# Patient Record
Sex: FEMALE | Race: WHITE | NOT HISPANIC OR LATINO
[De-identification: names, ages, dates, MRNs, and addresses within clinical notes are randomized per-mention and may not be internally consistent; named-entity substitution may affect disease eponyms.]

---

## 2015-05-30 RX ORDER — METOPROLOL TARTRATE 50 MG
1 TABLET ORAL
Qty: 0 | Refills: 0 | DISCHARGE
Start: 2015-05-30

## 2015-05-30 RX ORDER — LEVOTHYROXINE SODIUM 125 MCG
1 TABLET ORAL
Qty: 0 | Refills: 0 | DISCHARGE
Start: 2015-05-30

## 2015-05-30 RX ORDER — DILTIAZEM HCL 120 MG
1 CAPSULE, EXT RELEASE 24 HR ORAL
Qty: 0 | Refills: 0 | DISCHARGE
Start: 2015-05-30

## 2015-05-30 RX ORDER — PANTOPRAZOLE SODIUM 20 MG/1
1 TABLET, DELAYED RELEASE ORAL
Qty: 0 | Refills: 0 | DISCHARGE
Start: 2015-05-30

## 2017-02-07 ENCOUNTER — APPOINTMENT (OUTPATIENT)
Dept: CARDIOLOGY | Facility: CLINIC | Age: 82
End: 2017-02-07

## 2017-02-07 ENCOUNTER — NON-APPOINTMENT (OUTPATIENT)
Age: 82
End: 2017-02-07

## 2017-02-07 VITALS
DIASTOLIC BLOOD PRESSURE: 88 MMHG | HEIGHT: 55 IN | BODY MASS INDEX: 31.47 KG/M2 | OXYGEN SATURATION: 94 % | SYSTOLIC BLOOD PRESSURE: 165 MMHG | WEIGHT: 136 LBS | HEART RATE: 66 BPM

## 2017-03-06 ENCOUNTER — MEDICATION RENEWAL (OUTPATIENT)
Age: 82
End: 2017-03-06

## 2017-03-13 ENCOUNTER — RX RENEWAL (OUTPATIENT)
Age: 82
End: 2017-03-13

## 2017-04-20 ENCOUNTER — APPOINTMENT (OUTPATIENT)
Dept: CARDIOLOGY | Facility: CLINIC | Age: 82
End: 2017-04-20

## 2017-04-20 ENCOUNTER — NON-APPOINTMENT (OUTPATIENT)
Age: 82
End: 2017-04-20

## 2017-04-20 VITALS
BODY MASS INDEX: 32.4 KG/M2 | HEIGHT: 55 IN | HEART RATE: 62 BPM | WEIGHT: 140 LBS | SYSTOLIC BLOOD PRESSURE: 160 MMHG | DIASTOLIC BLOOD PRESSURE: 82 MMHG | OXYGEN SATURATION: 91 %

## 2017-05-04 ENCOUNTER — APPOINTMENT (OUTPATIENT)
Dept: CARDIOLOGY | Facility: CLINIC | Age: 82
End: 2017-05-04

## 2017-07-24 ENCOUNTER — APPOINTMENT (OUTPATIENT)
Dept: CARDIOLOGY | Facility: CLINIC | Age: 82
End: 2017-07-24

## 2017-07-24 VITALS
WEIGHT: 143 LBS | DIASTOLIC BLOOD PRESSURE: 90 MMHG | HEIGHT: 55 IN | SYSTOLIC BLOOD PRESSURE: 157 MMHG | HEART RATE: 67 BPM | BODY MASS INDEX: 33.09 KG/M2 | OXYGEN SATURATION: 91 %

## 2017-10-03 ENCOUNTER — APPOINTMENT (OUTPATIENT)
Dept: CARDIOLOGY | Facility: CLINIC | Age: 82
End: 2017-10-03
Payer: MEDICARE

## 2017-10-03 VITALS
WEIGHT: 142 LBS | SYSTOLIC BLOOD PRESSURE: 167 MMHG | HEIGHT: 55 IN | DIASTOLIC BLOOD PRESSURE: 76 MMHG | HEART RATE: 67 BPM | BODY MASS INDEX: 32.86 KG/M2 | OXYGEN SATURATION: 94 %

## 2017-10-03 PROCEDURE — 99214 OFFICE O/P EST MOD 30 MIN: CPT

## 2017-10-13 ENCOUNTER — MEDICATION RENEWAL (OUTPATIENT)
Age: 82
End: 2017-10-13

## 2017-11-15 ENCOUNTER — APPOINTMENT (OUTPATIENT)
Dept: CARDIOLOGY | Facility: CLINIC | Age: 82
End: 2017-11-15
Payer: MEDICARE

## 2017-11-15 PROCEDURE — 93306 TTE W/DOPPLER COMPLETE: CPT

## 2017-12-12 ENCOUNTER — APPOINTMENT (OUTPATIENT)
Dept: CARDIOLOGY | Facility: CLINIC | Age: 82
End: 2017-12-12
Payer: MEDICARE

## 2017-12-12 VITALS
WEIGHT: 140 LBS | HEIGHT: 55 IN | BODY MASS INDEX: 32.4 KG/M2 | SYSTOLIC BLOOD PRESSURE: 185 MMHG | OXYGEN SATURATION: 92 % | DIASTOLIC BLOOD PRESSURE: 79 MMHG | HEART RATE: 68 BPM

## 2017-12-12 DIAGNOSIS — I35.9 NONRHEUMATIC AORTIC VALVE DISORDER, UNSPECIFIED: ICD-10-CM

## 2017-12-12 PROCEDURE — 99215 OFFICE O/P EST HI 40 MIN: CPT

## 2018-01-03 ENCOUNTER — EMERGENCY (EMERGENCY)
Facility: HOSPITAL | Age: 83
LOS: 1 days | Discharge: HOME | End: 2018-01-03
Attending: EMERGENCY MEDICINE | Admitting: EMERGENCY MEDICINE
Payer: MEDICARE

## 2018-01-03 VITALS
RESPIRATION RATE: 20 BRPM | HEART RATE: 76 BPM | DIASTOLIC BLOOD PRESSURE: 55 MMHG | OXYGEN SATURATION: 94 % | TEMPERATURE: 98 F | SYSTOLIC BLOOD PRESSURE: 154 MMHG

## 2018-01-03 VITALS
HEIGHT: 55 IN | OXYGEN SATURATION: 97 % | DIASTOLIC BLOOD PRESSURE: 75 MMHG | TEMPERATURE: 98 F | RESPIRATION RATE: 16 BRPM | SYSTOLIC BLOOD PRESSURE: 189 MMHG | HEART RATE: 94 BPM | WEIGHT: 134.04 LBS

## 2018-01-03 DIAGNOSIS — Z95.1 PRESENCE OF AORTOCORONARY BYPASS GRAFT: Chronic | ICD-10-CM

## 2018-01-03 LAB
ALBUMIN SERPL ELPH-MCNC: 3.9 G/DL — SIGNIFICANT CHANGE UP (ref 3.3–5)
ALP SERPL-CCNC: 111 U/L — SIGNIFICANT CHANGE UP (ref 30–120)
ALT FLD-CCNC: 22 U/L DA — SIGNIFICANT CHANGE UP (ref 10–60)
ANION GAP SERPL CALC-SCNC: 6 MMOL/L — SIGNIFICANT CHANGE UP (ref 5–17)
APTT BLD: 36.4 SEC — SIGNIFICANT CHANGE UP (ref 27.5–37.4)
AST SERPL-CCNC: 22 U/L — SIGNIFICANT CHANGE UP (ref 10–40)
BASOPHILS # BLD AUTO: 0 K/UL — SIGNIFICANT CHANGE UP (ref 0–0.2)
BASOPHILS NFR BLD AUTO: 0.5 % — SIGNIFICANT CHANGE UP (ref 0–2)
BILIRUB SERPL-MCNC: 0.6 MG/DL — SIGNIFICANT CHANGE UP (ref 0.2–1.2)
BUN SERPL-MCNC: 19 MG/DL — SIGNIFICANT CHANGE UP (ref 7–23)
CALCIUM SERPL-MCNC: 9.3 MG/DL — SIGNIFICANT CHANGE UP (ref 8.4–10.5)
CHLORIDE SERPL-SCNC: 106 MMOL/L — SIGNIFICANT CHANGE UP (ref 96–108)
CK MB CFR SERPL CALC: 1.6 NG/ML — SIGNIFICANT CHANGE UP (ref 0–3.6)
CK SERPL-CCNC: 64 U/L — SIGNIFICANT CHANGE UP (ref 26–192)
CK SERPL-CCNC: 67 U/L — SIGNIFICANT CHANGE UP (ref 26–192)
CO2 SERPL-SCNC: 29 MMOL/L — SIGNIFICANT CHANGE UP (ref 22–31)
CREAT SERPL-MCNC: 1.15 MG/DL — SIGNIFICANT CHANGE UP (ref 0.5–1.3)
D DIMER BLD IA.RAPID-MCNC: <150 NG/ML DDU — SIGNIFICANT CHANGE UP
EOSINOPHIL # BLD AUTO: 0.1 K/UL — SIGNIFICANT CHANGE UP (ref 0–0.5)
EOSINOPHIL NFR BLD AUTO: 1.7 % — SIGNIFICANT CHANGE UP (ref 0–6)
GLUCOSE SERPL-MCNC: 134 MG/DL — HIGH (ref 70–99)
HCT VFR BLD CALC: 40.1 % — SIGNIFICANT CHANGE UP (ref 34.5–45)
HGB BLD-MCNC: 12.3 G/DL — SIGNIFICANT CHANGE UP (ref 11.5–15.5)
INR BLD: 1.64 RATIO — HIGH (ref 0.88–1.16)
LIDOCAIN IGE QN: 119 U/L — SIGNIFICANT CHANGE UP (ref 73–393)
LYMPHOCYTES # BLD AUTO: 1.7 K/UL — SIGNIFICANT CHANGE UP (ref 1–3.3)
LYMPHOCYTES # BLD AUTO: 20.9 % — SIGNIFICANT CHANGE UP (ref 13–44)
MCHC RBC-ENTMCNC: 26.1 PG — LOW (ref 27–34)
MCHC RBC-ENTMCNC: 30.6 GM/DL — LOW (ref 32–36)
MCV RBC AUTO: 85.4 FL — SIGNIFICANT CHANGE UP (ref 80–100)
MONOCYTES # BLD AUTO: 0.7 K/UL — SIGNIFICANT CHANGE UP (ref 0–0.9)
MONOCYTES NFR BLD AUTO: 8.7 % — SIGNIFICANT CHANGE UP (ref 2–14)
NEUTROPHILS # BLD AUTO: 5.4 K/UL — SIGNIFICANT CHANGE UP (ref 1.8–7.4)
NEUTROPHILS NFR BLD AUTO: 68.2 % — SIGNIFICANT CHANGE UP (ref 43–77)
NT-PROBNP SERPL-SCNC: 1798 PG/ML — HIGH (ref 0–450)
PLATELET # BLD AUTO: 226 K/UL — SIGNIFICANT CHANGE UP (ref 150–400)
POTASSIUM SERPL-MCNC: 3.7 MMOL/L — SIGNIFICANT CHANGE UP (ref 3.5–5.3)
POTASSIUM SERPL-SCNC: 3.7 MMOL/L — SIGNIFICANT CHANGE UP (ref 3.5–5.3)
PROT SERPL-MCNC: 8 G/DL — SIGNIFICANT CHANGE UP (ref 6–8.3)
PROTHROM AB SERPL-ACNC: 18.1 SEC — HIGH (ref 9.8–12.7)
RBC # BLD: 4.7 M/UL — SIGNIFICANT CHANGE UP (ref 3.8–5.2)
RBC # FLD: 14 % — SIGNIFICANT CHANGE UP (ref 10.3–14.5)
SODIUM SERPL-SCNC: 141 MMOL/L — SIGNIFICANT CHANGE UP (ref 135–145)
TROPONIN I SERPL-MCNC: 0 NG/ML — LOW (ref 0.02–0.06)
TROPONIN I SERPL-MCNC: 0.01 NG/ML — LOW (ref 0.02–0.06)
WBC # BLD: 7.9 K/UL — SIGNIFICANT CHANGE UP (ref 3.8–10.5)
WBC # FLD AUTO: 7.9 K/UL — SIGNIFICANT CHANGE UP (ref 3.8–10.5)

## 2018-01-03 PROCEDURE — 80053 COMPREHEN METABOLIC PANEL: CPT

## 2018-01-03 PROCEDURE — 99285 EMERGENCY DEPT VISIT HI MDM: CPT

## 2018-01-03 PROCEDURE — 85379 FIBRIN DEGRADATION QUANT: CPT

## 2018-01-03 PROCEDURE — 84484 ASSAY OF TROPONIN QUANT: CPT

## 2018-01-03 PROCEDURE — 71046 X-RAY EXAM CHEST 2 VIEWS: CPT

## 2018-01-03 PROCEDURE — 71046 X-RAY EXAM CHEST 2 VIEWS: CPT | Mod: 26

## 2018-01-03 PROCEDURE — 82550 ASSAY OF CK (CPK): CPT

## 2018-01-03 PROCEDURE — 83690 ASSAY OF LIPASE: CPT

## 2018-01-03 PROCEDURE — 85610 PROTHROMBIN TIME: CPT

## 2018-01-03 PROCEDURE — 93005 ELECTROCARDIOGRAM TRACING: CPT

## 2018-01-03 PROCEDURE — 82553 CREATINE MB FRACTION: CPT

## 2018-01-03 PROCEDURE — 85730 THROMBOPLASTIN TIME PARTIAL: CPT

## 2018-01-03 PROCEDURE — 93010 ELECTROCARDIOGRAM REPORT: CPT

## 2018-01-03 PROCEDURE — 36415 COLL VENOUS BLD VENIPUNCTURE: CPT

## 2018-01-03 PROCEDURE — 83880 ASSAY OF NATRIURETIC PEPTIDE: CPT

## 2018-01-03 PROCEDURE — 99284 EMERGENCY DEPT VISIT MOD MDM: CPT | Mod: 25

## 2018-01-03 PROCEDURE — 85027 COMPLETE CBC AUTOMATED: CPT

## 2018-01-03 RX ORDER — SODIUM CHLORIDE 9 MG/ML
3 INJECTION INTRAMUSCULAR; INTRAVENOUS; SUBCUTANEOUS ONCE
Qty: 0 | Refills: 0 | Status: COMPLETED | OUTPATIENT
Start: 2018-01-03 | End: 2018-01-03

## 2018-01-03 RX ORDER — ASPIRIN/CALCIUM CARB/MAGNESIUM 324 MG
325 TABLET ORAL ONCE
Qty: 0 | Refills: 0 | Status: COMPLETED | OUTPATIENT
Start: 2018-01-03 | End: 2018-01-03

## 2018-01-03 RX ADMIN — SODIUM CHLORIDE 3 MILLILITER(S): 9 INJECTION INTRAMUSCULAR; INTRAVENOUS; SUBCUTANEOUS at 19:45

## 2018-01-03 NOTE — ED PROVIDER NOTE - PROGRESS NOTE DETAILS
had discussed Dr. Barnes's recommendation with pt and son, do not want to stay for second set of blood work, pt has been asymptomatic since presentation, pain on and off for days, came on again around 5:30 this evening, after discussion, pt agrees to 2nd set now, will not stay for set later

## 2018-01-03 NOTE — ED ADULT NURSE NOTE - CHPI ED SYMPTOMS NEG
no syncope/no fever/no vomiting/no nausea/no cough/no dizziness/no chills/no shortness of breath/no back pain/no chest pain

## 2018-01-03 NOTE — ED PROVIDER NOTE - MEDICAL DECISION MAKING DETAILS
89 y.o. F awaiting valve replacement surgery next week presenting with episode CP - has been having pressure on and off for days, today was very stressed, does have h/o anxiety and panic attacks as well - on arrival to ED, asymptomatic - pt had 2 sets enzymes negative - pt refused 6hr set, allowed for 2hr - will call her cardiologist in the morning

## 2018-01-03 NOTE — ED PROVIDER NOTE - PMH
Atrial fibrillation, unspecified    CAD (coronary artery disease)  S/P CABG 1999  Dyslipidemia    Gastritis    Gastroesophageal reflux disease without esophagitis    Gastrointestinal hemorrhage, unspecified gastritis, unspecified gastrointestinal hemorrhage type    Hyperlipidemia    Hypertension    Hypothyroidism    Obesity

## 2018-01-03 NOTE — ED PROVIDER NOTE - OBJECTIVE STATEMENT
89 y/o F presents to the ED fo intermittent chest discomfort and tightness x several days. Denies any discomfort right now. Thinks that it may be related to nervousness regarding valve surgery she will be having next week-cardiologist Dr. Craig at [facility] advised her 3 weeks ago that she needed a new valve. Notes exertional dyspnea but denies any SOB right now. Notes nausea after eating dinner at times. Notes that swelling in legs is chronic. Denies fevers, cough, rhinorrhea, dizziness, vomiting, abdominal pain. PSHx; bypass (20 years ago), 2 stents (2 years ago) Takes Xarelto. Nonsmoker. Denies ETOH usage.

## 2018-01-03 NOTE — ED ADULT NURSE REASSESSMENT NOTE - ANCILLARY STATUS
awaiting lab draw/awaiting radiology
lab results pending/cardiovascular tests pending
lab results pending/radiology results pending
physician at bedside/DR ROLDAN AT BEDSIDE DISCUSSES POC

## 2018-01-11 ENCOUNTER — OUTPATIENT (OUTPATIENT)
Dept: OUTPATIENT SERVICES | Facility: HOSPITAL | Age: 83
LOS: 1 days | End: 2018-01-11
Payer: MEDICARE

## 2018-01-11 VITALS
RESPIRATION RATE: 18 BRPM | SYSTOLIC BLOOD PRESSURE: 164 MMHG | OXYGEN SATURATION: 97 % | WEIGHT: 134.04 LBS | HEART RATE: 63 BPM | DIASTOLIC BLOOD PRESSURE: 71 MMHG | HEIGHT: 55 IN | TEMPERATURE: 98 F

## 2018-01-11 DIAGNOSIS — Z86.010 PERSONAL HISTORY OF COLONIC POLYPS: Chronic | ICD-10-CM

## 2018-01-11 DIAGNOSIS — Z95.1 PRESENCE OF AORTOCORONARY BYPASS GRAFT: Chronic | ICD-10-CM

## 2018-01-11 DIAGNOSIS — K62.9 DISEASE OF ANUS AND RECTUM, UNSPECIFIED: Chronic | ICD-10-CM

## 2018-01-11 DIAGNOSIS — Z90.710 ACQUIRED ABSENCE OF BOTH CERVIX AND UTERUS: Chronic | ICD-10-CM

## 2018-01-11 DIAGNOSIS — I35.0 NONRHEUMATIC AORTIC (VALVE) STENOSIS: ICD-10-CM

## 2018-01-11 LAB
ALBUMIN SERPL ELPH-MCNC: 4.7 G/DL — SIGNIFICANT CHANGE UP (ref 3.3–5)
ALP SERPL-CCNC: 101 U/L — SIGNIFICANT CHANGE UP (ref 40–120)
ALT FLD-CCNC: 13 U/L RC — SIGNIFICANT CHANGE UP (ref 10–45)
ANION GAP SERPL CALC-SCNC: 12 MMOL/L — SIGNIFICANT CHANGE UP (ref 5–17)
AST SERPL-CCNC: 19 U/L — SIGNIFICANT CHANGE UP (ref 10–40)
BILIRUB SERPL-MCNC: 0.6 MG/DL — SIGNIFICANT CHANGE UP (ref 0.2–1.2)
BUN SERPL-MCNC: 19 MG/DL — SIGNIFICANT CHANGE UP (ref 7–23)
CALCIUM SERPL-MCNC: 9.7 MG/DL — SIGNIFICANT CHANGE UP (ref 8.4–10.5)
CHLORIDE SERPL-SCNC: 99 MMOL/L — SIGNIFICANT CHANGE UP (ref 96–108)
CO2 SERPL-SCNC: 30 MMOL/L — SIGNIFICANT CHANGE UP (ref 22–31)
CREAT SERPL-MCNC: 0.95 MG/DL — SIGNIFICANT CHANGE UP (ref 0.5–1.3)
GLUCOSE SERPL-MCNC: 119 MG/DL — HIGH (ref 70–99)
HCT VFR BLD CALC: 40.6 % — SIGNIFICANT CHANGE UP (ref 34.5–45)
HGB BLD-MCNC: 12.6 G/DL — SIGNIFICANT CHANGE UP (ref 11.5–15.5)
MCHC RBC-ENTMCNC: 26.5 PG — LOW (ref 27–34)
MCHC RBC-ENTMCNC: 31 GM/DL — LOW (ref 32–36)
MCV RBC AUTO: 85.4 FL — SIGNIFICANT CHANGE UP (ref 80–100)
PLATELET # BLD AUTO: 252 K/UL — SIGNIFICANT CHANGE UP (ref 150–400)
POTASSIUM SERPL-MCNC: 4 MMOL/L — SIGNIFICANT CHANGE UP (ref 3.5–5.3)
POTASSIUM SERPL-SCNC: 4 MMOL/L — SIGNIFICANT CHANGE UP (ref 3.5–5.3)
PROT SERPL-MCNC: 8.4 G/DL — HIGH (ref 6–8.3)
RBC # BLD: 4.76 M/UL — SIGNIFICANT CHANGE UP (ref 3.8–5.2)
RBC # FLD: 13.7 % — SIGNIFICANT CHANGE UP (ref 10.3–14.5)
SODIUM SERPL-SCNC: 141 MMOL/L — SIGNIFICANT CHANGE UP (ref 135–145)
WBC # BLD: 9 K/UL — SIGNIFICANT CHANGE UP (ref 3.8–10.5)
WBC # FLD AUTO: 9 K/UL — SIGNIFICANT CHANGE UP (ref 3.8–10.5)

## 2018-01-11 PROCEDURE — C1817: CPT

## 2018-01-11 PROCEDURE — 80053 COMPREHEN METABOLIC PANEL: CPT

## 2018-01-11 PROCEDURE — 93460 R&L HRT ART/VENTRICLE ANGIO: CPT

## 2018-01-11 PROCEDURE — 85027 COMPLETE CBC AUTOMATED: CPT

## 2018-01-11 PROCEDURE — 93005 ELECTROCARDIOGRAM TRACING: CPT

## 2018-01-11 PROCEDURE — 93460 R&L HRT ART/VENTRICLE ANGIO: CPT | Mod: 26

## 2018-01-11 PROCEDURE — C1894: CPT

## 2018-01-11 PROCEDURE — 93010 ELECTROCARDIOGRAM REPORT: CPT

## 2018-01-11 PROCEDURE — C1769: CPT

## 2018-01-11 PROCEDURE — C1887: CPT

## 2018-01-11 RX ORDER — LABETALOL HCL 100 MG
10 TABLET ORAL ONCE
Qty: 0 | Refills: 0 | Status: COMPLETED | OUTPATIENT
Start: 2018-01-11 | End: 2018-01-11

## 2018-01-11 RX ORDER — SIMVASTATIN 20 MG/1
1 TABLET, FILM COATED ORAL
Qty: 0 | Refills: 0 | COMMUNITY

## 2018-01-11 RX ADMIN — Medication 10 MILLIGRAM(S): at 14:15

## 2018-01-11 NOTE — H&P CARDIOLOGY - PSH
Rectal mass  removal in 6/2015  S/P CABG (coronary artery bypass graft)  in 1999 H/O abdominal hysterectomy    History of colon polyps  removal 2014  Rectal mass  removal in 6/2015  S/P CABG (coronary artery bypass graft)  in 1999

## 2018-01-11 NOTE — H&P CARDIOLOGY - HISTORY OF PRESENT ILLNESS
89-year-old white female who is status post CABG in 1989 with a LIMA graft to LAD and vein grafts to the other vesselS, HTN, HLD, Hypothyroidism, Chronic Atrial fibrillation and moderate aortic stenosis on echocardiogram from a year and half ago, Removal of Rectal mass in 6/15. She had a stress test in June of 2011 that was normal.     Cardiac catheterization in 2015 showed normal ejection fraction. She had a 90% distal left main, 100% ostial LAD, 90% circumflex 85% RCA. The LIMA graft to the LAD was patent and the vein grafts to the OM and RCA were closed. She underwent drug-eluting stents to the RCA and circumflex arteries. By echocardiogram 7/14 she has moderate aortic stenosis. A repeat echocardiogram performed 3/16 demonstrated ejection fraction of 66%. There was moderate aortic stenosis with a peak gradient of 32 and valve area 1.1 cmÂ². Showed mild MR and moderate TR with a PA pressure of 40. Most recent echo performed 5/17 demonstrates an ejection fraction of 57%. Distal moderate aortic stenosis. It is now moderate to severe TR with estimated PA pressure 62.    The patient had a repeat echocardiogram performed 11/17. This now shows severe aortic stenosis with stage III diastolic dysfunction and acentric LVH. She has mild pulmonary hypertension.     The patient has noted a slight increase in shortness of breath. She has no chest pain or episodes of dizziness lightheadedness.Pt was referred for Cardiac Cath as a pre-op for AVR.

## 2018-01-12 ENCOUNTER — CLINICAL ADVICE (OUTPATIENT)
Age: 83
End: 2018-01-12

## 2018-01-16 ENCOUNTER — APPOINTMENT (OUTPATIENT)
Dept: CARDIOTHORACIC SURGERY | Facility: CLINIC | Age: 83
End: 2018-01-16
Payer: MEDICARE

## 2018-01-16 ENCOUNTER — NON-APPOINTMENT (OUTPATIENT)
Age: 83
End: 2018-01-16

## 2018-01-16 VITALS — HEIGHT: 55 IN

## 2018-01-16 DIAGNOSIS — Z82.49 FAMILY HISTORY OF ISCHEMIC HEART DISEASE AND OTHER DISEASES OF THE CIRCULATORY SYSTEM: ICD-10-CM

## 2018-01-16 DIAGNOSIS — I35.0 NONRHEUMATIC AORTIC (VALVE) STENOSIS: ICD-10-CM

## 2018-01-16 DIAGNOSIS — Z80.9 FAMILY HISTORY OF MALIGNANT NEOPLASM, UNSPECIFIED: ICD-10-CM

## 2018-01-16 PROCEDURE — 99205 OFFICE O/P NEW HI 60 MIN: CPT

## 2018-01-16 PROCEDURE — 94010 BREATHING CAPACITY TEST: CPT

## 2018-01-16 PROCEDURE — 93000 ELECTROCARDIOGRAM COMPLETE: CPT

## 2018-01-16 RX ORDER — MULTIVIT-MIN/FOLIC/VIT K/LYCOP 400-300MCG
25 MCG TABLET ORAL DAILY
Refills: 0 | Status: ACTIVE | COMMUNITY

## 2018-01-25 ENCOUNTER — APPOINTMENT (OUTPATIENT)
Dept: CARDIOLOGY | Facility: CLINIC | Age: 83
End: 2018-01-25

## 2018-01-25 ENCOUNTER — OUTPATIENT (OUTPATIENT)
Dept: OUTPATIENT SERVICES | Facility: HOSPITAL | Age: 83
LOS: 1 days | End: 2018-01-25
Payer: MEDICARE

## 2018-01-25 DIAGNOSIS — I35.9 NONRHEUMATIC AORTIC VALVE DISORDER, UNSPECIFIED: ICD-10-CM

## 2018-01-25 DIAGNOSIS — Z90.710 ACQUIRED ABSENCE OF BOTH CERVIX AND UTERUS: Chronic | ICD-10-CM

## 2018-01-25 DIAGNOSIS — R07.9 CHEST PAIN, UNSPECIFIED: ICD-10-CM

## 2018-01-25 DIAGNOSIS — K62.9 DISEASE OF ANUS AND RECTUM, UNSPECIFIED: Chronic | ICD-10-CM

## 2018-01-25 DIAGNOSIS — Z95.1 PRESENCE OF AORTOCORONARY BYPASS GRAFT: Chronic | ICD-10-CM

## 2018-01-25 DIAGNOSIS — Z86.010 PERSONAL HISTORY OF COLONIC POLYPS: Chronic | ICD-10-CM

## 2018-01-25 PROCEDURE — 75572 CT HRT W/3D IMAGE: CPT | Mod: 26

## 2018-01-25 PROCEDURE — 75572 CT HRT W/3D IMAGE: CPT

## 2018-02-09 ENCOUNTER — APPOINTMENT (OUTPATIENT)
Dept: ULTRASOUND IMAGING | Facility: HOSPITAL | Age: 83
End: 2018-02-09

## 2018-02-09 ENCOUNTER — OUTPATIENT (OUTPATIENT)
Dept: OUTPATIENT SERVICES | Facility: HOSPITAL | Age: 83
LOS: 1 days | End: 2018-02-09
Payer: MEDICARE

## 2018-02-09 VITALS
SYSTOLIC BLOOD PRESSURE: 143 MMHG | TEMPERATURE: 99 F | WEIGHT: 134.92 LBS | HEART RATE: 60 BPM | RESPIRATION RATE: 20 BRPM | DIASTOLIC BLOOD PRESSURE: 82 MMHG | HEIGHT: 55 IN | OXYGEN SATURATION: 98 %

## 2018-02-09 DIAGNOSIS — Z90.710 ACQUIRED ABSENCE OF BOTH CERVIX AND UTERUS: Chronic | ICD-10-CM

## 2018-02-09 DIAGNOSIS — I48.91 UNSPECIFIED ATRIAL FIBRILLATION: ICD-10-CM

## 2018-02-09 DIAGNOSIS — Z01.818 ENCOUNTER FOR OTHER PREPROCEDURAL EXAMINATION: ICD-10-CM

## 2018-02-09 DIAGNOSIS — I35.0 NONRHEUMATIC AORTIC (VALVE) STENOSIS: ICD-10-CM

## 2018-02-09 DIAGNOSIS — Z95.1 PRESENCE OF AORTOCORONARY BYPASS GRAFT: Chronic | ICD-10-CM

## 2018-02-09 DIAGNOSIS — Z86.010 PERSONAL HISTORY OF COLONIC POLYPS: Chronic | ICD-10-CM

## 2018-02-09 DIAGNOSIS — K62.9 DISEASE OF ANUS AND RECTUM, UNSPECIFIED: Chronic | ICD-10-CM

## 2018-02-09 LAB
ANION GAP SERPL CALC-SCNC: 15 MMOL/L — SIGNIFICANT CHANGE UP (ref 5–17)
BLD GP AB SCN SERPL QL: NEGATIVE — SIGNIFICANT CHANGE UP
BUN SERPL-MCNC: 19 MG/DL — SIGNIFICANT CHANGE UP (ref 7–23)
CALCIUM SERPL-MCNC: 9.3 MG/DL — SIGNIFICANT CHANGE UP (ref 8.4–10.5)
CHLORIDE SERPL-SCNC: 101 MMOL/L — SIGNIFICANT CHANGE UP (ref 96–108)
CO2 SERPL-SCNC: 24 MMOL/L — SIGNIFICANT CHANGE UP (ref 22–31)
CREAT SERPL-MCNC: 0.96 MG/DL — SIGNIFICANT CHANGE UP (ref 0.5–1.3)
GLUCOSE SERPL-MCNC: 97 MG/DL — SIGNIFICANT CHANGE UP (ref 70–99)
HBA1C BLD-MCNC: 5.9 % — HIGH (ref 4–5.6)
HCT VFR BLD CALC: 34.3 % — LOW (ref 34.5–45)
HGB BLD-MCNC: 10.3 G/DL — LOW (ref 11.5–15.5)
MCHC RBC-ENTMCNC: 24.7 PG — LOW (ref 27–34)
MCHC RBC-ENTMCNC: 30 GM/DL — LOW (ref 32–36)
MCV RBC AUTO: 82.3 FL — SIGNIFICANT CHANGE UP (ref 80–100)
MRSA PCR RESULT.: SIGNIFICANT CHANGE UP
PLATELET # BLD AUTO: 241 K/UL — SIGNIFICANT CHANGE UP (ref 150–400)
POTASSIUM SERPL-MCNC: 4.5 MMOL/L — SIGNIFICANT CHANGE UP (ref 3.5–5.3)
POTASSIUM SERPL-SCNC: 4.5 MMOL/L — SIGNIFICANT CHANGE UP (ref 3.5–5.3)
RBC # BLD: 4.17 M/UL — SIGNIFICANT CHANGE UP (ref 3.8–5.2)
RBC # FLD: 15.1 % — HIGH (ref 10.3–14.5)
RH IG SCN BLD-IMP: POSITIVE — SIGNIFICANT CHANGE UP
S AUREUS DNA NOSE QL NAA+PROBE: SIGNIFICANT CHANGE UP
SODIUM SERPL-SCNC: 140 MMOL/L — SIGNIFICANT CHANGE UP (ref 135–145)
WBC # BLD: 8.36 K/UL — SIGNIFICANT CHANGE UP (ref 3.8–10.5)
WBC # FLD AUTO: 8.36 K/UL — SIGNIFICANT CHANGE UP (ref 3.8–10.5)

## 2018-02-09 PROCEDURE — 71045 X-RAY EXAM CHEST 1 VIEW: CPT

## 2018-02-09 PROCEDURE — 71045 X-RAY EXAM CHEST 1 VIEW: CPT | Mod: 26

## 2018-02-09 PROCEDURE — 87640 STAPH A DNA AMP PROBE: CPT

## 2018-02-09 PROCEDURE — G0463: CPT

## 2018-02-09 PROCEDURE — 93880 EXTRACRANIAL BILAT STUDY: CPT

## 2018-02-09 PROCEDURE — 83036 HEMOGLOBIN GLYCOSYLATED A1C: CPT

## 2018-02-09 PROCEDURE — 86901 BLOOD TYPING SEROLOGIC RH(D): CPT

## 2018-02-09 PROCEDURE — 86850 RBC ANTIBODY SCREEN: CPT

## 2018-02-09 PROCEDURE — 85027 COMPLETE CBC AUTOMATED: CPT

## 2018-02-09 PROCEDURE — 86900 BLOOD TYPING SEROLOGIC ABO: CPT

## 2018-02-09 PROCEDURE — 93880 EXTRACRANIAL BILAT STUDY: CPT | Mod: 26

## 2018-02-09 PROCEDURE — 87641 MR-STAPH DNA AMP PROBE: CPT

## 2018-02-09 PROCEDURE — 80048 BASIC METABOLIC PNL TOTAL CA: CPT

## 2018-02-09 RX ORDER — VANCOMYCIN HCL 1 G
1000 VIAL (EA) INTRAVENOUS ONCE
Qty: 0 | Refills: 0 | Status: DISCONTINUED | OUTPATIENT
Start: 2018-02-14 | End: 2018-02-14

## 2018-02-09 RX ORDER — SODIUM CHLORIDE 9 MG/ML
3 INJECTION INTRAMUSCULAR; INTRAVENOUS; SUBCUTANEOUS EVERY 8 HOURS
Qty: 0 | Refills: 0 | Status: DISCONTINUED | OUTPATIENT
Start: 2018-02-14 | End: 2018-02-14

## 2018-02-09 NOTE — H&P PST ADULT - ASSESSMENT
89-year-old white female with PMHx of CAD prior cardiac stents ( PCI -RCA & Cx in 2015), CABG in 1989 with a LIMA graft to LAD and vein grafts to the other vessels, HTN, HLD, Hypothyroidism, Chronic Atrial fibrillation on xarelto  and moderate aortic stenosis on echocardiogram from a year and half ago, Removal of Rectal mass in 6/15. She had a stress test in June of 2011 that was normal. The patient has noted a slight increase in shortness of breath x 2 months She has no chest pain or episodes of dizziness lightheadedness .Pt had Cardiac Cath  on jan 11 2018 as a pre-op for AVR., The patient had a repeat echocardiogram performed 11/17. This now shows severe aortic stenosis with stage III diastolic dysfunction and acentric LVH. She has mild pulmonary hypertension. Seen & evaluated by Dr Byrnes & Dr Anton & primary cards Dr Alonso Craig & is now referred  for Replace Aortic Valve Perq femoral Artery Approach om 2/14/18.

## 2018-02-09 NOTE — H&P PST ADULT - PROBLEM SELECTOR PLAN 1
Replace Aortic Valve Perq femoral Artery Approach om 2/14/18.  Afib on xarelto will take last dose on 2/11/18  cbc, BMP, T & S, HgbA1c, MRSA swab, CXR, carotid dopplers.

## 2018-02-09 NOTE — H&P PST ADULT - PROBLEM SELECTOR PLAN 2
As per [facility] project suggests Recommendation is 3 days prior procedure pt is taking her last dose on 2/11 at 10 am  Record ID is # [id number]

## 2018-02-09 NOTE — H&P PST ADULT - HISTORY OF PRESENT ILLNESS
89-year-old white female who is status post CABG in 1989 with a LIMA graft to LAD and vein grafts to the other vesselS, HTN, HLD, Hypothyroidism, Chronic Atrial fibrillation and moderate aortic stenosis on echocardiogram from a year and half ago, Removal of Rectal mass in 6/15. She had a stress test in June of 2011 that was normal.     Cardiac catheterization in 2015 showed normal ejection fraction. She had a 90% distal left main, 100% ostial LAD, 90% circumflex 85% RCA. The LIMA graft to the LAD was patent and the vein grafts to the OM and RCA were closed. She underwent drug-eluting stents to the RCA and circumflex arteries. By echocardiogram 7/14 she has moderate aortic stenosis. A repeat echocardiogram performed 3/16 demonstrated ejection fraction of 66%. There was moderate aortic stenosis with a peak gradient of 32 and valve area 1.1 cmÂ². Showed mild MR and moderate TR with a PA pressure of 40. Most recent echo performed 5/17 demonstrates an ejection fraction of 57%. Distal moderate aortic stenosis. It is now moderate to severe TR with estimated PA pressure 62.    The patient had a repeat echocardiogram performed 11/17. This now shows severe aortic stenosis with stage III diastolic dysfunction and acentric LVH. She has mild pulmonary hypertension.     The patient has noted a slight increase in shortness of breath. She has no chest pain or episodes of dizziness lightheadedness.Pt was referred for Cardiac Cath as a pre-op for AVR. 89-year-old white female who is status post CABG in 1989 with a LIMA graft to LAD and vein grafts to the other vesselS, HTN, HLD, Hypothyroidism, Chronic Atrial fibrillation and moderate aortic stenosis on echocardiogram from a year and half ago, Removal of Rectal mass in 6/15. She had a stress test in June of 2011 that was normal.     The patient had a repeat echocardiogram performed 11/17. This now shows severe aortic stenosis with stage III diastolic dysfunction and acentric LVH. She has mild pulmonary hypertension.     The patient has noted a slight increase in shortness of breath. She has no chest pain or episodes of dizziness lightheadedness.Pt was referred for Cardiac Cath as a pre-op for AVR. 89-year-old white female with PMHx of CAD prior cardiac stents ( PCI -RCA & Cx in 2015), CABG in 1989 with a LIMA graft to LAD and vein grafts to the other vessels, HTN, HLD, Hypothyroidism, Chronic Atrial fibrillation on xarelto  and moderate aortic stenosis on echocardiogram from a year and half ago, Removal of Rectal mass in 6/15. She had a stress test in June of 2011 that was normal. The patient has noted a slight increase in shortness of breath x 2 months She has no chest pain or episodes of dizziness lightheadedness .Pt had Cardiac Cath  on jan 11 2018 as a pre-op for AVR., The patient had a repeat echocardiogram performed 11/17. This now shows severe aortic stenosis with stage III diastolic dysfunction and acentric LVH. She has mild pulmonary hypertension. Seen & evaluated by Dr Byrnes & Dr Anton & primary cards Dr Alonso Craig & is now referred  for Replace Aortic Valve Perq femoral Artery Approach om 2/14/18.

## 2018-02-09 NOTE — H&P PST ADULT - PSH
H/O abdominal hysterectomy    History of colon polyps  removal 2014  Rectal mass  removal in 6/2015  S/P CABG (coronary artery bypass graft)  in 1999

## 2018-02-09 NOTE — H&P PST ADULT - NEGATIVE OPHTHALMOLOGIC SYMPTOMS
no diplopia/no blurred vision L/no lacrimation R/no photophobia/no lacrimation L/no blurred vision R

## 2018-02-09 NOTE — H&P PST ADULT - NSANTHOSAYNRD_GEN_A_CORE
No. BOBBI screening performed.  STOP BANG Legend: 0-2 = LOW Risk; 3-4 = INTERMEDIATE Risk; 5-8 = HIGH Risk

## 2018-02-13 ENCOUNTER — OUTPATIENT (OUTPATIENT)
Dept: OUTPATIENT SERVICES | Facility: HOSPITAL | Age: 83
LOS: 1 days | End: 2018-02-13
Payer: MEDICARE

## 2018-02-13 DIAGNOSIS — Z95.1 PRESENCE OF AORTOCORONARY BYPASS GRAFT: Chronic | ICD-10-CM

## 2018-02-13 DIAGNOSIS — Z90.710 ACQUIRED ABSENCE OF BOTH CERVIX AND UTERUS: Chronic | ICD-10-CM

## 2018-02-13 DIAGNOSIS — K62.9 DISEASE OF ANUS AND RECTUM, UNSPECIFIED: Chronic | ICD-10-CM

## 2018-02-13 DIAGNOSIS — Z86.010 PERSONAL HISTORY OF COLONIC POLYPS: Chronic | ICD-10-CM

## 2018-02-13 PROCEDURE — 86923 COMPATIBILITY TEST ELECTRIC: CPT

## 2018-02-14 ENCOUNTER — INPATIENT (INPATIENT)
Facility: HOSPITAL | Age: 83
LOS: 4 days | Discharge: HOME | DRG: 267 | End: 2018-02-19
Attending: THORACIC SURGERY (CARDIOTHORACIC VASCULAR SURGERY) | Admitting: THORACIC SURGERY (CARDIOTHORACIC VASCULAR SURGERY)
Payer: MEDICARE

## 2018-02-14 ENCOUNTER — APPOINTMENT (OUTPATIENT)
Dept: CARDIOTHORACIC SURGERY | Facility: HOSPITAL | Age: 83
End: 2018-02-14

## 2018-02-14 VITALS
WEIGHT: 136.69 LBS | SYSTOLIC BLOOD PRESSURE: 156 MMHG | OXYGEN SATURATION: 96 % | HEIGHT: 55 IN | DIASTOLIC BLOOD PRESSURE: 82 MMHG | HEART RATE: 73 BPM | RESPIRATION RATE: 18 BRPM | TEMPERATURE: 98 F

## 2018-02-14 DIAGNOSIS — Z95.1 PRESENCE OF AORTOCORONARY BYPASS GRAFT: Chronic | ICD-10-CM

## 2018-02-14 DIAGNOSIS — Z90.710 ACQUIRED ABSENCE OF BOTH CERVIX AND UTERUS: Chronic | ICD-10-CM

## 2018-02-14 DIAGNOSIS — Z86.010 PERSONAL HISTORY OF COLONIC POLYPS: Chronic | ICD-10-CM

## 2018-02-14 DIAGNOSIS — I35.0 NONRHEUMATIC AORTIC (VALVE) STENOSIS: ICD-10-CM

## 2018-02-14 DIAGNOSIS — K62.9 DISEASE OF ANUS AND RECTUM, UNSPECIFIED: Chronic | ICD-10-CM

## 2018-02-14 LAB
ALBUMIN SERPL ELPH-MCNC: 3.5 G/DL — SIGNIFICANT CHANGE UP (ref 3.3–5)
ALP SERPL-CCNC: 80 U/L — SIGNIFICANT CHANGE UP (ref 40–120)
ALT FLD-CCNC: 10 U/L RC — SIGNIFICANT CHANGE UP (ref 10–45)
ANION GAP SERPL CALC-SCNC: 14 MMOL/L — SIGNIFICANT CHANGE UP (ref 5–17)
APTT BLD: 30.5 SEC — SIGNIFICANT CHANGE UP (ref 27.5–37.4)
AST SERPL-CCNC: 20 U/L — SIGNIFICANT CHANGE UP (ref 10–40)
BASOPHILS # BLD AUTO: 0 K/UL — SIGNIFICANT CHANGE UP (ref 0–0.2)
BASOPHILS NFR BLD AUTO: 0.1 % — SIGNIFICANT CHANGE UP (ref 0–2)
BILIRUB SERPL-MCNC: 1 MG/DL — SIGNIFICANT CHANGE UP (ref 0.2–1.2)
BUN SERPL-MCNC: 21 MG/DL — SIGNIFICANT CHANGE UP (ref 7–23)
CALCIUM SERPL-MCNC: 8 MG/DL — LOW (ref 8.4–10.5)
CHLORIDE SERPL-SCNC: 105 MMOL/L — SIGNIFICANT CHANGE UP (ref 96–108)
CK MB BLD-MCNC: 10.2 % — HIGH (ref 0–3.5)
CK MB CFR SERPL CALC: 5.9 NG/ML — HIGH (ref 0–3.8)
CK SERPL-CCNC: 58 U/L — SIGNIFICANT CHANGE UP (ref 25–170)
CO2 SERPL-SCNC: 24 MMOL/L — SIGNIFICANT CHANGE UP (ref 22–31)
CREAT SERPL-MCNC: 0.85 MG/DL — SIGNIFICANT CHANGE UP (ref 0.5–1.3)
EOSINOPHIL # BLD AUTO: 0 K/UL — SIGNIFICANT CHANGE UP (ref 0–0.5)
EOSINOPHIL NFR BLD AUTO: 0 % — SIGNIFICANT CHANGE UP (ref 0–6)
GAS PNL BLDA: SIGNIFICANT CHANGE UP
GLUCOSE BLDC GLUCOMTR-MCNC: 139 MG/DL — HIGH (ref 70–99)
GLUCOSE SERPL-MCNC: 193 MG/DL — HIGH (ref 70–99)
HCT VFR BLD CALC: 29.7 % — LOW (ref 34.5–45)
HGB BLD-MCNC: 9.2 G/DL — LOW (ref 11.5–15.5)
INR BLD: 1.16 RATIO — SIGNIFICANT CHANGE UP (ref 0.88–1.16)
LYMPHOCYTES # BLD AUTO: 1.4 K/UL — SIGNIFICANT CHANGE UP (ref 1–3.3)
LYMPHOCYTES # BLD AUTO: 12.8 % — LOW (ref 13–44)
MCHC RBC-ENTMCNC: 25.5 PG — LOW (ref 27–34)
MCHC RBC-ENTMCNC: 31.1 GM/DL — LOW (ref 32–36)
MCV RBC AUTO: 81.9 FL — SIGNIFICANT CHANGE UP (ref 80–100)
MONOCYTES # BLD AUTO: 0.5 K/UL — SIGNIFICANT CHANGE UP (ref 0–0.9)
MONOCYTES NFR BLD AUTO: 4.3 % — SIGNIFICANT CHANGE UP (ref 2–14)
NEUTROPHILS # BLD AUTO: 9.2 K/UL — HIGH (ref 1.8–7.4)
NEUTROPHILS NFR BLD AUTO: 82.8 % — HIGH (ref 43–77)
PLATELET # BLD AUTO: 194 K/UL — SIGNIFICANT CHANGE UP (ref 150–400)
POTASSIUM SERPL-MCNC: 3.9 MMOL/L — SIGNIFICANT CHANGE UP (ref 3.5–5.3)
POTASSIUM SERPL-SCNC: 3.9 MMOL/L — SIGNIFICANT CHANGE UP (ref 3.5–5.3)
PROT SERPL-MCNC: 6.2 G/DL — SIGNIFICANT CHANGE UP (ref 6–8.3)
PROTHROM AB SERPL-ACNC: 12.7 SEC — SIGNIFICANT CHANGE UP (ref 9.8–12.7)
RBC # BLD: 3.62 M/UL — LOW (ref 3.8–5.2)
RBC # FLD: 14.6 % — HIGH (ref 10.3–14.5)
SODIUM SERPL-SCNC: 143 MMOL/L — SIGNIFICANT CHANGE UP (ref 135–145)
TROPONIN T SERPL-MCNC: 0.06 NG/ML — SIGNIFICANT CHANGE UP (ref 0–0.06)
WBC # BLD: 11.1 K/UL — HIGH (ref 3.8–10.5)
WBC # FLD AUTO: 11.1 K/UL — HIGH (ref 3.8–10.5)

## 2018-02-14 PROCEDURE — 99223 1ST HOSP IP/OBS HIGH 75: CPT

## 2018-02-14 PROCEDURE — 93355 ECHO TRANSESOPHAGEAL (TEE): CPT

## 2018-02-14 PROCEDURE — 33361 REPLACE AORTIC VALVE PERQ: CPT | Mod: 62,Q0

## 2018-02-14 PROCEDURE — 93010 ELECTROCARDIOGRAM REPORT: CPT

## 2018-02-14 PROCEDURE — 33361 REPLACE AORTIC VALVE PERQ: CPT

## 2018-02-14 RX ORDER — LEVOTHYROXINE SODIUM 125 MCG
100 TABLET ORAL DAILY
Qty: 0 | Refills: 0 | Status: DISCONTINUED | OUTPATIENT
Start: 2018-02-14 | End: 2018-02-19

## 2018-02-14 RX ORDER — ATORVASTATIN CALCIUM 80 MG/1
40 TABLET, FILM COATED ORAL AT BEDTIME
Qty: 0 | Refills: 0 | Status: DISCONTINUED | OUTPATIENT
Start: 2018-02-14 | End: 2018-02-19

## 2018-02-14 RX ORDER — INSULIN LISPRO 100/ML
VIAL (ML) SUBCUTANEOUS
Qty: 0 | Refills: 0 | Status: DISCONTINUED | OUTPATIENT
Start: 2018-02-14 | End: 2018-02-15

## 2018-02-14 RX ORDER — METOPROLOL TARTRATE 50 MG
25 TABLET ORAL EVERY 12 HOURS
Qty: 0 | Refills: 0 | Status: DISCONTINUED | OUTPATIENT
Start: 2018-02-14 | End: 2018-02-15

## 2018-02-14 RX ORDER — DOCUSATE SODIUM 100 MG
100 CAPSULE ORAL THREE TIMES A DAY
Qty: 0 | Refills: 0 | Status: DISCONTINUED | OUTPATIENT
Start: 2018-02-14 | End: 2018-02-15

## 2018-02-14 RX ORDER — SODIUM CHLORIDE 9 MG/ML
1000 INJECTION INTRAMUSCULAR; INTRAVENOUS; SUBCUTANEOUS
Qty: 0 | Refills: 0 | Status: DISCONTINUED | OUTPATIENT
Start: 2018-02-14 | End: 2018-02-15

## 2018-02-14 RX ORDER — CEFUROXIME AXETIL 250 MG
1500 TABLET ORAL EVERY 8 HOURS
Qty: 0 | Refills: 0 | Status: COMPLETED | OUTPATIENT
Start: 2018-02-14 | End: 2018-02-15

## 2018-02-14 RX ORDER — POTASSIUM CHLORIDE 20 MEQ
20 PACKET (EA) ORAL
Qty: 0 | Refills: 0 | Status: COMPLETED | OUTPATIENT
Start: 2018-02-14 | End: 2018-02-14

## 2018-02-14 RX ORDER — ASPIRIN/CALCIUM CARB/MAGNESIUM 324 MG
325 TABLET ORAL DAILY
Qty: 0 | Refills: 0 | Status: DISCONTINUED | OUTPATIENT
Start: 2018-02-14 | End: 2018-02-15

## 2018-02-14 RX ORDER — HEPARIN SODIUM 5000 [USP'U]/ML
5000 INJECTION INTRAVENOUS; SUBCUTANEOUS EVERY 8 HOURS
Qty: 0 | Refills: 0 | Status: DISCONTINUED | OUTPATIENT
Start: 2018-02-14 | End: 2018-02-15

## 2018-02-14 RX ORDER — INSULIN LISPRO 100/ML
VIAL (ML) SUBCUTANEOUS AT BEDTIME
Qty: 0 | Refills: 0 | Status: DISCONTINUED | OUTPATIENT
Start: 2018-02-14 | End: 2018-02-15

## 2018-02-14 RX ORDER — NICARDIPINE HYDROCHLORIDE 30 MG/1
3 CAPSULE, EXTENDED RELEASE ORAL
Qty: 40 | Refills: 0 | Status: DISCONTINUED | OUTPATIENT
Start: 2018-02-14 | End: 2018-02-15

## 2018-02-14 RX ORDER — PANTOPRAZOLE SODIUM 20 MG/1
40 TABLET, DELAYED RELEASE ORAL DAILY
Qty: 0 | Refills: 0 | Status: DISCONTINUED | OUTPATIENT
Start: 2018-02-14 | End: 2018-02-15

## 2018-02-14 RX ADMIN — ATORVASTATIN CALCIUM 40 MILLIGRAM(S): 80 TABLET, FILM COATED ORAL at 22:06

## 2018-02-14 RX ADMIN — Medication 100 MILLIGRAM(S): at 14:20

## 2018-02-14 RX ADMIN — Medication 20 MILLIEQUIVALENT(S): at 12:26

## 2018-02-14 RX ADMIN — Medication 2: at 17:14

## 2018-02-14 RX ADMIN — Medication 100 MILLIGRAM(S): at 22:06

## 2018-02-14 RX ADMIN — NICARDIPINE HYDROCHLORIDE 15 MG/HR: 30 CAPSULE, EXTENDED RELEASE ORAL at 12:30

## 2018-02-14 RX ADMIN — Medication 100 MILLIGRAM(S): at 14:46

## 2018-02-14 RX ADMIN — Medication 20 MILLIEQUIVALENT(S): at 12:27

## 2018-02-14 RX ADMIN — HEPARIN SODIUM 5000 UNIT(S): 5000 INJECTION INTRAVENOUS; SUBCUTANEOUS at 14:46

## 2018-02-14 RX ADMIN — Medication 25 MILLIGRAM(S): at 15:40

## 2018-02-14 RX ADMIN — PANTOPRAZOLE SODIUM 40 MILLIGRAM(S): 20 TABLET, DELAYED RELEASE ORAL at 12:27

## 2018-02-14 RX ADMIN — HEPARIN SODIUM 5000 UNIT(S): 5000 INJECTION INTRAVENOUS; SUBCUTANEOUS at 22:06

## 2018-02-14 RX ADMIN — Medication 20 MILLIEQUIVALENT(S): at 14:47

## 2018-02-14 RX ADMIN — SODIUM CHLORIDE 3 MILLILITER(S): 9 INJECTION INTRAMUSCULAR; INTRAVENOUS; SUBCUTANEOUS at 10:54

## 2018-02-14 RX ADMIN — Medication 325 MILLIGRAM(S): at 12:26

## 2018-02-14 NOTE — PROGRESS NOTE ADULT - SUBJECTIVE AND OBJECTIVE BOX
This patient has been implanted with a Transcatheter Aortic Valve Implant/Mitraclip under the following NCT/ALEX:    TAVR:    Commercial Implant NCT [id number], ALEX N/A and will be placed into the TVT Registry.

## 2018-02-14 NOTE — CONSULT NOTE ADULT - SUBJECTIVE AND OBJECTIVE BOX
CHIEF COMPLAINT: Patient is a 89y old  Female who presents with a chief complaint of " I am have valve surgery " (2018 06:00)      HPI:  89-year-old white female with PMHx of CAD prior cardiac stents ( PCI -RCA & Cx in ), CABG in  with a LIMA graft to LAD and vein grafts to the other vessels, HTN, HLD, Hypothyroidism, Chronic Atrial fibrillation on xarelto, severe AS, Removal of Rectal mass in 6/15 now with progressive dyspnea on exertion. She now is planning on having a TAVR this morning. Denies any chest pain, PND, orthopnea, near syncope, syncope, lower extremity edema, stroke like symptoms. Her dyspnea is at baseline. She is nervous about the procedure.       EKG: AF    REVIEW OF SYSTEMS:   All other review of systems are negative unless indicated above    PAST MEDICAL & SURGICAL HISTORY:  Gastrointestinal hemorrhage, unspecified gastritis, unspecified gastrointestinal hemorrhage type  Gastritis  Atrial fibrillation, unspecified  Hyperlipidemia  Gastroesophageal reflux disease without esophagitis  Hypothyroidism  Obesity  Dyslipidemia  Hypertension  CAD (coronary artery disease): S/P CABG   History of colon polyps: removal   H/O abdominal hysterectomy  Rectal mass: removal in 2015  S/P CABG (coronary artery bypass graft): in       SOCIAL HISTORY:  No tobacco, ethanol, or drug abuse.    FAMILY HISTORY:  No pertinent family history in first degree relatives    No family history of acute MI or sudden cardiac death.    MEDICATIONS  (STANDING):  sodium chloride 0.9% lock flush 3 milliLiter(s) IV Push every 8 hours  vancomycin  IVPB 1000 milliGRAM(s) IV Intermittent once    MEDICATIONS  (PRN):      Allergies    amoxicillin (Unknown)    Intolerances        Home meds:  Home Medications:  Calcium 500+D oral tablet, chewable: 1 tab(s) orally once a day (2018 05:56)  diltiazem 240 mg/24 hours oral capsule, extended release: 1 cap(s) orally once a day (2018 05:56)  levothyroxine 100 mcg (0.1 mg) oral tablet: 1 tab(s) orally once a day (2018 05:56)  Metoprolol Tartrate 25 mg oral tablet: 1 tab(s) orally 2 times a day (2018 05:56)  Ocuvite oral tablet: 1 tab(s) orally once a day (2018 05:56)  pantoprazole 20 mg oral delayed release tablet: 1 tab(s) orally once a day (2018 05:56)  simvastatin 20 mg oral tablet: 1 tab(s) orally once a day (at bedtime)    home (2018 05:56)  Vitamin B-12 100 mcg oral tablet: 1 tab(s) orally once a day (2018 05:56)  Vitamin D3 1000 intl units oral tablet: 1 tab(s) orally once a day (2018 05:56)  Xarelto 20 mg oral tablet: 1 tab(s) orally once a day (in the evening) last dose will be  at 10 am  (2018 05:56)        VITAL SIGNS:   Vital Signs Last 24 Hrs  T(C): 36.9 (2018 05:56), Max: 36.9 (2018 05:56)  T(F): 98.4 (2018 05:56), Max: 98.4 (2018 05:56)  HR: 73 (2018 05:56) (73 - 73)  BP: 156/82 (2018 05:56) (156/82 - 156/82)  BP(mean): --  RR: 18 (2018 05:56) (18 - 18)  SpO2: 96% (2018 05:56) (96% - 96%)    I&O's Summary      On Exam:     Constitutional: NAD, awake and alert, well-developed  HEENT: Moist Mucous Membranes, Anicteric  Pulmonary: Decreased breath sounds b/l. No rales, crackles or wheeze appreciated.   Cardiovascular: IRRR, S1 and no S2, 3/6 late peaking SM  Gastrointestinal: Bowel Sounds present, soft, nontender.   Lymph: No peripheral edema. No lymphadenopathy.  Skin: No visible rashes or ulcers.  Psych:  Mood & affect appropriate for situation.     LABS: All Labs Reviewed:                Blood Culture:         RADIOLOGY:  < from: Xray Chest 1 View AP/PA (18 @ 13:49) >    EXAM:  XR CHEST AP OR PA 1V                            PROCEDURE DATE:  2018            INTERPRETATION:  History: TAVR      Heart and mediastinum: Poststernotomy with mild cardiomegaly    Lungs, pleura, and airways: Trace left effusion and/orpleural   thickening. No pneumothorax or consolidation    Bones and soft tissues: The bones and soft tissues are unremarkable.    Impression:    Trace left effusion and/or pleural thickening. No pneumothorax or   consolidation    No significant change from January 3, 2018                    TREVIN KNUTSON M.D., ATTENDING RADIOLOGIST  This document has been electronically signed. 2018  2:25PM                < end of copied text >    < from: Cardiac Cath Lab - Adult (18 @ 12:33) >    [facility]  Department of Cardiology  [address] [place] [address]  [phone]  Cath Lab Report -- Comprehensive Report  Patient: RON ABREU  Study date: 2018  Account number: [id number]  MR number: [id number]  : 10/06/1926  Gender: Female  Race: W  Case Physician(s):  Nelson Flores M.D.  Fellow:  Lorenzo Joya M.D.  Referring Physician:  Alonso Craig M.D.  INDICATIONS: Aortic valve stenosis.  HISTORY: Aortic valve: history of severe stenosis. The patient has a  history of coronary artery disease. The patient has hypertension. PRIOR  CARDIOVASCULAR PROCEDURES: Coronary bypass.  PROCEDURE:  --  Right heart catheterization.  --  Left heart catheterization.  --  Left coronary angiography.  --  Right coronary angiography.  --  Sonosite - Diagnostic.  TECHNIQUE: The risks and alternatives of the procedures and conscious  sedation were explained to the patient and informed consent was obtained.  Cardiac catheterization performed electively.  Local anesthetic given. Right heart catheterization. The procedure was  performed utilizing a 7 FR [place] catheter under fluoroscopic guidance.  Measurements of pressures were obtained. Left heart catheterization. A 5FR  AL1 EXPO catheter was utilized. After recording ascending aortic pressure,  the catheter was advanced across the aortic valve and left ventricular  pressure was recorded. Left coronary artery angiography. The vessel was  injected utilizing a 5FR EBU4.0 catheter. Right coronary artery  angiography. The vessel was injected utilizing a 4FR SRC catheter.  Sonosite - Diagnostic. RADIATION EXPOSURE: 13 min.  CONTRAST GIVEN: Omnipaque 50 ml.  MEDICATIONS GIVEN: Fentanyl, 25 mcg, IV. Midazolam, 0.5 mg, IV.  VENTRICLES: No left ventriculogram was performed.  VALVES: AORTIC VALVE: The aortic valve was evaluated by hemodynamic  measurement and fluoroscopy. The aortic valve leaflets exhibited  moderately reduced excursion and mild calcification.  CORONARY VESSELS: The coronary circulation is right dominant.  LM:   --  LM: Angiography showed minor luminal irregularities with no flow  limiting lesions.  LAD:   --  Mid LAD: There was a 100 % stenosis in the proximal third of the  vessel segment. In a second lesion, there was a discrete 90 % stenosis in  the middle third of the vessel segment, proximal to the graft anastomosis.  --  D1: There was a 60 % stenosis.  CX:   --  Ostial circumflex: There was a tubular 20 % stenosis at the site  of a prior stent, in-stent.  --  Distal circumflex: There was a 30 % stenosis.  RCA:   --  Proximal RCA: There was a tubular 40 % stenosis at the site of a  prior stent, in-stent.  GRAFTS:   --  Graft to the mid LAD: The graft was a LIMA. It was normal.  COMPLICATIONS: There were no complications.  DIAGNOSTIC IMPRESSIONS: Patent RCA and Lcx stents. Patent LIMA to LAD.  Diseased mid LAD. Severe AS  DIAGNOSTIC RECOMMENDATIONS: F/u with TAVR clinic  Prepared and signed by  Nelson Flores M.D.  Signed 2018 17:44:18  HEMODYNAMIC TABLES  Pressures:  Baseline  Pressures:  - HR: 69  Pressures:  - Rhythm:  Pressures:  -- Aortic Pressure (S/D/M): 184/81/122  Pressures:  -- Aortic Valve Splice - AO: 189/77/--  Pressures:  -- Aortic Valve Splice - LV: 200/0/--  Pressures:  -- Left Ventricle (s/edp): 207/19/--  Pressures:  -- Pulmonary Artery (S/D/M): 54/18/35  Pressures:  -- Pulmonary Capillary Wedge: 24/32/22  Pressures:  -- Right [place] (a/v/M): 15/14/11  Pressures:  -- Right Ventricle (s/edp): 56/11/--  O2 Sats:  Baseline  O2 Sats:  - HR: 69  O2 Sats:  - Rhythm:  O2 Sats:  -- AO: 11.1/92.4/13.95  O2 Sats:  -- PA: 11.1/61.9/9.34  Valves:  Baseline  Valves:  -- AORTIC: Aortic systolic ejection period: 23.40  Valves:  -- AORTIC: Aortic valve area: 0.86  Valves:  -- AORTIC: Aortic valve flow: 183.78  Valves:  -- AORTIC: Aortic valve index: 0.58  Valves:  -- AORTIC: Aortic Valve Splice - Gradient: 23.48  Outputs:  Baseline  Outputs:  -- CALCULATIONS[age]  Outputs:  -- CALCULATIONS: Body Surface Area: 1.48  Outputs:  -- CALCULATIONS: Height in cm: 140.00  Outputs:  -- CALCULATIONS: Sex: Female  Outputs:  -- CALCULATIONS: Weight in k.80  Outputs:  -- OUTPUTS: CO by Quiana: 4.30  Outputs:  -- OUTPUTS: Quiana cardiac index: 2.90  Outputs:  -- OUTPUTS: O2 consumption: 198.00  Outputs:  -- OUTPUTS: Vo2 Indexed: 133.72  Outputs:  -- RESISTANCES: Left ventricular stroke work: 75.95  Outputs:  -- RESISTANCES: Left Ventricular Stroke Work index: 51.30  Outputs:  -- RESISTANCES: Pulmonary vascular index (dsc): 358.01  Outputs:  -- RESISTANCES: Pulmonary vascular index (Wood Units): 4.48  Outputs:  -- RESISTANCES: Pulmonary vascular resistance (dsc): 241.78  Outputs:  -- RESISTANCES: Pulmonary vascular resistance (Wood Units): 3.02  Outputs:  -- RESISTANCES: PVR_SVR Ratio: 0.12  Outputs:  -- RESISTANCES: Right ventricular stroke work: 20.64  Outputs:  -- RESISTANCES: Right ventricular stroke work index: 13.94  Outputs:  -- RESISTANCES: Systemic vascular index (dsc): 3056.81  Outputs:  -- RESISTANCES: Systemic vascular index (Wood Units): 38.22  Outputs:  -- RESISTANCES: Systemic vascular resistance (dsc): 2064.43  Outputs:  -- RESISTANCES: Systemic vascular resistance (Wood Units): 25.81  Outputs:  -- RESISTANCES: Total pulmonary index (dsc): 963.86  Outputs:  -- RESISTANCES: Total pulmonary index (Wood Units): 12.05  Outputs:  -- RESISTANCES: Total pulmonary resistance (dsc): 650.95  Outputs:  -- RESISTANCES: Total pulmonary resistance (Wood Units): 8.14  Outputs:  -- RESISTANCES: Total vascular index (Wood Units): 42.01  Outputs:  -- RESISTANCES: Total vascular resistance (dsc): 2269.02  Outputs:  -- RESISTANCES: Total vascular resistance (Wood Units): 28.37  Outputs:  -- RESISTANCES: Total vascular resistance index (dsc): 3359.74  Outputs:-- RESISTANCES: TPR_TVR Ratio: 0.29  Outputs:  -- SHUNTS: Pulmonary flow: 4.30  Outputs:  -- SHUNTS: Qp Indexed: 2.90  Outputs:  -- SHUNTS: Qs Indexed: 2.90  Outputs:  -- SHUNTS: Systemic flow: 4.30    < end of copied text >

## 2018-02-14 NOTE — CONSULT NOTE ADULT - ASSESSMENT
89-year-old white female with PMHx of CAD prior cardiac stents ( PCI -RCA & Cx in 2015), CABG in 1989 with a LIMA graft to LAD and vein grafts to the other vessels, HTN, HLD, Hypothyroidism, Chronic Atrial fibrillation on xarelto, severe AS, Removal of Rectal mass in 6/15 now with progressive dyspnea on exertion now pending TAVR.   - Plan for TAVR today. Optimized as best as possible from cardiac standpoint.   - Hold AC until ok with TAVR team to resume  - Cont  statin  - Likely would benefit from ASA  - Cont Metorpolol and Cardizem at home doses  - Monitor and replete electrolytes. Keep K>4.0 and Mg>2.0.   - Further cardiac workup will depend on clinical course.   Will followup post operatively.

## 2018-02-14 NOTE — BRIEF OPERATIVE NOTE - PROCEDURE
<<-----Click on this checkbox to enter Procedure TAVR (transcatheter aortic valve replacement)  02/14/2018  29core valve via left transfemoral approach  TVP wire in place via right venous sheath  Active  JOYCE

## 2018-02-14 NOTE — BRIEF OPERATIVE NOTE - POST-OP DX
Aortic valve stenosis, etiology of cardiac valve disease unspecified  02/14/2018    Active  Aleks Jang

## 2018-02-15 DIAGNOSIS — Z95.2 PRESENCE OF PROSTHETIC HEART VALVE: ICD-10-CM

## 2018-02-15 LAB
ALBUMIN SERPL ELPH-MCNC: 3.9 G/DL — SIGNIFICANT CHANGE UP (ref 3.3–5)
ALP SERPL-CCNC: 81 U/L — SIGNIFICANT CHANGE UP (ref 40–120)
ALT FLD-CCNC: 14 U/L RC — SIGNIFICANT CHANGE UP (ref 10–45)
ANION GAP SERPL CALC-SCNC: 11 MMOL/L — SIGNIFICANT CHANGE UP (ref 5–17)
APTT BLD: 23.1 SEC — LOW (ref 27.5–37.4)
AST SERPL-CCNC: 22 U/L — SIGNIFICANT CHANGE UP (ref 10–40)
BILIRUB SERPL-MCNC: 0.6 MG/DL — SIGNIFICANT CHANGE UP (ref 0.2–1.2)
BUN SERPL-MCNC: 19 MG/DL — SIGNIFICANT CHANGE UP (ref 7–23)
CALCIUM SERPL-MCNC: 8.7 MG/DL — SIGNIFICANT CHANGE UP (ref 8.4–10.5)
CHLORIDE SERPL-SCNC: 106 MMOL/L — SIGNIFICANT CHANGE UP (ref 96–108)
CO2 SERPL-SCNC: 24 MMOL/L — SIGNIFICANT CHANGE UP (ref 22–31)
CREAT SERPL-MCNC: 0.82 MG/DL — SIGNIFICANT CHANGE UP (ref 0.5–1.3)
GLUCOSE SERPL-MCNC: 137 MG/DL — HIGH (ref 70–99)
HCT VFR BLD CALC: 29.1 % — LOW (ref 34.5–45)
HGB BLD-MCNC: 9.3 G/DL — LOW (ref 11.5–15.5)
INR BLD: 1.12 RATIO — SIGNIFICANT CHANGE UP (ref 0.88–1.16)
MCHC RBC-ENTMCNC: 26.1 PG — LOW (ref 27–34)
MCHC RBC-ENTMCNC: 31.9 GM/DL — LOW (ref 32–36)
MCV RBC AUTO: 81.9 FL — SIGNIFICANT CHANGE UP (ref 80–100)
PLATELET # BLD AUTO: 172 K/UL — SIGNIFICANT CHANGE UP (ref 150–400)
POTASSIUM SERPL-MCNC: 4.6 MMOL/L — SIGNIFICANT CHANGE UP (ref 3.5–5.3)
POTASSIUM SERPL-SCNC: 4.6 MMOL/L — SIGNIFICANT CHANGE UP (ref 3.5–5.3)
PROT SERPL-MCNC: 7 G/DL — SIGNIFICANT CHANGE UP (ref 6–8.3)
PROTHROM AB SERPL-ACNC: 12.2 SEC — SIGNIFICANT CHANGE UP (ref 9.8–12.7)
RBC # BLD: 3.55 M/UL — LOW (ref 3.8–5.2)
RBC # FLD: 14.5 % — SIGNIFICANT CHANGE UP (ref 10.3–14.5)
SODIUM SERPL-SCNC: 141 MMOL/L — SIGNIFICANT CHANGE UP (ref 135–145)
WBC # BLD: 11.4 K/UL — HIGH (ref 3.8–10.5)
WBC # FLD AUTO: 11.4 K/UL — HIGH (ref 3.8–10.5)

## 2018-02-15 PROCEDURE — 93010 ELECTROCARDIOGRAM REPORT: CPT

## 2018-02-15 PROCEDURE — 33207 INSERT HEART PM VENTRICULAR: CPT | Mod: KX

## 2018-02-15 PROCEDURE — 71045 X-RAY EXAM CHEST 1 VIEW: CPT | Mod: 26,77

## 2018-02-15 PROCEDURE — 99232 SBSQ HOSP IP/OBS MODERATE 35: CPT

## 2018-02-15 PROCEDURE — 71045 X-RAY EXAM CHEST 1 VIEW: CPT | Mod: 26

## 2018-02-15 PROCEDURE — 93306 TTE W/DOPPLER COMPLETE: CPT | Mod: 26

## 2018-02-15 RX ORDER — VANCOMYCIN HCL 1 G
1000 VIAL (EA) INTRAVENOUS ONCE
Qty: 0 | Refills: 0 | Status: COMPLETED | OUTPATIENT
Start: 2018-02-16 | End: 2018-02-16

## 2018-02-15 RX ORDER — DOCUSATE SODIUM 100 MG
100 CAPSULE ORAL THREE TIMES A DAY
Qty: 0 | Refills: 0 | Status: DISCONTINUED | OUTPATIENT
Start: 2018-02-15 | End: 2018-02-19

## 2018-02-15 RX ORDER — RIVAROXABAN 15 MG-20MG
15 KIT ORAL EVERY 24 HOURS
Qty: 0 | Refills: 0 | Status: DISCONTINUED | OUTPATIENT
Start: 2018-02-15 | End: 2018-02-15

## 2018-02-15 RX ORDER — ASPIRIN/CALCIUM CARB/MAGNESIUM 324 MG
81 TABLET ORAL DAILY
Qty: 0 | Refills: 0 | Status: DISCONTINUED | OUTPATIENT
Start: 2018-02-15 | End: 2018-02-19

## 2018-02-15 RX ORDER — ATORVASTATIN CALCIUM 80 MG/1
40 TABLET, FILM COATED ORAL AT BEDTIME
Qty: 0 | Refills: 0 | Status: DISCONTINUED | OUTPATIENT
Start: 2018-02-15 | End: 2018-02-15

## 2018-02-15 RX ORDER — ASPIRIN/CALCIUM CARB/MAGNESIUM 324 MG
81 TABLET ORAL DAILY
Qty: 0 | Refills: 0 | Status: DISCONTINUED | OUTPATIENT
Start: 2018-02-15 | End: 2018-02-15

## 2018-02-15 RX ORDER — SODIUM CHLORIDE 9 MG/ML
3 INJECTION INTRAMUSCULAR; INTRAVENOUS; SUBCUTANEOUS EVERY 8 HOURS
Qty: 0 | Refills: 0 | Status: DISCONTINUED | OUTPATIENT
Start: 2018-02-15 | End: 2018-02-19

## 2018-02-15 RX ORDER — FENTANYL CITRATE 50 UG/ML
25 INJECTION INTRAVENOUS ONCE
Qty: 0 | Refills: 0 | Status: DISCONTINUED | OUTPATIENT
Start: 2018-02-15 | End: 2018-02-15

## 2018-02-15 RX ORDER — RIVAROXABAN 15 MG-20MG
20 KIT ORAL EVERY 24 HOURS
Qty: 0 | Refills: 0 | Status: DISCONTINUED | OUTPATIENT
Start: 2018-02-15 | End: 2018-02-15

## 2018-02-15 RX ADMIN — Medication 25 MILLIGRAM(S): at 06:12

## 2018-02-15 RX ADMIN — SODIUM CHLORIDE 3 MILLILITER(S): 9 INJECTION INTRAMUSCULAR; INTRAVENOUS; SUBCUTANEOUS at 22:00

## 2018-02-15 RX ADMIN — Medication 100 MICROGRAM(S): at 06:12

## 2018-02-15 RX ADMIN — Medication 81 MILLIGRAM(S): at 13:45

## 2018-02-15 RX ADMIN — ATORVASTATIN CALCIUM 40 MILLIGRAM(S): 80 TABLET, FILM COATED ORAL at 22:49

## 2018-02-15 RX ADMIN — HEPARIN SODIUM 5000 UNIT(S): 5000 INJECTION INTRAVENOUS; SUBCUTANEOUS at 06:12

## 2018-02-15 RX ADMIN — Medication 100 MILLIGRAM(S): at 06:00

## 2018-02-15 RX ADMIN — FENTANYL CITRATE 25 MICROGRAM(S): 50 INJECTION INTRAVENOUS at 08:00

## 2018-02-15 RX ADMIN — RIVAROXABAN 20 MILLIGRAM(S): KIT at 13:45

## 2018-02-15 RX ADMIN — FENTANYL CITRATE 25 MICROGRAM(S): 50 INJECTION INTRAVENOUS at 07:45

## 2018-02-15 RX ADMIN — SODIUM CHLORIDE 3 MILLILITER(S): 9 INJECTION INTRAMUSCULAR; INTRAVENOUS; SUBCUTANEOUS at 13:46

## 2018-02-15 NOTE — PROGRESS NOTE ADULT - ASSESSMENT
89-year-old white female with PMHx of CAD prior cardiac stents ( PCI -RCA & Cx in 2015), CABG in 1989 with a LIMA graft to LAD and vein grafts to the other vessels, HTN, HLD, Hypothyroidism, Chronic Atrial fibrillation on xarelto, severe AS, Removal of Rectal mass in 6/15 now with progressive dyspnea on exertion with severe AS, now s/p TAVR.    - Tolerated procedure well. Is aware and alert, and off pressors  - Can restart systemic anticoagulation with Xarelto as per CTS  - Cont statin  - Would benefit from ASA; She seems to have excessive bruising when on aspirin in past. We will monitor this as outpatient.  - Restart Metoprolol 25 mg po bid. We can hold off on restarting Cardizem at current time. Her heart rates are controlled in AF.  - Monitor and replete electrolytes. Keep K>4.0 and Mg>2.0.   - Further cardiac workup will depend on clinical course.   - Will follow in step down unit. 89-year-old white female with PMHx of CAD prior cardiac stents ( PCI -RCA & Cx in 2015), CABG in 1989 with a LIMA graft to LAD and vein grafts to the other vessels, HTN, HLD, Hypothyroidism, Chronic Atrial fibrillation on xarelto, severe AS, Removal of Rectal mass in 6/15 now with progressive dyspnea on exertion with severe AS, now s/p TAVR.    - Tolerated procedure well. Is aware and alert, and off pressors  - Can restart systemic anticoagulation with Xarelto as per CTS  - No events on telemetry to date. Continue to monitor. Pacing wires remain in.  - Cont statin  - Would benefit from ASA; She seems to have excessive bruising when on aspirin in past. We will monitor this as outpatient.  - Restart Metoprolol 25 mg po bid. We can hold off on restarting Cardizem at current time. Her heart rates are controlled in AF.  - Monitor and replete electrolytes. Keep K>4.0 and Mg>2.0.   - Further cardiac workup will depend on clinical course.   - Will follow in step down unit.

## 2018-02-15 NOTE — PROGRESS NOTE ADULT - SUBJECTIVE AND OBJECTIVE BOX
[facility] - Kam Craig, Anaid, Magali, Roselyn, Jaylene Gomez  Office Number:  [phone]    Patient resting comfortably in bed in NAD.  Laying flat with no respiratory distress.  No complaints of chest pain, dyspnea, palpitations, PND, or orthopnea.  Tolerated procedure well.    ROS: negative unless otherwise mentioned.    Telemetry:  AF, rates controlled    MEDICATIONS  (STANDING):  aspirin Disintegrating Tablet 81 milliGRAM(s) Oral daily  atorvastatin 40 milliGRAM(s) Oral at bedtime  fentaNYL    Injectable 25 MICROGram(s) IV Push once  heparin  Injectable 5000 Unit(s) SubCutaneous every 8 hours  levothyroxine 100 MICROGram(s) Oral daily  metoprolol     tartrate 25 milliGRAM(s) Oral every 12 hours  rivaroxaban 20 milliGRAM(s) Oral every 24 hours  sodium chloride 0.9%. 1000 milliLiter(s) (20 mL/Hr) IV Continuous <Continuous>    MEDICATIONS  (PRN):      Allergies    amoxicillin (Unknown)    Intolerances        Vital Signs Last 24 Hrs  T(C): 36.6 (15 Feb 2018 08:00), Max: 37 (14 Feb 2018 19:00)  T(F): 97.9 (15 Feb 2018 08:00), Max: 98.6 (14 Feb 2018 19:00)  HR: 72 (15 Feb 2018 10:00) (44 - 95)  BP: 145/65 (15 Feb 2018 10:00) (125/58 - 151/66)  BP(mean): 93 (15 Feb 2018 10:00) (84 - 95)  RR: 23 (15 Feb 2018 10:00) (22 - 59)  SpO2: 99% (15 Feb 2018 10:00) (92% - 100%)    I&O's Summary    14 Feb 2018 07:01  -  15 Feb 2018 07:00  --------------------------------------------------------  IN: 970 mL / OUT: 600 mL / NET: 370 mL    15 Feb 2018 07:01  -  15 Feb 2018 10:55  --------------------------------------------------------  IN: 170 mL / OUT: 200 mL / NET: -30 mL        ON EXAM:    Constitutional: NAD, awake and alert, well-developed  HEENT: Moist Mucous Membranes, Anicteric  Pulmonary: Decreased breath sounds b/l. No rales, crackles or wheeze appreciated.   Cardiovascular: IRRR, S1 and no S2, 3/6 late peaking SM  Gastrointestinal: Bowel Sounds present, soft, nontender.   Lymph: No peripheral edema. No lymphadenopathy.  Skin: No visible rashes or ulcers.  Psych:  Mood & affect appropriate for situation.   LABS: All Labs Reviewed:                        9.3    11.4  )-----------( 172      ( 15 Feb 2018 00:35 )             29.1                         9.2    11.1  )-----------( 194      ( 14 Feb 2018 10:32 )             29.7     15 Feb 2018 00:35    141    |  106    |  19     ----------------------------<  137    4.6     |  24     |  0.82   14 Feb 2018 10:32    143    |  105    |  21     ----------------------------<  193    3.9     |  24     |  0.85     Ca    8.7        15 Feb 2018 00:35  Ca    8.0        14 Feb 2018 10:32    TPro  7.0    /  Alb  3.9    /  TBili  0.6    /  DBili  x      /  AST  22     /  ALT  14     /  AlkPhos  81     15 Feb 2018 00:35  TPro  6.2    /  Alb  3.5    /  TBili  1.0    /  DBili  x      /  AST  20     /  ALT  10     /  AlkPhos  80     14 Feb 2018 10:32    PT/INR - ( 15 Feb 2018 00:35 )   PT: 12.2 sec;   INR: 1.12 ratio         PTT - ( 15 Feb 2018 00:35 )  PTT:23.1 sec  CARDIAC MARKERS ( 14 Feb 2018 10:32 )  x     / 0.06 ng/mL / 58 U/L / x     / 5.9 ng/mL      Blood Culture:

## 2018-02-15 NOTE — PROGRESS NOTE ADULT - ASSESSMENT
89-year-old white female with PMHx of CAD prior cardiac stents ( PCI -RCA & Cx in 2015), CABG in 1989 with a LIMA graft to LAD and vein grafts to the other vessels, HTN, HLD, Hypothyroidism, Chronic Atrial fibrillation on xarelto  and moderate aortic stenosis on echocardiogram from a year and half ago, Removal of Rectal mass in 6/15.  reports increasin SOB/HERNADEZ ECHO reveals severe aortic stenosis with stage III diastolic dysfunction and acentric LVH, mild PAH .  Dr. Craig Cardiology Seen  2/14/18 Underwent TAVR # 29mm CORE Evolut Pro bioprosthesis via left  femoral approach .Right femoral TVP. Post operative course includes   howard cardia down to 40s resolved. Restarted on home dose  metoprolol 25 BID   2/15/18  ECHO  WDL no AR no paravalvular leak transferred to [address] [place] [address]- seen by cardiology Dr Mariee  no need to resume home cardizem at this time in rate controlled   chronic afib-  Xarelto 20 BID resumed 89-year-old white female with PMHx of CAD prior cardiac stents ( PCI -RCA & Cx in 2015), CABG in 1989 with a LIMA graft to LAD and vein grafts to the other vessels, HTN, HLD, Hypothyroidism, Chronic Atrial fibrillation on xarelto  and moderate aortic stenosis on echocardiogram from a year and half ago, Removal of Rectal mass in 6/15.  reports increasin SOB/HERNADEZ ECHO reveals severe aortic stenosis with stage III diastolic dysfunction and acentric LVH, mild PAH .  Dr. Craig Cardiology Seen  2/14/18 Underwent TAVR # 29mm CORE Evolut Pro bioprosthesis via left  femoral approach .Right femoral TVP.  Post operative course includes   howard cardia down to 40s resolved.  LBBB Restarted on home dose  metoprolol 25 BID   2/15/18  ECHO  WDL no AR no paravalvular leak transferred to [address] [place] [address]- seen by cardiology Dr Mariee  no need to resume home cardizem at this time in rate controlled   chronic afib-  Xarelto 20 BID resumed 89-year-old white female with PMHx of CAD prior cardiac stents ( PCI -RCA & Cx in 2015), CABG in 1989 with a LIMA graft to LAD and vein grafts to the other vessels, HTN, HLD, Hypothyroidism, Chronic Atrial fibrillation on xarelto  and moderate aortic stenosis on echocardiogram from a year and half ago, Removal of Rectal mass in 6/15.  reports increasin SOB/HERNADEZ ECHO reveals severe aortic stenosis with stage III diastolic dysfunction and acentric LVH, mild PAH .  Dr. Craig Cardiology Seen  2/14/18 Underwent TAVR # 29mm CORE Evolut Pro bioprosthesis via left  femoral approach .Right femoral TVP.  Post operative course includes   howard cardia down to 40s resolved.  LBBB Restarted on home dose  metoprolol 25 BID   2/15/18  ECHO  WDL no AR no paravalvular leak transferred to [address] [place] [address]- seen by cardiology Dr Mariee  no need to resume home cardizem at this time in rate controlled   chronic afib-  Xarelto 20 BID resumed   addendum: patient experienced  multiple 3- 5  sec symptomatic pauses EP called Dr Anton,Dr Byrnes and Dr Mccray at bedside.   patient underwent PPM placement LACW tolerated procedure well Xarelto on hold

## 2018-02-15 NOTE — PROGRESS NOTE ADULT - SUBJECTIVE AND OBJECTIVE BOX
Subjective  Hello OOB in chair no distress "I feel good"  VITAL SIGNS  Telemetry:  Afib 80s    Vital Signs Last 24 Hrs  T(C): 36.8 (02-15-18 @ 11:59), Max: 37 (18 @ 19:00)  T(F): 98.2 (02-15-18 @ 11:59), Max: 98.6 (18 @ 19:00)  HR: 83 (02-15-18 @ 11:59) (44 - 95)  BP: 157/81 (02-15-18 @ 11:59) (125/58 - 157/81)  RR: 20 (02-15-18 @ 11:59) (20 - 59)  SpO2: 99% (02-15-18 @ 11:59) (92% - 100%)            @ 07:01  -  02-15 @ 07:00  --------------------------------------------------------  IN: 970 mL / OUT: 600 mL / NET: 370 mL    02-15 @ 07:01  -  02-15 @ 12:32  --------------------------------------------------------  IN: 170 mL / OUT: 200 mL / NET: -30 mL    Daily Weight in k (15 Feb 2018 00:00)  CAPILLARY BLOOD GLUCOSE  POCT Blood Glucose.: 139 mg/dL (2018 12:33)        PHYSICAL EXAM        Neurology: alert and oriented x 3, nonfocal, no gross deficits    CV :    Sternal Wound :  CDI , Stable    Lungs:    Abdomen: soft, nontender, nondistended, positive bowel sounds, last bowel movement     :               Extremities:                                           Physical Therapy Rec:   Home  [  ]   Home w/ PT  [  ]  Rehab  [  ]    Discussed with Cardiothoracic Team at AM rounds. Subjective  Hello OOB in chair no distress "I feel good"  VITAL SIGNS  Telemetry:  Afib 80s    Vital Signs Last 24 Hrs  T(C): 36.8 (02-15-18 @ 11:59), Max: 37 (18 @ 19:00)  T(F): 98.2 (02-15-18 @ 11:59), Max: 98.6 (18 @ 19:00)  HR: 83 (02-15-18 @ 11:59) (44 - 95)  BP: 157/81 (02-15-18 @ 11:59) (125/58 - 157/81)  RR: 20 (02-15-18 @ 11:59) (20 - 59)  SpO2: 99% (02-15-18 @ 11:59) (92% - 100%)            @ 07:01  -  02-15 @ 07:00  --------------------------------------------------------  IN: 970 mL / OUT: 600 mL / NET: 370 mL    02-15 @ 07:01  -  02-15 @ 12:32  --------------------------------------------------------  IN: 170 mL / OUT: 200 mL / NET: -30 mL    Daily Weight in k (15 Feb 2018 00:00)  CAPILLARY BLOOD GLUCOSE  POCT Blood Glucose.: 139 mg/dL (2018 12:33)  MEDICATIONS  (STANDING):  aspirin enteric coated 81 milliGRAM(s) Oral daily  atorvastatin 40 milliGRAM(s) Oral at bedtime  docusate sodium 100 milliGRAM(s) Oral three times a day  levothyroxine 100 MICROGram(s) Oral daily  metoprolol     tartrate 25 milliGRAM(s) Oral every 12 hours  rivaroxaban 20 milliGRAM(s) Oral every 24 hours  sodium chloride 0.9% lock flush 3 milliLiter(s) IV Push every 8 hours           PHYSICAL EXAM  Neurology: alert and oriented x 3, nonfocal, no gross deficits  CV :S1 S2 IRR  Lungs:CTA  Abdomen: soft, nontender, nondistended, positive bowel sounds,   :  Voids           Extremities: B/L LE warm + Dp   Right groin CDI no hematoma ecchymosis        left Groin CDI no hematoma ecchymosis                                       Disposition to   Home  [x  ]    Discussed with Cardiothoracic Team at AM rounds. Subjective  Hello OOB in chair no distress "I feel good"  VITAL SIGNS  Telemetry:  Afib 80s    Vital Signs Last 24 Hrs  T(C): 36.8 (02-15-18 @ 11:59), Max: 37 (18 @ 19:00)  T(F): 98.2 (02-15-18 @ 11:59), Max: 98.6 (18 @ 19:00)  HR: 83 (02-15-18 @ 11:59) (44 - 95)  BP: 157/81 (02-15-18 @ 11:59) (125/58 - 157/81)  RR: 20 (02-15-18 @ 11:59) (20 - 59)  SpO2: 99% (02-15-18 @ 11:59) (92% - 100%)            @ 07:01  -  02-15 @ 07:00  --------------------------------------------------------  IN: 970 mL / OUT: 600 mL / NET: 370 mL    02-15 @ 07:01  -  02-15 @ 12:32  --------------------------------------------------------  IN: 170 mL / OUT: 200 mL / NET: -30 mL    Daily Weight in k (15 Feb 2018 00:00)  CAPILLARY BLOOD GLUCOSE  POCT Blood Glucose.: 139 mg/dL (2018 12:33)  MEDICATIONS  (STANDING):  aspirin enteric coated 81 milliGRAM(s) Oral daily  atorvastatin 40 milliGRAM(s) Oral at bedtime  docusate sodium 100 milliGRAM(s) Oral three times a day  levothyroxine 100 MICROGram(s) Oral daily  metoprolol     tartrate 25 milliGRAM(s) Oral every 12 hours  rivaroxaban 20 milliGRAM(s) Oral every 24 hours  sodium chloride 0.9% lock flush 3 milliLiter(s) IV Push every 8 hours           PHYSICAL EXAM  Neurology: alert and oriented x 3, nonfocal, no gross deficits  CV :S1 S2 IRR  Lungs:CTA  Abdomen: soft, nontender, nondistended, positive bowel sounds,   :  Voids           Extremities: B/L LE warm + Dp   Right groin CDI no hematoma ecchymosis        left Groin CDI no hematoma ecchymosis     Disposition to   Home  [x  ]    Discussed with Cardiothoracic Team at AM rounds.

## 2018-02-15 NOTE — PROGRESS NOTE ADULT - SUBJECTIVE AND OBJECTIVE BOX
*****Structural Heart Team*****    Subjective:    Patient resting comfortably in chair, offering no complaints. She had no difficulty in ambulating earlier with the assistance of a walker.    T(C): 36.9 (02-15-18 @ 13:53), Max: 37 (02-14-18 @ 19:00)  HR: 94 (02-15-18 @ 13:53) (68 - 95)  BP: 143/75 (02-15-18 @ 13:53) (125/58 - 157/81)  RR: 18 (02-15-18 @ 13:53) (18 - 59)  SpO2: 94% (02-15-18 @ 13:53) (92% - 100%)  Wt(kg): --  02-14 @ 07:01  -  02-15 @ 07:00  --------------------------------------------------------  IN: 970 mL / OUT: 600 mL / NET: 370 mL    02-15 @ 07:01  -  02-15 @ 15:13  --------------------------------------------------------  IN: 406 mL / OUT: 200 mL / NET: 206 mL      MEDICATIONS  (STANDING):  aspirin enteric coated 81 milliGRAM(s) Oral daily  atorvastatin 40 milliGRAM(s) Oral at bedtime  docusate sodium 100 milliGRAM(s) Oral three times a day  levothyroxine 100 MICROGram(s) Oral daily  metoprolol     tartrate 25 milliGRAM(s) Oral every 12 hours  rivaroxaban 20 milliGRAM(s) Oral every 24 hours  sodium chloride 0.9% lock flush 3 milliLiter(s) IV Push every 8 hours    MEDICATIONS  (PRN):      Exam:  General: A/O x3  Pulmonary: CTAB  Cor: S1S2, Irregular, No murmur noted.  ECG: AFib  Groin/Wound: Soft, NT, No hematoma noted  Abdomen: soft, NT/ND, + Bowel Sounds  Neuro: Non focal  Extremeties: 1+ Pulses B/L, no edema noted                          9.3    11.4  )-----------( 172      ( 15 Feb 2018 00:35 )             29.1   02-15    141  |  106  |  19  ----------------------------<  137<H>  4.6   |  24  |  0.82    Ca    8.7      15 Feb 2018 00:35    TPro  7.0  /  Alb  3.9  /  TBili  0.6  /  DBili  x   /  AST  22  /  ALT  14  /  AlkPhos  81  02-15  PT/INR - ( 15 Feb 2018 00:35 )   PT: 12.2 sec;   INR: 1.12 ratio         PTT - ( 15 Feb 2018 00:35 )  PTT:23.1 sec    Assesment/Plan:  89F s/p Percutaneous femoral TAVR POD #1, AFib/Sick Sinus Syndrome, Hypothyroidism, Chronic Diastolic Heart failure  1.) ASA 81 mg po daily, Rivaroxaban 20 mg po daily  2.) AFib: Metoprolol 25 mg po 2 times daily  3.) Ambulate as tolerated  4.) Hypothyroid: continue Synthroid  5.) Discharge Plan: Patient is to follow up with Dr. Anton in 1 week post discharge. HE/She should then follow up with Dr. Byrnes in 30 days, with a repeat Transthoracic Echo to be done at that visit.

## 2018-02-16 DIAGNOSIS — I49.5 SICK SINUS SYNDROME: ICD-10-CM

## 2018-02-16 LAB
ANION GAP SERPL CALC-SCNC: 12 MMOL/L — SIGNIFICANT CHANGE UP (ref 5–17)
APTT BLD: 32.1 SEC — SIGNIFICANT CHANGE UP (ref 27.5–37.4)
BUN SERPL-MCNC: 17 MG/DL — SIGNIFICANT CHANGE UP (ref 7–23)
CALCIUM SERPL-MCNC: 8.9 MG/DL — SIGNIFICANT CHANGE UP (ref 8.4–10.5)
CHLORIDE SERPL-SCNC: 100 MMOL/L — SIGNIFICANT CHANGE UP (ref 96–108)
CO2 SERPL-SCNC: 28 MMOL/L — SIGNIFICANT CHANGE UP (ref 22–31)
CREAT SERPL-MCNC: 0.81 MG/DL — SIGNIFICANT CHANGE UP (ref 0.5–1.3)
GLUCOSE SERPL-MCNC: 115 MG/DL — HIGH (ref 70–99)
HCT VFR BLD CALC: 27 % — LOW (ref 34.5–45)
HCT VFR BLD CALC: 30 % — LOW (ref 34.5–45)
HGB BLD-MCNC: 8.6 G/DL — LOW (ref 11.5–15.5)
HGB BLD-MCNC: 9.5 G/DL — LOW (ref 11.5–15.5)
INR BLD: 1.83 RATIO — HIGH (ref 0.88–1.16)
MCHC RBC-ENTMCNC: 25.5 PG — LOW (ref 27–34)
MCHC RBC-ENTMCNC: 25.6 PG — LOW (ref 27–34)
MCHC RBC-ENTMCNC: 31.6 GM/DL — LOW (ref 32–36)
MCHC RBC-ENTMCNC: 31.8 GM/DL — LOW (ref 32–36)
MCV RBC AUTO: 80.5 FL — SIGNIFICANT CHANGE UP (ref 80–100)
MCV RBC AUTO: 80.9 FL — SIGNIFICANT CHANGE UP (ref 80–100)
PLATELET # BLD AUTO: 153 K/UL — SIGNIFICANT CHANGE UP (ref 150–400)
PLATELET # BLD AUTO: 166 K/UL — SIGNIFICANT CHANGE UP (ref 150–400)
POTASSIUM SERPL-MCNC: 3.9 MMOL/L — SIGNIFICANT CHANGE UP (ref 3.5–5.3)
POTASSIUM SERPL-SCNC: 3.9 MMOL/L — SIGNIFICANT CHANGE UP (ref 3.5–5.3)
PROTHROM AB SERPL-ACNC: 20.2 SEC — HIGH (ref 9.8–12.7)
RBC # BLD: 3.35 M/UL — LOW (ref 3.8–5.2)
RBC # BLD: 3.71 M/UL — LOW (ref 3.8–5.2)
RBC # FLD: 14.7 % — HIGH (ref 10.3–14.5)
RBC # FLD: 14.7 % — HIGH (ref 10.3–14.5)
SODIUM SERPL-SCNC: 140 MMOL/L — SIGNIFICANT CHANGE UP (ref 135–145)
WBC # BLD: 10.9 K/UL — HIGH (ref 3.8–10.5)
WBC # BLD: 8.5 K/UL — SIGNIFICANT CHANGE UP (ref 3.8–10.5)
WBC # FLD AUTO: 10.9 K/UL — HIGH (ref 3.8–10.5)
WBC # FLD AUTO: 8.5 K/UL — SIGNIFICANT CHANGE UP (ref 3.8–10.5)

## 2018-02-16 PROCEDURE — 99232 SBSQ HOSP IP/OBS MODERATE 35: CPT

## 2018-02-16 PROCEDURE — 71045 X-RAY EXAM CHEST 1 VIEW: CPT | Mod: 26

## 2018-02-16 RX ORDER — ACETAMINOPHEN 500 MG
650 TABLET ORAL EVERY 6 HOURS
Qty: 0 | Refills: 0 | Status: DISCONTINUED | OUTPATIENT
Start: 2018-02-16 | End: 2018-02-19

## 2018-02-16 RX ORDER — METOPROLOL TARTRATE 50 MG
25 TABLET ORAL
Qty: 0 | Refills: 0 | Status: DISCONTINUED | OUTPATIENT
Start: 2018-02-16 | End: 2018-02-19

## 2018-02-16 RX ADMIN — Medication 650 MILLIGRAM(S): at 08:45

## 2018-02-16 RX ADMIN — Medication 250 MILLIGRAM(S): at 06:48

## 2018-02-16 RX ADMIN — SODIUM CHLORIDE 3 MILLILITER(S): 9 INJECTION INTRAMUSCULAR; INTRAVENOUS; SUBCUTANEOUS at 06:49

## 2018-02-16 RX ADMIN — Medication 25 MILLIGRAM(S): at 18:27

## 2018-02-16 RX ADMIN — Medication 100 MICROGRAM(S): at 06:08

## 2018-02-16 RX ADMIN — SODIUM CHLORIDE 3 MILLILITER(S): 9 INJECTION INTRAMUSCULAR; INTRAVENOUS; SUBCUTANEOUS at 13:08

## 2018-02-16 RX ADMIN — ATORVASTATIN CALCIUM 40 MILLIGRAM(S): 80 TABLET, FILM COATED ORAL at 21:46

## 2018-02-16 RX ADMIN — Medication 650 MILLIGRAM(S): at 08:15

## 2018-02-16 RX ADMIN — Medication 650 MILLIGRAM(S): at 18:57

## 2018-02-16 RX ADMIN — Medication 650 MILLIGRAM(S): at 18:27

## 2018-02-16 RX ADMIN — SODIUM CHLORIDE 3 MILLILITER(S): 9 INJECTION INTRAMUSCULAR; INTRAVENOUS; SUBCUTANEOUS at 21:47

## 2018-02-16 NOTE — PROGRESS NOTE ADULT - SUBJECTIVE AND OBJECTIVE BOX
VITAL SIGNS    Telemetry:  AFib 70-90  Vital Signs Last 24 Hrs  T(C): 36.8 (02-16-18 @ 10:00), Max: 37.3 (02-16-18 @ 08:30)  T(F): 98.2 (02-16-18 @ 10:00), Max: 99.1 (02-16-18 @ 08:30)  HR: 87 (02-16-18 @ 10:00) (77 - 107)  BP: 150/66 (02-16-18 @ 10:00) (128/87 - 169/84)  RR: 18 (02-16-18 @ 10:00) (18 - 18)  SpO2: 94% (02-16-18 @ 10:00) (91% - 96%)            02-15 @ 07:01  -  02-16 @ 07:00  --------------------------------------------------------  IN: 566 mL / OUT: 1175 mL / NET: -609 mL    02-16 @ 07:01  -  02-16 @ 13:37  --------------------------------------------------------  IN: 0 mL / OUT: 200 mL / NET: -200 mL       Daily   Admit Wt: Drug Dosing Weight  Height (cm): 139.7 (14 Feb 2018 05:56)  Weight (kg): 62 (14 Feb 2018 05:56)  BMI (kg/m2): 31.8 (14 Feb 2018 05:56)  BSA (m2): 1.49 (14 Feb 2018 05:56)    MEDICATIONS  acetaminophen   Tablet. 650 milliGRAM(s) Oral every 6 hours PRN  aspirin enteric coated 81 milliGRAM(s) Oral daily  atorvastatin 40 milliGRAM(s) Oral at bedtime  docusate sodium 100 milliGRAM(s) Oral three times a day  levothyroxine 100 MICROGram(s) Oral daily  sodium chloride 0.9% lock flush 3 milliLiter(s) IV Push every 8 hours      PHYSICAL EXAM  Subjective: NAD  Neurology: alert and oriented x 3, nonfocal, no gross deficits  CV : S1S2 PPM site ecchymotic superior incision slow ooze  Lungs: CTA  Abdomen: soft, NT,ND, (+ )BM  :  voiding  Extremities: -c/c/e     LABS  02-16    140  |  100  |  17  ----------------------------<  115<H>  3.9   |  28  |  0.81    Ca    8.9      16 Feb 2018 00:13    TPro  7.0  /  Alb  3.9  /  TBili  0.6  /  DBili  x   /  AST  22  /  ALT  14  /  AlkPhos  81  02-15                                 9.5    10.9  )-----------( 166      ( 16 Feb 2018 00:14 )             30.0          PT/INR - ( 16 Feb 2018 00:14 )   PT: 20.2 sec;   INR: 1.83 ratio         PTT - ( 16 Feb 2018 00:14 )  PTT:32.1 sec       PAST MEDICAL & SURGICAL HISTORY:  Gastrointestinal hemorrhage, unspecified gastritis, unspecified gastrointestinal hemorrhage type  Gastritis  Atrial fibrillation, unspecified  Hyperlipidemia  Gastroesophageal reflux disease without esophagitis  Hypothyroidism  Obesity  Dyslipidemia  Hypertension  CAD (coronary artery disease): S/P CABG 1999  History of colon polyps: removal 2014  H/O abdominal hysterectomy  Rectal mass: removal in 6/2015  S/P CABG (coronary artery bypass graft): in 1999

## 2018-02-16 NOTE — PROGRESS NOTE ADULT - ASSESSMENT
89-year-old white female with PMHx of CAD prior cardiac stents ( PCI -RCA & Cx in 2015), CABG in 1989 with a LIMA graft to LAD and vein grafts to the other vessels, HTN, HLD, Hypothyroidism, Chronic Atrial fibrillation( on xarelto)  and moderate aortic stenosis on echocardiogram from a year and half ago with recent echo revealing  severe aortic stenosis with stage III diastolic dysfunction and acentric LVH, mild PAH. She is s/p TAVR 2/14  after which she experienced  multiple 3- 5  sec symptomatic pauses.  Patient now status post PPM ( [employer] ) 2/15/18

## 2018-02-16 NOTE — PROGRESS NOTE ADULT - PROBLEM SELECTOR PLAN 1
- Status post PPM 2/15/18  - Device check and pairing by "[employer]" rep  - surgical incision site with + clot formation and positive ooze at right lateral aspect of incision; pressure applied for approximately 30 minutes with significant tapering of ooze. Pressure dressing and sandbag applied.   -Continue to hold Xarelto  - Continue to monitor site  - may resume Betablocker for rate control    -Post PPM teaching reviewed with patient and son at bedside   - Booklet with further instructions and temporary ID card provided to patient  - Patient to follow up in  in EP clinic on 2/27/18 at 9am  -Patient and son informed that if when cleared for discharge, if fever or swelling/redness/discharge from wound, they should call EP clinic at [phone]. [phone] / Afib with up to 5 second pause;  - Status post PPM 2/15/18  - Device check and pairing by [employer] rep  - surgical incision site with + clot formation and positive ooze at right lateral aspect of incision; pressure applied for approximately 30 minutes with significant tapering of ooze. Pressure dressing and sandbag applied.   -Continue to hold Xarelto  - Continue to monitor site  - may resume Betablocker for rate control    -Post PPM teaching reviewed with patient and son at bedside   - Booklet with further instructions and temporary ID card provided to patient  - Patient to follow up in  in EP clinic on 2/27/18 at 9am  -Patient and son informed that if when cleared for discharge, if fever or swelling/redness/discharge from wound, they should call EP clinic at [phone]. [phone]

## 2018-02-16 NOTE — PROGRESS NOTE ADULT - ATTENDING COMMENTS
seen and examined with NP. I agree with H & P, A & P.  oozing from PM site.  Responded well to pressure.  Hold Xarelto.

## 2018-02-16 NOTE — PROGRESS NOTE ADULT - PROBLEM SELECTOR PLAN 1
Xarelto held for bleeding to PPM site  Pressure dressing to PPM site  labs in am   asa 81 metoprolol 25 bid atorvastatin  multiple 3-5 sec pause  PPM with Dr Mccray 2/15  ambulate tid  incentive spirometrty   hold Cardizem per Cardiology  disposition  to home

## 2018-02-16 NOTE — PROGRESS NOTE ADULT - SUBJECTIVE AND OBJECTIVE BOX
[facility] - Kam Craig, Anaid, Magali, Roselyn, Jaylene Gomez  Office Number:  [phone]    Patient resting comfortably in bed in NAD.  Laying flat with no respiratory distress.  No complaints of chest pain, dyspnea, palpitations, PND, or orthopnea.  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  She has a hematoma at the site.  Her H/H is stable.  Her full dose AC is on hold.    Telemetry:  Atrial fibrillation.  pauses up to 5 seconds yesterday.  Nothing overnight.    MEDICATIONS  (STANDING):  aspirin enteric coated 81 milliGRAM(s) Oral daily  atorvastatin 40 milliGRAM(s) Oral at bedtime  docusate sodium 100 milliGRAM(s) Oral three times a day  levothyroxine 100 MICROGram(s) Oral daily  sodium chloride 0.9% lock flush 3 milliLiter(s) IV Push every 8 hours    MEDICATIONS  (PRN):  acetaminophen   Tablet. 650 milliGRAM(s) Oral every 6 hours PRN Mild Pain (1 - 3)      Allergies    amoxicillin (Unknown)        Vital Signs Last 24 Hrs  T(C): 37.3 (16 Feb 2018 08:30), Max: 37.3 (16 Feb 2018 08:30)  T(F): 99.1 (16 Feb 2018 08:30), Max: 99.1 (16 Feb 2018 08:30)  HR: 87 (16 Feb 2018 08:30) (72 - 107)  BP: 139/59 (16 Feb 2018 08:30) (125/58 - 169/84)  BP(mean): 86 (16 Feb 2018 08:30) (84 - 93)  RR: 18 (16 Feb 2018 08:30) (18 - 26)  SpO2: 94% (16 Feb 2018 08:30) (91% - 99%)    I&O's Summary    15 Feb 2018 07:01  -  16 Feb 2018 07:00  --------------------------------------------------------  IN: 566 mL / OUT: 1175 mL / NET: -609 mL        ON EXAM:    Constitutional: NAD, awake and alert, well-developed  HEENT: Moist Mucous Membranes, Anicteric  Pulmonary: Decreased breath sounds b/l. No rales, crackles or wheeze appreciated.   Cardiovascular: IRRR, S1 and no S2, 3/6 systolic murmur  Gastrointestinal: Bowel Sounds present, soft, nontender.   Lymph: No peripheral edema. No lymphadenopathy.  Skin: No visible rashes or ulcers.  Psych:  Mood & affect appropriate for situation.     LABS: All Labs Reviewed:                        9.5    10.9  )-----------( 166      ( 16 Feb 2018 00:14 )             30.0                         9.3    11.4  )-----------( 172      ( 15 Feb 2018 00:35 )             29.1                         9.2    11.1  )-----------( 194      ( 14 Feb 2018 10:32 )             29.7     16 Feb 2018 00:13    140    |  100    |  17     ----------------------------<  115    3.9     |  28     |  0.81   15 Feb 2018 00:35    141    |  106    |  19     ----------------------------<  137    4.6     |  24     |  0.82   14 Feb 2018 10:32    143    |  105    |  21     ----------------------------<  193    3.9     |  24     |  0.85     Ca    8.9        16 Feb 2018 00:13  Ca    8.7        15 Feb 2018 00:35  Ca    8.0        14 Feb 2018 10:32    TPro  7.0    /  Alb  3.9    /  TBili  0.6    /  DBili  x      /  AST  22     /  ALT  14     /  AlkPhos  81     15 Feb 2018 00:35  TPro  6.2    /  Alb  3.5    /  TBili  1.0    /  DBili  x      /  AST  20     /  ALT  10     /  AlkPhos  80     14 Feb 2018 10:32    PT/INR - ( 16 Feb 2018 00:14 )   PT: 20.2 sec;   INR: 1.83 ratio         PTT - ( 16 Feb 2018 00:14 )  PTT:32.1 sec  CARDIAC MARKERS ( 14 Feb 2018 10:32 )  x     / 0.06 ng/mL / 58 U/L / x     / 5.9 ng/mL

## 2018-02-16 NOTE — PROGRESS NOTE ADULT - ASSESSMENT
89-year-old white female with PMHx of CAD prior cardiac stents ( PCI -RCA & Cx in 2015), CABG in 1989 with a LIMA graft to LAD and vein grafts to the other vessels, HTN, HLD, Hypothyroidism, Chronic Atrial fibrillation on xarelto  and moderate aortic stenosis on echocardiogram from a year and half ago, Removal of Rectal mass in 6/15.  reports increasin SOB/HERNADEZ ECHO reveals severe aortic stenosis with stage III diastolic dysfunction and acentric LVH, mild PAH .  Dr. Craig Cardiology Seen  2/14/18 Underwent TAVR # 29mm CORE Evolut Pro bioprosthesis via left  femoral approach .Right femoral TVP.  Post operative course includes   howard cardia down to 40s resolved.  LBBB Restarted on home dose  metoprolol 25 BID   2/15/18  ECHO  WDL no AR no paravalvular leak transferred to [address] [place] [address]- seen by cardiology Dr Mariee  no need to resume home cardizem at this time in rate controlled   chronic afib-  Xarelto 20 BID resumed   addendum: patient experienced  multiple 3- 5  sec symptomatic pauses EP called Dr Anton,Dr Byrnes and Dr Mccray at bedside.  patient underwent PPM placement LACW  Xarelto on hold   2/16 FFP transfused for PPM site bleeding

## 2018-02-16 NOTE — PROGRESS NOTE ADULT - SUBJECTIVE AND OBJECTIVE BOX
INTERVAL HPI/OVERNIGHT EVENTS: + oozing at pacemaker incision site. Patient denies chest pain/ sob/ dizziness/ palpitations.     MEDICATIONS  (STANDING):  aspirin enteric coated 81 milliGRAM(s) Oral daily  atorvastatin 40 milliGRAM(s) Oral at bedtime  docusate sodium 100 milliGRAM(s) Oral three times a day  levothyroxine 100 MICROGram(s) Oral daily  sodium chloride 0.9% lock flush 3 milliLiter(s) IV Push every 8 hours    MEDICATIONS  (PRN):  acetaminophen   Tablet. 650 milliGRAM(s) Oral every 6 hours PRN Mild Pain (1 - 3)      Allergies  amoxicillin (Unknown)    Intolerances      ROS:  General: Pt denies fever/chills  Cardiovascular: denies chest pain/palpitations.  Respiratory and Thorax: denies difficulty breathing  Gastrointestinal: denies abdominal pain/diarrhea/constipation/bloody stool  Skin: denies rashes/sores  Hematologic: denies abnormal bleeding    Vital Signs Last 24 Hrs  T(C): 37 (16 Feb 2018 13:47), Max: 37.3 (16 Feb 2018 08:30)  T(F): 98.6 (16 Feb 2018 13:47), Max: 99.1 (16 Feb 2018 08:30)  HR: 65 (16 Feb 2018 13:47) (65 - 107)  BP: 144/74 (16 Feb 2018 13:47) (128/87 - 169/84)  BP(mean): 94 (16 Feb 2018 10:00) (86 - 94)  RR: 18 (16 Feb 2018 13:47) (18 - 18)  SpO2: 98% (16 Feb 2018 13:47) (91% - 98%)    Physical Exam:  Constitutional: well developed, well nourished,  and in  no acute distress  Neurological: Alert & Oriented x 3,  no focal deficits  Respiratory: Breathing nonlabored; CTA bilaterally  Cardiovascular: (+) S1 & S2  Gastrointestinal: soft, NT, nondistended, (+) BS  Genitourinary: non distended bladder  Extremities: +2 bilateral radial pulses. Trace bilateral  pedal edema  Skin: Left anterior chest wall, infraclavicular, surgical incision site with + clot formation and positive ooze at right lateral  aspect of incision; pressure applied for approximately 30 minutes with significant tapering of ooze. Pressure dressing applied.     LABS:                        9.5    10.9  )-----------( 166      ( 16 Feb 2018 00:14 )             30.0     02-16    140  |  100  |  17  ----------------------------<  115<H>  3.9   |  28  |  0.81    Ca    8.9      16 Feb 2018 00:13    TPro  7.0  /  Alb  3.9  /  TBili  0.6  /  DBili  x   /  AST  22  /  ALT  14  /  AlkPhos  81  02-15    PT/INR - ( 16 Feb 2018 00:14 )   PT: 20.2 sec;   INR: 1.83 ratio         PTT - ( 16 Feb 2018 00:14 )  PTT:32.1 sec      RADIOLOGY & ADDITIONAL TESTS: < from: Xray Chest 1 View- PORTABLE-Urgent (02.15.18 @ 19:35) >  No pneumothorax status post left chest wall pacemaker   placement.  Trace left pleural effusion.    < end of copied text >      TELE: Afib 60's to low 100's, briefly up to 130's this am. Intermittently V paced.

## 2018-02-16 NOTE — PROGRESS NOTE ADULT - ASSESSMENT
89-year-old white female with PMHx of CAD prior cardiac stents ( PCI -RCA & Cx in 2015), CABG in 1989 with a LIMA graft to LAD and vein grafts to the other vessels, HTN, HLD, Hypothyroidism, Chronic Atrial fibrillation on xarelto, severe AS, Removal of Rectal mass in 6/15 now with progressive dyspnea on exertion with severe AS, now s/p TAVR, tachy howard syndrome s/p emergent PPM:    - Patient s/p emergent PPM and now with pocket hematoma.   Monitor for signs of expansion.  EPS follow up.  Follow CBC.  stable thus far with no evidence of meaningful acute blood loss anemia  - Continue to hold full dose AC until cleared by EPS  - Continue aspirin for now  - Would resume metoprolol and increase as tolerated given AF with RVR and hyperdynamic LV  - Cont statin  - Monitor and replete electrolytes. Keep K>4.0 and Mg>2.0.   - Further cardiac workup will depend on clinical course.

## 2018-02-17 LAB
ANION GAP SERPL CALC-SCNC: 11 MMOL/L — SIGNIFICANT CHANGE UP (ref 5–17)
BUN SERPL-MCNC: 18 MG/DL — SIGNIFICANT CHANGE UP (ref 7–23)
CALCIUM SERPL-MCNC: 8.5 MG/DL — SIGNIFICANT CHANGE UP (ref 8.4–10.5)
CHLORIDE SERPL-SCNC: 102 MMOL/L — SIGNIFICANT CHANGE UP (ref 96–108)
CO2 SERPL-SCNC: 26 MMOL/L — SIGNIFICANT CHANGE UP (ref 22–31)
CREAT SERPL-MCNC: 0.91 MG/DL — SIGNIFICANT CHANGE UP (ref 0.5–1.3)
GLUCOSE SERPL-MCNC: 109 MG/DL — HIGH (ref 70–99)
HCT VFR BLD CALC: 26.3 % — LOW (ref 34.5–45)
HGB BLD-MCNC: 8.2 G/DL — LOW (ref 11.5–15.5)
MCHC RBC-ENTMCNC: 25.5 PG — LOW (ref 27–34)
MCHC RBC-ENTMCNC: 31.3 GM/DL — LOW (ref 32–36)
MCV RBC AUTO: 81.3 FL — SIGNIFICANT CHANGE UP (ref 80–100)
PLATELET # BLD AUTO: 145 K/UL — LOW (ref 150–400)
POTASSIUM SERPL-MCNC: 4 MMOL/L — SIGNIFICANT CHANGE UP (ref 3.5–5.3)
POTASSIUM SERPL-SCNC: 4 MMOL/L — SIGNIFICANT CHANGE UP (ref 3.5–5.3)
RBC # BLD: 3.24 M/UL — LOW (ref 3.8–5.2)
RBC # FLD: 14.6 % — HIGH (ref 10.3–14.5)
SODIUM SERPL-SCNC: 139 MMOL/L — SIGNIFICANT CHANGE UP (ref 135–145)
WBC # BLD: 7.5 K/UL — SIGNIFICANT CHANGE UP (ref 3.8–10.5)
WBC # FLD AUTO: 7.5 K/UL — SIGNIFICANT CHANGE UP (ref 3.8–10.5)

## 2018-02-17 PROCEDURE — 71045 X-RAY EXAM CHEST 1 VIEW: CPT | Mod: 26

## 2018-02-17 RX ADMIN — Medication 650 MILLIGRAM(S): at 12:29

## 2018-02-17 RX ADMIN — SODIUM CHLORIDE 3 MILLILITER(S): 9 INJECTION INTRAMUSCULAR; INTRAVENOUS; SUBCUTANEOUS at 06:19

## 2018-02-17 RX ADMIN — Medication 650 MILLIGRAM(S): at 12:59

## 2018-02-17 RX ADMIN — SODIUM CHLORIDE 3 MILLILITER(S): 9 INJECTION INTRAMUSCULAR; INTRAVENOUS; SUBCUTANEOUS at 21:15

## 2018-02-17 RX ADMIN — ATORVASTATIN CALCIUM 40 MILLIGRAM(S): 80 TABLET, FILM COATED ORAL at 21:16

## 2018-02-17 RX ADMIN — Medication 100 MICROGRAM(S): at 06:18

## 2018-02-17 RX ADMIN — Medication 25 MILLIGRAM(S): at 17:44

## 2018-02-17 RX ADMIN — Medication 25 MILLIGRAM(S): at 06:18

## 2018-02-17 RX ADMIN — Medication 100 MILLIGRAM(S): at 06:19

## 2018-02-17 RX ADMIN — SODIUM CHLORIDE 3 MILLILITER(S): 9 INJECTION INTRAMUSCULAR; INTRAVENOUS; SUBCUTANEOUS at 14:00

## 2018-02-17 RX ADMIN — Medication 81 MILLIGRAM(S): at 12:28

## 2018-02-17 NOTE — PROGRESS NOTE ADULT - PROBLEM SELECTOR PLAN 1
/ Afib with up to 5 second pause;  - Status post PPM 2/15/18 with oozing at site;  resolving, now with some spotting   -Continue to hold Xarelto  - Continue to monitor site  - Please increase betablocker for better rate control , BP permitting    - Patient to follow up in EP clinic on 2/27/18 at 9am  - Continue to monitor on telemetry

## 2018-02-17 NOTE — PROGRESS NOTE ADULT - SUBJECTIVE AND OBJECTIVE BOX
INTERVAL HPI/OVERNIGHT EVENTS: Patient evaluated sitting in chair, in no acute distress. She denies chest pain/sob/dizziness/palpitations. Left anterior chest wall, infraclavicular surgical incision site pressure dressing changed; no active oozing appreciated but remains with spotting at site. No hematoma/ ecchymosis/erythema. New pressure dressing applied to site.    MEDICATIONS  (STANDING):  aspirin enteric coated 81 milliGRAM(s) Oral daily  atorvastatin 40 milliGRAM(s) Oral at bedtime  docusate sodium 100 milliGRAM(s) Oral three times a day  levothyroxine 100 MICROGram(s) Oral daily  metoprolol     tartrate 25 milliGRAM(s) Oral two times a day  sodium chloride 0.9% lock flush 3 milliLiter(s) IV Push every 8 hours    MEDICATIONS  (PRN):  acetaminophen   Tablet. 650 milliGRAM(s) Oral every 6 hours PRN Mild Pain (1 - 3)      Allergies    amoxicillin (Unknown)    Intolerances      ROS:  General: Pt denies fevers/chills  Cardiovascular: denies chest pain/palpitations.  Respiratory and Thorax: + mild sob with exertion   Gastrointestinal: denies abdominal pain/diarrhea/constipation/bloody stool    Vital Signs Last 24 Hrs  T(C): 36.7 (17 Feb 2018 06:14), Max: 36.8 (16 Feb 2018 20:05)  T(F): 98.1 (17 Feb 2018 06:14), Max: 98.2 (16 Feb 2018 20:05)  HR: 97 (17 Feb 2018 06:14) (67 - 97)  BP: 130/69 (17 Feb 2018 06:14) (115/66 - 181/70)  BP(mean): 107 (16 Feb 2018 17:00) (107 - 107)  RR: 18 (17 Feb 2018 06:14) (18 - 18)  SpO2: 95% (17 Feb 2018 06:14) (94% - 96%)    Physical Exam:  Constitutional: well developed, well nourished,  and in  no acute distress.  Neurological: Alert & Oriented x 3,  no focal deficits, WALSH  Respiratory: Breathing nonlabored;  CTA  bilaterally  Cardiovascular: (+) S1 & S2  Gastrointestinal: softly distended, NT/no rebound, (+) BS  Genitourinary: non distended bladder  Extremities: + 2 bilateral radial pulses.  No pedal edema  Skin:  Left anterior chest wall, infraclavicular surgical incision site pressure dressing changed; no active oozing appreciated but remains with spotting at site. No hematoma/ ecchymosis/erythema. New pressure dressing applied to site.      LABS:                        8.2    7.5   )-----------( 145      ( 17 Feb 2018 05:56 )             26.3     02-17    139  |  102  |  18  ----------------------------<  109<H>  4.0   |  26  |  0.91    Ca    8.5      17 Feb 2018 05:56      PT/INR - ( 16 Feb 2018 00:14 )   PT: 20.2 sec;   INR: 1.83 ratio         PTT - ( 16 Feb 2018 00:14 )  PTT:32.1 sec      RADIOLOGY & ADDITIONAL TESTS: < from: Xray Chest 1 View- PORTABLE-Urgent (02.15.18 @ 19:35) >  No pneumothorax status post left chest wall pacemaker   placement.  Trace left pleural effusion.    TELE: Afib 80's to 120's

## 2018-02-17 NOTE — PROGRESS NOTE ADULT - PROBLEM SELECTOR PLAN 1
Xarelto held for bleeding to PPM site  Maintain pressure dressing to PPM site  labs in am   asa 81 metoprolol 25 bid atorvastatin  multiple 3-5 sec pause  PPM with Dr Mccray 2/15  ambulate tid  incentive spirometrty   hold Cardizem per Cardiology  disposition  to home

## 2018-02-17 NOTE — PROGRESS NOTE ADULT - SUBJECTIVE AND OBJECTIVE BOX
VITAL SIGNS    Telemetry:  Afib   Vital Signs Last 24 Hrs  T(C): 36.7 (18 @ 06:14), Max: 36.8 (18 @ 20:05)  T(F): 98.1 (18 @ 06:14), Max: 98.2 (18 @ 20:05)  HR: 97 (18 @ 06:14) (67 - 97)  BP: 130/69 (18 @ 06:14) (115/66 - 181/70)  RR: 18 (18 @ 06:14) (18 - 18)  SpO2: 95% (18 @ 06:14) (94% - 96%)             @ 07:01  -   @ 07:00  --------------------------------------------------------  IN: 460 mL / OUT: 850 mL / NET: -390 mL  Daily Weight in k.2 (2018 11:16)  Admit Wt: Drug Dosing Weight  Height (cm): 139.7 (2018 05:56)  Weight (kg): 62 (2018 05:56)  BMI (kg/m2): 31.8 (2018 05:56)  BSA (m2): 1.49 (2018 05:56)    MEDICATIONS  acetaminophen   Tablet. 650 milliGRAM(s) Oral every 6 hours PRN  aspirin enteric coated 81 milliGRAM(s) Oral daily  atorvastatin 40 milliGRAM(s) Oral at bedtime  docusate sodium 100 milliGRAM(s) Oral three times a day  levothyroxine 100 MICROGram(s) Oral daily  metoprolol     tartrate 25 milliGRAM(s) Oral two times a day    PHYSICAL EXAM  Subjective: NAD  Neurology: alert and oriented x 3, nonfocal, no gross deficits  CV : S1S2  Left PPM site w/ compression dressing intact  Lungs: CTA b/l  Abdomen: soft, NT,ND, (+ )BM  :  voiding  Extremities: -c/c/e     LABS      139  |  102  |  18  ----------------------------<  109<H>  4.0   |  26  |  0.91    Ca    8.5      2018 05:56                                   8.2    7.5   )-----------( 145      ( 2018 05:56 )             26.3          PT/INR - ( 2018 00:14 )   PT: 20.2 sec;   INR: 1.83 ratio         PTT - ( 2018 00:14 )  PTT:32.1 sec       PAST MEDICAL & SURGICAL HISTORY:  Gastrointestinal hemorrhage, unspecified gastritis, unspecified gastrointestinal hemorrhage type  Gastritis  Atrial fibrillation, unspecified  Hyperlipidemia  Gastroesophageal reflux disease without esophagitis  Hypothyroidism  Obesity  Dyslipidemia  Hypertension  CAD (coronary artery disease): S/P CABG   History of colon polyps: removal   H/O abdominal hysterectomy  Rectal mass: removal in 2015  S/P CABG (coronary artery bypass graft): in

## 2018-02-17 NOTE — PROGRESS NOTE ADULT - ASSESSMENT
89-year-old white female with PMHx of CAD prior cardiac stents ( PCI -RCA & Cx in 2015), CABG in 1989 with a LIMA graft to LAD and vein grafts to the other vessels, HTN, HLD, Hypothyroidism, Chronic Atrial fibrillation on xarelto  and moderate aortic stenosis on echocardiogram from a year and half ago, Removal of Rectal mass in 6/15.  reports increasin SOB/HERNADEZ ECHO reveals severe aortic stenosis with stage III diastolic dysfunction and acentric LVH, mild PAH .  Dr. Craig Cardiology Seen  2/14/18 Underwent TAVR # 29mm CORE Evolut Pro bioprosthesis via left  femoral approach .Right femoral TVP.  Post operative course includes   howard cardia down to 40s resolved.  LBBB Restarted on home dose  metoprolol 25 BID   2/15/18  ECHO  WDL no AR no paravalvular leak transferred to [address] [place] [address]- seen by cardiology Dr Mariee  no need to resume home cardizem at this time in rate controlled   chronic afib-  Xarelto 20 BID resumed   addendum: patient experienced  multiple 3- 5  sec symptomatic pauses EP called Dr Anton,Dr Byrnes and Dr Mccray at bedside.  patient underwent PPM placement LACW  Xarelto on hold   2/16 FFP transfused for bleeding to PPM site  2/17 H/H stable, will consider reinstating xeralto in am if no further bleeding noted from PPM

## 2018-02-17 NOTE — PROGRESS NOTE ADULT - ASSESSMENT
89-year-old white female with PMHx of CAD prior cardiac stents ( PCI -RCA & Cx in 2015), CABG in 1989 with a LIMA graft to LAD and vein grafts to the other vessels, HTN, HLD, Hypothyroidism, Chronic Atrial fibrillation( on xarelto)  and moderate aortic stenosis on echocardiogram from a year and half ago with recent echo revealing  severe aortic stenosis with stage III diastolic dysfunction and acentric LVH, mild PAH. She is s/p TAVR 2/14  after which she experienced  multiple 3- 5  sec symptomatic pauses.  Patient now status post PPM ( [employer] ) 2/15/18. Hospital course complicated by pacemaker site ooze. Xarelto on hold.

## 2018-02-17 NOTE — PROGRESS NOTE ADULT - SUBJECTIVE AND OBJECTIVE BOX
· Subjective and Objective:   [facility] CARDIOLOGY CONSULTANTS:    Kam Craig, Magali Worrell Pannella, Patel, Goodger      [phone]    CHIEF COMPLAINT: Patient is a 89y old  Female who presents with a chief complaint of " I am have valve surgery " (14 Feb 2018 06:00)    Patient resting comfortably in bed in NAD.  Laying flat with no respiratory distress.  No complaints of chest pain, dyspnea, palpitations, PND, or orthopnea.  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  She has a hematoma at the site.  Her H/H is stable.  Her full dose AC is on hold.      REVIEW OF SYSTEMS:  CONSTITUTIONAL: No fever, weight loss, or fatigue  EYES: No eye pain, visual disturbances, or discharge  ENMT:  No difficulty hearing, tinnitus, vertigo; No sinus or throat pain  NECK: No pain or stiffness  RESPIRATORY: denies cough,No wheezing, chills or hemoptysis; No shortness of breath  CARDIOVASCULAR: No chest pain,No palpitations, dizziness, or leg swelling  GASTROINTESTINAL: No abdominal or epigastric pain. No nausea, vomiting, or hematemesis; No diarrhea , no constipation. No melena or hematochezia.  GENITOURINARY: No dysuria, frequency, hematuria, or incontinence  NEUROLOGICAL: No headaches, memory loss, loss of strength, numbness, or tremors  SKIN: No itching, burning, rashes, or lesions   LYMPH NODES: No enlarged glands  ENDOCRINE: No heat or cold intolerance; No hair loss  MUSCULOSKELETAL: No joint pain or swelling; No muscle, back, or extremity pain  PSYCHIATRIC: No depression, anxiety, mood swings, or difficulty sleeping  HEME/LYMPH: No easy bruising, or bleeding gums  ALLERGY AND IMMUNOLOGIC: No hives or eczema          PAST MEDICAL & SURGICAL HISTORY:  Gastrointestinal hemorrhage, unspecified gastritis, unspecified gastrointestinal hemorrhage type  Gastritis  Atrial fibrillation, unspecified  Hyperlipidemia  Gastroesophageal reflux disease without esophagitis  Hypothyroidism  Obesity  Dyslipidemia  Hypertension  CAD (coronary artery disease): S/P CABG 1999  History of colon polyps: removal 2014  H/O abdominal hysterectomy  Rectal mass: removal in 6/2015  S/P CABG (coronary artery bypass graft): in 1999      MEDICATIONS  (STANDING):  aspirin enteric coated 81 milliGRAM(s) Oral daily  atorvastatin 40 milliGRAM(s) Oral at bedtime  docusate sodium 100 milliGRAM(s) Oral three times a day  levothyroxine 100 MICROGram(s) Oral daily  metoprolol     tartrate 25 milliGRAM(s) Oral two times a day  sodium chloride 0.9% lock flush 3 milliLiter(s) IV Push every 8 hours      Allergies    amoxicillin (Unknown)    Intolerances                              8.2    7.5   )-----------( 145      ( 17 Feb 2018 05:56 )             26.3       02-17    139  |  102  |  18  ----------------------------<  109<H>  4.0   |  26  |  0.91    Ca    8.5      17 Feb 2018 05:56            PT/INR - ( 16 Feb 2018 00:14 )   PT: 20.2 sec;   INR: 1.83 ratio         PTT - ( 16 Feb 2018 00:14 )  PTT:32.1 sec                      Daily     Daily     I&O's Summary    16 Feb 2018 07:01  -  17 Feb 2018 07:00  --------------------------------------------------------  IN: 460 mL / OUT: 850 mL / NET: -390 mL        Vital Signs Last 24 Hrs  T(C): 36.7 (17 Feb 2018 06:14), Max: 37 (16 Feb 2018 13:47)  T(F): 98.1 (17 Feb 2018 06:14), Max: 98.6 (16 Feb 2018 13:47)  HR: 97 (17 Feb 2018 06:14) (65 - 97)  BP: 130/69 (17 Feb 2018 06:14) (115/66 - 181/70)  BP(mean): 107 (16 Feb 2018 17:00) (94 - 107)  RR: 18 (17 Feb 2018 06:14) (18 - 18)  SpO2: 95% (17 Feb 2018 06:14) (94% - 98%)    PHYSICAL EXAM:   · Constitutional	Well-developed, well nourished  · Eyes	EOMI; PERRL; no drainage or redness  · ENMT	No oral lesions; no gross abnormalities  · Neck	No bruits; no thyromegaly or nodules  · Respiratory	Normal breath sounds b/l, No RRW  · Cardiovascular	Regular rate & rhythm, normal S1, S2; no murmurs, gallops or rubs; no S3, S4  · Gastrointestinal	Soft, non-tender, no hepatosplenomegaly, normal bowel sounds  · Extremities	No cyanosis, clubbing or edema  · Vascular	Equal and normal pulses (carotid, femoral, dorsalis pedis)  · Neurological	Alert & oriented; no sensory, motor or coordination deficits, normal reflexes

## 2018-02-18 LAB
ANION GAP SERPL CALC-SCNC: 12 MMOL/L — SIGNIFICANT CHANGE UP (ref 5–17)
BUN SERPL-MCNC: 22 MG/DL — SIGNIFICANT CHANGE UP (ref 7–23)
CALCIUM SERPL-MCNC: 8.6 MG/DL — SIGNIFICANT CHANGE UP (ref 8.4–10.5)
CHLORIDE SERPL-SCNC: 103 MMOL/L — SIGNIFICANT CHANGE UP (ref 96–108)
CO2 SERPL-SCNC: 25 MMOL/L — SIGNIFICANT CHANGE UP (ref 22–31)
CREAT SERPL-MCNC: 0.85 MG/DL — SIGNIFICANT CHANGE UP (ref 0.5–1.3)
GLUCOSE SERPL-MCNC: 105 MG/DL — HIGH (ref 70–99)
HCT VFR BLD CALC: 25.5 % — LOW (ref 34.5–45)
HGB BLD-MCNC: 8.3 G/DL — LOW (ref 11.5–15.5)
MCHC RBC-ENTMCNC: 26.4 PG — LOW (ref 27–34)
MCHC RBC-ENTMCNC: 32.5 GM/DL — SIGNIFICANT CHANGE UP (ref 32–36)
MCV RBC AUTO: 81.4 FL — SIGNIFICANT CHANGE UP (ref 80–100)
PLATELET # BLD AUTO: 149 K/UL — LOW (ref 150–400)
POTASSIUM SERPL-MCNC: 3.9 MMOL/L — SIGNIFICANT CHANGE UP (ref 3.5–5.3)
POTASSIUM SERPL-SCNC: 3.9 MMOL/L — SIGNIFICANT CHANGE UP (ref 3.5–5.3)
RBC # BLD: 3.13 M/UL — LOW (ref 3.8–5.2)
RBC # FLD: 14.5 % — SIGNIFICANT CHANGE UP (ref 10.3–14.5)
SODIUM SERPL-SCNC: 140 MMOL/L — SIGNIFICANT CHANGE UP (ref 135–145)
WBC # BLD: 8.1 K/UL — SIGNIFICANT CHANGE UP (ref 3.8–10.5)
WBC # FLD AUTO: 8.1 K/UL — SIGNIFICANT CHANGE UP (ref 3.8–10.5)

## 2018-02-18 PROCEDURE — 93010 ELECTROCARDIOGRAM REPORT: CPT

## 2018-02-18 RX ORDER — RIVAROXABAN 15 MG-20MG
20 KIT ORAL EVERY 24 HOURS
Qty: 0 | Refills: 0 | Status: DISCONTINUED | OUTPATIENT
Start: 2018-02-18 | End: 2018-02-19

## 2018-02-18 RX ORDER — DILTIAZEM HCL 120 MG
240 CAPSULE, EXT RELEASE 24 HR ORAL DAILY
Qty: 0 | Refills: 0 | Status: DISCONTINUED | OUTPATIENT
Start: 2018-02-18 | End: 2018-02-19

## 2018-02-18 RX ORDER — FERROUS SULFATE 325(65) MG
325 TABLET ORAL DAILY
Qty: 0 | Refills: 0 | Status: DISCONTINUED | OUTPATIENT
Start: 2018-02-18 | End: 2018-02-19

## 2018-02-18 RX ORDER — FOLIC ACID 0.8 MG
1 TABLET ORAL DAILY
Qty: 0 | Refills: 0 | Status: DISCONTINUED | OUTPATIENT
Start: 2018-02-18 | End: 2018-02-19

## 2018-02-18 RX ADMIN — SODIUM CHLORIDE 3 MILLILITER(S): 9 INJECTION INTRAMUSCULAR; INTRAVENOUS; SUBCUTANEOUS at 05:36

## 2018-02-18 RX ADMIN — Medication 1 MILLIGRAM(S): at 12:57

## 2018-02-18 RX ADMIN — Medication 100 MICROGRAM(S): at 05:39

## 2018-02-18 RX ADMIN — RIVAROXABAN 20 MILLIGRAM(S): KIT at 16:20

## 2018-02-18 RX ADMIN — SODIUM CHLORIDE 3 MILLILITER(S): 9 INJECTION INTRAMUSCULAR; INTRAVENOUS; SUBCUTANEOUS at 14:17

## 2018-02-18 RX ADMIN — ATORVASTATIN CALCIUM 40 MILLIGRAM(S): 80 TABLET, FILM COATED ORAL at 21:30

## 2018-02-18 RX ADMIN — Medication 100 MILLIGRAM(S): at 21:30

## 2018-02-18 RX ADMIN — Medication 25 MILLIGRAM(S): at 16:18

## 2018-02-18 RX ADMIN — Medication 650 MILLIGRAM(S): at 06:20

## 2018-02-18 RX ADMIN — Medication 81 MILLIGRAM(S): at 12:57

## 2018-02-18 RX ADMIN — Medication 325 MILLIGRAM(S): at 12:57

## 2018-02-18 RX ADMIN — Medication 100 MILLIGRAM(S): at 12:57

## 2018-02-18 RX ADMIN — Medication 25 MILLIGRAM(S): at 05:39

## 2018-02-18 RX ADMIN — Medication 240 MILLIGRAM(S): at 16:20

## 2018-02-18 RX ADMIN — Medication 650 MILLIGRAM(S): at 05:39

## 2018-02-18 RX ADMIN — SODIUM CHLORIDE 3 MILLILITER(S): 9 INJECTION INTRAMUSCULAR; INTRAVENOUS; SUBCUTANEOUS at 21:32

## 2018-02-18 NOTE — PROGRESS NOTE ADULT - PROBLEM SELECTOR PLAN 1
Xarelto held for bleeding to PPM site, Will reinstate tonight  dry sterile gauze to PPM site  Cardizem 240 for HR control  labs in am   asa 81 metoprolol 25 bid atorvastatin  multiple 3-5 sec pause  PPM with Dr Mccray 2/15  ambulate tid  incentive spirometrty   hold Cardizem per Cardiology  disposition  to home

## 2018-02-18 NOTE — PROGRESS NOTE ADULT - PROBLEM SELECTOR PLAN 1
Afib with up to 5 second pause;  - Status post PPM 2/15/18 with oozing at site;  resolving, now with some spotting   -back on xarelto - observe site  - Please increase betablocker for better rate control   - Patient to follow up in EP clinic on 2/27/18 at 9am  - Continue to monitor on telemetry Afib with up to 5 second pause;  - Status post PPM 2/15/18 with oozing at site;  resolving, now with some spotting   -xarelto scheduled for tonight - observe site  - Please increase betablocker for better rate control   - Patient to follow up in EP clinic on 2/27/18 at 9am  - Continue to monitor on telemetry

## 2018-02-18 NOTE — PROGRESS NOTE ADULT - PROBLEM SELECTOR PROBLEM 1
S/P TAVR (transcatheter aortic valve replacement)
Tachycardia-bradycardia syndrome
S/P TAVR (transcatheter aortic valve replacement)

## 2018-02-18 NOTE — PROGRESS NOTE ADULT - SUBJECTIVE AND OBJECTIVE BOX
24H hour events: No acute events.    MEDICATIONS:  aspirin enteric coated 81 milliGRAM(s) Oral daily  diltiazem    milliGRAM(s) Oral daily  metoprolol     tartrate 25 milliGRAM(s) Oral two times a day  rivaroxaban 20 milliGRAM(s) Oral every 24 hours  acetaminophen   Tablet. 650 milliGRAM(s) Oral every 6 hours PRN  docusate sodium 100 milliGRAM(s) Oral three times a day  atorvastatin 40 milliGRAM(s) Oral at bedtime  levothyroxine 100 MICROGram(s) Oral daily  ferrous    sulfate 325 milliGRAM(s) Oral daily  folic acid 1 milliGRAM(s) Oral daily      REVIEW OF SYSTEMS:  Complete 10point ROS negative. Feels well. Denies cardiac sx.    PHYSICAL EXAM:  T(C): 36.6 (02-18-18 @ 05:00), Max: 36.8 (02-17-18 @ 20:10)  HR: 84 (02-18-18 @ 05:00) (84 - 99)  BP: 113/70 (02-18-18 @ 05:00) (113/70 - 120/75)  RR: 18 (02-18-18 @ 05:00) (18 - 18)  SpO2: 97% (02-18-18 @ 05:00) (94% - 97%)  Wt(kg): --  I&O's Summary    17 Feb 2018 07:01  -  18 Feb 2018 07:00  --------------------------------------------------------  IN: 420 mL / OUT: 750 mL / NET: -330 mL      Appearance: Normal	  HEENT:   Normal oral mucosa, PERRL, EOMI	  Lymphatic: No lymphadenopathy  Cardiovascular: Normal S1 S2, No JVD, No murmurs, No edema  left upper chest incision c/d/i, nontender to palp, no swelling/erythema  Respiratory: Lungs clear to auscultation	  Psychiatry: A & O x 3, Mood & affect appropriate  Gastrointestinal:  Soft, Non-tender, + BS	  Skin: No rashes, No ecchymoses, No cyanosis	  Neurologic: Non-focal  Extremities: Normal range of motion, No clubbing, cyanosis or edema  Vascular: Peripheral pulses palpable 2+ bilaterally      LABS:	 	    CBC Full  -  ( 18 Feb 2018 06:14 )  WBC Count : 8.1 K/uL  Hemoglobin : 8.3 g/dL  Hematocrit : 25.5 %  Platelet Count - Automated : 149 K/uL  Mean Cell Volume : 81.4 fl  Mean Cell Hemoglobin : 26.4 pg  Mean Cell Hemoglobin Concentration : 32.5 gm/dL  Auto Neutrophil # : x  Auto Lymphocyte # : x  Auto Monocyte # : x  Auto Eosinophil # : x  Auto Basophil # : x  Auto Neutrophil % : x  Auto Lymphocyte % : x  Auto Monocyte % : x  Auto Eosinophil % : x  Auto Basophil % : x    02-18    140  |  103  |  22  ----------------------------<  105<H>  3.9   |  25  |  0.85  02-17    139  |  102  |  18  ----------------------------<  109<H>  4.0   |  26  |  0.91    Ca    8.6      18 Feb 2018 06:14  Ca    8.5      17 Feb 2018 05:56    TELEMETRY: AF 70, rarely as high as 140s     	  ASSESSMENT/PLAN:

## 2018-02-18 NOTE — PROGRESS NOTE ADULT - ASSESSMENT
89-year-old white female with PMHx of CAD prior cardiac stents ( PCI -RCA & Cx in 2015), CABG in 1989 with a LIMA graft to LAD and vein grafts to the other vessels, HTN, HLD, Hypothyroidism, Chronic Atrial fibrillation on xarelto, severe AS, Removal of Rectal mass in 6/15 now with progressive dyspnea on exertion with severe AS, now s/p TAVR, tachy howard syndrome s/p emergent PPM:    - Patient s/p emergent PPM and now with pocket hematoma.   Monitor for signs of expansion.  CTS and EPS follow up.  Follow CBC.  stable thus far with no evidence of meaningful acute blood loss anemia  - Pt has been restarted on rivaroxaban  - Continue aspirin for now  - Would resume metoprolol and increase as tolerated given AF with RVR and hyperdynamic LV  - Cont statin  - Monitor and replete electrolytes. Keep K>4.0 and Mg>2.0.   - Further cardiac workup will depend on clinical course.

## 2018-02-18 NOTE — PROGRESS NOTE ADULT - SUBJECTIVE AND OBJECTIVE BOX
VITAL SIGNS    Telemetry:  Afib   Vital Signs Last 24 Hrs  T(C): 36.6 (18 @ 05:00), Max: 36.8 (18 @ 20:10)  T(F): 97.8 (18 @ 05:00), Max: 98.2 (18 @ 20:10)  HR: 84 (18 @ 05:00) (84 - 99)  BP: 113/70 (18 @ 05:00) (113/70 - 120/75)  RR: 18 (18 @ 05:00) (18 - 18)  SpO2: 97% (18 @ 05:00) (94% - 97%)             @ 07:01  -   @ 07:00  --------------------------------------------------------  IN: 420 mL / OUT: 750 mL / NET: -330 mL    Daily Weight in k.2 (2018 11:16)  Admit Wt: Drug Dosing Weight  Height (cm): 139.7 (2018 05:56)  Weight (kg): 62 (2018 05:56)  BMI (kg/m2): 31.8 (2018 05:56)  BSA (m2): 1.49 (2018 05:56)    MEDICATIONS  acetaminophen   Tablet. 650 milliGRAM(s) Oral every 6 hours PRN  aspirin enteric coated 81 milliGRAM(s) Oral daily  atorvastatin 40 milliGRAM(s) Oral at bedtime  docusate sodium 100 milliGRAM(s) Oral three times a day  levothyroxine 100 MICROGram(s) Oral daily  metoprolol     tartrate 25 milliGRAM(s) Oral two times a day    PHYSICAL EXAM  Subjective: NAD  Neurology: alert and oriented x 3, nonfocal, no gross deficits  CV : S1S2  left ppm site oozing to right border,    Lungs: CTA  Abdomen: soft, NT,ND, (+)BM  :  voiding  Extremities:  -c/c/e     LABS      140  |  103  |  22  ----------------------------<  105<H>  3.9   |  25  |  0.85    Ca    8.6      2018 06:14                                   8.3    8.1   )-----------( 149      ( 2018 06:14 )             25.5                 PAST MEDICAL & SURGICAL HISTORY:  Gastrointestinal hemorrhage, unspecified gastritis, unspecified gastrointestinal hemorrhage type  Gastritis  Atrial fibrillation, unspecified  Hyperlipidemia  Gastroesophageal reflux disease without esophagitis  Hypothyroidism  Obesity  Dyslipidemia  Hypertension  CAD (coronary artery disease): S/P CABG   History of colon polyps: removal   H/O abdominal hysterectomy  Rectal mass: removal in 2015  S/P CABG (coronary artery bypass graft): in

## 2018-02-18 NOTE — PROGRESS NOTE ADULT - ASSESSMENT
89-year-old white female with PMHx of CAD prior cardiac stents ( PCI -RCA & Cx in 2015), CABG in 1989 with a LIMA graft to LAD and vein grafts to the other vessels, HTN, HLD, Hypothyroidism, Chronic Atrial fibrillation on xarelto  and moderate aortic stenosis on echocardiogram from a year and half ago, Removal of Rectal mass in 6/15.  reports increasin SOB/HERNADEZ ECHO reveals severe aortic stenosis with stage III diastolic dysfunction and acentric LVH, mild PAH .  Dr. Craig Cardiology Seen  2/14/18 Underwent TAVR # 29mm CORE Evolut Pro bioprosthesis via left  femoral approach .Right femoral TVP.  Post operative course includes   howard cardia down to 40s resolved.  LBBB Restarted on home dose  metoprolol 25 BID   2/15/18  ECHO  WDL no AR no paravalvular leak transferred to [address] [place] [address]- seen by cardiology Dr Mariee  no need to resume home cardizem at this time in rate controlled   chronic afib-  Xarelto 20 BID resumed   addendum: patient experienced  multiple 3- 5  sec symptomatic pauses EP called Dr Anton,Dr Byrnes and Dr Mccray at bedside.  patient underwent PPM placement LACW  Xarelto on hold   2/16 FFP transfused for bleeding to PPM site  2/17 H/H stable, will consider reinstating xeralto in am if no further bleeding noted from PPM  2/18 small clot expressed from ppm pocket, +oozing to right wound border          interrupted silk sutures x 3 placed with prolene mesh by Dr Anton

## 2018-02-18 NOTE — PROGRESS NOTE ADULT - ASSESSMENT
89-year-old white female with PMHx of CAD prior cardiac stents ( PCI -RCA & Cx in 2015), CABG in 1989 with a LIMA graft to LAD and vein grafts to the other vessels, HTN, HLD, Hypothyroidism, Chronic Atrial fibrillation( on xarelto)  and moderate aortic stenosis on echocardiogram from a year and half ago with recent echo revealing  severe aortic stenosis with stage III diastolic dysfunction and acentric LVH, mild PAH. She is s/p TAVR 2/14  after which she experienced  multiple 3- 5  sec symptomatic pauses.  Patient now status post PPM ( [employer] ) 2/15/18. Hospital course complicated by pacemaker site ooze.

## 2018-02-18 NOTE — PROGRESS NOTE ADULT - SUBJECTIVE AND OBJECTIVE BOX
· Subjective and Objective:   [facility] CARDIOLOGY CONSULTANTS:    Kam Craig, Magali Worrell, Jason Dempsey Savella, Goodger      [phone]    CHIEF COMPLAINT: Patient is a 89y old  Female who presents with a chief complaint of " I am have valve surgery " (2018 06:00)    Patient resting comfortably in bed in NAD.  Laying flat with no respiratory distress.  No complaints of chest pain, dyspnea, palpitations, PND, or orthopnea.  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  She has a hematoma at the site.  Her H/H is stable. Pocket sutured this morning - small clot expressed    REVIEW OF SYSTEMS:  CONSTITUTIONAL: No fever, weight loss, or fatigue  EYES: No eye pain, visual disturbances, or discharge  ENMT:  No difficulty hearing, tinnitus, vertigo; No sinus or throat pain  NECK: No pain or stiffness  RESPIRATORY: denies cough,No wheezing, chills or hemoptysis; No shortness of breath  CARDIOVASCULAR: No chest pain,No palpitations, dizziness, or leg swelling  GASTROINTESTINAL: No abdominal or epigastric pain. No nausea, vomiting, or hematemesis; No diarrhea , no constipation. No melena or hematochezia.  GENITOURINARY: No dysuria, frequency, hematuria, or incontinence  NEUROLOGICAL: No headaches, memory loss, loss of strength, numbness, or tremors  SKIN: No itching, burning, rashes, or lesions   LYMPH NODES: No enlarged glands  ENDOCRINE: No heat or cold intolerance; No hair loss  MUSCULOSKELETAL: No joint pain or swelling; No muscle, back, or extremity pain  PSYCHIATRIC: No depression, anxiety, mood swings, or difficulty sleeping  HEME/LYMPH: No easy bruising, or bleeding gums  ALLERGY AND IMMUNOLOGIC: No hives or eczema          PAST MEDICAL & SURGICAL HISTORY:  Gastrointestinal hemorrhage, unspecified gastritis, unspecified gastrointestinal hemorrhage type  Gastritis  Atrial fibrillation, unspecified  Hyperlipidemia  Gastroesophageal reflux disease without esophagitis  Hypothyroidism  Obesity  Dyslipidemia  Hypertension  CAD (coronary artery disease): S/P CABG   History of colon polyps: removal   H/O abdominal hysterectomy  Rectal mass: removal in 2015  S/P CABG (coronary artery bypass graft): in       MEDICATIONS  (STANDING):  aspirin enteric coated 81 milliGRAM(s) Oral daily  atorvastatin 40 milliGRAM(s) Oral at bedtime  diltiazem    milliGRAM(s) Oral daily  docusate sodium 100 milliGRAM(s) Oral three times a day  ferrous    sulfate 325 milliGRAM(s) Oral daily  folic acid 1 milliGRAM(s) Oral daily  levothyroxine 100 MICROGram(s) Oral daily  metoprolol     tartrate 25 milliGRAM(s) Oral two times a day  rivaroxaban 20 milliGRAM(s) Oral every 24 hours  sodium chloride 0.9% lock flush 3 milliLiter(s) IV Push every 8 hours      Allergies    amoxicillin (Unknown)    Intolerances                              8.3    8.1   )-----------( 149      ( 2018 06:14 )             25.5       02-18    140  |  103  |  22  ----------------------------<  105<H>  3.9   |  25  |  0.85    Ca    8.6      2018 06:14                                  Daily     Daily Weight in k.2 (2018 11:16)    I&O's Summary    2018 07:01  -  2018 07:00  --------------------------------------------------------  IN: 420 mL / OUT: 750 mL / NET: -330 mL        Vital Signs Last 24 Hrs  T(C): 36.6 (2018 05:00), Max: 36.8 (2018 20:10)  T(F): 97.8 (2018 05:00), Max: 98.2 (2018 20:10)  HR: 84 (2018 05:00) (84 - 99)  BP: 113/70 (2018 05:00) (113/70 - 120/75)  BP(mean): --  RR: 18 (2018 05:00) (18 - 18)  SpO2: 97% (2018 05:00) (94% - 97%)    PHYSICAL EXAM:   · Constitutional	Well-developed, well nourished  · Eyes	EOMI; PERRL; no drainage or redness  · ENMT	No oral lesions; no gross abnormalities  · Neck	No bruits; no thyromegaly or nodules  · Respiratory	Normal breath sounds b/l, No RRW  · Cardiovascular	Regular rate & rhythm, normal S1, S2; no murmurs, gallops or rubs; no S3, S4  · Gastrointestinal	Soft, non-tender, no hepatosplenomegaly, normal bowel sounds  · Extremities	No cyanosis, clubbing or edema  · Vascular	Equal and normal pulses (carotid, femoral, dorsalis pedis)  · Neurological	Alert & oriented; no sensory, motor or coordination deficits, normal reflexes

## 2018-02-19 ENCOUNTER — TRANSCRIPTION ENCOUNTER (OUTPATIENT)
Age: 83
End: 2018-02-19

## 2018-02-19 VITALS
OXYGEN SATURATION: 97 % | SYSTOLIC BLOOD PRESSURE: 100 MMHG | DIASTOLIC BLOOD PRESSURE: 60 MMHG | RESPIRATION RATE: 18 BRPM | HEART RATE: 58 BPM | TEMPERATURE: 98 F

## 2018-02-19 LAB
ANION GAP SERPL CALC-SCNC: 10 MMOL/L — SIGNIFICANT CHANGE UP (ref 5–17)
BUN SERPL-MCNC: 21 MG/DL — SIGNIFICANT CHANGE UP (ref 7–23)
CALCIUM SERPL-MCNC: 8.6 MG/DL — SIGNIFICANT CHANGE UP (ref 8.4–10.5)
CHLORIDE SERPL-SCNC: 101 MMOL/L — SIGNIFICANT CHANGE UP (ref 96–108)
CO2 SERPL-SCNC: 28 MMOL/L — SIGNIFICANT CHANGE UP (ref 22–31)
CREAT SERPL-MCNC: 0.86 MG/DL — SIGNIFICANT CHANGE UP (ref 0.5–1.3)
GLUCOSE SERPL-MCNC: 119 MG/DL — HIGH (ref 70–99)
HCT VFR BLD CALC: 24.2 % — LOW (ref 34.5–45)
HGB BLD-MCNC: 7.9 G/DL — LOW (ref 11.5–15.5)
MCHC RBC-ENTMCNC: 26.4 PG — LOW (ref 27–34)
MCHC RBC-ENTMCNC: 32.8 GM/DL — SIGNIFICANT CHANGE UP (ref 32–36)
MCV RBC AUTO: 80.6 FL — SIGNIFICANT CHANGE UP (ref 80–100)
PLATELET # BLD AUTO: 170 K/UL — SIGNIFICANT CHANGE UP (ref 150–400)
POTASSIUM SERPL-MCNC: 4.1 MMOL/L — SIGNIFICANT CHANGE UP (ref 3.5–5.3)
POTASSIUM SERPL-SCNC: 4.1 MMOL/L — SIGNIFICANT CHANGE UP (ref 3.5–5.3)
RBC # BLD: 3 M/UL — LOW (ref 3.8–5.2)
RBC # FLD: 14.7 % — HIGH (ref 10.3–14.5)
SODIUM SERPL-SCNC: 139 MMOL/L — SIGNIFICANT CHANGE UP (ref 135–145)
WBC # BLD: 8.5 K/UL — SIGNIFICANT CHANGE UP (ref 3.8–10.5)
WBC # FLD AUTO: 8.5 K/UL — SIGNIFICANT CHANGE UP (ref 3.8–10.5)

## 2018-02-19 PROCEDURE — C1769: CPT

## 2018-02-19 PROCEDURE — 93355 ECHO TRANSESOPHAGEAL (TEE): CPT

## 2018-02-19 PROCEDURE — 83605 ASSAY OF LACTIC ACID: CPT

## 2018-02-19 PROCEDURE — 80053 COMPREHEN METABOLIC PANEL: CPT

## 2018-02-19 PROCEDURE — C1887: CPT

## 2018-02-19 PROCEDURE — 82330 ASSAY OF CALCIUM: CPT

## 2018-02-19 PROCEDURE — 85730 THROMBOPLASTIN TIME PARTIAL: CPT

## 2018-02-19 PROCEDURE — P9059: CPT

## 2018-02-19 PROCEDURE — C1894: CPT

## 2018-02-19 PROCEDURE — 86923 COMPATIBILITY TEST ELECTRIC: CPT

## 2018-02-19 PROCEDURE — 84132 ASSAY OF SERUM POTASSIUM: CPT

## 2018-02-19 PROCEDURE — 82962 GLUCOSE BLOOD TEST: CPT

## 2018-02-19 PROCEDURE — 93005 ELECTROCARDIOGRAM TRACING: CPT

## 2018-02-19 PROCEDURE — C1895: CPT

## 2018-02-19 PROCEDURE — 82947 ASSAY GLUCOSE BLOOD QUANT: CPT

## 2018-02-19 PROCEDURE — L8699: CPT

## 2018-02-19 PROCEDURE — 99239 HOSP IP/OBS DSCHRG MGMT >30: CPT

## 2018-02-19 PROCEDURE — 84295 ASSAY OF SERUM SODIUM: CPT

## 2018-02-19 PROCEDURE — 82803 BLOOD GASES ANY COMBINATION: CPT

## 2018-02-19 PROCEDURE — C1760: CPT

## 2018-02-19 PROCEDURE — 80048 BASIC METABOLIC PNL TOTAL CA: CPT

## 2018-02-19 PROCEDURE — 82565 ASSAY OF CREATININE: CPT

## 2018-02-19 PROCEDURE — C1898: CPT

## 2018-02-19 PROCEDURE — 36430 TRANSFUSION BLD/BLD COMPNT: CPT

## 2018-02-19 PROCEDURE — 85027 COMPLETE CBC AUTOMATED: CPT

## 2018-02-19 PROCEDURE — 33207 INSERT HEART PM VENTRICULAR: CPT | Mod: KX

## 2018-02-19 PROCEDURE — 85014 HEMATOCRIT: CPT

## 2018-02-19 PROCEDURE — 82435 ASSAY OF BLOOD CHLORIDE: CPT

## 2018-02-19 PROCEDURE — 93306 TTE W/DOPPLER COMPLETE: CPT

## 2018-02-19 PROCEDURE — 85610 PROTHROMBIN TIME: CPT

## 2018-02-19 PROCEDURE — 71045 X-RAY EXAM CHEST 1 VIEW: CPT

## 2018-02-19 PROCEDURE — C1889: CPT

## 2018-02-19 PROCEDURE — 84484 ASSAY OF TROPONIN QUANT: CPT

## 2018-02-19 PROCEDURE — 82550 ASSAY OF CK (CPK): CPT

## 2018-02-19 PROCEDURE — 99232 SBSQ HOSP IP/OBS MODERATE 35: CPT

## 2018-02-19 PROCEDURE — 76000 FLUOROSCOPY <1 HR PHYS/QHP: CPT

## 2018-02-19 PROCEDURE — 82553 CREATINE MB FRACTION: CPT

## 2018-02-19 RX ORDER — ACETAMINOPHEN 500 MG
2 TABLET ORAL
Qty: 0 | Refills: 0 | COMMUNITY
Start: 2018-02-19

## 2018-02-19 RX ORDER — ASPIRIN/CALCIUM CARB/MAGNESIUM 324 MG
1 TABLET ORAL
Qty: 0 | Refills: 0 | DISCHARGE
Start: 2018-02-19

## 2018-02-19 RX ORDER — FOLIC ACID 0.8 MG
0 TABLET ORAL
Qty: 0 | Refills: 0 | COMMUNITY
Start: 2018-02-19

## 2018-02-19 RX ORDER — DOCUSATE SODIUM 100 MG
1 CAPSULE ORAL
Qty: 0 | Refills: 0 | COMMUNITY
Start: 2018-02-19

## 2018-02-19 RX ORDER — FERROUS SULFATE 325(65) MG
1 TABLET ORAL
Qty: 0 | Refills: 0 | COMMUNITY
Start: 2018-02-19

## 2018-02-19 RX ADMIN — SODIUM CHLORIDE 3 MILLILITER(S): 9 INJECTION INTRAMUSCULAR; INTRAVENOUS; SUBCUTANEOUS at 13:19

## 2018-02-19 RX ADMIN — Medication 100 MICROGRAM(S): at 06:36

## 2018-02-19 RX ADMIN — Medication 325 MILLIGRAM(S): at 11:00

## 2018-02-19 RX ADMIN — Medication 240 MILLIGRAM(S): at 06:36

## 2018-02-19 RX ADMIN — Medication 100 MILLIGRAM(S): at 06:36

## 2018-02-19 RX ADMIN — Medication 1 MILLIGRAM(S): at 11:00

## 2018-02-19 RX ADMIN — SODIUM CHLORIDE 3 MILLILITER(S): 9 INJECTION INTRAMUSCULAR; INTRAVENOUS; SUBCUTANEOUS at 06:40

## 2018-02-19 RX ADMIN — Medication 25 MILLIGRAM(S): at 06:36

## 2018-02-19 RX ADMIN — Medication 81 MILLIGRAM(S): at 11:01

## 2018-02-19 NOTE — DISCHARGE NOTE ADULT - NS AS ACTIVITY OBS
Walking-Outdoors allowed/Walking-Indoors allowed/Do not make important decisions/Do not drive or operate machinery/Showering allowed/Stairs allowed/No Heavy lifting/straining

## 2018-02-19 NOTE — DISCHARGE NOTE ADULT - CARE PLAN
Principal Discharge DX:	S/P TAVR (transcatheter aortic valve replacement)  Goal:	recover from surgery  Assessment and plan of treatment:	Follow up Dr Anton 2 weeks  Call [phone]> ask for Barbie to arrange post op visit  Follow up Dr Mccray 2 weeks for pacemaker check  TAVR discharge instructions>Keep femoral or groin site clean,please shower daily and pat site dry.Watch site for signs of reddness or drainage, these should be reported to your doctor.You will receive a card about your valve in the mail,please carry in your wallet.  [facility] Nurse Practitioner  [phone]  They will call you to arrange complimentary home visits post operatively

## 2018-02-19 NOTE — DISCHARGE NOTE ADULT - PLAN OF CARE
recover from surgery Follow up Dr Anton 2 weeks  Call [phone]> ask for Barbie to arrange post op visit  Follow up Dr Mccray 2 weeks for pacemaker check  TAVR discharge instructions>Keep femoral or groin site clean,please shower daily and pat site dry.Watch site for signs of reddness or drainage, these should be reported to your doctor.You will receive a card about your valve in the mail,please carry in your wallet.  [facility] Nurse Practitioner  [phone]  They will call you to arrange complimentary home visits post operatively

## 2018-02-19 NOTE — PROGRESS NOTE ADULT - SUBJECTIVE AND OBJECTIVE BOX
[facility] Cardiology Consultants - Kam Craig, Anaid, Magali, Roselyn, Jaylene Gomez  Office Number:  [phone]    Patient resting comfortably in bed in NAD.  Laying flat with no respiratory distress.  No complaints of chest pain, dyspnea, palpitations, PND, or orthopnea.  Feeling well    ROS: negative unless otherwise mentioned.    Telemetry:  AF , rate controlled today, was up to 170s yesterday    MEDICATIONS  (STANDING):  aspirin enteric coated 81 milliGRAM(s) Oral daily  atorvastatin 40 milliGRAM(s) Oral at bedtime  diltiazem    milliGRAM(s) Oral daily  docusate sodium 100 milliGRAM(s) Oral three times a day  ferrous    sulfate 325 milliGRAM(s) Oral daily  folic acid 1 milliGRAM(s) Oral daily  levothyroxine 100 MICROGram(s) Oral daily  metoprolol     tartrate 25 milliGRAM(s) Oral two times a day  rivaroxaban 20 milliGRAM(s) Oral every 24 hours  sodium chloride 0.9% lock flush 3 milliLiter(s) IV Push every 8 hours    MEDICATIONS  (PRN):  acetaminophen   Tablet. 650 milliGRAM(s) Oral every 6 hours PRN Mild Pain (1 - 3)      Allergies    amoxicillin (Unknown)    Intolerances        Vital Signs Last 24 Hrs  T(C): 36.9 (19 Feb 2018 10:37), Max: 36.9 (19 Feb 2018 10:37)  T(F): 98.4 (19 Feb 2018 10:37), Max: 98.4 (19 Feb 2018 10:37)  HR: 58 (19 Feb 2018 10:37) (58 - 100)  BP: 100/60 (19 Feb 2018 10:37) (100/60 - 126/68)  BP(mean): --  RR: 18 (19 Feb 2018 10:37) (18 - 18)  SpO2: 97% (19 Feb 2018 10:37) (95% - 98%)    I&O's Summary    18 Feb 2018 07:01  -  19 Feb 2018 07:00  --------------------------------------------------------  IN: 880 mL / OUT: 1300 mL / NET: -420 mL    19 Feb 2018 07:01  -  19 Feb 2018 12:09  --------------------------------------------------------  IN: 360 mL / OUT: 0 mL / NET: 360 mL        ON EXAM:    Constitutional: NAD, awake and alert, well-developed, PPM site with bruising but no significant hematoma noted.  HEENT: Moist Mucous Membranes, Anicteric  Pulmonary: Decreased breath sounds b/l. No rales, crackles or wheeze appreciated.   Cardiovascular: IRRR, S1 and no S2, 3/6 systolic murmur  Gastrointestinal: Bowel Sounds present, soft, nontender.   Lymph: No peripheral edema. No lymphadenopathy.  Skin: No visible rashes or ulcers.  Psych:  Mood & affect appropriate for situation.     LABS: All Labs Reviewed:                        7.9    8.5   )-----------( 170      ( 19 Feb 2018 05:36 )             24.2                         8.3    8.1   )-----------( 149      ( 18 Feb 2018 06:14 )             25.5                         8.2    7.5   )-----------( 145      ( 17 Feb 2018 05:56 )             26.3     19 Feb 2018 05:36    139    |  101    |  21     ----------------------------<  119    4.1     |  28     |  0.86   18 Feb 2018 06:14    140    |  103    |  22     ----------------------------<  105    3.9     |  25     |  0.85   17 Feb 2018 05:56    139    |  102    |  18     ----------------------------<  109    4.0     |  26     |  0.91     Ca    8.6        19 Feb 2018 05:36  Ca    8.6        18 Feb 2018 06:14  Ca    8.5        17 Feb 2018 05:56            Blood Culture:

## 2018-02-19 NOTE — DISCHARGE NOTE ADULT - COMMUNITY RESOURCES
[facility] Nurse Practitioner  [phone]  They will call you to arrange complimentary home visits post operatively

## 2018-02-19 NOTE — DISCHARGE NOTE ADULT - CARE PROVIDERS DIRECT ADDRESSES
[email or web address]

## 2018-02-19 NOTE — DISCHARGE NOTE ADULT - MEDICATION SUMMARY - MEDICATIONS TO TAKE
I will START or STAY ON the medications listed below when I get home from the hospital:    aspirin 81 mg oral delayed release tablet  -- 1 tab(s) by mouth once a day  -- Indication: For aspirin    Tylenol 325 mg oral tablet  -- 2 tab(s) by mouth every 6 hours, As needed, Mild Pain (1 - 3)  -- Indication: For pain    diltiazem 240 mg/24 hours oral capsule, extended release  -- 1 cap(s) by mouth once a day  -- Indication: For calcium channel blocker    Xarelto 20 mg oral tablet  -- 1 tab(s) by mouth once a day (in the evening) last dose will be feb 11 at 10 am   -- Indication: For anticoagulation    simvastatin 20 mg oral tablet  -- 1 tab(s) by mouth once a day (at bedtime)    home  -- Indication: For Statin    Metoprolol Tartrate 25 mg oral tablet  -- 1 tab(s) by mouth 2 times a day  -- Indication: For betablocker    ferrous sulfate 325 mg (65 mg elemental iron) oral tablet  -- 1 tab(s) by mouth 3 times a day  -- Indication: For iron supplement    docusate sodium 100 mg oral capsule  -- 1 cap(s) by mouth 3 times a day  -- Indication: For Stool softener    pantoprazole 20 mg oral delayed release tablet  -- 1 tab(s) by mouth once a day  -- Indication: For GERD    levothyroxine 100 mcg (0.1 mg) oral tablet  -- 1 tab(s) by mouth once a day  -- Indication: For hypothyoidism    Calcium 500+D oral tablet, chewable  -- 1 tab(s) by mouth once a day  -- Indication: For Supplement    Ocuvite oral tablet  -- 1 tab(s) by mouth once a day  -- Indication: For Supplement    Vitamin D3 1000 intl units oral tablet  -- 1 tab(s) by mouth once a day  -- Indication: For Supplement    Vitamin B-12 100 mcg oral tablet  -- 1 tab(s) by mouth once a day  -- Indication: For Supplement    folic acid 0.8 mg oral tablet  --  by mouth   -- Indication: For Supplement

## 2018-02-19 NOTE — DISCHARGE NOTE ADULT - PATIENT PORTAL LINK FT
You can access the [facility] Patient Portal, offered by [facility], by registering with the following website: [email or web address]

## 2018-02-19 NOTE — DISCHARGE NOTE ADULT - HOSPITAL COURSE
89-year-old white female with PMHx of CAD prior cardiac stents ( PCI -RCA & Cx in 2015), CABG in 1989 with a LIMA graft to LAD and vein grafts to the other vessels, HTN, HLD, Hypothyroidism, Chronic Atrial fibrillation on xarelto  and moderate aortic stenosis on echocardiogram from a year and half ago, Removal of Rectal mass in 6/15.  reports increasin SOB/HERNADEZ ECHO reveals severe aortic stenosis with stage III diastolic dysfunction and acentric LVH, mild PAH .  Dr. Craig Cardiology Seen  2/14/18 Underwent TAVR # 29mm CORE Evolut Pro bioprosthesis via left  femoral approach .Right femoral TVP.  Post operative course includes   howard cardia down to 40s resolved.  LBBB Restarted on home dose  metoprolol 25 BID   2/15/18  ECHO  WDL no AR no paravalvular leak transferred to [address] [place] [address]- seen by cardiology Dr Mariee  no need to resume home cardizem at this time in rate controlled   chronic afib-  Xarelto 20 BID resumed   addendum: patient experienced  multiple 3- 5  sec symptomatic pauses EP called Dr Anton,Dr Byrnes and Dr Mccray at bedside.  patient underwent PPM placement LACW  Xarelto on hold   2/16 FFP transfused for bleeding to PPM site  2/17 H/H stable, will consider reinstating xeralto in am if no further bleeding noted from PPM  2/18 small clot expressed from ppm pocket, +oozing to right wound border          interrupted silk sutures x 3 placed with prolene mesh by Dr Anton

## 2018-02-19 NOTE — DISCHARGE NOTE ADULT - OTHER SIGNIFICANT FINDINGS
PHYSICAL EXAM  Subjective: NAD  Neurology: alert and oriented x 3, nonfocal, no gross deficits  CV : S1S2  irreg  irreg  left ppm site > no drainage, dsg changed   Lungs: CTA  Abdomen: soft, NT,ND, (+)BM this am  :  voiding  Extremities:  no edema, B/L groin incision , + ecchymosis

## 2018-02-19 NOTE — PROGRESS NOTE ADULT - ASSESSMENT
89-year-old white female with CAD prior cardiac stents ( PCI -RCA & Cx in 2015), CABG in 1989 with a LIMA graft to LAD and vein grafts to the other vessels, HTN, HLD, Hypothyroidism, Chronic Atrial fibrillation on xarelto, Removal of Rectal mass in 6/15 now with progressive dyspnea on exertion with severe AS, now s/p TAVR, tachy howard syndrome s/p emergent PPM:    - Patient s/p emergent PPM and with pocket hematoma.  Seems to have resolved and improved.  - Drop in Hb noted this morning, though no re-expansion of the hematoma.  - Restarted back on Xarelto  - Continue aspirin for now  - Agree with continuing metoprolol 25 mg po bid and cardizem. She has been rate controlled today.  - Cont statin  - Monitor and replete electrolytes. Keep K>4.0 and Mg>2.0.   - Further cardiac workup will depend on clinical course.   - d/c planning by CTS if bleeding issues resolved. She will follow up with Dr. Craig in our office in 1-2 weeks.

## 2018-02-20 LAB
ANION GAP SERPL CALC-SCNC: 12 MMOL/L
BASOPHILS # BLD AUTO: 0 K/UL
BASOPHILS NFR BLD AUTO: 0.1 %
BUN SERPL-MCNC: 20 MG/DL
CALCIUM SERPL-MCNC: 9.5 MG/DL
CHLORIDE SERPL-SCNC: 102 MMOL/L
CO2 SERPL-SCNC: 28 MMOL/L
CREAT SERPL-MCNC: 0.93 MG/DL
EOSINOPHIL # BLD AUTO: 0.1 K/UL
EOSINOPHIL NFR BLD AUTO: 1.7 %
GLUCOSE SERPL-MCNC: 96 MG/DL
HCT VFR BLD CALC: 35.5 %
HGB BLD-MCNC: 11.8 G/DL
LYMPHOCYTES # BLD AUTO: 1.6 K/UL
LYMPHOCYTES NFR BLD AUTO: 19.3 %
MAN DIFF?: NO
MCHC RBC-ENTMCNC: 28.3 PG
MCHC RBC-ENTMCNC: 33.3 GM/DL
MCV RBC AUTO: 85 FL
MONOCYTES # BLD AUTO: 0.7 K/UL
MONOCYTES NFR BLD AUTO: 8.6 %
NEUTROPHILS # BLD AUTO: 5.6 K/UL
NEUTROPHILS NFR BLD AUTO: 70.3 %
NT-PROBNP SERPL-MCNC: 1949 PG/ML
PLATELET # BLD AUTO: 233 K/UL
POTASSIUM SERPL-SCNC: 4.6 MMOL/L
RBC # BLD: 4.18 M/UL
RBC # FLD: 13.6 %
SODIUM SERPL-SCNC: 142 MMOL/L
WBC # FLD AUTO: 8 K/UL

## 2018-02-20 RX ORDER — CALCIUM CITRATE/VITAMIN D3 315MG-6.25
TABLET ORAL DAILY
Refills: 0 | Status: ACTIVE | COMMUNITY

## 2018-02-20 RX ORDER — ACETAMINOPHEN 325 MG/1
325 TABLET, FILM COATED ORAL
Refills: 0 | Status: ACTIVE | COMMUNITY

## 2018-02-20 RX ORDER — PANTOPRAZOLE 20 MG/1
20 TABLET, DELAYED RELEASE ORAL DAILY
Qty: 90 | Refills: 3 | Status: DISCONTINUED | COMMUNITY
End: 2018-02-20

## 2018-02-26 ENCOUNTER — APPOINTMENT (OUTPATIENT)
Dept: ELECTROPHYSIOLOGY | Facility: CLINIC | Age: 83
End: 2018-02-26
Payer: MEDICARE

## 2018-02-26 VITALS
OXYGEN SATURATION: 96 % | BODY MASS INDEX: 31.01 KG/M2 | HEIGHT: 55 IN | DIASTOLIC BLOOD PRESSURE: 80 MMHG | WEIGHT: 134 LBS | HEART RATE: 64 BPM | SYSTOLIC BLOOD PRESSURE: 155 MMHG

## 2018-02-26 PROCEDURE — 99024 POSTOP FOLLOW-UP VISIT: CPT

## 2018-02-26 RX ORDER — PANTOPRAZOLE SODIUM 40 MG/1
40 TABLET, DELAYED RELEASE ORAL
Qty: 30 | Refills: 0 | Status: DISCONTINUED | COMMUNITY
End: 2018-02-26

## 2018-02-27 ENCOUNTER — NON-APPOINTMENT (OUTPATIENT)
Age: 83
End: 2018-02-27

## 2018-02-27 ENCOUNTER — APPOINTMENT (OUTPATIENT)
Dept: CARDIOTHORACIC SURGERY | Facility: CLINIC | Age: 83
End: 2018-02-27
Payer: MEDICARE

## 2018-02-27 VITALS
OXYGEN SATURATION: 99 % | BODY MASS INDEX: 30.09 KG/M2 | HEART RATE: 58 BPM | SYSTOLIC BLOOD PRESSURE: 142 MMHG | DIASTOLIC BLOOD PRESSURE: 73 MMHG | TEMPERATURE: 97.7 F | RESPIRATION RATE: 15 BRPM | HEIGHT: 55 IN | WEIGHT: 130 LBS

## 2018-02-27 PROCEDURE — 99214 OFFICE O/P EST MOD 30 MIN: CPT

## 2018-02-27 RX ORDER — DOCUSATE SODIUM 100 MG/1
100 CAPSULE ORAL 3 TIMES DAILY
Refills: 0 | Status: DISCONTINUED | COMMUNITY
End: 2018-02-27

## 2018-03-06 DIAGNOSIS — Z01.818 ENCOUNTER FOR OTHER PREPROCEDURAL EXAMINATION: ICD-10-CM

## 2018-03-06 DIAGNOSIS — I35.0 NONRHEUMATIC AORTIC (VALVE) STENOSIS: ICD-10-CM

## 2018-03-08 ENCOUNTER — APPOINTMENT (OUTPATIENT)
Dept: CARDIOLOGY | Facility: CLINIC | Age: 83
End: 2018-03-08
Payer: MEDICARE

## 2018-03-08 VITALS
HEIGHT: 55 IN | BODY MASS INDEX: 30.55 KG/M2 | WEIGHT: 132 LBS | HEART RATE: 70 BPM | SYSTOLIC BLOOD PRESSURE: 150 MMHG | DIASTOLIC BLOOD PRESSURE: 74 MMHG | OXYGEN SATURATION: 90 %

## 2018-03-08 PROCEDURE — 99214 OFFICE O/P EST MOD 30 MIN: CPT

## 2018-03-12 DIAGNOSIS — R05 COUGH: ICD-10-CM

## 2018-03-26 ENCOUNTER — APPOINTMENT (OUTPATIENT)
Dept: CARDIOLOGY | Facility: CLINIC | Age: 83
End: 2018-03-26
Payer: MEDICARE

## 2018-03-26 VITALS
HEIGHT: 55 IN | SYSTOLIC BLOOD PRESSURE: 149 MMHG | HEART RATE: 58 BPM | DIASTOLIC BLOOD PRESSURE: 71 MMHG | BODY MASS INDEX: 30.09 KG/M2 | OXYGEN SATURATION: 97 % | WEIGHT: 130 LBS

## 2018-03-26 DIAGNOSIS — R06.02 SHORTNESS OF BREATH: ICD-10-CM

## 2018-03-26 PROCEDURE — 99215 OFFICE O/P EST HI 40 MIN: CPT

## 2018-03-27 ENCOUNTER — APPOINTMENT (OUTPATIENT)
Dept: CARDIOLOGY | Facility: CLINIC | Age: 83
End: 2018-03-27

## 2018-04-03 ENCOUNTER — APPOINTMENT (OUTPATIENT)
Dept: CV DIAGNOSITCS | Facility: HOSPITAL | Age: 83
End: 2018-04-03

## 2018-04-03 ENCOUNTER — APPOINTMENT (OUTPATIENT)
Dept: CARDIOTHORACIC SURGERY | Facility: CLINIC | Age: 83
End: 2018-04-03
Payer: MEDICARE

## 2018-04-03 ENCOUNTER — OUTPATIENT (OUTPATIENT)
Dept: OUTPATIENT SERVICES | Facility: HOSPITAL | Age: 83
LOS: 1 days | End: 2018-04-03
Payer: MEDICARE

## 2018-04-03 VITALS — OXYGEN SATURATION: 98 % | HEART RATE: 67 BPM | SYSTOLIC BLOOD PRESSURE: 154 MMHG | DIASTOLIC BLOOD PRESSURE: 75 MMHG

## 2018-04-03 DIAGNOSIS — Z90.710 ACQUIRED ABSENCE OF BOTH CERVIX AND UTERUS: Chronic | ICD-10-CM

## 2018-04-03 DIAGNOSIS — Z86.010 PERSONAL HISTORY OF COLONIC POLYPS: Chronic | ICD-10-CM

## 2018-04-03 DIAGNOSIS — Z95.1 PRESENCE OF AORTOCORONARY BYPASS GRAFT: Chronic | ICD-10-CM

## 2018-04-03 DIAGNOSIS — K62.9 DISEASE OF ANUS AND RECTUM, UNSPECIFIED: Chronic | ICD-10-CM

## 2018-04-03 DIAGNOSIS — Z95.2 PRESENCE OF PROSTHETIC HEART VALVE: ICD-10-CM

## 2018-04-03 PROCEDURE — 93000 ELECTROCARDIOGRAM COMPLETE: CPT

## 2018-04-03 PROCEDURE — 93306 TTE W/DOPPLER COMPLETE: CPT | Mod: 26

## 2018-04-03 PROCEDURE — 93306 TTE W/DOPPLER COMPLETE: CPT

## 2018-04-03 PROCEDURE — 99215 OFFICE O/P EST HI 40 MIN: CPT

## 2018-04-24 ENCOUNTER — APPOINTMENT (OUTPATIENT)
Dept: CARDIOLOGY | Facility: CLINIC | Age: 83
End: 2018-04-24
Payer: MEDICARE

## 2018-04-24 VITALS
HEIGHT: 55 IN | HEART RATE: 71 BPM | OXYGEN SATURATION: 95 % | SYSTOLIC BLOOD PRESSURE: 123 MMHG | BODY MASS INDEX: 30.55 KG/M2 | WEIGHT: 132 LBS | DIASTOLIC BLOOD PRESSURE: 74 MMHG

## 2018-04-24 PROCEDURE — 99214 OFFICE O/P EST MOD 30 MIN: CPT

## 2018-05-03 ENCOUNTER — APPOINTMENT (OUTPATIENT)
Dept: CARDIOLOGY | Facility: CLINIC | Age: 83
End: 2018-05-03
Payer: MEDICARE

## 2018-05-03 ENCOUNTER — NON-APPOINTMENT (OUTPATIENT)
Age: 83
End: 2018-05-03

## 2018-05-03 VITALS
WEIGHT: 133 LBS | DIASTOLIC BLOOD PRESSURE: 80 MMHG | BODY MASS INDEX: 30.78 KG/M2 | HEART RATE: 92 BPM | SYSTOLIC BLOOD PRESSURE: 170 MMHG | OXYGEN SATURATION: 86 % | HEIGHT: 55 IN

## 2018-05-03 PROCEDURE — 99214 OFFICE O/P EST MOD 30 MIN: CPT

## 2018-05-15 ENCOUNTER — APPOINTMENT (OUTPATIENT)
Dept: CARDIOLOGY | Facility: CLINIC | Age: 83
End: 2018-05-15
Payer: MEDICARE

## 2018-05-15 ENCOUNTER — NON-APPOINTMENT (OUTPATIENT)
Age: 83
End: 2018-05-15

## 2018-05-15 VITALS
OXYGEN SATURATION: 93 % | BODY MASS INDEX: 30.09 KG/M2 | WEIGHT: 130 LBS | DIASTOLIC BLOOD PRESSURE: 70 MMHG | HEIGHT: 55 IN | HEART RATE: 73 BPM | SYSTOLIC BLOOD PRESSURE: 130 MMHG

## 2018-05-15 PROCEDURE — 99214 OFFICE O/P EST MOD 30 MIN: CPT

## 2018-05-15 PROCEDURE — 93000 ELECTROCARDIOGRAM COMPLETE: CPT

## 2018-05-21 ENCOUNTER — APPOINTMENT (OUTPATIENT)
Dept: ELECTROPHYSIOLOGY | Facility: CLINIC | Age: 83
End: 2018-05-21
Payer: MEDICARE

## 2018-05-21 VITALS
WEIGHT: 131 LBS | SYSTOLIC BLOOD PRESSURE: 146 MMHG | BODY MASS INDEX: 30.32 KG/M2 | HEART RATE: 83 BPM | HEIGHT: 55 IN | OXYGEN SATURATION: 93 % | DIASTOLIC BLOOD PRESSURE: 71 MMHG

## 2018-05-21 PROCEDURE — 93279 PRGRMG DEV EVAL PM/LDLS PM: CPT

## 2018-05-31 ENCOUNTER — APPOINTMENT (OUTPATIENT)
Dept: CARDIOLOGY | Facility: CLINIC | Age: 83
End: 2018-05-31
Payer: MEDICARE

## 2018-05-31 ENCOUNTER — NON-APPOINTMENT (OUTPATIENT)
Age: 83
End: 2018-05-31

## 2018-05-31 VITALS
DIASTOLIC BLOOD PRESSURE: 76 MMHG | BODY MASS INDEX: 30.55 KG/M2 | HEIGHT: 55 IN | HEART RATE: 92 BPM | WEIGHT: 132 LBS | SYSTOLIC BLOOD PRESSURE: 160 MMHG | OXYGEN SATURATION: 91 %

## 2018-05-31 PROCEDURE — 99215 OFFICE O/P EST HI 40 MIN: CPT

## 2018-06-04 ENCOUNTER — APPOINTMENT (OUTPATIENT)
Dept: CARDIOLOGY | Facility: CLINIC | Age: 83
End: 2018-06-04
Payer: MEDICARE

## 2018-06-04 VITALS
HEART RATE: 81 BPM | DIASTOLIC BLOOD PRESSURE: 67 MMHG | WEIGHT: 131 LBS | HEIGHT: 55 IN | OXYGEN SATURATION: 92 % | BODY MASS INDEX: 30.32 KG/M2 | SYSTOLIC BLOOD PRESSURE: 126 MMHG

## 2018-06-04 PROCEDURE — 99214 OFFICE O/P EST MOD 30 MIN: CPT

## 2018-06-05 ENCOUNTER — INPATIENT (INPATIENT)
Facility: HOSPITAL | Age: 83
LOS: 12 days | Discharge: HOME | DRG: 330 | End: 2018-06-18
Attending: FAMILY MEDICINE | Admitting: FAMILY MEDICINE
Payer: MEDICARE

## 2018-06-05 VITALS
OXYGEN SATURATION: 96 % | DIASTOLIC BLOOD PRESSURE: 53 MMHG | HEART RATE: 66 BPM | RESPIRATION RATE: 18 BRPM | SYSTOLIC BLOOD PRESSURE: 152 MMHG | WEIGHT: 130.95 LBS | TEMPERATURE: 98 F | HEIGHT: 55 IN

## 2018-06-05 DIAGNOSIS — Z29.9 ENCOUNTER FOR PROPHYLACTIC MEASURES, UNSPECIFIED: ICD-10-CM

## 2018-06-05 DIAGNOSIS — D64.9 ANEMIA, UNSPECIFIED: ICD-10-CM

## 2018-06-05 DIAGNOSIS — D72.829 ELEVATED WHITE BLOOD CELL COUNT, UNSPECIFIED: ICD-10-CM

## 2018-06-05 DIAGNOSIS — K62.9 DISEASE OF ANUS AND RECTUM, UNSPECIFIED: Chronic | ICD-10-CM

## 2018-06-05 DIAGNOSIS — Z86.010 PERSONAL HISTORY OF COLONIC POLYPS: Chronic | ICD-10-CM

## 2018-06-05 DIAGNOSIS — Z95.1 PRESENCE OF AORTOCORONARY BYPASS GRAFT: Chronic | ICD-10-CM

## 2018-06-05 DIAGNOSIS — Z90.710 ACQUIRED ABSENCE OF BOTH CERVIX AND UTERUS: Chronic | ICD-10-CM

## 2018-06-05 LAB
ALBUMIN SERPL ELPH-MCNC: 3.7 G/DL — SIGNIFICANT CHANGE UP (ref 3.3–5)
ALP SERPL-CCNC: 84 U/L — SIGNIFICANT CHANGE UP (ref 40–120)
ALT FLD-CCNC: 13 U/L — SIGNIFICANT CHANGE UP (ref 12–78)
ANION GAP SERPL CALC-SCNC: 12 MMOL/L — SIGNIFICANT CHANGE UP (ref 5–17)
AST SERPL-CCNC: 15 U/L — SIGNIFICANT CHANGE UP (ref 15–37)
BASOPHILS # BLD AUTO: 0.02 K/UL — SIGNIFICANT CHANGE UP (ref 0–0.2)
BASOPHILS NFR BLD AUTO: 0.2 % — SIGNIFICANT CHANGE UP (ref 0–2)
BILIRUB SERPL-MCNC: 1.2 MG/DL — SIGNIFICANT CHANGE UP (ref 0.2–1.2)
BLD GP AB SCN SERPL QL: SIGNIFICANT CHANGE UP
BUN SERPL-MCNC: 15 MG/DL — SIGNIFICANT CHANGE UP (ref 7–23)
CALCIUM SERPL-MCNC: 8.8 MG/DL — SIGNIFICANT CHANGE UP (ref 8.5–10.1)
CHLORIDE SERPL-SCNC: 102 MMOL/L — SIGNIFICANT CHANGE UP (ref 96–108)
CK MB BLD-MCNC: 2.9 % — SIGNIFICANT CHANGE UP (ref 0–3.5)
CK MB CFR SERPL CALC: 0.9 NG/ML — SIGNIFICANT CHANGE UP (ref 0–3.6)
CK SERPL-CCNC: 31 U/L — SIGNIFICANT CHANGE UP (ref 26–192)
CO2 SERPL-SCNC: 25 MMOL/L — SIGNIFICANT CHANGE UP (ref 22–31)
CREAT SERPL-MCNC: 0.99 MG/DL — SIGNIFICANT CHANGE UP (ref 0.5–1.3)
EOSINOPHIL # BLD AUTO: 0.05 K/UL — SIGNIFICANT CHANGE UP (ref 0–0.5)
EOSINOPHIL NFR BLD AUTO: 0.5 % — SIGNIFICANT CHANGE UP (ref 0–6)
GLUCOSE SERPL-MCNC: 133 MG/DL — HIGH (ref 70–99)
HCT VFR BLD CALC: 23.5 % — LOW (ref 34.5–45)
HGB BLD-MCNC: 6.9 G/DL — CRITICAL LOW (ref 11.5–15.5)
IMM GRANULOCYTES NFR BLD AUTO: 1 % — SIGNIFICANT CHANGE UP (ref 0–1.5)
LYMPHOCYTES # BLD AUTO: 1.3 K/UL — SIGNIFICANT CHANGE UP (ref 1–3.3)
LYMPHOCYTES # BLD AUTO: 13.8 % — SIGNIFICANT CHANGE UP (ref 13–44)
MCHC RBC-ENTMCNC: 22.8 PG — LOW (ref 27–34)
MCHC RBC-ENTMCNC: 29.4 GM/DL — LOW (ref 32–36)
MCV RBC AUTO: 77.6 FL — LOW (ref 80–100)
MONOCYTES # BLD AUTO: 0.66 K/UL — SIGNIFICANT CHANGE UP (ref 0–0.9)
MONOCYTES NFR BLD AUTO: 7 % — SIGNIFICANT CHANGE UP (ref 2–14)
NEUTROPHILS # BLD AUTO: 7.3 K/UL — SIGNIFICANT CHANGE UP (ref 1.8–7.4)
NEUTROPHILS NFR BLD AUTO: 77.5 % — HIGH (ref 43–77)
NRBC # BLD: 0 /100 WBCS — SIGNIFICANT CHANGE UP (ref 0–0)
NT-PROBNP SERPL-SCNC: 5095 PG/ML — HIGH (ref 0–450)
OB PNL STL: NEGATIVE — SIGNIFICANT CHANGE UP
PLATELET # BLD AUTO: 310 K/UL — SIGNIFICANT CHANGE UP (ref 150–400)
POTASSIUM SERPL-MCNC: 4.3 MMOL/L — SIGNIFICANT CHANGE UP (ref 3.5–5.3)
POTASSIUM SERPL-SCNC: 4.3 MMOL/L — SIGNIFICANT CHANGE UP (ref 3.5–5.3)
PROT SERPL-MCNC: 7.6 G/DL — SIGNIFICANT CHANGE UP (ref 6–8.3)
RBC # BLD: 3.03 M/UL — LOW (ref 3.8–5.2)
RBC # FLD: 19.6 % — HIGH (ref 10.3–14.5)
SODIUM SERPL-SCNC: 139 MMOL/L — SIGNIFICANT CHANGE UP (ref 135–145)
TROPONIN I SERPL-MCNC: <.015 NG/ML — SIGNIFICANT CHANGE UP (ref 0.01–0.04)
WBC # BLD: 9.42 K/UL — SIGNIFICANT CHANGE UP (ref 3.8–10.5)
WBC # FLD AUTO: 9.42 K/UL — SIGNIFICANT CHANGE UP (ref 3.8–10.5)

## 2018-06-05 PROCEDURE — 71045 X-RAY EXAM CHEST 1 VIEW: CPT | Mod: 26

## 2018-06-05 PROCEDURE — 99223 1ST HOSP IP/OBS HIGH 75: CPT

## 2018-06-05 PROCEDURE — 99285 EMERGENCY DEPT VISIT HI MDM: CPT

## 2018-06-05 PROCEDURE — 93010 ELECTROCARDIOGRAM REPORT: CPT

## 2018-06-05 RX ORDER — FUROSEMIDE 40 MG
40 TABLET ORAL ONCE
Qty: 0 | Refills: 0 | Status: COMPLETED | OUTPATIENT
Start: 2018-06-05 | End: 2018-06-05

## 2018-06-05 RX ORDER — ASPIRIN/CALCIUM CARB/MAGNESIUM 324 MG
81 TABLET ORAL DAILY
Qty: 0 | Refills: 0 | Status: DISCONTINUED | OUTPATIENT
Start: 2018-06-05 | End: 2018-06-13

## 2018-06-05 RX ORDER — PANTOPRAZOLE SODIUM 20 MG/1
40 TABLET, DELAYED RELEASE ORAL
Qty: 0 | Refills: 0 | Status: DISCONTINUED | OUTPATIENT
Start: 2018-06-05 | End: 2018-06-07

## 2018-06-05 RX ORDER — METOPROLOL TARTRATE 50 MG
25 TABLET ORAL
Qty: 0 | Refills: 0 | Status: DISCONTINUED | OUTPATIENT
Start: 2018-06-05 | End: 2018-06-06

## 2018-06-05 RX ORDER — CHOLECALCIFEROL (VITAMIN D3) 125 MCG
1000 CAPSULE ORAL DAILY
Qty: 0 | Refills: 0 | Status: DISCONTINUED | OUTPATIENT
Start: 2018-06-05 | End: 2018-06-13

## 2018-06-05 RX ORDER — PREGABALIN 225 MG/1
100 CAPSULE ORAL DAILY
Qty: 0 | Refills: 0 | Status: DISCONTINUED | OUTPATIENT
Start: 2018-06-05 | End: 2018-06-13

## 2018-06-05 RX ORDER — MULTIVIT-MIN/FERROUS GLUCONATE 9 MG/15 ML
1 LIQUID (ML) ORAL DAILY
Qty: 0 | Refills: 0 | Status: DISCONTINUED | OUTPATIENT
Start: 2018-06-05 | End: 2018-06-13

## 2018-06-05 RX ORDER — DILTIAZEM HCL 120 MG
240 CAPSULE, EXT RELEASE 24 HR ORAL DAILY
Qty: 0 | Refills: 0 | Status: DISCONTINUED | OUTPATIENT
Start: 2018-06-05 | End: 2018-06-06

## 2018-06-05 RX ORDER — DOCUSATE SODIUM 100 MG
100 CAPSULE ORAL THREE TIMES A DAY
Qty: 0 | Refills: 0 | Status: DISCONTINUED | OUTPATIENT
Start: 2018-06-05 | End: 2018-06-13

## 2018-06-05 RX ORDER — DIGOXIN 250 MCG
0.12 TABLET ORAL DAILY
Qty: 0 | Refills: 0 | Status: DISCONTINUED | OUTPATIENT
Start: 2018-06-05 | End: 2018-06-06

## 2018-06-05 RX ORDER — FOLIC ACID 0.8 MG
1 TABLET ORAL DAILY
Qty: 0 | Refills: 0 | Status: DISCONTINUED | OUTPATIENT
Start: 2018-06-05 | End: 2018-06-13

## 2018-06-05 RX ORDER — LEVOTHYROXINE SODIUM 125 MCG
100 TABLET ORAL DAILY
Qty: 0 | Refills: 0 | Status: DISCONTINUED | OUTPATIENT
Start: 2018-06-05 | End: 2018-06-13

## 2018-06-05 RX ORDER — SIMVASTATIN 20 MG/1
20 TABLET, FILM COATED ORAL AT BEDTIME
Qty: 0 | Refills: 0 | Status: DISCONTINUED | OUTPATIENT
Start: 2018-06-05 | End: 2018-06-13

## 2018-06-05 RX ADMIN — Medication 25 MILLIGRAM(S): at 21:52

## 2018-06-05 RX ADMIN — Medication 40 MILLIGRAM(S): at 17:45

## 2018-06-05 RX ADMIN — SIMVASTATIN 20 MILLIGRAM(S): 20 TABLET, FILM COATED ORAL at 21:52

## 2018-06-05 RX ADMIN — Medication 100 MILLIGRAM(S): at 21:53

## 2018-06-05 NOTE — H&P ADULT - PMH
Atrial fibrillation, unspecified    CAD (coronary artery disease)  S/P CABG 1999  Dyslipidemia    Gastritis    Gastroesophageal reflux disease without esophagitis    Gastrointestinal hemorrhage, unspecified gastritis, unspecified gastrointestinal hemorrhage type    Hyperlipidemia    Hypertension    Hypothyroidism    Obesity Anemia    Atrial fibrillation, unspecified    CAD (coronary artery disease)  S/P CABG 1999  Constipation    Dyslipidemia    Gastritis    Gastroesophageal reflux disease without esophagitis    Gastrointestinal hemorrhage, unspecified gastritis, unspecified gastrointestinal hemorrhage type    Hyperlipidemia    Hypertension    Hypothyroidism    Obesity    Xerophthalmia

## 2018-06-05 NOTE — H&P ADULT - NSHPREVIEWOFSYSTEMS_GEN_ALL_CORE
· RESPIRATORY: - - -  · Respiratory [+]: EXERTIONAL DYSPNEA, SHORTNESS OF BREATH  · ROS STATEMENT: all other ROS negative except as per HPI

## 2018-06-05 NOTE — ED ADULT NURSE NOTE - OBJECTIVE STATEMENT
patient came in ED from home with c/o weakness, poor appetite and shortness of breath on exertion. sent by PMD for low Hgb. patient states last Feb 2018 she had valve replacement and pacemaker placed in. alert and oriented X 4. non-labored respiration noted. lungs clear and equal on auscultation. denies abdominal pain. denies chest pain or discomfort. denies episode of nausea, vomiting or diarrhea. denies black or dark stool. on Xarelto.

## 2018-06-05 NOTE — CONSULT NOTE ADULT - ASSESSMENT
This is a 89 year old woman with a history of CAD s/p remote CABG, more recent CHARY to the RCA and LCx, severe AS s/p TAVR, chronic AF with TBS s/p PPM, on Xarelto for stroke risk reduction, normal LV function, hypertension, hyperlipidemia who has been feeling weak and fatigued lately, along with having HERNADEZ, admitted with symptomatic anemia:    - Admit to remote telemetry  - Transfuse 2 units PRBC  - Trend H/H  -  Send stool for occult blood  - GI evaluation  - Continue aspirin  - Can hold xarelto for now  - Continue digoxin 125 QD.  Check level  - Continue diltiazem 240 QD and metoprolol 25 BID as long as blood pressure can tolerate  - COntinue simvastatin 20  - Monitor and replete lytes  - To follow closely with you.

## 2018-06-05 NOTE — H&P ADULT - NSHPLABSRESULTS_GEN_ALL_CORE
06-05    139  |  102  |  15  ----------------------------<  133<H>  4.3   |  25  |  0.99    Ca    8.8      05 Jun 2018 12:33    TPro  7.6  /  Alb  3.7  /  TBili  1.2  /  DBili  x   /  AST  15  /  ALT  13  /  AlkPhos  84  06-05                            6.9    9.42  )-----------( 310      ( 05 Jun 2018 12:33 )             23.5       CARDIAC MARKERS ( 05 Jun 2018 12:33 )  <.015 ng/mL / x     / 31 U/L / x     / 0.9 ng/mL        LIVER FUNCTIONS - ( 05 Jun 2018 12:33 )  Alb: 3.7 g/dL / Pro: 7.6 g/dL / ALK PHOS: 84 U/L / ALT: 13 U/L / AST: 15 U/L / GGT: x

## 2018-06-05 NOTE — H&P ADULT - HISTORY OF PRESENT ILLNESS
· HPI Objective Statement: 89 female sent to ER by her cardiologist for a low hg level 7.1. Patient states for the past 2 weeks she has not been feeling well, generalized fatigue, shortness of breath with exertion, sleeping more.	  In ER patient was found to be anemic.  Patient is being admitted for further work up and treatment

## 2018-06-05 NOTE — PROVIDER CONTACT NOTE (CHANGE IN STATUS NOTIFICATION) - SITUATION
1st unit PRBC started, time out completed with JOHNNY Barajas. vital sign taken and recorded. consent verified. patient made aware to watch for signs for blood transfusion reaction. will monitor.

## 2018-06-05 NOTE — ED PROVIDER NOTE - OBJECTIVE STATEMENT
89 female sent to ER by her cardiologist for a low hg level 7.1. Patient states for the past 2 weeks she has not been feeling well, generalized fatigue, shortness of breath with exertion, sleeping more.

## 2018-06-05 NOTE — H&P ADULT - NSHPPHYSICALEXAM_GEN_ALL_CORE
· Physical Examination: rectal- small hemorrhoid, brown stool  · CONSTITUTIONAL: - - -  · Appearance: well appearing  · Development: well developed  · Distress: no apparent  · Manner: appropriate for situation  · Mentation: awake, alert  · Mood: appropriate  · Nourishment: well  · CARDIAC: - - -  · CARDIAC RHYTHM: IRREGULAR  · CARDIAC RATE: normal  · CARDIAC SOUNDS: MURMUR  · CARDIAC PEDAL EDEMA: PITTING 2 MM  · CARDIAC CAPI REFILL: less than or equal to 2 seconds  · GASTROINTESTINAL: Abdomen soft, non-tender, no guarding.  · MUSCULOSKELETAL: - - -  · MUSC EXAM: atraumatic, normal range of motion  · NEUROLOGICAL: - - -  · NEURO LOC: alert  follows commands  · NEURO NECK: normal  · SKIN: Skin normal color for race, warm, dry and intact. No evidence of rash.

## 2018-06-06 DIAGNOSIS — I48.91 UNSPECIFIED ATRIAL FIBRILLATION: ICD-10-CM

## 2018-06-06 LAB
ANION GAP SERPL CALC-SCNC: 7 MMOL/L — SIGNIFICANT CHANGE UP (ref 5–17)
BUN SERPL-MCNC: 16 MG/DL — SIGNIFICANT CHANGE UP (ref 7–23)
CALCIUM SERPL-MCNC: 8.4 MG/DL — LOW (ref 8.5–10.1)
CHLORIDE SERPL-SCNC: 102 MMOL/L — SIGNIFICANT CHANGE UP (ref 96–108)
CO2 SERPL-SCNC: 31 MMOL/L — SIGNIFICANT CHANGE UP (ref 22–31)
CREAT SERPL-MCNC: 0.95 MG/DL — SIGNIFICANT CHANGE UP (ref 0.5–1.3)
FERRITIN SERPL-MCNC: 46 NG/ML — SIGNIFICANT CHANGE UP (ref 15–150)
GLUCOSE SERPL-MCNC: 88 MG/DL — SIGNIFICANT CHANGE UP (ref 70–99)
HCT VFR BLD CALC: 30.2 % — LOW (ref 34.5–45)
HGB BLD-MCNC: 9.5 G/DL — LOW (ref 11.5–15.5)
IRON SATN MFR SERPL: 14 UG/DL — LOW (ref 30–160)
IRON SATN MFR SERPL: 4 % — LOW (ref 14–50)
OB PNL STL: POSITIVE
POTASSIUM SERPL-MCNC: 3.7 MMOL/L — SIGNIFICANT CHANGE UP (ref 3.5–5.3)
POTASSIUM SERPL-SCNC: 3.7 MMOL/L — SIGNIFICANT CHANGE UP (ref 3.5–5.3)
SODIUM SERPL-SCNC: 140 MMOL/L — SIGNIFICANT CHANGE UP (ref 135–145)
TIBC SERPL-MCNC: 393 UG/DL — SIGNIFICANT CHANGE UP (ref 220–430)
UIBC SERPL-MCNC: 379 UG/DL — HIGH (ref 110–370)

## 2018-06-06 PROCEDURE — 99233 SBSQ HOSP IP/OBS HIGH 50: CPT

## 2018-06-06 RX ORDER — IRON SUCROSE 20 MG/ML
100 INJECTION, SOLUTION INTRAVENOUS EVERY 24 HOURS
Qty: 0 | Refills: 0 | Status: COMPLETED | OUTPATIENT
Start: 2018-06-06 | End: 2018-06-08

## 2018-06-06 RX ORDER — METOPROLOL TARTRATE 50 MG
25 TABLET ORAL
Qty: 0 | Refills: 0 | Status: DISCONTINUED | OUTPATIENT
Start: 2018-06-06 | End: 2018-06-13

## 2018-06-06 RX ORDER — DILTIAZEM HCL 120 MG
240 CAPSULE, EXT RELEASE 24 HR ORAL DAILY
Qty: 0 | Refills: 0 | Status: DISCONTINUED | OUTPATIENT
Start: 2018-06-06 | End: 2018-06-13

## 2018-06-06 RX ORDER — FERROUS SULFATE 325(65) MG
325 TABLET ORAL DAILY
Qty: 0 | Refills: 0 | Status: DISCONTINUED | OUTPATIENT
Start: 2018-06-06 | End: 2018-06-07

## 2018-06-06 RX ORDER — DIGOXIN 250 MCG
0.12 TABLET ORAL DAILY
Qty: 0 | Refills: 0 | Status: DISCONTINUED | OUTPATIENT
Start: 2018-06-06 | End: 2018-06-13

## 2018-06-06 RX ADMIN — Medication 25 MILLIGRAM(S): at 17:14

## 2018-06-06 RX ADMIN — Medication 100 MILLIGRAM(S): at 05:15

## 2018-06-06 RX ADMIN — PANTOPRAZOLE SODIUM 40 MILLIGRAM(S): 20 TABLET, DELAYED RELEASE ORAL at 05:15

## 2018-06-06 RX ADMIN — Medication 1000 UNIT(S): at 11:36

## 2018-06-06 RX ADMIN — Medication 1 TABLET(S): at 11:33

## 2018-06-06 RX ADMIN — Medication 25 MILLIGRAM(S): at 05:15

## 2018-06-06 RX ADMIN — Medication 1 MILLIGRAM(S): at 11:36

## 2018-06-06 RX ADMIN — Medication 1 TABLET(S): at 11:34

## 2018-06-06 RX ADMIN — Medication 0.12 MILLIGRAM(S): at 05:15

## 2018-06-06 RX ADMIN — Medication 240 MILLIGRAM(S): at 05:15

## 2018-06-06 RX ADMIN — Medication 100 MICROGRAM(S): at 05:15

## 2018-06-06 RX ADMIN — SIMVASTATIN 20 MILLIGRAM(S): 20 TABLET, FILM COATED ORAL at 21:20

## 2018-06-06 RX ADMIN — IRON SUCROSE 100 MILLIGRAM(S): 20 INJECTION, SOLUTION INTRAVENOUS at 17:13

## 2018-06-06 RX ADMIN — PREGABALIN 100 MICROGRAM(S): 225 CAPSULE ORAL at 11:35

## 2018-06-06 RX ADMIN — Medication 81 MILLIGRAM(S): at 11:33

## 2018-06-06 NOTE — PROGRESS NOTE ADULT - ASSESSMENT
89 year old woman with a history of CAD s/p remote CABG, more recent CHARY to the RCA and LCx, severe AS s/p TAVR, chronic AF with TBS s/p PPM, on Xarelto for stroke risk reduction, normal LV function, hypertension, hyperlipidemia who has been feeling weak and fatigued lately, along with having HERNADEZ, admitted with symptomatic anemia. No clear GI bleed. AF is rate controlled; s/p RBCs    - cont tele  - Trend H/H  -  Send stool for occult blood  - GI evaluation  - Continue aspirin  - Can hold xarelto for now  - Continue digoxin 125 QD.  Check level  - Continue diltiazem 240 QD and metoprolol 25 BID as long as blood pressure can tolerate  - COntinue simvastatin 20  - Monitor and replete lytes  - To follow closely with you.

## 2018-06-06 NOTE — CONSULT NOTE ADULT - SUBJECTIVE AND OBJECTIVE BOX
Chief Complaint:  Patient is a 89y old  Female who presents with a chief complaint of weakness and lethargy, blood level low (2018 21:06)      HPI:  · HPI Objective Statement: 89 female sent to ER by her cardiologist for a low hg level 7.1. Patient states for the past 2 weeks she has not been feeling well, generalized fatigue, shortness of breath with exertion, sleeping more.	  In ER patient was found to be anemic.  Patient is being admitted for further work up and treatment (2018 15:51)  GI called to R/O GI bleed or GI lesion. Pt offers no GI complaints today.      Allergies:  amoxicillin (Unknown)      Medications:  aspirin enteric coated 81 milliGRAM(s) Oral daily  calcium carbonate 1250 mG + Vitamin D (OsCal 500 + D) 1 Tablet(s) Oral daily  cholecalciferol 1000 Unit(s) Oral daily  cyanocobalamin 100 MICROGram(s) Oral daily  digoxin     Tablet 0.125 milliGRAM(s) Oral daily  diltiazem    milliGRAM(s) Oral daily  docusate sodium 100 milliGRAM(s) Oral three times a day  ferrous    sulfate 325 milliGRAM(s) Oral daily  folic acid 1 milliGRAM(s) Oral daily  iron sucrose Injectable 100 milliGRAM(s) IV Push every 24 hours  levothyroxine 100 MICROGram(s) Oral daily  metoprolol tartrate 25 milliGRAM(s) Oral two times a day  multivitamin/minerals 1 Tablet(s) Oral daily  pantoprazole    Tablet 40 milliGRAM(s) Oral before breakfast  simvastatin 20 milliGRAM(s) Oral at bedtime      PMHX/PSHX:  Xerophthalmia  Anemia  Constipation  Gastrointestinal hemorrhage, unspecified gastritis, unspecified gastrointestinal hemorrhage type  Gastritis  Atrial fibrillation, unspecified  Hyperlipidemia  Gastroesophageal reflux disease without esophagitis  Hypothyroidism  Obesity  Dyslipidemia  Hypertension  CAD (coronary artery disease)  History of colon polyps  H/O abdominal hysterectomy  Rectal mass  S/P CABG (coronary artery bypass graft)      Family history:  No pertinent family history in first degree relatives      Social History:     ROS:     General:  No wt loss, fevers, chills, night sweats, fatigue,   Eyes:  Good vision, no reported pain  ENT:  No sore throat, pain, runny nose, dysphagia  CV:  No pain, palpitations, hypo/hypertension  Resp:  No dyspnea, cough, tachypnea, wheezing  GI:  No pain, No nausea, No vomiting, No diarrhea, No constipation, No weight loss, No fever, No pruritis, No rectal bleeding, No tarry stools, No dysphagia,  :  No pain, bleeding, incontinence, nocturia  Muscle:  No pain, weakness  Neuro:  No weakness, tingling, memory problems        PHYSICAL EXAM:   Vital Signs:  Vital Signs Last 24 Hrs  T(C): 36.8 (2018 16:01), Max: 37.4 (2018 05:16)  T(F): 98.2 (2018 16:01), Max: 99.3 (2018 05:16)  HR: 66 (2018 16:01) (60 - 76)  BP: 116/65 (2018 16:01) (116/64 - 153/79)  BP(mean): --  RR: 16 (2018 16:01) (14 - 16)  SpO2: 95% (2018 16:01) (93% - 96%)  Daily     Daily Weight in k (2018 05:16)    GENERAL:  Appears stated age, well-groomed, well-nourished, no distress  HEENT:  NC/AT,  conjunctivae clear and pink, no thyromegaly, nodules, adenopathy, no JVD, sclera -anicteric  NECK: Supple, No JVD  CHEST:  Full & symmetric excursion, no increased effort, breath sounds clear  HEART:  Regular rhythm, S1, S2, no murmur/rub/S3/S4, no abdominal bruit, no edema  ABDOMEN:  Soft, non-tender, non-distended, normoactive bowel sounds,  no masses ,no hepato-splenomegaly, no signs of chronic liver disease  EXTEREMITIES:  no cyanosis,clubbing or edema  NEURO:  Alert, oriented, no asterixis, no tremor, no encephalopathy    LABS:                        9.5    x     )-----------( x        ( 2018 01:11 )             30.2     06-06    140  |  102  |  16  ----------------------------<  88  3.7   |  31  |  0.95    Ca    8.4<L>      2018 16:07    TPro  7.6  /  Alb  3.7  /  TBili  1.2  /  DBili  x   /  AST  15  /  ALT  13  /  AlkPhos  84  06-05    LIVER FUNCTIONS - ( 2018 12:33 )  Alb: 3.7 g/dL / Pro: 7.6 g/dL / ALK PHOS: 84 U/L / ALT: 13 U/L / AST: 15 U/L / GGT: x                   Imaging:

## 2018-06-06 NOTE — CONSULT NOTE ADULT - ASSESSMENT
Anemia, R/O GI bleed or GI source of anemia    1 Discussed EGD/Colonoscopy to evaluate for ulcer/cancer or GI source of anemia  2 No GI complaints or bleeding  3 Agree with transfusion  4 Patient refused endoscopic work up. Understands risk of ulcer/cancer etc  5 Follow CBC  6 Transfuse PRN  7 Care coordinated with PMD  8 Spent 55-65 minutes on patient care  9 Please call me back PRN. Thank you.

## 2018-06-06 NOTE — PROGRESS NOTE ADULT - SUBJECTIVE AND OBJECTIVE BOX
Follow up: CAD, TAVR, AF    HPI:   89 female sent to ER by her cardiologist for a low hg level 7.1. Patient states for the past 2 weeks she has not been feeling well, generalized fatigue, shortness of breath with exertion, sleeping more.	  In ER patient was found to be anemic.  Patient is being admitted for further work up and treatment (05 Jun 2018 15:51)    This morning, she is sleeping but arousable. She has no specific complaints and reports no chest pain or dyspnea       PAST MEDICAL & SURGICAL HISTORY:  Xerophthalmia  Anemia  Constipation  Gastrointestinal hemorrhage, unspecified gastritis, unspecified gastrointestinal hemorrhage type  Gastritis  Atrial fibrillation, unspecified  Hyperlipidemia  Gastroesophageal reflux disease without esophagitis  Hypothyroidism  Obesity  Dyslipidemia  Hypertension  CAD (coronary artery disease): S/P CABG 1999  History of colon polyps: removal 2014  H/O abdominal hysterectomy  Rectal mass: removal in 6/2015  S/P CABG (coronary artery bypass graft): in 1999      MEDICATIONS  (STANDING):  aspirin enteric coated 81 milliGRAM(s) Oral daily  calcium carbonate 1250 mG + Vitamin D (OsCal 500 + D) 1 Tablet(s) Oral daily  cholecalciferol 1000 Unit(s) Oral daily  cyanocobalamin 100 MICROGram(s) Oral daily  digoxin     Tablet 0.125 milliGRAM(s) Oral daily  diltiazem    milliGRAM(s) Oral daily  docusate sodium 100 milliGRAM(s) Oral three times a day  folic acid 1 milliGRAM(s) Oral daily  levothyroxine 100 MICROGram(s) Oral daily  metoprolol tartrate 25 milliGRAM(s) Oral two times a day  multivitamin/minerals 1 Tablet(s) Oral daily  pantoprazole    Tablet 40 milliGRAM(s) Oral before breakfast  simvastatin 20 milliGRAM(s) Oral at bedtime    Vital Signs Last 24 Hrs  T(C): 37.4 (06 Jun 2018 05:16), Max: 37.4 (06 Jun 2018 05:16)  T(F): 99.3 (06 Jun 2018 05:16), Max: 99.3 (06 Jun 2018 05:16)  HR: 76 (06 Jun 2018 05:16) (65 - 93)  BP: 120/73 (06 Jun 2018 05:16) (120/73 - 157/63)  BP(mean): --  RR: 16 (06 Jun 2018 05:16) (16 - 19)  SpO2: 93% (06 Jun 2018 05:16) (93% - 96%)    I&O's Summary      PHYSICAL EXAM:    Constitutional: NAD, frail  Eyes:   Pupils round, no lesions  ENMT: no exudate or erythema  Pulmonary: Non-labored, breath sounds are clear bilaterally, No wheezing, rales or rhonchi  Cardiovascular: PMI not palpable irreg normal S1 and S2, no murmurs, rubs, gallops or clicks  Gastrointestinal: Bowel Sounds present, soft, nontender.   Lymph: No cervical lymphadenopathy.  Neurological: Alert, no focal deficits  Skin: No rashes.  No cyanosis.  Psych:  Mood & affect appropriate   Ext: No lower ext edema      CARDIAC MARKERS ( 05 Jun 2018 12:33 )  <.015 ng/mL / x     / 31 U/L / x     / 0.9 ng/mL                            9.5    x     )-----------( x        ( 06 Jun 2018 01:11 )             30.2     06-05    139  |  102  |  15  ----------------------------<  133<H>  4.3   |  25  |  0.99    Ca    8.8      05 Jun 2018 12:33    TPro  7.6  /  Alb  3.7  /  TBili  1.2  /  DBili  x   /  AST  15  /  ALT  13  /  AlkPhos  84  06-05    Tele: AF 60bpm    < from: Xray Chest 1 View- PORTABLE-Urgent (06.05.18 @ 14:24) >    EXAM:  XR CHEST PORTABLE URGENT 1V                            PROCEDURE DATE:  06/05/2018          INTERPRETATION:  Chest one view    HISTORY: Anemia and weakness    COMPARISON STUDY: 2/17/2018    Frontal expiratory view of the chest shows the heart to be similarly   enlarged in size. The lungs show small left pleural effusion with   blunting of the right costophrenic angle and there is no evidence of   pneumothorax.  Left pacemaker and sternal wires are again noted.    IMPRESSION:  Basilar fluid as described.    Thank you for the courtesy of this referral.                BOBBY SILVA M.D., ATTENDING RADIOLOGIST  This document has been electronically signed. Jun 5 2018  2:38PM                < end of copied text >    < from: Transthoracic Echocardiogram (04.03.18 @ 12:57) >    Patient name: RON ABREU  YOB: 1926   Age: 89 (F)   MR#: [id number]  Study Date: 4/3/2018  Location: O/PSonographer: Sangita Ram[facility]rez [facility], [place]  Study quality: Technically difficult  Referring Physician: DARIA MINAYA MD  Blood Pressure: 183/77 mmHg  Height: 140 cm  Weight: 58 kg  BSA: 1.5 m2  ------------------------------------------------------------------------  PROCEDURE: Transthoracic echocardiogram with 2-D, M-Mode  and complete spectral and color flow Doppler.  INDICATION:Presence of prosthetic heart valve (Z95.2)  ------------------------------------------------------------------------  Dimensions:    Normal Values:  LA:     5.0    2.0 - 4.0 cm  Ao:     2.5    2.0 - 3.8 cm  SEPTUM: 0.6    0.6 - 1.2 cm  PWT:    0.80.6 - 1.1 cm  LVIDd:  4.4    3.0 - 5.6 cm  LVIDs:  2.7    1.8 - 4.0 cm  Derived variables:  LVMI: 63 g/m2  RWT: 0.36  Fractional short: 39 %  EF (Visual Estimate): 70 %  Doppler Peak Velocity (m/sec): AoV=1.5  ------------------------------------------------------------------------  Observations:  Mitral Valve: Mitral annular calcification. Thickened  mitral leaflets. Mild mitral regurgitation.  Aortic Valve/Aorta: Transcatheter aortic valve replacement.  Peak transaortic valve gradient equals 9 mm Hg, mean  transaortic valve gradient equals 4 mm Hg, which is  probably normal in the presence of a transcatheter aortic  valve replacement. No aortic valve regurgitation seen. Peak  left ventricular outflow tract gradient equals 1 mm Hg,  mean gradient is equal to 1 mm Hg, LVOT velocity time  integral equals 14 cm.  Aortic Root: 2.5 cm.  Left Atrium: Severely dilated left atrium.  LA volume index  = 69 cc/m2.  Left Ventricle: Normal left ventricular systolic function.  Septal motion consistentwith paced rhythm/conduction  defect. Normal left ventricular internal dimensions and  wall thicknesses.  Right Heart: Right atrium not well visualized, appears  mildly dilated. Mild right ventricular enlargement with  mildly decreased right ventricular systolic function. A  device wire is noted in the right heart. Normal tricuspid  valve. Mild-moderate tricuspid regurgitation. Normal  pulmonic valve. Mild pulmonic regurgitation.  Pericardium/Pleura: Normal pericardium with no pericardial  effusion.  Hemodynamic: Estimated right atrial pressure is 8 mm Hg.  Estimated right ventricular systolic pressure equals 57 mm  Hg, assuming right atrial pressure equals 8 mm Hg,  consistent with moderate pulmonary hypertension.  ------------------------------------------------------------------------  Conclusions:  1. Mitral annular calcification. Thickened mitral leaflets.  Mild mitral regurgitation.  2. Transcatheter aortic valve replacement. Peak transaortic  valve gradient equals 9 mm Hg, mean transaortic valve  gradient equals 4 mm Hg, which is probably normal in the  presence of a transcatheter aortic valve replacement. No  aortic valve regurgitation seen.  3. Normal left ventricular systolic function. Septal motion  consistent with paced rhythm/conduction defect.  4. Mild right ventricular enlargement with mildly decreased  right ventricular systolic function. A device wire is noted  in the right heart.  5. Estimated pulmonary artery systolic pressure equals 57  mm Hg, assuming right atrial pressure equals 8 mm Hg,  consistent with moderate pulmonary pressures.  *** Compared with echocardiogram of 2/15/2018, results are  similar on today's study.  ------------------------------------------------------------------------  Confirmed on 4/3/2018 - 15:19:53 by Meagan Carter M.D.  ------------------------------------------------------------------------    < end of copied text >

## 2018-06-06 NOTE — PROGRESS NOTE ADULT - SUBJECTIVE AND OBJECTIVE BOX
Patient is a 89y old  Female who presents with a chief complaint of weakness and lethargy, blood level low (05 Jun 2018 21:06)      INTERVAL /OVERNIGHT EVENTS: denies bleeding, no abd pain    MEDICATIONS  (STANDING):  aspirin enteric coated 81 milliGRAM(s) Oral daily  calcium carbonate 1250 mG + Vitamin D (OsCal 500 + D) 1 Tablet(s) Oral daily  cholecalciferol 1000 Unit(s) Oral daily  cyanocobalamin 100 MICROGram(s) Oral daily  digoxin     Tablet 0.125 milliGRAM(s) Oral daily  diltiazem    milliGRAM(s) Oral daily  docusate sodium 100 milliGRAM(s) Oral three times a day  ferrous    sulfate 325 milliGRAM(s) Oral daily  folic acid 1 milliGRAM(s) Oral daily  levothyroxine 100 MICROGram(s) Oral daily  metoprolol tartrate 25 milliGRAM(s) Oral two times a day  multivitamin/minerals 1 Tablet(s) Oral daily  pantoprazole    Tablet 40 milliGRAM(s) Oral before breakfast  simvastatin 20 milliGRAM(s) Oral at bedtime    MEDICATIONS  (PRN):      Allergies    amoxicillin (Unknown)    Intolerances        REVIEW OF SYSTEMS:  CONSTITUTIONAL: No fever, weight loss, or fatigue  EYES: No eye pain, visual disturbances, or discharge  ENMT:  No difficulty hearing, tinnitus, vertigo; No sinus or throat pain  NECK: No pain or stiffness  RESPIRATORY: No cough, wheezing, chills or hemoptysis; No shortness of breath  CARDIOVASCULAR: No chest pain, palpitations, dizziness, or leg swelling  GASTROINTESTINAL: No abdominal or epigastric pain. No nausea, vomiting, or hematemesis; No diarrhea or constipation. No melena or hematochezia.  GENITOURINARY: No dysuria, frequency, hematuria, or incontinence  NEUROLOGICAL: No headaches, memory loss, loss of strength, numbness, or tremors  SKIN: No itching, burning, rashes, or lesions   LYMPH NODES: No enlarged glands  ENDOCRINE: No heat or cold intolerance; No hair loss; No polydipsia or polyuria  MUSCULOSKELETAL: No joint pain or swelling; No muscle, back, or extremity pain  PSYCHIATRIC: No depression, anxiety, mood swings, or difficulty sleeping  HEME/LYMPH: No easy bruising, or bleeding gums  ALLERGY AND IMMUNOLOGIC: No hives or eczema    Vital Signs Last 24 Hrs  T(C): 36.8 (06 Jun 2018 12:14), Max: 37.4 (06 Jun 2018 05:16)  T(F): 98.2 (06 Jun 2018 12:14), Max: 99.3 (06 Jun 2018 05:16)  HR: 60 (06 Jun 2018 12:14) (60 - 93)  BP: 116/64 (06 Jun 2018 12:14) (116/64 - 157/63)  BP(mean): --  RR: 15 (06 Jun 2018 12:14) (14 - 18)  SpO2: 96% (06 Jun 2018 12:14) (93% - 96%)    PHYSICAL EXAM:  GENERAL: NAD, well-groomed, well-developed  HEAD:  Atraumatic, Normocephalic  EYES: EOMI, PERRLA, conjunctiva and sclera clear  ENMT: No tonsillar erythema, exudates, or enlargement; Moist mucous membranes, Good dentition, No lesions  NECK: Supple, No JVD, Normal thyroid  NERVOUS SYSTEM:  Alert & Oriented X3, Good concentration; Motor Strength 5/5 B/L upper and lower extremities; DTRs 2+ intact and symmetric  CHEST/LUNG: Clear to auscultation bilaterally; No rales, rhonchi, wheezing, or rubs  HEART: Regular rate and rhythm; No murmurs, rubs, or gallops  ABDOMEN: Soft, Nontender, Nondistended; Bowel sounds present  EXTREMITIES:  2+ Peripheral Pulses, No clubbing, cyanosis, or edema  LYMPH: No lymphadenopathy noted  SKIN: No rashes or lesions    LABS:                        9.5    x     )-----------( x        ( 06 Jun 2018 01:11 )             30.2       Ca    8.8        05 Jun 2018 12:33          CAPILLARY BLOOD GLUCOSE          RADIOLOGY & ADDITIONAL TESTS:    Notes Reviewed:  [x ] YES  [ ] NO    Care Discussed with Consultants/Other Providers [ x] YES  [ ] NO

## 2018-06-06 NOTE — CONSULT NOTE ADULT - SUBJECTIVE AND OBJECTIVE BOX
Hematology/Oncology consult     Patient is seen and examined  notes/labs reviewed  pt family is at bedside and d/w family.  consult dictated,  Job #    contact #  son/daughter----  phone #    IMPRESSION:      RECOMMENDATION:              ,thanks for courtsey of this consult,will follow this pt with you for hem/onc issue while pt is in hospital. Hematology/Oncology consult     Patient is seen and examined  notes/labs reviewed  pt family is at bedside and d/w family.  consult dictated,  Job # [id number]    pcp---dr cuenca    contact #  son----luly strauss  phone # [phone]    IMPRESSION:  anemia  s/p prbc on admisssion 6/5  iron deficincy on iron profile    RECOMMENDATION:  1/anemia w/u--b12/level/retic count  2/ iron def--looking for a rapid response, will give iv iron, consider GI evaluation to r/o gi pathology/malignancy in view of iron def, according to son pt follows with gi dr langley  iv venofer 100 mg a day for 3 days  family/son refuses bm bx  monitor h/h--transfuse prbc as needed for dsymptomatic anemia or if hb is under 7  d/w pt and son , are in agreement    Dr Jefferson  ,thanks for courtesy of this consult, will follow this pt with you for hem/onc issue while pt is in hospital.

## 2018-06-07 ENCOUNTER — TRANSCRIPTION ENCOUNTER (OUTPATIENT)
Age: 83
End: 2018-06-07

## 2018-06-07 DIAGNOSIS — R19.5 OTHER FECAL ABNORMALITIES: ICD-10-CM

## 2018-06-07 LAB
APTT BLD: 26.9 SEC — LOW (ref 27.5–37.4)
DIGOXIN SERPL-MCNC: 1.1 NG/ML — SIGNIFICANT CHANGE UP (ref 0.8–2)
FOLATE SERPL-MCNC: >20 NG/ML — SIGNIFICANT CHANGE UP
HCT VFR BLD CALC: 30 % — LOW (ref 34.5–45)
HCT VFR BLD CALC: 30.8 % — LOW (ref 34.5–45)
HGB BLD-MCNC: 9 G/DL — LOW (ref 11.5–15.5)
HGB BLD-MCNC: 9.6 G/DL — LOW (ref 11.5–15.5)
INR BLD: 1.25 RATIO — HIGH (ref 0.88–1.16)
MCHC RBC-ENTMCNC: 24.7 PG — LOW (ref 27–34)
MCHC RBC-ENTMCNC: 31.2 GM/DL — LOW (ref 32–36)
MCV RBC AUTO: 79.2 FL — LOW (ref 80–100)
NRBC # BLD: 0 /100 WBCS — SIGNIFICANT CHANGE UP (ref 0–0)
PLATELET # BLD AUTO: 216 K/UL — SIGNIFICANT CHANGE UP (ref 150–400)
PROTHROM AB SERPL-ACNC: 13.7 SEC — HIGH (ref 9.8–12.7)
RBC # BLD: 3.89 M/UL — SIGNIFICANT CHANGE UP (ref 3.8–5.2)
RBC # BLD: 3.89 M/UL — SIGNIFICANT CHANGE UP (ref 3.8–5.2)
RBC # FLD: 19.2 % — HIGH (ref 10.3–14.5)
RETICS #: 121.8 K/UL — SIGNIFICANT CHANGE UP (ref 25–125)
RETICS/RBC NFR: SIGNIFICANT CHANGE UP % (ref 0.5–2.5)
VIT B12 SERPL-MCNC: >2000 PG/ML — HIGH (ref 232–1245)
WBC # BLD: 9.86 K/UL — SIGNIFICANT CHANGE UP (ref 3.8–10.5)
WBC # FLD AUTO: 9.86 K/UL — SIGNIFICANT CHANGE UP (ref 3.8–10.5)

## 2018-06-07 PROCEDURE — 99232 SBSQ HOSP IP/OBS MODERATE 35: CPT

## 2018-06-07 RX ORDER — FERROUS SULFATE 325(65) MG
325 TABLET ORAL
Qty: 0 | Refills: 0 | Status: DISCONTINUED | OUTPATIENT
Start: 2018-06-07 | End: 2018-06-08

## 2018-06-07 RX ORDER — SUCRALFATE 1 G
1 TABLET ORAL
Qty: 0 | Refills: 0 | Status: DISCONTINUED | OUTPATIENT
Start: 2018-06-07 | End: 2018-06-13

## 2018-06-07 RX ORDER — MULTIVIT WITH MIN/MFOLATE/K2 340-15/3 G
240 POWDER (GRAM) ORAL ONCE
Qty: 0 | Refills: 0 | Status: COMPLETED | OUTPATIENT
Start: 2018-06-07 | End: 2018-06-07

## 2018-06-07 RX ORDER — PANTOPRAZOLE SODIUM 20 MG/1
40 TABLET, DELAYED RELEASE ORAL
Qty: 0 | Refills: 0 | Status: DISCONTINUED | OUTPATIENT
Start: 2018-06-07 | End: 2018-06-13

## 2018-06-07 RX ORDER — POLYETHYLENE GLYCOL 3350 17 G/17G
17 POWDER, FOR SOLUTION ORAL ONCE
Qty: 0 | Refills: 0 | Status: COMPLETED | OUTPATIENT
Start: 2018-06-07 | End: 2018-06-07

## 2018-06-07 RX ADMIN — Medication 325 MILLIGRAM(S): at 17:11

## 2018-06-07 RX ADMIN — Medication 100 MILLIGRAM(S): at 21:29

## 2018-06-07 RX ADMIN — Medication 240 MILLILITER(S): at 18:33

## 2018-06-07 RX ADMIN — Medication 1000 UNIT(S): at 11:15

## 2018-06-07 RX ADMIN — Medication 240 MILLIGRAM(S): at 05:36

## 2018-06-07 RX ADMIN — Medication 1 GRAM(S): at 17:10

## 2018-06-07 RX ADMIN — Medication 5 MILLIGRAM(S): at 21:29

## 2018-06-07 RX ADMIN — Medication 0.12 MILLIGRAM(S): at 05:36

## 2018-06-07 RX ADMIN — Medication 1 TABLET(S): at 11:15

## 2018-06-07 RX ADMIN — Medication 100 MICROGRAM(S): at 05:36

## 2018-06-07 RX ADMIN — PANTOPRAZOLE SODIUM 40 MILLIGRAM(S): 20 TABLET, DELAYED RELEASE ORAL at 05:36

## 2018-06-07 RX ADMIN — Medication 1 MILLIGRAM(S): at 11:15

## 2018-06-07 RX ADMIN — Medication 81 MILLIGRAM(S): at 11:15

## 2018-06-07 RX ADMIN — Medication 25 MILLIGRAM(S): at 17:11

## 2018-06-07 RX ADMIN — IRON SUCROSE 100 MILLIGRAM(S): 20 INJECTION, SOLUTION INTRAVENOUS at 17:10

## 2018-06-07 RX ADMIN — Medication 25 MILLIGRAM(S): at 05:36

## 2018-06-07 RX ADMIN — PANTOPRAZOLE SODIUM 40 MILLIGRAM(S): 20 TABLET, DELAYED RELEASE ORAL at 17:10

## 2018-06-07 RX ADMIN — PREGABALIN 100 MICROGRAM(S): 225 CAPSULE ORAL at 11:15

## 2018-06-07 RX ADMIN — SIMVASTATIN 20 MILLIGRAM(S): 20 TABLET, FILM COATED ORAL at 21:29

## 2018-06-07 NOTE — CHART NOTE - NSCHARTNOTEFT_GEN_A_CORE
Called by RN pt with BRBPR    89 yo f pmh afib (previously on xarelto), GERD, Gastritis, HLD, hemorrhoids, HTN< HLD, CAD ?stent on ASA, CABG, Rectal mass, presents with lethargy. admitted with anemia. Evaluated by heme and GI, anemia likely multifactorial, guaiac pos stool, iron def, hx of gastritis and gastric ulcerated submucosal lesion on prior egds as per GI, s/p prbc on this admisssion 6/5/18. Now with bright red blood per rectum after prep for scope in am. Pt states she was in the bathroom and felt like she had a watery bowel movement, looked down and saw bright red blood. Pt was helped back to bed by nurse. Denies abdominal pain, nausea, vomiting, dizziness, lightheadedness, chest pain or shortness of breath    Vital Signs Last 24 Hrs  T(C): 36.7 (07 Jun 2018 19:40), Max: 36.8 (07 Jun 2018 11:50)  T(F): 98 (07 Jun 2018 19:40), Max: 98.2 (07 Jun 2018 11:50)  HR: 69 (07 Jun 2018 19:40) (55 - 74)  BP: 144/83 (07 Jun 2018 19:40) (123/55 - 151/78)  BP(mean): --  RR: 16 (07 Jun 2018 19:40) (14 - 18)  SpO2: 97% (07 Jun 2018 19:40) (95% - 98%)    General: Well developed, well nourished, NAD  HEENT: NCAT, PERRLA, EOMI bl, moist mucous membranes   Neurology: A&Ox3  Respiratory: CTA B/L, No W/R/R  CV: RRR, +S1/S2, no murmurs, rubs or gallops  Abdominal: Soft, NT, ND +BSx4  Skin: warm, dry, normal color, no rash or abnormal lesions  Rectal: + trace bright red blood, no obvious active bleeding +rectal mass/ hemorrhoid                          9.6    9.86  )-----------( 216      ( 07 Jun 2018 06:54 )             30.8     06 Jun 2018 16:07    140    |  102    |  16     ----------------------------<  88     3.7     |  31     |  0.95     Ca    8.4        06 Jun 2018 16:07        PT/INR - ( 07 Jun 2018 06:54 )   PT: 13.7 sec;   INR: 1.25 ratio         PTT - ( 07 Jun 2018 06:54 )  PTT:26.9 sec  CAPILLARY BLOOD GLUCOSE    A/p: 89 yo f pmh as above , admitted with anemia, now with episode of BRBPR    - hemodynamically stable. Asymptomatic  - pt currently on ASA, h/o stent , will continue aspirin , bleeding has stopped.   - will check STAT H&H  - GI eval tomorrow, scheduled for scope  - d/w Dr. Jefferson, will transfuse <8   - will follow

## 2018-06-07 NOTE — PROGRESS NOTE ADULT - ASSESSMENT
89 female sent to ER by her cardiologist for a low hg level 7.1.GI called for fobt anemia and scopes.

## 2018-06-07 NOTE — PROGRESS NOTE ADULT - SUBJECTIVE AND OBJECTIVE BOX
Patient is a 89y old  Female who presents with a chief complaint of weakness and lethargy, blood level low (05 Jun 2018 21:06)      INTERVAL /OVERNIGHT EVENTS: now guaic +, agreeable to GI work up    MEDICATIONS  (STANDING):  aspirin enteric coated 81 milliGRAM(s) Oral daily  calcium carbonate 1250 mG + Vitamin D (OsCal 500 + D) 1 Tablet(s) Oral daily  cholecalciferol 1000 Unit(s) Oral daily  cyanocobalamin 100 MICROGram(s) Oral daily  digoxin     Tablet 0.125 milliGRAM(s) Oral daily  diltiazem    milliGRAM(s) Oral daily  docusate sodium 100 milliGRAM(s) Oral three times a day  ferrous    sulfate 325 milliGRAM(s) Oral two times a day  folic acid 1 milliGRAM(s) Oral daily  iron sucrose Injectable 100 milliGRAM(s) IV Push every 24 hours  levothyroxine 100 MICROGram(s) Oral daily  metoprolol tartrate 25 milliGRAM(s) Oral two times a day  multivitamin/minerals 1 Tablet(s) Oral daily  pantoprazole    Tablet 40 milliGRAM(s) Oral before breakfast  simvastatin 20 milliGRAM(s) Oral at bedtime    MEDICATIONS  (PRN):      Allergies    amoxicillin (Unknown)    Intolerances        REVIEW OF SYSTEMS:  CONSTITUTIONAL: No fever, weight loss, or fatigue  EYES: No eye pain, visual disturbances, or discharge  ENMT:  No difficulty hearing, tinnitus, vertigo; No sinus or throat pain  NECK: No pain or stiffness  RESPIRATORY: No cough, wheezing, chills or hemoptysis; No shortness of breath  CARDIOVASCULAR: No chest pain, palpitations, dizziness, or leg swelling  GASTROINTESTINAL: No abdominal or epigastric pain. No nausea, vomiting, or hematemesis; No diarrhea or constipation. No melena or hematochezia.  GENITOURINARY: No dysuria, frequency, hematuria, or incontinence  NEUROLOGICAL: No headaches, memory loss, loss of strength, numbness, or tremors  SKIN: No itching, burning, rashes, or lesions   LYMPH NODES: No enlarged glands  ENDOCRINE: No heat or cold intolerance; No hair loss; No polydipsia or polyuria  MUSCULOSKELETAL: No joint pain or swelling; No muscle, back, or extremity pain  PSYCHIATRIC: No depression, anxiety, mood swings, or difficulty sleeping  HEME/LYMPH: No easy bruising, or bleeding gums  ALLERGY AND IMMUNOLOGIC: No hives or eczema    Vital Signs Last 24 Hrs  T(C): 36.8 (07 Jun 2018 11:50), Max: 36.8 (06 Jun 2018 16:01)  T(F): 98.2 (07 Jun 2018 11:50), Max: 98.2 (06 Jun 2018 16:01)  HR: 55 (07 Jun 2018 11:50) (55 - 74)  BP: 123/67 (07 Jun 2018 11:50) (106/59 - 151/78)  BP(mean): --  RR: 14 (07 Jun 2018 11:50) (14 - 18)  SpO2: 98% (07 Jun 2018 11:50) (95% - 98%)    PHYSICAL EXAM:  GENERAL: NAD, well-groomed, well-developed  HEAD:  Atraumatic, Normocephalic  EYES: EOMI, PERRLA, conjunctiva and sclera clear  ENMT: No tonsillar erythema, exudates, or enlargement; Moist mucous membranes, Good dentition, No lesions  NECK: Supple, No JVD, Normal thyroid  NERVOUS SYSTEM:  Alert & Oriented X3, Good concentration; Motor Strength 5/5 B/L upper and lower extremities; DTRs 2+ intact and symmetric  CHEST/LUNG: Clear to auscultation bilaterally; No rales, rhonchi, wheezing, or rubs  HEART: Regular rate and rhythm; No murmurs, rubs, or gallops  ABDOMEN: Soft, Nontender, Nondistended; Bowel sounds present  EXTREMITIES:  2+ Peripheral Pulses, No clubbing, cyanosis, or edema  LYMPH: No lymphadenopathy noted  SKIN: No rashes or lesions    LABS:                        9.6    9.86  )-----------( 216      ( 07 Jun 2018 06:54 )             30.8     06 Jun 2018 16:07    140    |  102    |  16     ----------------------------<  88     3.7     |  31     |  0.95     Ca    8.4        06 Jun 2018 16:07      PT/INR - ( 07 Jun 2018 06:54 )   PT: 13.7 sec;   INR: 1.25 ratio         PTT - ( 07 Jun 2018 06:54 )  PTT:26.9 sec    CAPILLARY BLOOD GLUCOSE          RADIOLOGY & ADDITIONAL TESTS:    Notes Reviewed:  [x ] YES  [ ] NO    Care Discussed with Consultants/Other Providers [x ] YES  [ ] NO

## 2018-06-07 NOTE — PROGRESS NOTE ADULT - ASSESSMENT
IMPRESSION:  anemia--multifactorial, guaiac pos stool, iron def, hx of gastritis and gastric ulcerated submucosal lesion on prior egds as per gi  s/p prbc on this admisssion 6/5/18  iron deficincy on iron profile    RECOMMENDATION:  1/ iron def--looking for a rapid response, pt is on  iv iron 100 mg a day --day 2(2/3) today, s/p prbc, monitor serial h/h--transfuse prbc as needed for symptomatic anemia  2/ patient is seen by  GI , for possible egd per gi, final decision gi w/u per pt/gi to r/o gi pathology  monitor h/h--transfuse prbc as needed for symptomatic anemia or if hb is under 7  d/w pt and son earlier

## 2018-06-07 NOTE — PROGRESS NOTE ADULT - SUBJECTIVE AND OBJECTIVE BOX
Patient is a 89y old  Female who presents with a chief complaint of weakness and lethargy, blood level low (05 Jun 2018 21:06)       Pt is seen and examined  pt is awake and is out of bed to chair.  pt seems comfortable and denies any complaints at this time    HPI:  · HPI Objective Statement: 89 female sent to ER by her cardiologist for a low hg level 7.1. Patient states for the past 2 weeks she has not been feeling well, generalized fatigue, shortness of breath with exertion, sleeping more.	  In ER patient was found to be anemic.  Patient is being admitted for further work up and treatment (05 Jun 2018 15:51)       MEDICATIONS  (STANDING):  aspirin enteric coated 81 milliGRAM(s) Oral daily  bisacodyl 5 milliGRAM(s) Oral at bedtime  calcium carbonate 1250 mG + Vitamin D (OsCal 500 + D) 1 Tablet(s) Oral daily  cholecalciferol 1000 Unit(s) Oral daily  cyanocobalamin 100 MICROGram(s) Oral daily  digoxin     Tablet 0.125 milliGRAM(s) Oral daily  diltiazem    milliGRAM(s) Oral daily  docusate sodium 100 milliGRAM(s) Oral three times a day  ferrous    sulfate 325 milliGRAM(s) Oral two times a day  folic acid 1 milliGRAM(s) Oral daily  iron sucrose Injectable 100 milliGRAM(s) IV Push every 24 hours  levothyroxine 100 MICROGram(s) Oral daily  metoprolol tartrate 25 milliGRAM(s) Oral two times a day  multivitamin/minerals 1 Tablet(s) Oral daily  pantoprazole    Tablet 40 milliGRAM(s) Oral two times a day  polyethylene glycol 3350 17 Gram(s) Oral once  simvastatin 20 milliGRAM(s) Oral at bedtime  sucralfate 1 Gram(s) Oral two times a day    MEDICATIONS  (PRN):      Allergies    amoxicillin (Unknown)    Intolerances        Vital Signs Last 24 Hrs  T(C): 36.6 (07 Jun 2018 16:22), Max: 36.8 (07 Jun 2018 11:50)  T(F): 97.9 (07 Jun 2018 16:22), Max: 98.2 (07 Jun 2018 11:50)  HR: 65 (07 Jun 2018 16:22) (55 - 74)  BP: 123/55 (07 Jun 2018 16:22) (106/59 - 151/78)  BP(mean): --  RR: 15 (07 Jun 2018 16:22) (14 - 18)  SpO2: 97% (07 Jun 2018 16:22) (95% - 98%)    PHYSICAL EXAM  General: adult in NAD  HEENT: clear oropharynx, anicteric sclera, pink conjunctiva  Neck: supple  CV: normal S1/S2 with no murmur rubs or gallops  Lungs: positive air movement b/l ant lungs,clear to auscultation, no wheezes, no rales  Abdomen: soft non-tender non-distended, no hepatosplenomegaly  Ext: no clubbing cyanosis or edema  Skin: no rashes and no petechiae  Neuro: alert and oriented X 4, no focal deficits  LABS:                          9.6    9.86  )-----------( 216      ( 07 Jun 2018 06:54 )             30.8         Mean Cell Volume : 79.2 fl  Mean Cell Hemoglobin : 24.7 pg  Mean Cell Hemoglobin Concentration : 31.2 gm/dL  Auto Neutrophil # : x  Auto Lymphocyte # : x  Auto Monocyte # : x  Auto Eosinophil # : x  Auto Basophil # : x  Auto Neutrophil % : x  Auto Lymphocyte % : x  Auto Monocyte % : x  Auto Eosinophil % : x  Auto Basophil % : x    Serial CBC's  06-07 @ 06:54  Hct-30.8 / Hgb-9.6 / Plat-216 / RBC-3.89 / WBC-9.86          Serial CBC's  06-06 @ 01:11  Hct-30.2 / Hgb-9.5 / Plat--- / RBC--- / WBC---          Serial CBC's  06-05 @ 12:33  Hct-23.5 / Hgb-6.9 / Plat-310 / RBC-3.03 / WBC-9.42            06-06    140  |  102  |  16  ----------------------------<  88  3.7   |  31  |  0.95    Ca    8.4<L>      06 Jun 2018 16:07        PT/INR - ( 07 Jun 2018 06:54 )   PT: 13.7 sec;   INR: 1.25 ratio         PTT - ( 07 Jun 2018 06:54 )  PTT:26.9 sec    Vitamin B12, Serum: >2000 pg/mL (06-07-18 @ 08:03)  Folate, Serum: >20.0 ng/mL (06-07-18 @ 08:03)  Reticulocyte Percent: see note % (06-07-18 @ 06:54)  Iron - Total Binding Capacity.: 393 ug/dL (06-05-18 @ 22:52)  Ferritin, Serum: 46 ng/mL (06-05-18 @ 22:52)                BLOOD SMEAR INTERPRETATION: Normal WBC series and morphology, no apparent blast cells or immature wbc, no schistocytes or fragmented rbc, platelets are adequate per field, no clumping of platelets or giant platelets.        RADIOLOGY & ADDITIONAL STUDIES:

## 2018-06-07 NOTE — PROGRESS NOTE ADULT - PROBLEM SELECTOR PLAN 1
fobt +  hgb trend noted, s/p 2u prbc  hx of gastritis and gastric ulcerated submucosal lesion on egd done 3/2015  serial cbcs, active T&S, transfuse prn   ppi/carafate  hold ac, asa, nsaids in setting of active gib  refusing colon, agreeable for egd, plan for egd tomorrow, npo after midnight, please obtain medical and cardiac clearance, pt with ppm needs interrogation prior to procedure hgb trend noted, s/p 2u prbc  hx of gastritis and gastric ulcerated submucosal lesion on prior egds   serial cbcs, active T&S, transfuse prn   ppi/carafate  hold ac, asa, nsaids in setting of active gib  pt declining colon at present, agreeable for egd, procedure discussed/questions answered, plan for egd tomorrow, npo after midnight, please obtain medical and cardiac clearance, pt with ppm needs interrogation prior to procedure (pt states device was interrogated a few weeks ago at cardio office at [facility])

## 2018-06-07 NOTE — PROGRESS NOTE ADULT - ASSESSMENT
89 year old woman with a history of CAD s/p remote CABG, more recent CHARY to the RCA and LCx, severe AS s/p TAVR, chronic AF with TBS s/p PPM, on Xarelto for stroke risk reduction, normal LV function, hypertension, hyperlipidemia who has been feeling weak and fatigued lately, along with having HERNADEZ, admitted with symptomatic anemia. No clear GI bleed, though FOBT now positive. AF is rate controlled; s/p RBCs    - No sign of acute ischemia or decompensated heart failure.  - Trend H/H  - GI evaluation. She is agreeble to endoscopy at the least.  - Continue aspirin  - Can hold xarelto for now  - Continue digoxin 125 QD.  Check level  - Continue diltiazem 240 QD and metoprolol 25 BID as long as blood pressure can tolerate  - Continue simvastatin 20  - Monitor and replete lytes  - To follow closely with you. 89 year old woman with a history of CAD s/p remote CABG, more recent CHARY to the RCA and LCx, severe AS s/p TAVR, chronic AF with TBS s/p PPM, on Xarelto for stroke risk reduction, normal LV function, hypertension, hyperlipidemia who has been feeling weak and fatigued lately, along with having HERNADEZ, admitted with symptomatic anemia. No clear GI bleed, though FOBT now positive. AF is rate controlled; s/p RBCs    - No sign of acute ischemia or decompensated heart failure.  - Trend H/H  - GI evaluation. She is agreeable to endoscopy at the least.  - Continue aspirin  - Can hold xarelto for now  - Continue digoxin 125 QD.  Check level  - Continue diltiazem 240 QD and metoprolol 25 BID as long as blood pressure can tolerate  - Continue simvastatin 20  - Monitor and replete lytes  - To follow closely with you. 89 year old woman with a history of CAD s/p remote CABG, more recent CHARY to the RCA and LCx, severe AS s/p TAVR, chronic AF with TBS s/p PPM, on Xarelto for stroke risk reduction, normal LV function, hypertension, hyperlipidemia who has been feeling weak and fatigued lately, along with having HERNADEZ, admitted with symptomatic anemia. No clear GI bleed, though FOBT now positive. AF is rate controlled; s/p RBCs    - No sign of acute ischemia or decompensated heart failure.  - Trend H/H  - GI evaluation. She is agreeable to endoscopy at the least.  - Continue aspirin  - Can hold xarelto for now  - Continue digoxin 125 QD.  Check level  - Continue diltiazem 240 QD and metoprolol 25 BID as long as blood pressure can tolerate  - Continue simvastatin 20  - Monitor and replete lytes  - PPM interrogated in 5/21/2018. Remaining life >5 years. No events noted.  - To follow closely with you.

## 2018-06-07 NOTE — PROGRESS NOTE ADULT - SUBJECTIVE AND OBJECTIVE BOX
Chief Complaint:  Patient is a 89y old  Female who presents with a chief complaint of weakness and lethargy, blood level low (2018 21:06)    Xerophthalmia  Anemia  Constipation  Gastrointestinal hemorrhage, unspecified gastritis, unspecified gastrointestinal hemorrhage type  Gastritis  Atrial fibrillation, unspecified  Hyperlipidemia  Gastroesophageal reflux disease without esophagitis  Hypothyroidism  Obesity  Dyslipidemia  Hypertension  CAD (coronary artery disease)  History of colon polyps  H/O abdominal hysterectomy  Rectal mass  S/P CABG (coronary artery bypass graft)     HPI:  · HPI Objective Statement: 89 female sent to ER by her cardiologist for a low hg level 7.1. Patient states for the past 2 weeks she has not been feeling well, generalized fatigue, shortness of breath with exertion, sleeping more.	  In ER patient was found to be anemic.  Patient is being admitted for further work up and treatment (2018 15:51)    GI called for fobt anemia and scopes. Chart reviewed. 89yo f with gi pmhx significant for anemia, gib, constipation, gerd, rectal mass, sent by outpatient doctor for low hgb. Pt initially seen by Dr Sargent, refused endoscopic w/u. Now agreeable for egd, asked to be scoped by Dr Sargent. Pt seen and examined    currently on floors, afebrile vs reviewed  hgb trend noted, 9.6 today, fobt +, s/p 2u prbc  plts 216 inr 1.25  iron studies show patrick  echo noted  no abd imaging      amoxicillin (Unknown)      aspirin enteric coated 81 milliGRAM(s) Oral daily  calcium carbonate 1250 mG + Vitamin D (OsCal 500 + D) 1 Tablet(s) Oral daily  cholecalciferol 1000 Unit(s) Oral daily  cyanocobalamin 100 MICROGram(s) Oral daily  digoxin     Tablet 0.125 milliGRAM(s) Oral daily  diltiazem    milliGRAM(s) Oral daily  docusate sodium 100 milliGRAM(s) Oral three times a day  ferrous    sulfate 325 milliGRAM(s) Oral two times a day  folic acid 1 milliGRAM(s) Oral daily  iron sucrose Injectable 100 milliGRAM(s) IV Push every 24 hours  levothyroxine 100 MICROGram(s) Oral daily  metoprolol tartrate 25 milliGRAM(s) Oral two times a day  multivitamin/minerals 1 Tablet(s) Oral daily  pantoprazole    Tablet 40 milliGRAM(s) Oral before breakfast  simvastatin 20 milliGRAM(s) Oral at bedtime        FAMILY HISTORY:  No pertinent family history in first degree relatives        Review of Systems:    General:  No wt loss, fevers, chills, night sweats, fatigue  Eyes:  Good vision, no reported pain  ENT:  No sore throat, pain, runny nose, dysphagia  CV:  No pain, palpitations, no lightheadedness  Resp:  No dyspnea, cough, tachypnea, wheezing  GI: see above  :  No pain, bleeding, incontinence, nocturia  Muscle:  No pain, weakness  Neuro:  No weakness, tingling, memory problems  Psych:  No fatigue, insomnia, mood problems, depression  Endocrine:  No polyuria, polydypsia, cold/heat intolerance  Heme:  No petechiae, ecchymosis, easy bruisability  Skin:  No rash, tattoos, scars, edema    Relevant Family History:   n/c    Relevant Social History: n/c      Physical Exam:    Vital Signs:  Vital Signs Last 24 Hrs  T(C): 36.8 (2018 11:50), Max: 36.8 (2018 16:01)  T(F): 98.2 (2018 11:50), Max: 98.2 (2018 16:01)  HR: 55 (2018 11:50) (55 - 74)  BP: 123/67 (2018 11:50) (106/59 - 151/78)  BP(mean): --  RR: 14 (2018 11:50) (14 - 18)  SpO2: 98% (2018 11:50) (95% - 98%)  Daily     Daily Weight in k (2018 04:21)    General:  Appears stated age, well-groomed, nad  HEENT:  NC/AT,  conjunctivae clear and pink, no thyromegaly, nodules, adenopathy, no JVD  Chest:  Full & symmetric excursion, no increased effort, breath sounds clear  Cardiovascular:  Regular rhythm, S1, S2, no murmur/rub/S3/S4, no abdominal bruit, no edema  Abdomen:  Soft, non-tender, non-distended, normoactive bowel sounds,  no masses ,no hepatosplenomeagaly, no signs of chronic liver disease  Extremities:  no cyanosis,clubbing or edema  Skin:  No rash/erythema/ecchymoses/petechiae/wounds/abscess/warm/dry  Neuro/Psych:  A&O  , no asterixis, no tremor, no encephalopathy    Laboratory:                            9.6    9.86  )-----------( 216      ( 2018 06:54 )             30.8     06-06    140  |  102  |  16  ----------------------------<  88  3.7   |  31  |  0.95    Ca    8.4<L>      2018 16:07        PT/INR - ( 2018 06:54 )   PT: 13.7 sec;   INR: 1.25 ratio         PTT - ( 2018 06:54 )  PTT:26.9 sec      Imaging:      < from: Upper Endoscopy (10.30.15 @ 16:00) >    [facility]  ____________________________________________________________________________________________________  Patient Name: Nohemy Hoyt                    MRN: [id number]  Account Number: [id number]                     YOB: 1926                                                   Gender: Female  Attending MD: Juan Metzger MD            Procedure Date No Time: 10/30/2015  ____________________________________________________________________________________________________     Procedure:           Upper GI endoscopy  Indications:         Therapeutic procedure  Providers:           Juan Metzger MD, Paul Lam  Referring MD:        Sathya Machado MD, BOBBY WASHINGTON M.D  Medicines:     Monitored Anesthesia Care  Complications:       No immediate complications.  ____________________________________________________________________________________________________  Procedure:           After obtaining informed consent, the endoscope was passed under direct                        vision. Throughout the procedure, the patient's blood pressure, pulse, and                        oxygen saturations were monitored continuously. The Endoscope was introduced                        through the mouth, and advanced to the second part of duodenum. The Endoscope                        was introduced through the mouth, and advanced to the second part of                        duodenum. The upper GI endoscopy was accomplished without difficulty. The                        patient tolerated the procedure well.                                                                                                        Findings:       The Z-line was irregular and was found 35 cm from the incisors with a 2cm sliding hiatal        hernia. Biopsies were taken with a cold forceps for histology to rule out underlying        Smith's.       The gastric mucosa was diffusely atrophic throughout in the body and fundus. Biopsies were        taken with a cold forceps for histology.       A single 30 mm broad based submucosal polyp was noted in the antrum. It contained two clean        based ulcers on its surface. On EUS it was mucosal based and preparations were made for        mucosal resection. An Endoloop was placed around the polyp USP down it's broad base Snare        mucosal resection was performed and ~half of the lesion was removed. The Endoloop remained in        place around the base. The polypectomy site appeared yellow. There was no bleeding at the end        of the procedure. The lesion was tattooed just proximal (1-2cm) to the base of the polyp.                                                                                                        Impression:          -Z-line irregular, 35 cm from the incisors with hiatal hernia. Biopsied for                        Smith's esophagus.                       - Atrophic gastritis. Biopsied.                       - 3cm gastric ulcerated submucosal lesion. Status post partial resection with                        placement of Endoloop. Status post tattoo of the site in case there is an                        indication for surgical resection  Recommendation:      - Discharge patient to home (ambulatory).             - ok to continue aspirin given stents and atrial fibrillation                       - hold Xarelto until  and then will discuss with cardiologist regarding                        holding Xarelto for 7-10 days                       - liquid diet today and soft diet for 1 week                       - continue PPI and Carafate 10ml twice daily at 10am and 4pm.                       - await pathology results                            _______________________  Juan Metzger MD  10/30/2015 6:19 PM  This report has been signed electronically.    _______________________  Juan Metzger MD  10/30/2015 6:19 PM  Number of Addenda: 0    Note Initiated On: 10/30/2015 4:00 PM    < end of copied text > Chief Complaint:  Patient is a 89y old  Female who presents with a chief complaint of weakness and lethargy, blood level low (2018 21:06)    Xerophthalmia  Anemia  Constipation  Gastrointestinal hemorrhage, unspecified gastritis, unspecified gastrointestinal hemorrhage type  Gastritis  Atrial fibrillation, unspecified  Hyperlipidemia  Gastroesophageal reflux disease without esophagitis  Hypothyroidism  Obesity  Dyslipidemia  Hypertension  CAD (coronary artery disease)  History of colon polyps  H/O abdominal hysterectomy  Rectal mass  S/P CABG (coronary artery bypass graft)     HPI:  · HPI Objective Statement: 89 female sent to ER by her cardiologist for a low hg level 7.1. Patient states for the past 2 weeks she has not been feeling well, generalized fatigue, shortness of breath with exertion, sleeping more.	  In ER patient was found to be anemic.  Patient is being admitted for further work up and treatment (2018 15:51)    GI called for fobt anemia and scopes. Chart reviewed. 89yo f with gi pmhx significant for anemia, gib, constipation, gerd, rectal mass, sent by outpatient doctor for low hgb. Pt initially seen by Dr Sargent, refused endoscopic w/u. Now agreeable for egd, asked to be scoped by Dr Sargent. Pt seen and examined. Without gi complaints. States last bm this am and greenish brown. Denies hematemesis, melena, brbpr. Denies n/v/abd pain. States po intake is okay. Per rn no s/s overt gib noted.    last egd 10/2016 , see path below  per pt last colon about 2 yrs ago and 'something removed' (2015 ascending colon polyp c/w tva)  denies hx of abd sx  thinks mother had esophageal ca  denies toxic habits  states takes xarelto and asa at home    currently on floors, afebrile vs reviewed  hgb trend noted, 9.6 today, fobt +, s/p 2u prbc  plts 216 inr 1.25  iron studies show patrick  echo noted  no abd imaging      amoxicillin (Unknown)      aspirin enteric coated 81 milliGRAM(s) Oral daily  calcium carbonate 1250 mG + Vitamin D (OsCal 500 + D) 1 Tablet(s) Oral daily  cholecalciferol 1000 Unit(s) Oral daily  cyanocobalamin 100 MICROGram(s) Oral daily  digoxin     Tablet 0.125 milliGRAM(s) Oral daily  diltiazem    milliGRAM(s) Oral daily  docusate sodium 100 milliGRAM(s) Oral three times a day  ferrous    sulfate 325 milliGRAM(s) Oral two times a day  folic acid 1 milliGRAM(s) Oral daily  iron sucrose Injectable 100 milliGRAM(s) IV Push every 24 hours  levothyroxine 100 MICROGram(s) Oral daily  metoprolol tartrate 25 milliGRAM(s) Oral two times a day  multivitamin/minerals 1 Tablet(s) Oral daily  pantoprazole    Tablet 40 milliGRAM(s) Oral before breakfast  simvastatin 20 milliGRAM(s) Oral at bedtime        FAMILY HISTORY:  No pertinent family history in first degree relatives        Review of Systems:    General:  No wt loss, fevers, chills, night sweats, fatigue  Eyes:  Good vision, no reported pain  ENT:  No sore throat, pain, runny nose, dysphagia  CV:  No pain, palpitations, no lightheadedness  Resp:  No dyspnea, cough, tachypnea, wheezing  GI: see above  :  No pain, bleeding, incontinence, nocturia  Muscle:  No pain, weakness  Neuro:  No weakness, tingling, memory problems  Psych:  No fatigue, insomnia, mood problems, depression  Endocrine:  No polyuria, polydypsia, cold/heat intolerance  Heme:  No petechiae, ecchymosis, easy bruisability  Skin:  No rash, tattoos, scars, edema    Relevant Family History:   n/c    Relevant Social History: n/c      Physical Exam:    Vital Signs:  Vital Signs Last 24 Hrs  T(C): 36.8 (2018 11:50), Max: 36.8 (2018 16:01)  T(F): 98.2 (2018 11:50), Max: 98.2 (2018 16:01)  HR: 55 (2018 11:50) (55 - 74)  BP: 123/67 (2018 11:50) (106/59 - 151/78)  BP(mean): --  RR: 14 (2018 11:50) (14 - 18)  SpO2: 98% (2018 11:50) (95% - 98%)  Daily     Daily Weight in k (2018 04:21)    General:  sitting upright in bed in nad  HEENT:  NC/AT,  conjunctivae clear and pink, anicteric  Chest:  cta  Cv- S1S2 irreg  Abdomen:  Soft, non-tender, mild dt +bs  Extremities:  no edema  Skin:  No rash  Neuro/Psych:  A&Ox3  Laboratory:                            9.6    9.86  )-----------( 216      ( 2018 06:54 )             30.8     06-06    140  |  102  |  16  ----------------------------<  88  3.7   |  31  |  0.95  Surgical Pathology Final Report -  (10.03.16 @ 10:58)    Surgical Pathology Final Report - :   ACCESSION No:  30 [id number]    RON ABREU                    2        Surgical Final Report          Final Diagnosis    1. Small bowel, duodenum, biopsy:  -  Non-neoplastic small bowel, duodenal mucosa with  preserved villous architecture, negative for inflammation.  The  changes of celiac disease/sprue are not present in this sample.    2.  Stomach, antrum, biopsy:  Diffquick stain examined.  - Non-neoplastic gastric, antral mucosa demonstrating mild  to moderate chronic inactive gastritis, negative for intestinal  metaplasia, negative for dysplasia.  Special stain for H. pylori  micro-organisms is negative.  - Changes of reactive gastropathy are also present.    3.  Stomach, body, biopsy:  Diffquick stain examined.  - Non-neoplastic gastric, oxyntic mucosa demonstrating  moderate active chronic gastritis with foci of intestinal  metaplasia, negative for dysplasia.  Special stain for H. pylori  micro-organisms is negative.    Chris Dalton M.D.  (Electronic Signature)  Reported on: 10/04/16    Clinical Information  89 year old female with anemia    Specimen Description  1-Duodenum  2-Antrum  3-Gastric body    Gross Description  1 - The specimen is received in formalin and the specimen  container is labeled: Duodenum. Itconsists of two fragments of  tan soft tissue, each measuring 0.3 cm in greatest dimension.  Entirely submitted. One cassette.    2 - The specimen is received in formalin and the specimen  container is labeled: Antrum. It consists of multiple fragments  of tan soft tissue measuring in aggregate 0.4 x 0.4 x 0.2 cm.  Entirely submitted. One cassette.    3 - The specimen is received in formalin and the specimen  container is labeled: Gastric body. It consists of a              RON ABREU A               2        Surgical Final Report          fragment of tan soft tissue measuring 0.3 cm in greatest  dimension. Entirely submitted. One cassette.    In addition to other data that may appear on the specimen  containers, all labels have been inspected to confirm the  presence of the patient's name and date of birth.    DN 10/04/16 07:05      Ca    8.4<L>      2018 16:07        PT/INR - ( 2018 06:54 )   PT: 13.7 sec;   INR: 1.25 ratio         PTT - ( 2018 06:54 )  PTT:26.9 sec      Imaging:

## 2018-06-08 ENCOUNTER — RESULT REVIEW (OUTPATIENT)
Age: 83
End: 2018-06-08

## 2018-06-08 DIAGNOSIS — K62.5 HEMORRHAGE OF ANUS AND RECTUM: ICD-10-CM

## 2018-06-08 LAB
HCT VFR BLD CALC: 29.7 % — LOW (ref 34.5–45)
HGB BLD-MCNC: 8.8 G/DL — LOW (ref 11.5–15.5)
MCHC RBC-ENTMCNC: 23.8 PG — LOW (ref 27–34)
MCHC RBC-ENTMCNC: 29.6 GM/DL — LOW (ref 32–36)
MCV RBC AUTO: 80.5 FL — SIGNIFICANT CHANGE UP (ref 80–100)
NRBC # BLD: 0 /100 WBCS — SIGNIFICANT CHANGE UP (ref 0–0)
PLATELET # BLD AUTO: 240 K/UL — SIGNIFICANT CHANGE UP (ref 150–400)
RBC # BLD: 3.69 M/UL — LOW (ref 3.8–5.2)
RBC # FLD: 19.3 % — HIGH (ref 10.3–14.5)
WBC # BLD: 8.86 K/UL — SIGNIFICANT CHANGE UP (ref 3.8–10.5)
WBC # FLD AUTO: 8.86 K/UL — SIGNIFICANT CHANGE UP (ref 3.8–10.5)

## 2018-06-08 PROCEDURE — 88305 TISSUE EXAM BY PATHOLOGIST: CPT | Mod: 26

## 2018-06-08 PROCEDURE — 99232 SBSQ HOSP IP/OBS MODERATE 35: CPT

## 2018-06-08 PROCEDURE — 88312 SPECIAL STAINS GROUP 1: CPT | Mod: 26

## 2018-06-08 RX ORDER — ASCORBIC ACID 60 MG
250 TABLET,CHEWABLE ORAL EVERY 8 HOURS
Qty: 0 | Refills: 0 | Status: CANCELLED | OUTPATIENT
Start: 2018-06-08 | End: 2018-06-13

## 2018-06-08 RX ORDER — FERROUS SULFATE 325(65) MG
325 TABLET ORAL THREE TIMES A DAY
Qty: 0 | Refills: 0 | Status: DISCONTINUED | OUTPATIENT
Start: 2018-06-08 | End: 2018-06-13

## 2018-06-08 RX ADMIN — Medication 0.12 MILLIGRAM(S): at 05:50

## 2018-06-08 RX ADMIN — Medication 25 MILLIGRAM(S): at 05:50

## 2018-06-08 RX ADMIN — Medication 1 TABLET(S): at 17:42

## 2018-06-08 RX ADMIN — Medication 81 MILLIGRAM(S): at 17:33

## 2018-06-08 RX ADMIN — Medication 325 MILLIGRAM(S): at 21:21

## 2018-06-08 RX ADMIN — PANTOPRAZOLE SODIUM 40 MILLIGRAM(S): 20 TABLET, DELAYED RELEASE ORAL at 17:34

## 2018-06-08 RX ADMIN — Medication 1 TABLET(S): at 17:32

## 2018-06-08 RX ADMIN — Medication 325 MILLIGRAM(S): at 17:33

## 2018-06-08 RX ADMIN — Medication 100 MILLIGRAM(S): at 17:34

## 2018-06-08 RX ADMIN — PREGABALIN 100 MICROGRAM(S): 225 CAPSULE ORAL at 17:42

## 2018-06-08 RX ADMIN — Medication 1 GRAM(S): at 17:42

## 2018-06-08 RX ADMIN — Medication 1000 UNIT(S): at 17:33

## 2018-06-08 RX ADMIN — IRON SUCROSE 100 MILLIGRAM(S): 20 INJECTION, SOLUTION INTRAVENOUS at 17:38

## 2018-06-08 RX ADMIN — Medication 100 MICROGRAM(S): at 05:50

## 2018-06-08 RX ADMIN — SIMVASTATIN 20 MILLIGRAM(S): 20 TABLET, FILM COATED ORAL at 21:21

## 2018-06-08 RX ADMIN — Medication 240 MILLIGRAM(S): at 05:50

## 2018-06-08 RX ADMIN — PANTOPRAZOLE SODIUM 40 MILLIGRAM(S): 20 TABLET, DELAYED RELEASE ORAL at 05:50

## 2018-06-08 RX ADMIN — Medication 25 MILLIGRAM(S): at 17:41

## 2018-06-08 RX ADMIN — Medication 1 MILLIGRAM(S): at 17:33

## 2018-06-08 RX ADMIN — Medication 325 MILLIGRAM(S): at 05:51

## 2018-06-08 NOTE — PROGRESS NOTE ADULT - SUBJECTIVE AND OBJECTIVE BOX
Patient is a 89y old  Female who presents with a chief complaint of weakness and lethargy, blood level low (05 Jun 2018 21:06)      INTERVAL /OVERNIGHT EVENTS: rectal bleed last night    MEDICATIONS  (STANDING):  aspirin enteric coated 81 milliGRAM(s) Oral daily  bisacodyl 5 milliGRAM(s) Oral at bedtime  calcium carbonate 1250 mG + Vitamin D (OsCal 500 + D) 1 Tablet(s) Oral daily  cholecalciferol 1000 Unit(s) Oral daily  cyanocobalamin 100 MICROGram(s) Oral daily  digoxin     Tablet 0.125 milliGRAM(s) Oral daily  diltiazem    milliGRAM(s) Oral daily  docusate sodium 100 milliGRAM(s) Oral three times a day  ferrous    sulfate 325 milliGRAM(s) Oral three times a day  folic acid 1 milliGRAM(s) Oral daily  iron sucrose Injectable 100 milliGRAM(s) IV Push every 24 hours  levothyroxine 100 MICROGram(s) Oral daily  metoprolol tartrate 25 milliGRAM(s) Oral two times a day  multivitamin/minerals 1 Tablet(s) Oral daily  pantoprazole    Tablet 40 milliGRAM(s) Oral two times a day  simvastatin 20 milliGRAM(s) Oral at bedtime  sucralfate 1 Gram(s) Oral two times a day    MEDICATIONS  (PRN):      Allergies    amoxicillin (Unknown)    Intolerances        REVIEW OF SYSTEMS:  CONSTITUTIONAL: No fever, weight loss, or fatigue  EYES: No eye pain, visual disturbances, or discharge  ENMT:  No difficulty hearing, tinnitus, vertigo; No sinus or throat pain  NECK: No pain or stiffness  RESPIRATORY: No cough, wheezing, chills or hemoptysis; No shortness of breath  CARDIOVASCULAR: No chest pain, palpitations, dizziness, or leg swelling  GASTROINTESTINAL: No abdominal or epigastric pain. No nausea, vomiting, or hematemesis; No diarrhea or constipation. + BRBPR  GENITOURINARY: No dysuria, frequency, hematuria, or incontinence  NEUROLOGICAL: No headaches, memory loss, loss of strength, numbness, or tremors  SKIN: No itching, burning, rashes, or lesions   LYMPH NODES: No enlarged glands  ENDOCRINE: No heat or cold intolerance; No hair loss; No polydipsia or polyuria  MUSCULOSKELETAL: No joint pain or swelling; No muscle, back, or extremity pain  PSYCHIATRIC: No depression, anxiety, mood swings, or difficulty sleeping  HEME/LYMPH: No easy bruising, or bleeding gums  ALLERGY AND IMMUNOLOGIC: No hives or eczema    Vital Signs Last 24 Hrs  T(C): 36.8 (08 Jun 2018 14:37), Max: 37 (08 Jun 2018 11:29)  T(F): 98.2 (08 Jun 2018 14:37), Max: 98.6 (08 Jun 2018 11:29)  HR: 72 (08 Jun 2018 14:37) (53 - 72)  BP: 142/52 (08 Jun 2018 14:37) (117/61 - 149/61)  BP(mean): --  RR: 14 (08 Jun 2018 14:37) (14 - 16)  SpO2: 95% (08 Jun 2018 14:37) (94% - 97%)    PHYSICAL EXAM:  GENERAL: NAD, well-groomed, well-developed  HEAD:  Atraumatic, Normocephalic  EYES: EOMI, PERRLA, conjunctiva and sclera clear  ENMT: No tonsillar erythema, exudates, or enlargement; Moist mucous membranes, Good dentition, No lesions  NECK: Supple, No JVD, Normal thyroid  NERVOUS SYSTEM:  Alert & Oriented X3, Good concentration; Motor Strength 5/5 B/L upper and lower extremities; DTRs 2+ intact and symmetric  CHEST/LUNG: Clear to auscultation bilaterally; No rales, rhonchi, wheezing, or rubs  HEART: Regular rate and rhythm; No murmurs, rubs, or gallops  ABDOMEN: Soft, Nontender, Nondistended; Bowel sounds present  EXTREMITIES:  2+ Peripheral Pulses, No clubbing, cyanosis, or edema  LYMPH: No lymphadenopathy noted  SKIN: No rashes or lesions    LABS:                        8.8    8.86  )-----------( 240      ( 08 Jun 2018 06:37 )             29.7       Ca    8.4        06 Jun 2018 16:07      PT/INR - ( 07 Jun 2018 06:54 )   PT: 13.7 sec;   INR: 1.25 ratio         PTT - ( 07 Jun 2018 06:54 )  PTT:26.9 sec    CAPILLARY BLOOD GLUCOSE          RADIOLOGY & ADDITIONAL TESTS:    Notes Reviewed:  [x ] YES  [ ] NO    Care Discussed with Consultants/Other Providers [x ] YES  [ ] NO

## 2018-06-08 NOTE — PROGRESS NOTE ADULT - ASSESSMENT
89female was sent to ER by her cardiologist for a low hg level 7.1. Patient states for the past 2 weeks she has not been feeling well, generalized fatigue, shortness of breath with exertion, sleeping more.	In ER patient was found to be anemic, received prbc transfusion this admission.  Patient is being admitted for further work up and treatment , GI called for fobt positive stool,  anemia and for gi w/u, scheduled for gi w/u on 6/8 afternoon.  hematology was consulted for anemia, iron profile was suggestive of iron deficiency and patient was started in iv venofer----6/6/18---6/8/18

## 2018-06-08 NOTE — DIETITIAN INITIAL EVALUATION ADULT. - PERTINENT LABORATORY DATA
( 6-5-18) H/H : 6.9/23.5( L) MCV 77.6( L) serum fe 14( L) % saturation fe 4( L) Ferritin 46 ( 6-6-18) na 140 k 3.7 cl 102 BUN 16 creatinine 0.95 glu 88 ( 6-7-18) Vit B12 > 2000 Folate > 20

## 2018-06-08 NOTE — PROGRESS NOTE ADULT - ASSESSMENT
89 year old woman with a history of CAD s/p remote CABG, more recent CHARY to the RCA and LCx, severe AS s/p TAVR, chronic AF with TBS s/p PPM, on Xarelto for stroke risk reduction, normal LV function, hypertension, hyperlipidemia who has been feeling weak and fatigued lately, along with having HERNADEZ, admitted with symptomatic anemia. No clear GI bleed, though FOBT now positive. AF is rate controlled; s/p RBCs    - No sign of acute ischemia or decompensated heart failure.  - Trend H/H. currently stable.   - Continue aspirin  - Can hold xarelto for now  - Continue digoxin 125 QD.  Check level  - Continue diltiazem 240 QD and metoprolol 25 BID as long as blood pressure can tolerate  - Continue simvastatin 20  - Monitor and replete lytes  - PPM interrogated in 5/21/2018. Remaining life >5 years. No events noted.  - Currently pending a EGD  - To follow closely with you. 89 year old woman with a history of CAD s/p remote CABG, more recent CHARY to the RCA and LCx, severe AS s/p TAVR, chronic AF with TBS s/p PPM, on Xarelto for stroke risk reduction, normal LV function, hypertension, hyperlipidemia who has been feeling weak and fatigued lately, along with having HERNADEZ, admitted with symptomatic anemia. No clear GI bleed, though FOBT now positive. AF is rate controlled; s/p RBCs    - No sign of acute ischemia or decompensated heart failure.  - Trend H/H. currently stable.   - Continue aspirin  - Can hold xarelto for now  - Continue digoxin 125 QD.  Check level  - Continue diltiazem 240 QD and metoprolol 25 BID as long as blood pressure can tolerate  - Continue simvastatin 20  - Monitor and replete lytes  - PPM interrogated in 5/21/2018. Remaining life >5 years. No events noted.  - Currently pending a EGD. Currently no active cardiac conditions. No signs of ischemia, ADHF, clinical exam not consistent with severe stenotic valvular disease, no unstable arrhythmias noted. Therefore able to proceed with this endoscopic procedure without any further cardiac workup. Routine hemodynamic monitoring is suggested during the procedure.   - To follow closely with you.

## 2018-06-08 NOTE — PROGRESS NOTE ADULT - SUBJECTIVE AND OBJECTIVE BOX
Patient is a 89y old  Female who presents with a chief complaint of weakness and lethargy, blood level low (05 Jun 2018 21:06)       Pt is seen and examined, pt is awake and is out of bed to chair, scheduled for egd/colonoscopy--gi work up this afternoon  pt seems comfortable and denies any complaints at this time    HPI:  · HPI Objective Statement: 89 female sent to ER by her cardiologist for a low hg level 7.1. Patient states for the past 2 weeks she has not been feeling well, generalized fatigue, shortness of breath with exertion, sleeping more.	  In ER patient was found to be anemic.  Patient is being admitted for further work up and treatment (05 Jun 2018 15:51)         MEDICATIONS  (STANDING):  aspirin enteric coated 81 milliGRAM(s) Oral daily  bisacodyl 5 milliGRAM(s) Oral at bedtime  calcium carbonate 1250 mG + Vitamin D (OsCal 500 + D) 1 Tablet(s) Oral daily  cholecalciferol 1000 Unit(s) Oral daily  cyanocobalamin 100 MICROGram(s) Oral daily  digoxin     Tablet 0.125 milliGRAM(s) Oral daily  diltiazem    milliGRAM(s) Oral daily  docusate sodium 100 milliGRAM(s) Oral three times a day  ferrous    sulfate 325 milliGRAM(s) Oral three times a day  folic acid 1 milliGRAM(s) Oral daily  iron sucrose Injectable 100 milliGRAM(s) IV Push every 24 hours  levothyroxine 100 MICROGram(s) Oral daily  metoprolol tartrate 25 milliGRAM(s) Oral two times a day  multivitamin/minerals 1 Tablet(s) Oral daily  pantoprazole    Tablet 40 milliGRAM(s) Oral two times a day  simvastatin 20 milliGRAM(s) Oral at bedtime  sucralfate 1 Gram(s) Oral two times a day    MEDICATIONS  (PRN):      Allergies    amoxicillin (Unknown)    Intolerances        Vital Signs Last 24 Hrs  T(C): 36.8 (08 Jun 2018 13:54), Max: 37 (08 Jun 2018 11:29)  T(F): 98.2 (08 Jun 2018 13:54), Max: 98.6 (08 Jun 2018 11:29)  HR: 62 (08 Jun 2018 13:54) (53 - 72)  BP: 149/61 (08 Jun 2018 13:54) (117/61 - 149/61)  BP(mean): --  RR: 15 (08 Jun 2018 13:54) (15 - 16)  SpO2: 97% (08 Jun 2018 13:54) (94% - 97%)    PHYSICAL EXAM  General: adult in NAD  HEENT: clear oropharynx, anicteric sclera, pink conjunctiva  Neck: supple  CV: normal S1/S2 with no murmur rubs or gallops  Lungs: positive air movement b/l ant lungs,clear to auscultation, no wheezes, no rales  Abdomen: soft non-tender non-distended, no hepatosplenomegaly  Ext: no clubbing cyanosis or edema  Skin: no rashes and no petechiae  Neuro: alert and oriented X 4, no focal deficits  LABS:                          8.8    8.86  )-----------( 240      ( 08 Jun 2018 06:37 )             29.7         Mean Cell Volume : 80.5 fl  Mean Cell Hemoglobin : 23.8 pg  Mean Cell Hemoglobin Concentration : 29.6 gm/dL  Auto Neutrophil # : x  Auto Lymphocyte # : x  Auto Monocyte # : x  Auto Eosinophil # : x  Auto Basophil # : x  Auto Neutrophil % : x  Auto Lymphocyte % : x  Auto Monocyte % : x  Auto Eosinophil % : x  Auto Basophil % : x    Serial CBC's  06-08 @ 06:37  Hct-29.7 / Hgb-8.8 / Plat-240 / RBC-3.69 / WBC-8.86          Serial CBC's  06-07 @ 20:20  Hct-30.0 / Hgb-9.0 / Plat--- / RBC--- / WBC---          Serial CBC's  06-07 @ 06:54  Hct-30.8 / Hgb-9.6 / Plat-216 / RBC-3.89 / WBC-9.86          Serial CBC's  06-06 @ 01:11  Hct-30.2 / Hgb-9.5 / Plat--- / RBC--- / WBC---          Serial CBC's  06-05 @ 12:33  Hct-23.5 / Hgb-6.9 / Plat-310 / RBC-3.03 / WBC-9.42            06-06    140  |  102  |  16  ----------------------------<  88  3.7   |  31  |  0.95    Ca    8.4<L>      06 Jun 2018 16:07        PT/INR - ( 07 Jun 2018 06:54 )   PT: 13.7 sec;   INR: 1.25 ratio         PTT - ( 07 Jun 2018 06:54 )  PTT:26.9 sec    Vitamin B12, Serum: >2000 pg/mL (06-07-18 @ 08:03)  Folate, Serum: >20.0 ng/mL (06-07-18 @ 08:03)  Reticulocyte Percent: see note % (06-07-18 @ 06:54)  Iron - Total Binding Capacity.: 393 ug/dL (06-05-18 @ 22:52)  Ferritin, Serum: 46 ng/mL (06-05-18 @ 22:52)                BLOOD SMEAR INTERPRETATION:       RADIOLOGY & ADDITIONAL STUDIES:

## 2018-06-08 NOTE — PROGRESS NOTE ADULT - PROBLEM SELECTOR PLAN 1
etiology multifactorial, guaiac pos stool, iron def, hx of gastritis and gastric ulcerated submucosal lesion on prior egds as per gi  s/p prbc on this admisssion 6/5/18  iron deficiency on iron profile  looking for a rapid response, pt is on  iv iron 100 mg a day --day 3(3/3) today  monitor serial h/h--transfuse prbc as needed for symptomatic anemia etiology multifactorial, guaiac pos stool, iron def, hx of gastritis and gastric ulcerated submucosal lesion on prior egds as per gi  s/p prbc on this admisssion 6/5/18  iron deficiency on iron profile  looking for a rapid response, pt is on  iv iron 100 mg a day --day 3(3/3) today  monitor serial h/h--transfuse prbc as needed for symptomatic anemia  po iron from 6/9/18

## 2018-06-08 NOTE — PROGRESS NOTE ADULT - SUBJECTIVE AND OBJECTIVE BOX
[facility] -- Kam Craig, Anaid, Magali, Jason Dempsey Savella  Office # [phone]      Follow Up:  AF, TAVR    Subjective/Observations: Patient seen and examined. Events noted. Resting comfortably in bed. No complaints of chest pain, dyspnea, or palpitations reported. No signs of orthopnea or PND. Planning on EGD today      REVIEW OF SYSTEMS: All other review of systems is negative unless indicated above    PAST MEDICAL & SURGICAL HISTORY:  Xerophthalmia  Anemia  Constipation  Gastrointestinal hemorrhage, unspecified gastritis, unspecified gastrointestinal hemorrhage type  Gastritis  Atrial fibrillation, unspecified  Hyperlipidemia  Gastroesophageal reflux disease without esophagitis  Hypothyroidism  Obesity  Dyslipidemia  Hypertension  CAD (coronary artery disease): S/P CABG 1999  History of colon polyps: removal 2014  H/O abdominal hysterectomy  Rectal mass: removal in 6/2015  S/P CABG (coronary artery bypass graft): in 1999      MEDICATIONS  (STANDING):  aspirin enteric coated 81 milliGRAM(s) Oral daily  bisacodyl 5 milliGRAM(s) Oral at bedtime  calcium carbonate 1250 mG + Vitamin D (OsCal 500 + D) 1 Tablet(s) Oral daily  cholecalciferol 1000 Unit(s) Oral daily  cyanocobalamin 100 MICROGram(s) Oral daily  digoxin     Tablet 0.125 milliGRAM(s) Oral daily  diltiazem    milliGRAM(s) Oral daily  docusate sodium 100 milliGRAM(s) Oral three times a day  ferrous    sulfate 325 milliGRAM(s) Oral three times a day  folic acid 1 milliGRAM(s) Oral daily  iron sucrose Injectable 100 milliGRAM(s) IV Push every 24 hours  levothyroxine 100 MICROGram(s) Oral daily  metoprolol tartrate 25 milliGRAM(s) Oral two times a day  multivitamin/minerals 1 Tablet(s) Oral daily  pantoprazole    Tablet 40 milliGRAM(s) Oral two times a day  simvastatin 20 milliGRAM(s) Oral at bedtime  sucralfate 1 Gram(s) Oral two times a day    MEDICATIONS  (PRN):      Allergies    amoxicillin (Unknown)    Intolerances            Vital Signs Last 24 Hrs  T(C): 36.6 (08 Jun 2018 08:00), Max: 36.8 (07 Jun 2018 11:50)  T(F): 97.9 (08 Jun 2018 08:00), Max: 98.2 (07 Jun 2018 11:50)  HR: 64 (08 Jun 2018 08:00) (55 - 72)  BP: 146/68 (08 Jun 2018 08:00) (122/66 - 146/68)  BP(mean): --  RR: 15 (08 Jun 2018 08:00) (14 - 16)  SpO2: 94% (08 Jun 2018 08:00) (94% - 98%)    I&O's Summary    07 Jun 2018 07:01  -  08 Jun 2018 07:00  --------------------------------------------------------  IN: 520 mL / OUT: 0 mL / NET: 520 mL          PHYSICAL EXAM:  TELE: off  Constitutional: NAD, awake and alert, well-developed  HEENT: Moist Mucous Membranes, Anicteric  Pulmonary: Decreased breath sounds b/l. No rales, crackles or wheeze appreciated.   Cardiovascular: IRRR, S1 and S2, 1/6 SM  Gastrointestinal: Bowel Sounds present, soft, nontender.   Lymph: No peripheral edema. No lymphadenopathy.  Skin: No visible rashes or ulcers.  Psych:  Mood & affect appropriate    LABS: All Labs Reviewed:                        8.8    8.86  )-----------( 240      ( 08 Jun 2018 06:37 )             29.7                         9.0    x     )-----------( x        ( 07 Jun 2018 20:20 )             30.0                         9.6    9.86  )-----------( 216      ( 07 Jun 2018 06:54 )             30.8     06 Jun 2018 16:07    140    |  102    |  16     ----------------------------<  88     3.7     |  31     |  0.95   05 Jun 2018 12:33    139    |  102    |  15     ----------------------------<  133    4.3     |  25     |  0.99     Ca    8.4        06 Jun 2018 16:07  Ca    8.8        05 Jun 2018 12:33    TPro  7.6    /  Alb  3.7    /  TBili  1.2    /  DBili  x      /  AST  15     /  ALT  13     /  AlkPhos  84     05 Jun 2018 12:33    PT/INR - ( 07 Jun 2018 06:54 )   PT: 13.7 sec;   INR: 1.25 ratio         PTT - ( 07 Jun 2018 06:54 )  PTT:26.9 sec

## 2018-06-08 NOTE — PROGRESS NOTE ADULT - PROBLEM SELECTOR PLAN 3
plan as per cardiology  supportive care  d/w patient at bedside. plan as per cardiology  supportive care  d/w patient at bedside.  dr jg lancaster covers me 6/9 and 6/10, if any question.

## 2018-06-08 NOTE — CONSULT NOTE ADULT - SUBJECTIVE AND OBJECTIVE BOX
History of Present Illness:  Reason for Admission: dizzy	  History of Present Illness: 	  · HPI Objective Statement: 89 female sent to ER by her cardiologist for a low hg level 7.1. Patient states for the past 2 weeks she has not been feeling well, generalized fatigue, shortness of breath with exertion, sleeping more.  She denies any blood per rectum.  She had a colonoscopy 2 years ago.  Patient recieved two units of PRBC and has been stable.  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	  	      Review of Systems:  Review of Systems: · RESPIRATORY: - - -  · Respiratory [+]: EXERTIONAL DYSPNEA, SHORTNESS OF BREATH · ROS STATEMENT: all other ROS negative except as per HPI	  Other Review of Systems: All other review of systems negative, except as noted in HPI 	      Allergies and Intolerances:        Allergies:  	amoxicillin: Drug, Unknown    Home Medications:   * Incomplete Medication History as of 05-Jun-2018 12:05 documented in Structured Notes  · 	Tylenol 325 mg oral tablet: 2 tab(s) orally every 6 hours, As needed, Mild Pain (1 - 3)  · 	folic acid 0.8 mg oral tablet:  orally , Last Dose Taken:    · 	docusate sodium 100 mg oral capsule: 1 cap(s) orally 3 times a day, Last Dose Taken:    · 	ferrous sulfate 325 mg (65 mg elemental iron) oral tablet: 1 tab(s) orally 3 times a day, Last Dose Taken:    · 	aspirin 81 mg oral delayed release tablet: 1 tab(s) orally once a day, Last Dose Taken:    · 	Metoprolol Tartrate 25 mg oral tablet: 1 tab(s) orally 2 times a day  · 	pantoprazole 20 mg oral delayed release tablet: 1 tab(s) orally once a day  · 	diltiazem 240 mg/24 hours oral capsule, extended release: 1 cap(s) orally once a day, Last Dose Taken:    · 	levothyroxine 100 mcg (0.1 mg) oral tablet: 1 tab(s) orally once a day, Last Dose Taken:    · 	simvastatin 20 mg oral tablet: 1 tab(s) orally once a day (at bedtime)  	  	home  · 	Calcium 500+D oral tablet, chewable: 1 tab(s) orally once a day, Last Dose Taken:    · 	Ocuvite oral tablet: 1 tab(s) orally once a day  · 	Vitamin D3 1000 intl units oral tablet: 1 tab(s) orally once a day  · 	Vitamin B-12 100 mcg oral tablet: 1 tab(s) orally once a day  · 	Xarelto 20 mg oral tablet: 1 tab(s) orally once a day (in the evening) last dose will be feb 11 at 10 am   · 	DIGOXIN 125 MCG TABS: Last Dose Taken:      Patient History:    Past Medical History:  Anemia    Atrial fibrillation, unspecified    CAD (coronary artery disease)  S/P CABG 1999  Constipation    Dyslipidemia    Gastritis    Gastroesophageal reflux disease without esophagitis    Gastrointestinal hemorrhage, unspecified gastritis, unspecified gastrointestinal hemorrhage type    Hyperlipidemia    Hypertension    Hypothyroidism    Obesity    Xerophthalmia.     Past Surgical History:  H/O abdominal hysterectomy    History of colon polyps  removal 2014  Rectal mass  removal in 6/2015  S/P CABG (coronary artery bypass graft)  in 1999.     Family History:  No pertinent family history in first degree relatives.     Social History:  Social History (marital status, living situation, occupation, tobacco use, alcohol and drug use, and sexual history): · Tobacco Usage Never smoker	     Tobacco Screening:  · Core Measure Site	Yes	  · Has the patient used tobacco in the past 30 days?	No 	    Risk Assessment:    Present on Admission:  Deep Venous Thrombosis	no 	  Pulmonary Embolus	no 	     Heart Failure:  Does this patient have a history of or has been diagnosed with heart failure? unknown.       Physical Exam:  Physical Exam: · Physical Examination: rectal- small hemorrhoid, brown stool  · CONSTITUTIONAL: - - -  · Appearance: well appearing  · Development: well developed  · Distress: no apparent  · Manner: appropriate for situation  · Mentation: awake, alert  · Mood: appropriate  · Nourishment: well  · CARDIAC: - - -  · CARDIAC RHYTHM: IRREGULAR  · CARDIAC RATE: normal  · CARDIAC SOUNDS: MURMUR  · CARDIAC PEDAL EDEMA: PITTING 2 MM  · CARDIAC CAPI REFILL: less than or equal to 2 seconds  · GASTROINTESTINAL: Abdomen soft, non-tender, no guarding.  · MUSCULOSKELETAL: - - -  · MUSC EXAM: atraumatic, normal range of motion  · NEUROLOGICAL: - - -  · NEURO LOC: alert  follows commands  · NEURO NECK: normal · SKIN: Skin normal color for race, warm, dry and intact. No evidence of rash. Abdomnen soft nt nd

## 2018-06-08 NOTE — DIETITIAN INITIAL EVALUATION ADULT. - OTHER INFO
Pt reports she was tolerating liquids well prior to NPO for EGD. She denies any food allergies, takes no supplements, tries to follow a low sugar and low sodium diet, has been on iron in the past but never knew how long to take for, eats some red meat but chicken more often. skin - intact, Pt with pmhx as below admitted for anemia whiich is suspected to be multifactorial . Pt is obese appearing . She is currently npo awaiting EGD

## 2018-06-08 NOTE — CONSULT NOTE ADULT - ASSESSMENT
Dx hepatic flexure mass.   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  Patient needs surgical resection.  R/B/A briefly discussed with patient.  I will follow patient and plan on surgery next week.

## 2018-06-09 DIAGNOSIS — K31.89 OTHER DISEASES OF STOMACH AND DUODENUM: ICD-10-CM

## 2018-06-09 DIAGNOSIS — K63.9 DISEASE OF INTESTINE, UNSPECIFIED: ICD-10-CM

## 2018-06-09 LAB
CEA SERPL-MCNC: 3.8 NG/ML — SIGNIFICANT CHANGE UP (ref 0–3.8)
HCT VFR BLD CALC: 30.8 % — LOW (ref 34.5–45)
HGB BLD-MCNC: 9.5 G/DL — LOW (ref 11.5–15.5)

## 2018-06-09 PROCEDURE — 99232 SBSQ HOSP IP/OBS MODERATE 35: CPT

## 2018-06-09 PROCEDURE — 74177 CT ABD & PELVIS W/CONTRAST: CPT | Mod: 26

## 2018-06-09 RX ORDER — IOHEXOL 300 MG/ML
500 INJECTION, SOLUTION INTRAVENOUS
Qty: 0 | Refills: 0 | Status: COMPLETED | OUTPATIENT
Start: 2018-06-09 | End: 2018-06-09

## 2018-06-09 RX ADMIN — Medication 1 MILLIGRAM(S): at 12:46

## 2018-06-09 RX ADMIN — IOHEXOL 500 MILLILITER(S): 300 INJECTION, SOLUTION INTRAVENOUS at 10:00

## 2018-06-09 RX ADMIN — Medication 325 MILLIGRAM(S): at 12:46

## 2018-06-09 RX ADMIN — PREGABALIN 100 MICROGRAM(S): 225 CAPSULE ORAL at 12:46

## 2018-06-09 RX ADMIN — Medication 100 MILLIGRAM(S): at 05:45

## 2018-06-09 RX ADMIN — PANTOPRAZOLE SODIUM 40 MILLIGRAM(S): 20 TABLET, DELAYED RELEASE ORAL at 05:45

## 2018-06-09 RX ADMIN — Medication 81 MILLIGRAM(S): at 12:46

## 2018-06-09 RX ADMIN — Medication 240 MILLIGRAM(S): at 05:45

## 2018-06-09 RX ADMIN — PANTOPRAZOLE SODIUM 40 MILLIGRAM(S): 20 TABLET, DELAYED RELEASE ORAL at 17:00

## 2018-06-09 RX ADMIN — Medication 100 MICROGRAM(S): at 05:45

## 2018-06-09 RX ADMIN — Medication 1 GRAM(S): at 05:45

## 2018-06-09 RX ADMIN — SIMVASTATIN 20 MILLIGRAM(S): 20 TABLET, FILM COATED ORAL at 21:44

## 2018-06-09 RX ADMIN — Medication 1 GRAM(S): at 17:00

## 2018-06-09 RX ADMIN — Medication 1 TABLET(S): at 12:46

## 2018-06-09 RX ADMIN — Medication 325 MILLIGRAM(S): at 05:46

## 2018-06-09 RX ADMIN — Medication 325 MILLIGRAM(S): at 21:44

## 2018-06-09 RX ADMIN — Medication 1000 UNIT(S): at 12:46

## 2018-06-09 RX ADMIN — Medication 0.12 MILLIGRAM(S): at 05:45

## 2018-06-09 RX ADMIN — Medication 25 MILLIGRAM(S): at 05:46

## 2018-06-09 RX ADMIN — IOHEXOL 500 MILLILITER(S): 300 INJECTION, SOLUTION INTRAVENOUS at 11:05

## 2018-06-09 NOTE — PROGRESS NOTE ADULT - ASSESSMENT
Maximiliano is a 89 years old woman admitted with anemia and found on colonoscopy to have hepatic flexure mass

## 2018-06-09 NOTE — PROGRESS NOTE ADULT - ASSESSMENT
89 year old woman with a history of CAD s/p remote CABG, more recent CHARY to the RCA and LCx, severe AS s/p TAVR, chronic AF with TBS s/p PPM, on Xarelto for stroke risk reduction, normal LV function, hypertension, hyperlipidemia who has been feeling weak and fatigued lately, along with having HERNADEZ, admitted with symptomatic anemia. No clear GI bleed, though FOBT now positive. AF is rate controlled; s/p RBCs    - No sign of acute ischemia or decompensated heart failure.  - Trend H/H. currently stable.   - Continue aspirin  - hold xarelto for upcoming surgery   - Continue digoxin 125 QD.   level checked, stable   - Continue diltiazem 240 QD and metoprolol 25 BID as long as blood pressure can tolerate  - Continue simvastatin   - Monitor and replete lytes keep K >4 and Mg >2  - PPM interrogated in 5/21/2018. Remaining life >5 years. No events noted.  - S/p EGD yesterday: Dx hepatic flexure mass, colorectal surgery involved and discussed with patient findings and need for surgical resection next week   . Currently no active cardiac conditions. No signs of ischemia, ADHF, clinical exam not consistent with severe stenotic valvular disease, no unstable arrhythmias noted.  From cardiology standpoint pt is okay to  proceed with resection without any further cardiac workup. Routine hemodynamic monitoring is suggested during the procedure.   - will cont to follow     Shantell Thomas NP  Cardiology

## 2018-06-09 NOTE — PROGRESS NOTE ADULT - PROBLEM SELECTOR PLAN 1
s/p colonoscopy with hepatic flexure mass   monitor h and h   regular diet   transfuse as needed   hold anticoagulation

## 2018-06-09 NOTE — PROGRESS NOTE ADULT - SUBJECTIVE AND OBJECTIVE BOX
HPI:  · HPI Objective Statement: 89 female sent to ER by her cardiologist for a low hg level 7.1. Patient states for the past 2 weeks she has not been feeling well, generalized fatigue, shortness of breath with exertion, sleeping more.	  In ER patient was found to be anemic.  Patient is being admitted for further work up and treatment (05 Jun 2018 15:51)     Pt is seen and examined  pt is out of bed to chair  pt seems comfortable and denies any complaints at this time    ROS:  Negative except for:    MEDICATIONS  (STANDING):  aspirin enteric coated 81 milliGRAM(s) Oral daily  bisacodyl 5 milliGRAM(s) Oral at bedtime  calcium carbonate 1250 mG + Vitamin D (OsCal 500 + D) 1 Tablet(s) Oral daily  cholecalciferol 1000 Unit(s) Oral daily  cyanocobalamin 100 MICROGram(s) Oral daily  digoxin     Tablet 0.125 milliGRAM(s) Oral daily  diltiazem    milliGRAM(s) Oral daily  docusate sodium 100 milliGRAM(s) Oral three times a day  ferrous    sulfate 325 milliGRAM(s) Oral three times a day  folic acid 1 milliGRAM(s) Oral daily  levothyroxine 100 MICROGram(s) Oral daily  metoprolol tartrate 25 milliGRAM(s) Oral two times a day  multivitamin/minerals 1 Tablet(s) Oral daily  pantoprazole    Tablet 40 milliGRAM(s) Oral two times a day  simvastatin 20 milliGRAM(s) Oral at bedtime  sucralfate 1 Gram(s) Oral two times a day    MEDICATIONS  (PRN):      Allergies    amoxicillin (Unknown)    Intolerances        Vital Signs Last 24 Hrs  T(C): 36.8 (09 Jun 2018 15:19), Max: 37 (08 Jun 2018 16:22)  T(F): 98.2 (09 Jun 2018 15:19), Max: 98.6 (08 Jun 2018 16:22)  HR: 56 (09 Jun 2018 15:19) (55 - 74)  BP: 107/56 (09 Jun 2018 15:19) (107/56 - 152/79)  BP(mean): --  RR: 18 (09 Jun 2018 15:19) (18 - 18)  SpO2: 95% (09 Jun 2018 15:19) (95% - 98%)    PHYSICAL EXAM  General: adult in NAD  HEENT: clear oropharynx, anicteric sclera, pink conjunctiva  Neck: supple  CV: normal S1/S2 with no murmur rubs or gallops  Lungs: positive air movement b/l ant lungs,clear to auscultation, no wheezes, no rales  Abdomen: soft non-tender non-distended, no hepatosplenomegaly  Ext: no clubbing cyanosis or edema  Skin: no rashes and no petechiae  Neuro: alert and oriented X 4, no focal deficits  LABS:                          9.5    x     )-----------( x        ( 09 Jun 2018 12:19 )             30.8         Mean Cell Volume : 80.5 fl  Mean Cell Hemoglobin : 23.8 pg  Mean Cell Hemoglobin Concentration : 29.6 gm/dL  Auto Neutrophil # : x  Auto Lymphocyte # : x  Auto Monocyte # : x  Auto Eosinophil # : x  Auto Basophil # : x  Auto Neutrophil % : x  Auto Lymphocyte % : x  Auto Monocyte % : x  Auto Eosinophil % : x  Auto Basophil % : x    Serial CBC's  06-09 @ 12:19  Hct-30.8 / Hgb-9.5 / Plat--- / RBC--- / WBC---          Serial CBC's  06-08 @ 06:37  Hct-29.7 / Hgb-8.8 / Plat-240 / RBC-3.69 / WBC-8.86          Serial CBC's  06-07 @ 20:20  Hct-30.0 / Hgb-9.0 / Plat--- / RBC--- / WBC---          Serial CBC's  06-07 @ 06:54  Hct-30.8 / Hgb-9.6 / Plat-216 / RBC-3.89 / WBC-9.86          Serial CBC's  06-06 @ 01:11  Hct-30.2 / Hgb-9.5 / Plat--- / RBC--- / WBC---                        Vitamin B12, Serum: >2000 pg/mL (06-07 @ 08:03)  Folate, Serum: >20.0 ng/mL (06-07 @ 08:03)  Reticulocyte Percent: see note % (06-07 @ 06:54)  Iron - Total Binding Capacity.: 393 ug/dL (06-05 @ 22:52)  Ferritin, Serum: 46 ng/mL (06-05 @ 22:52)

## 2018-06-09 NOTE — PROGRESS NOTE ADULT - PROBLEM SELECTOR PLAN 3
rate control with beta blocker  and digoxin  Hold AC for GI work up yesterday and possible surgery coming week

## 2018-06-09 NOTE — PROGRESS NOTE ADULT - ASSESSMENT
Dx Colon mass  I willl discuss with Dr Jefferson.  Will plan on colon resection for next week.  She will need medical clearance prior.  She is aware of the risks benefits nd alternatives.

## 2018-06-09 NOTE — PROGRESS NOTE ADULT - SUBJECTIVE AND OBJECTIVE BOX
[facility] -- Kam Craig, Anaid, Magali, Jason Dempsey Savella  Office # [phone]      Follow Up:  AF, TAVR    Subjective/Observations: Patient seen and examined. Sitting comfortably in chair . No complaints of chest pain, dyspnea, or palpitations reported. No signs of orthopnea or PND.         REVIEW OF SYSTEMS: All other review of systems is negative unless indicated above    PAST MEDICAL & SURGICAL HISTORY:  Xerophthalmia  Anemia  Constipation  Gastrointestinal hemorrhage, unspecified gastritis, unspecified gastrointestinal hemorrhage type  Gastritis  Atrial fibrillation, unspecified  Hyperlipidemia  Gastroesophageal reflux disease without esophagitis  Hypothyroidism  Obesity  Dyslipidemia  Hypertension  CAD (coronary artery disease): S/P CABG 1999  History of colon polyps: removal 2014  H/O abdominal hysterectomy  Rectal mass: removal in 6/2015  S/P CABG (coronary artery bypass graft): in 1999      MEDICATIONS  (STANDING):  aspirin enteric coated 81 milliGRAM(s) Oral daily  bisacodyl 5 milliGRAM(s) Oral at bedtime  calcium carbonate 1250 mG + Vitamin D (OsCal 500 + D) 1 Tablet(s) Oral daily  cholecalciferol 1000 Unit(s) Oral daily  cyanocobalamin 100 MICROGram(s) Oral daily  digoxin     Tablet 0.125 milliGRAM(s) Oral daily  diltiazem    milliGRAM(s) Oral daily  docusate sodium 100 milliGRAM(s) Oral three times a day  ferrous    sulfate 325 milliGRAM(s) Oral three times a day  folic acid 1 milliGRAM(s) Oral daily  levothyroxine 100 MICROGram(s) Oral daily  metoprolol tartrate 25 milliGRAM(s) Oral two times a day  multivitamin/minerals 1 Tablet(s) Oral daily  pantoprazole    Tablet 40 milliGRAM(s) Oral two times a day  simvastatin 20 milliGRAM(s) Oral at bedtime  sucralfate 1 Gram(s) Oral two times a day    MEDICATIONS  (PRN):      Allergies    amoxicillin (Unknown)    Intolerances            Vital Signs Last 24 Hrs  T(C): 37 (09 Jun 2018 07:34), Max: 37 (08 Jun 2018 11:29)  T(F): 98.6 (09 Jun 2018 07:34), Max: 98.6 (08 Jun 2018 11:29)  HR: 61 (09 Jun 2018 07:34) (53 - 74)  BP: 146/50 (09 Jun 2018 07:34) (117/61 - 149/61)  BP(mean): --  RR: 18 (09 Jun 2018 07:34) (14 - 18)  SpO2: 96% (09 Jun 2018 07:34) (94% - 97%)    I&O's Summary        PHYSICAL EXAM:  TELE: not on tele  Constitutional: NAD, awake and alert, well-developed  HEENT: Moist Mucous Membranes, Anicteric  Pulmonary: Non-labored, breath sounds are decreased bilaterally, No wheezing, rales or rhonchi  Cardiovascular: IrRegular, S1 and S2, + syst murmurs, no rubs, gallops or clicks  Gastrointestinal: Bowel Sounds present, soft, nontender.   Lymph: No peripheral edema. No lymphadenopathy.  Skin: No visible rashes or ulcers.  Psych:  Mood & affect appropriate    LABS: All Labs Reviewed:                        8.8    8.86  )-----------( 240      ( 08 Jun 2018 06:37 )             29.7                         9.0    x     )-----------( x        ( 07 Jun 2018 20:20 )             30.0                         9.6    9.86  )-----------( 216      ( 07 Jun 2018 06:54 )             30.8     06 Jun 2018 16:07    140    |  102    |  16     ----------------------------<  88     3.7     |  31     |  0.95     Ca    8.4        06 Jun 2018 16:07

## 2018-06-09 NOTE — PROGRESS NOTE ADULT - SUBJECTIVE AND OBJECTIVE BOX
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 Patient currently offers no complaints tolerating diet and had BM      Xerophthalmia  Anemia  Constipation  Gastrointestinal hemorrhage, unspecified gastritis, unspecified gastrointestinal hemorrhage type  Gastritis  Atrial fibrillation, unspecified  Hyperlipidemia  Gastroesophageal reflux disease without esophagitis  Hypothyroidism  Obesity  Dyslipidemia  Hypertension  CAD (coronary artery disease)  History of colon polyps  H/O abdominal hysterectomy  Rectal mass  S/P CABG (coronary artery bypass graft)    amoxicillin (Unknown)      aspirin enteric coated 81 milliGRAM(s) Oral daily  calcium carbonate 1250 mG + Vitamin D (OsCal 500 + D) 1 Tablet(s) Oral daily  cholecalciferol 1000 Unit(s) Oral daily  cyanocobalamin 100 MICROGram(s) Oral daily  digoxin     Tablet 0.125 milliGRAM(s) Oral daily  diltiazem    milliGRAM(s) Oral daily  docusate sodium 100 milliGRAM(s) Oral three times a day  ferrous    sulfate 325 milliGRAM(s) Oral two times a day  folic acid 1 milliGRAM(s) Oral daily  iron sucrose Injectable 100 milliGRAM(s) IV Push every 24 hours  levothyroxine 100 MICROGram(s) Oral daily  metoprolol tartrate 25 milliGRAM(s) Oral two times a day  multivitamin/minerals 1 Tablet(s) Oral daily  pantoprazole    Tablet 40 milliGRAM(s) Oral before breakfast  simvastatin 20 milliGRAM(s) Oral at bedtime        FAMILY HISTORY:  No pertinent family history in first degree relatives        Review of Systems:    General:  No wt loss, fevers, chills, night sweats, fatigue  Eyes:  Good vision, no reported pain  ENT:  No sore throat, pain, runny nose, dysphagia  CV:  No pain, palpitations, no lightheadedness  Resp:  No dyspnea, cough, tachypnea, wheezing  GI: see above  :  No pain, bleeding, incontinence, nocturia  Muscle:  No pain, weakness  Neuro:  No weakness, tingling, memory problems  Psych:  No fatigue, insomnia, mood problems, depression  Endocrine:  No polyuria, polydypsia, cold/heat intolerance  Heme:  No petechiae, ecchymosis, easy bruisability  Skin:  No rash, tattoos, scars, edema    Relevant Family History:   n/c    Relevant Social History: n/c      Physical Exam:    Vital Signs Last 24 Hrs  T(C): 37 (09 Jun 2018 07:34), Max: 37 (08 Jun 2018 11:29)  T(F): 98.6 (09 Jun 2018 07:34), Max: 98.6 (08 Jun 2018 11:29)  HR: 61 (09 Jun 2018 07:34) (53 - 74)  BP: 146/50 (09 Jun 2018 07:34) (117/61 - 149/61)  BP(mean): --  RR: 18 (09 Jun 2018 07:34) (14 - 18)  SpO2: 96% (09 Jun 2018 07:34) (95% - 97%)      General:  sitting upright in bed in nad  HEENT:  NC/AT,  conjunctivae clear and pink, anicteric  Chest:  cta  Cv- S1S2 irreg  Abdomen:  Soft, non-tender, mild dt +bs  Extremities:  no edema  Skin:  No rash  Neuro/Psych:  A&Ox3  Laboratory:                                             8.8    8.86  )-----------( 240      ( 08 Jun 2018 06:37 )             29.7     BALLATOEMILIANOSA A                    2        Surgical Final Report          Final Diagnosis    1. Small bowel, duodenum, biopsy:  -  Non-neoplastic small bowel, duodenal mucosa with  preserved villous architecture, negative for inflammation.  The  changes of celiac disease/sprue are not present in this sample.    2.  Stomach, antrum, biopsy:  Diffquick stain examined.  - Non-neoplastic gastric, antral mucosa demonstrating mild  to moderate chronic inactive gastritis, negative for intestinal  metaplasia, negative for dysplasia.  Special stain for H. pylori  micro-organisms is negative.  - Changes of reactive gastropathy are also present.    3.  Stomach, body, biopsy:  Diffquick stain examined.  - Non-neoplastic gastric, oxyntic mucosa demonstrating  moderate active chronic gastritis with foci of intestinal  metaplasia, negative for dysplasia.  Special stain for H. pylori  micro-organisms is negative.    Chris Dalton M.D.  (Electronic Signature)  Reported on: 10/04/16    Clinical Information  89 year old female with anemia    Specimen Description  1-Duodenum  2-Antrum  3-Gastric body    Gross Description  1 - The specimen is received in formalin and the specimen  container is labeled: Duodenum. Itconsists of two fragments of  tan soft tissue, each measuring 0.3 cm in greatest dimension.  Entirely submitted. One cassette.    2 - The specimen is received in formalin and the specimen  container is labeled: Antrum. It consists of multiple fragments  of tan soft tissue measuring in aggregate 0.4 x 0.4 x 0.2 cm.  Entirely submitted. One cassette.    3 - The specimen is received in formalin and the specimen  container is labeled: Gastric body. It consists of a              BALLATO, RON A               2        Surgical Final Report          fragment of tan soft tissue measuring 0.3 cm in greatest  dimension. Entirely submitted. One cassette.    In addition to other data that may appear on the specimen  containers, all labels have been inspected to confirm the  presence of the patient's name and date of birth.    DN 10/04/16 07:05      Ca    8.4<L>      06 Jun 2018 16:07        PT/INR - ( 07 Jun 2018 06:54 )   PT: 13.7 sec;   INR: 1.25 ratio         PTT - ( 07 Jun 2018 06:54 )  PTT:26.9 sec      Imaging:

## 2018-06-09 NOTE — PROGRESS NOTE ADULT - SUBJECTIVE AND OBJECTIVE BOX
Patient is a 89y old  Female who presents with a chief complaint of weakness and lethargy, blood level low (05 Jun 2018 21:06)      INTERVAL /OVERNIGHT EVENTS: no further rectal bleeding    MEDICATIONS  (STANDING):  aspirin enteric coated 81 milliGRAM(s) Oral daily  bisacodyl 5 milliGRAM(s) Oral at bedtime  calcium carbonate 1250 mG + Vitamin D (OsCal 500 + D) 1 Tablet(s) Oral daily  cholecalciferol 1000 Unit(s) Oral daily  cyanocobalamin 100 MICROGram(s) Oral daily  digoxin     Tablet 0.125 milliGRAM(s) Oral daily  diltiazem    milliGRAM(s) Oral daily  docusate sodium 100 milliGRAM(s) Oral three times a day  ferrous    sulfate 325 milliGRAM(s) Oral three times a day  folic acid 1 milliGRAM(s) Oral daily  levothyroxine 100 MICROGram(s) Oral daily  metoprolol tartrate 25 milliGRAM(s) Oral two times a day  multivitamin/minerals 1 Tablet(s) Oral daily  pantoprazole    Tablet 40 milliGRAM(s) Oral two times a day  simvastatin 20 milliGRAM(s) Oral at bedtime  sucralfate 1 Gram(s) Oral two times a day    MEDICATIONS  (PRN):      Allergies    amoxicillin (Unknown)    Intolerances        REVIEW OF SYSTEMS:  CONSTITUTIONAL: No fever, weight loss, or fatigue  EYES: No eye pain, visual disturbances, or discharge  ENMT:  No difficulty hearing, tinnitus, vertigo; No sinus or throat pain  NECK: No pain or stiffness  RESPIRATORY: No cough, wheezing, chills or hemoptysis; No shortness of breath  CARDIOVASCULAR: No chest pain, palpitations, dizziness, or leg swelling  GASTROINTESTINAL: No abdominal or epigastric pain. No nausea, vomiting, or hematemesis; No diarrhea or constipation. No melena or hematochezia.  GENITOURINARY: No dysuria, frequency, hematuria, or incontinence  NEUROLOGICAL: No headaches, memory loss, loss of strength, numbness, or tremors  SKIN: No itching, burning, rashes, or lesions   LYMPH NODES: No enlarged glands  ENDOCRINE: No heat or cold intolerance; No hair loss; No polydipsia or polyuria  MUSCULOSKELETAL: No joint pain or swelling; No muscle, back, or extremity pain  PSYCHIATRIC: No depression, anxiety, mood swings, or difficulty sleeping  HEME/LYMPH: No easy bruising, or bleeding gums  ALLERGY AND IMMUNOLOGIC: No hives or eczema    Vital Signs Last 24 Hrs  T(C): 36.8 (09 Jun 2018 15:19), Max: 37 (08 Jun 2018 16:22)  T(F): 98.2 (09 Jun 2018 15:19), Max: 98.6 (08 Jun 2018 16:22)  HR: 56 (09 Jun 2018 15:19) (55 - 74)  BP: 107/56 (09 Jun 2018 15:19) (107/56 - 152/79)  BP(mean): --  RR: 18 (09 Jun 2018 15:19) (18 - 18)  SpO2: 95% (09 Jun 2018 15:19) (95% - 98%)    PHYSICAL EXAM:  GENERAL: NAD, well-groomed, well-developed  HEAD:  Atraumatic, Normocephalic  EYES: EOMI, PERRLA, conjunctiva and sclera clear  ENMT: No tonsillar erythema, exudates, or enlargement; Moist mucous membranes, Good dentition, No lesions  NECK: Supple, No JVD, Normal thyroid  NERVOUS SYSTEM:  Alert & Oriented X3, Good concentration; Motor Strength 5/5 B/L upper and lower extremities; DTRs 2+ intact and symmetric  CHEST/LUNG: Clear to auscultation bilaterally; No rales, rhonchi, wheezing, or rubs  HEART: Regular rate and rhythm; No murmurs, rubs, or gallops  ABDOMEN: Soft, Nontender, Nondistended; Bowel sounds present  EXTREMITIES:  2+ Peripheral Pulses, No clubbing, cyanosis, or edema  LYMPH: No lymphadenopathy noted  SKIN: No rashes or lesions    LABS:                        9.5    x     )-----------( x        ( 09 Jun 2018 12:19 )             30.8               CAPILLARY BLOOD GLUCOSE          RADIOLOGY & ADDITIONAL TESTS:    Notes Reviewed:  [x ] YES  [ ] NO    Care Discussed with Consultants/Other Providers [x ] YES  [ ] NO

## 2018-06-09 NOTE — PROGRESS NOTE ADULT - SUBJECTIVE AND OBJECTIVE BOX
patient has no complaints.  She has no pain or discomfort    ROS  GI abdominal pain  All other ROS are negative    PE  Abdomen soft NT ND

## 2018-06-10 PROCEDURE — 99232 SBSQ HOSP IP/OBS MODERATE 35: CPT

## 2018-06-10 RX ADMIN — Medication 325 MILLIGRAM(S): at 21:17

## 2018-06-10 RX ADMIN — SIMVASTATIN 20 MILLIGRAM(S): 20 TABLET, FILM COATED ORAL at 21:17

## 2018-06-10 RX ADMIN — Medication 0.12 MILLIGRAM(S): at 05:15

## 2018-06-10 RX ADMIN — Medication 1 GRAM(S): at 05:15

## 2018-06-10 RX ADMIN — Medication 325 MILLIGRAM(S): at 05:16

## 2018-06-10 RX ADMIN — Medication 1000 UNIT(S): at 11:00

## 2018-06-10 RX ADMIN — Medication 1 GRAM(S): at 17:02

## 2018-06-10 RX ADMIN — Medication 240 MILLIGRAM(S): at 05:16

## 2018-06-10 RX ADMIN — Medication 1 TABLET(S): at 11:00

## 2018-06-10 RX ADMIN — Medication 100 MILLIGRAM(S): at 21:17

## 2018-06-10 RX ADMIN — Medication 81 MILLIGRAM(S): at 11:00

## 2018-06-10 RX ADMIN — Medication 25 MILLIGRAM(S): at 05:15

## 2018-06-10 RX ADMIN — PANTOPRAZOLE SODIUM 40 MILLIGRAM(S): 20 TABLET, DELAYED RELEASE ORAL at 05:19

## 2018-06-10 RX ADMIN — Medication 325 MILLIGRAM(S): at 11:02

## 2018-06-10 RX ADMIN — Medication 1 MILLIGRAM(S): at 11:00

## 2018-06-10 RX ADMIN — Medication 100 MICROGRAM(S): at 05:16

## 2018-06-10 RX ADMIN — PREGABALIN 100 MICROGRAM(S): 225 CAPSULE ORAL at 11:00

## 2018-06-10 RX ADMIN — Medication 25 MILLIGRAM(S): at 17:02

## 2018-06-10 RX ADMIN — PANTOPRAZOLE SODIUM 40 MILLIGRAM(S): 20 TABLET, DELAYED RELEASE ORAL at 17:02

## 2018-06-10 NOTE — PROGRESS NOTE ADULT - ASSESSMENT
89 year old woman with a history of CAD s/p remote CABG, more recent CHARY to the RCA and LCx, severe AS s/p TAVR, chronic AF with TBS s/p PPM, on Xarelto for stroke risk reduction, normal LV function, hypertension, hyperlipidemia who has been feeling weak and fatigued lately, along with having HERNADEZ, admitted with symptomatic anemia. No clear GI bleed, though FOBT now positive. AF is rate controlled; s/p RBCs    - No sign of acute ischemia or decompensated heart failure.  - Trend H/H. S/P 2 Units PRBC 6/5 currently stable.   - Continue aspirin  - hold xarelto for upcoming surgery   - Continue digoxin 125 QD.   level checked, stable   - Continue diltiazem 240 QD and metoprolol 25 BID as long as blood pressure can tolerate  - Continue simvastatin   - Monitor and replete lytes keep K >4 and Mg >2  - PPM interrogated in 5/21/2018. Remaining life >5 years. No events noted.  - CT of abd/Pelv 6/9 - possible metastatic disease and diverticulosis  - S/p EGD: Dx hepatic flexure mass, colorectal surgery involved and discussed with patient findings and need for surgical resection next week. Currently no active cardiac conditions. No signs of ischemia, ADHF, clinical exam not consistent with severe stenotic valvular disease, no unstable arrhythmias noted.  From cardiology standpoint pt is okay to  proceed with resection without any further cardiac workup. Routine hemodynamic monitoring is suggested during the procedure.   - will cont to follow     SHRUTI Guzman  Cardiology

## 2018-06-10 NOTE — PROGRESS NOTE ADULT - SUBJECTIVE AND OBJECTIVE BOX
Patient has no complaints.  She has mild pain in lower abdomen.  She is having bm as well.    ROS  GI abdominal pain  ALl other ROS are negative    PE  abdomen soft NT ND

## 2018-06-10 NOTE — PROGRESS NOTE ADULT - SUBJECTIVE AND OBJECTIVE BOX
Overnight events:  Patient seen and examined offers no complaints, tolerating diet and had BM. For possible surgery on Wednesday     Xerophthalmia  Anemia  Constipation  Gastrointestinal hemorrhage, unspecified gastritis, unspecified gastrointestinal hemorrhage type  Gastritis  Atrial fibrillation, unspecified  Hyperlipidemia  Gastroesophageal reflux disease without esophagitis  Hypothyroidism  Obesity  Dyslipidemia  Hypertension  CAD (coronary artery disease)  History of colon polyps  H/O abdominal hysterectomy  Rectal mass  S/P CABG (coronary artery bypass graft)    amoxicillin (Unknown)      aspirin enteric coated 81 milliGRAM(s) Oral daily  calcium carbonate 1250 mG + Vitamin D (OsCal 500 + D) 1 Tablet(s) Oral daily  cholecalciferol 1000 Unit(s) Oral daily  cyanocobalamin 100 MICROGram(s) Oral daily  digoxin     Tablet 0.125 milliGRAM(s) Oral daily  diltiazem    milliGRAM(s) Oral daily  docusate sodium 100 milliGRAM(s) Oral three times a day  ferrous    sulfate 325 milliGRAM(s) Oral two times a day  folic acid 1 milliGRAM(s) Oral daily  iron sucrose Injectable 100 milliGRAM(s) IV Push every 24 hours  levothyroxine 100 MICROGram(s) Oral daily  metoprolol tartrate 25 milliGRAM(s) Oral two times a day  multivitamin/minerals 1 Tablet(s) Oral daily  pantoprazole    Tablet 40 milliGRAM(s) Oral before breakfast  simvastatin 20 milliGRAM(s) Oral at bedtime        FAMILY HISTORY:  No pertinent family history in first degree relatives        Review of Systems:    General:  No wt loss, fevers, chills, night sweats, fatigue  Eyes:  Good vision, no reported pain  ENT:  No sore throat, pain, runny nose, dysphagia  CV:  No pain, palpitations, no lightheadedness  Resp:  No dyspnea, cough, tachypnea, wheezing  GI: see above  :  No pain, bleeding, incontinence, nocturia  Muscle:  No pain, weakness  Neuro:  No weakness, tingling, memory problems  Psych:  No fatigue, insomnia, mood problems, depression  Endocrine:  No polyuria, polydypsia, cold/heat intolerance  Heme:  No petechiae, ecchymosis, easy bruisability  Skin:  No rash, tattoos, scars, edema    Relevant Family History:   n/c    Relevant Social History: n/c      Physical Exam:    Vital Signs Last 24 Hrs  T(C): 36.9 (10 Carmelo 2018 08:18), Max: 36.9 (09 Jun 2018 19:18)  T(F): 98.5 (10 Carmelo 2018 08:18), Max: 98.5 (09 Jun 2018 19:18)  HR: 65 (10 Carmelo 2018 08:18) (56 - 69)  BP: 119/70 (10 Carmelo 2018 08:18) (107/56 - 160/68)  BP(mean): --  RR: 17 (10 Carmelo 2018 08:18) (17 - 18)  SpO2: 96% (10 Carmelo 2018 08:18) (92% - 98%)    General:  sitting upright in bed in nad  HEENT:  NC/AT,  conjunctivae clear and pink, anicteric  Chest:  cta  Cv- S1S2 irreg  Abdomen:  Soft, non-tender, mild dt +bs  Extremities:  no edema  Skin:  No rash  Neuro/Psych:  A&Ox3  Laboratory:                                                        9.5    x     )-----------( x        ( 09 Jun 2018 12:19 )             30.8       BALLATO, RON A                    2        Surgical Final Report          Final Diagnosis    1. Small bowel, duodenum, biopsy:  -  Non-neoplastic small bowel, duodenal mucosa with  preserved villous architecture, negative for inflammation.  The  changes of celiac disease/sprue are not present in this sample.    2.  Stomach, antrum, biopsy:  Diffquick stain examined.  - Non-neoplastic gastric, antral mucosa demonstrating mild  to moderate chronic inactive gastritis, negative for intestinal  metaplasia, negative for dysplasia.  Special stain for H. pylori  micro-organisms is negative.  - Changes of reactive gastropathy are also present.    3.  Stomach, body, biopsy:  Diffquick stain examined.  - Non-neoplastic gastric, oxyntic mucosa demonstrating  moderate active chronic gastritis with foci of intestinal  metaplasia, negative for dysplasia.  Special stain for H. pylori  micro-organisms is negative.    Chris Dalton M.D.  (Electronic Signature)  Reported on: 10/04/16    Clinical Information  89 year old female with anemia    Specimen Description  1-Duodenum  2-Antrum  3-Gastric body    Gross Description  1 - The specimen is received in formalin and the specimen  container is labeled: Duodenum. Itconsists of two fragments of  tan soft tissue, each measuring 0.3 cm in greatest dimension.  Entirely submitted. One cassette.    2 - The specimen is received in formalin and the specimen  container is labeled: Antrum. It consists of multiple fragments  of tan soft tissue measuring in aggregate 0.4 x 0.4 x 0.2 cm.  Entirely submitted. One cassette.    3 - The specimen is received in formalin and the specimen  container is labeled: Gastric body. It consists of a              BALLATO, RON A               2        Surgical Final Report          fragment of tan soft tissue measuring 0.3 cm in greatest  dimension. Entirely submitted. One cassette.    In addition to other data that may appear on the specimen  containers, all labels have been inspected to confirm the  presence of the patient's name and date of birth.    DN 10/04/16 07:05      Ca    8.4<L>      06 Jun 2018 16:07        PT/INR - ( 07 Jun 2018 06:54 )   PT: 13.7 sec;   INR: 1.25 ratio         PTT - ( 07 Jun 2018 06:54 )  PTT:26.9 sec      Imaging:

## 2018-06-10 NOTE — PROGRESS NOTE ADULT - ASSESSMENT
Dx colon mass  Discussed with Dr Che.  She will require surgical resection for hepatic flexure lesion.  This likely source for patient symp[tomatic anemia.    Liver lesion found on ctscan, consult oncology for further recomendatiosn.   Surgery possible for Wednesday

## 2018-06-10 NOTE — PROGRESS NOTE ADULT - SUBJECTIVE AND OBJECTIVE BOX
Patient is a 89y old  Female who presents with a chief complaint of weakness and lethargy, blood level low (05 Jun 2018 21:06)      INTERVAL HPI/OVERNIGHT EVENTS: pt stable, for or on wed, ct noted    MEDICATIONS  (STANDING):  aspirin enteric coated 81 milliGRAM(s) Oral daily  bisacodyl 5 milliGRAM(s) Oral at bedtime  calcium carbonate 1250 mG + Vitamin D (OsCal 500 + D) 1 Tablet(s) Oral daily  cholecalciferol 1000 Unit(s) Oral daily  cyanocobalamin 100 MICROGram(s) Oral daily  digoxin     Tablet 0.125 milliGRAM(s) Oral daily  diltiazem    milliGRAM(s) Oral daily  docusate sodium 100 milliGRAM(s) Oral three times a day  ferrous    sulfate 325 milliGRAM(s) Oral three times a day  folic acid 1 milliGRAM(s) Oral daily  levothyroxine 100 MICROGram(s) Oral daily  metoprolol tartrate 25 milliGRAM(s) Oral two times a day  multivitamin/minerals 1 Tablet(s) Oral daily  pantoprazole    Tablet 40 milliGRAM(s) Oral two times a day  simvastatin 20 milliGRAM(s) Oral at bedtime  sucralfate 1 Gram(s) Oral two times a day    MEDICATIONS  (PRN):      Allergies    amoxicillin (Unknown)    Intolerances        REVIEW OF SYSTEMS:  CONSTITUTIONAL: No fever, weight loss, or fatigue  EYES: No eye pain, visual disturbances  ENMT:  No difficulty hearing, tinnitus, vertigo; No sinus or throat pain  NECK: No pain or stiffness  RESPIRATORY: No cough, wheezing, chills or hemoptysis; No shortness of breath  CARDIOVASCULAR: No chest pain, palpitations, dizziness  GASTROINTESTINAL: No abdominal or epigastric pain. No nausea, vomiting, or hematemesis; No diarrhea or constipation. No melena or hematochezia.  GENITOURINARY: No dysuria, frequency, hematuria, or incontinence  NEUROLOGICAL: No headaches, memory loss, loss of strength, numbness, or tremors  SKIN: No itching, burning  LYMPH NODES: No enlarged glands  MUSCULOSKELETAL: No joint pain or swelling; No muscle, back, or extremity pain  PSYCHIATRIC: No depression, mood swings  HEME/LYMPH: No easy bruising, or bleeding gums  ALLERGY AND IMMUNOLOGIC: No hives    Vital Signs Last 24 Hrs  T(C): 36.9 (10 Carmelo 2018 12:25), Max: 36.9 (09 Jun 2018 19:18)  T(F): 98.4 (10 Carmelo 2018 12:25), Max: 98.5 (09 Jun 2018 19:18)  HR: 53 (10 Carmelo 2018 12:25) (53 - 69)  BP: 124/72 (10 Carmelo 2018 12:25) (107/56 - 160/68)  BP(mean): --  RR: 17 (10 Carmelo 2018 12:25) (17 - 18)  SpO2: 97% (10 Carmelo 2018 12:25) (92% - 97%)    PHYSICAL EXAM:  GENERAL: NAD, well-groomed, well-developed  HEAD:  Atraumatic, Normocephalic  EYES: EOMI, PERRLA, conjunctiva and sclera clear  ENMT: No tonsillar erythema, exudates, or enlargement   NECK: Supple, No JVD  NERVOUS SYSTEM:  Alert & Oriented   CHEST/LUNG: Clear to auscultation bilaterally; No rales, rhonchi, wheezing  HEART: Regular rate and rhythm  ABDOMEN: Soft, Nontender, Nondistended; Bowel sounds present  EXTREMITIES:  2+ Peripheral Pulses   LYMPH: No lymphadenopathy noted  SKIN: No rashes     LABS:              CAPILLARY BLOOD GLUCOSE                RADIOLOGY & ADDITIONAL TESTS:    < from: CT Abdomen and Pelvis w/ Oral Cont and w/ IV Cont (06.09.18 @ 12:30) >  XAM:  CT ABDOMEN AND PELVIS OC IC                            PROCEDURE DATE:  06/09/2018          INTERPRETATION:  Comparison study dated 5/29/2015    Clinical information: Abdominal pain, diarrhea, previous appendectomy,   cholecystectomy.         Axial images obtained, coronal and sagittal images computer reformatted.   IV and oral contrast material administered. 95 cc of Omnipaque 350   intravenously administered, 5 cc discarded.    TAVR aortic valve prosthesis evident. Minimal left pleuraleffusion. No   right pleural effusion. Pacemaker wire and sternotomy sutures evident.    Heterogenicity posterior aspect right hepatic lobe, cannot exclude occult   space occupying lesions, metastatic disease. Similar findings visible in   the tip ofthe liver. The spleen is not enlarged. 12 mm splenule present.   Pancreatic parenchyma homogeneous. No adrenal lesions identified. No   hydronephrotic changes or renal masses visible. Normal caliber abdominal   aorta. No ascites or retroperitoneal lymphadenopathy. No biliary ductal   dilatation.        No bowel obstruction. Diverticulosis present, no evidence of   diverticulitis. Urinary bladder is filled, no stones evident. Inguinal   regions intact. Multilevel disc degenerative disease lumbar region. No   free pelvic fluid evident.    IMPRESSION:  1. Heterogeneous appearance of posterior aspect of right hepatic lobe and   the tip of the liver raises suspicion for the presence of occult space   occupying lesions, metastatic disease.  2. Diverticulosis  3. See above report                  IRINEO OG M.D.,ATTENDING RADIOLOGIST  This document has been electronically signed. Jun 9 2018 12:37PM                < end of copied text >      Consultant(s) Notes Reviewed:  [x ] YES  [ ] NO    Care Discussed with Consultants/Other Providers [ x] YES  [ ] NO    Advanced care planning discussed with patient and family, advanced care planning forms reviewed, discussed, and completed.  20 minutes spent.

## 2018-06-10 NOTE — PROGRESS NOTE ADULT - PROBLEM SELECTOR PLAN 1
s/p colonoscopy with hepatic flexure mass   monitor h and h   regular diet   transfuse as needed   hold anticoagulation  for or wednesday

## 2018-06-10 NOTE — PROGRESS NOTE ADULT - SUBJECTIVE AND OBJECTIVE BOX
[facility] -- Kam Craig, Anaid, Magali, Jason Dempsey Savella  Office # [phone]      Follow Up:  AF/TAVR    Subjective/Observations: Pt seen and examined.  Lying comfortably in bed.  Reports Rt sided abdominal pain started yesterday s/p CT with no associated n/v or bleeding overnight and denies cp, sob or palpitations.  Pt states was able to sleep overnight.  No signs of orthopnea or PND.        REVIEW OF SYSTEMS: All other review of systems is negative unless indicated above    PAST MEDICAL & SURGICAL HISTORY:  Xerophthalmia  Anemia  Constipation  Gastrointestinal hemorrhage, unspecified gastritis, unspecified gastrointestinal hemorrhage type  Gastritis  Atrial fibrillation, unspecified  Hyperlipidemia  Gastroesophageal reflux disease without esophagitis  Hypothyroidism  Obesity  Dyslipidemia  Hypertension  CAD (coronary artery disease): S/P CABG 1999  History of colon polyps: removal 2014  H/O abdominal hysterectomy  Rectal mass: removal in 6/2015  S/P CABG (coronary artery bypass graft): in 1999      MEDICATIONS  (STANDING):  aspirin enteric coated 81 milliGRAM(s) Oral daily  bisacodyl 5 milliGRAM(s) Oral at bedtime  calcium carbonate 1250 mG + Vitamin D (OsCal 500 + D) 1 Tablet(s) Oral daily  cholecalciferol 1000 Unit(s) Oral daily  cyanocobalamin 100 MICROGram(s) Oral daily  digoxin     Tablet 0.125 milliGRAM(s) Oral daily  diltiazem    milliGRAM(s) Oral daily  docusate sodium 100 milliGRAM(s) Oral three times a day  ferrous    sulfate 325 milliGRAM(s) Oral three times a day  folic acid 1 milliGRAM(s) Oral daily  levothyroxine 100 MICROGram(s) Oral daily  metoprolol tartrate 25 milliGRAM(s) Oral two times a day  multivitamin/minerals 1 Tablet(s) Oral daily  pantoprazole    Tablet 40 milliGRAM(s) Oral two times a day  simvastatin 20 milliGRAM(s) Oral at bedtime  sucralfate 1 Gram(s) Oral two times a day    MEDICATIONS  (PRN):      Allergies    amoxicillin (Unknown)    Intolerances            Vital Signs Last 24 Hrs  T(C): 36.8 (10 Carmelo 2018 04:52), Max: 36.9 (09 Jun 2018 19:18)  T(F): 98.3 (10 Carmelo 2018 04:52), Max: 98.5 (09 Jun 2018 19:18)  HR: 67 (10 Carmelo 2018 04:52) (56 - 69)  BP: 160/68 (10 Carmelo 2018 04:52) (107/56 - 160/68)  BP(mean): --  RR: 18 (10 Carmelo 2018 04:52) (18 - 18)  SpO2: 94% (10 Carmelo 2018 04:52) (92% - 98%)    I&O's Summary        PHYSICAL EXAM:  TELE: Not on tele  Constitutional: NAD, awake and alert, Frail  HEENT: Moist Mucous Membranes, Anicteric  Pulmonary: Non-labored, breath sounds are clear bilaterally, No wheezing, rales or rhonchi  Cardiovascular: Irregular, S1 and S2, No murmurs, rubs, gallops or clicks  Gastrointestinal: Bowel Sounds present, soft, nontender.   Lymph: Scant lower extremity edema Rt >Lt. No lymphadenopathy.  Skin: No visible rashes or ulcers.  Psych:  Mood & affect appropriate    LABS: All Labs Reviewed:                        9.5    x     )-----------( x        ( 09 Jun 2018 12:19 )             30.8                         8.8    8.86  )-----------( 240      ( 08 Jun 2018 06:37 )             29.7                         9.0    x     )-----------( x        ( 07 Jun 2018 20:20 )             30.0     < from: 12 Lead ECG (06.05.18 @ 12:32) >  Ventricular Rate 64 BPM    Atrial Rate 300 BPM    QRS Duration 130 ms    Q-T Interval 440 ms    QTC Calculation(Bezet) 453 ms    R Axis 7 degrees    T Axis 208 degrees    Diagnosis Line Atrial fibrillation  Left bundle branch block  Abnormal ECG  When compared with ECG of 05-MAR-2016 00:38,  Left bundle branch block is now present  Confirmed by Dawn Wilson ([id number]) on 6/6/2018 10:46:42 AM    < end of copied text >    < from: Transthoracic Echocardiogram (04.03.18 @ 12:57) >    Patient name: RON ABREU  YOB: 1926   Age: 89 (F)   MR#: [id number]  Study Date: 4/3/2018  Location: O/PSonographer: Sangita Ramírez RDCS  Study quality: Technically difficult  Referring Physician: DARIA MINAYA MD  Blood Pressure: 183/77 mmHg  Height: 140 cm  Weight: 58 kg  BSA: 1.5 m2  ------------------------------------------------------------------------  PROCEDURE: Transthoracic echocardiogram with 2-D, M-Mode  and complete spectral and color flow Doppler.  INDICATION:Presence of prosthetic heart valve (Z95.2)  ------------------------------------------------------------------------  Dimensions:    Normal Values:  LA:     5.0    2.0 - 4.0 cm  Ao:     2.5    2.0 - 3.8 cm  SEPTUM: 0.6    0.6 - 1.2 cm  PWT:    0.80.6 - 1.1 cm  LVIDd:  4.4    3.0 - 5.6 cm  LVIDs:  2.7    1.8 - 4.0 cm  Derived variables:  LVMI: 63 g/m2  RWT: 0.36  Fractional short: 39 %  EF (Visual Estimate): 70 %  Doppler Peak Velocity (m/sec): AoV=1.5  ------------------------------------------------------------------------  Observations:  Mitral Valve: Mitral annular calcification. Thickened  mitral leaflets. Mild mitral regurgitation.  Aortic Valve/Aorta: Transcatheter aortic valve replacement.  Peak transaortic valve gradient equals 9 mm Hg, mean  transaortic valve gradient equals 4 mm Hg, which is  probably normal in the presence of a transcatheter aortic  valve replacement. No aortic valve regurgitation seen. Peak  left ventricular outflow tract gradient equals 1 mm Hg,  mean gradient is equal to 1 mm Hg, LVOT velocity time  integral equals 14 cm.  Aortic Root: 2.5 cm.  Left Atrium: Severely dilated left atrium.  LA volume index  = 69 cc/m2.  Left Ventricle: Normal left ventricular systolic function.  Septal motion consistentwith paced rhythm/conduction  defect. Normal left ventricular internal dimensions and  wall thicknesses.  Right Heart: Right atrium not well visualized, appears  mildly dilated. Mild right ventricular enlargement with  mildly decreased right ventricular systolic function. A  device wire is noted in the right heart. Normal tricuspid  valve. Mild-moderate tricuspid regurgitation. Normal  pulmonic valve. Mild pulmonic regurgitation.  Pericardium/Pleura: Normal pericardium with no pericardial  effusion.  Hemodynamic: Estimated right atrial pressure is 8 mm Hg.  Estimated right ventricular systolic pressure equals 57 mm  Hg, assuming right atrial pressure equals 8 mm Hg,  consistent with moderate pulmonary hypertension.  ------------------------------------------------------------------------  Conclusions:  1. Mitral annular calcification. Thickened mitral leaflets.  Mild mitral regurgitation.  2. Transcatheter aortic valve replacement. Peak transaortic  valve gradient equals 9 mm Hg, mean transaortic valve  gradient equals 4 mm Hg, which is probably normal in the  presence of a transcatheter aortic valve replacement. No  aortic valve regurgitation seen.  3. Normal left ventricular systolic function. Septal motion  consistent with paced rhythm/conduction defect.  4. Mild right ventricular enlargement with mildly decreased  right ventricular systolic function. A device wire is noted  in the right heart.  5. Estimated pulmonary artery systolic pressure equals 57  mm Hg, assuming right atrial pressure equals 8 mm Hg,  consistent with moderate pulmonary pressures.  *** Compared with echocardiogram of 2/15/2018, results are  similar on today's study.  ------------------------------------------------------------------------  Confirmed on 4/3/2018 - 15:19:53 by Meagan Carter M.D.  ------------------------------------------------------------------------    < end of copied text >  < from: CT Abdomen and Pelvis w/ Oral Cont and w/ IV Cont (06.09.18 @ 12:30) >  EXAM:  CT ABDOMEN AND PELVIS OC IC                            PROCEDURE DATE:  06/09/2018          INTERPRETATION:  Comparison study dated 5/29/2015    Clinical information: Abdominal pain, diarrhea, previous appendectomy,   cholecystectomy.         Axial images obtained, coronal and sagittal images computer reformatted.   IV and oral contrast material administered. 95 cc of Omnipaque 350   intravenously administered, 5 cc discarded.    TAVR aortic valve prosthesis evident. Minimal left pleuraleffusion. No   right pleural effusion. Pacemaker wire and sternotomy sutures evident.    Heterogenicity posterior aspect right hepatic lobe, cannot exclude occult   space occupying lesions, metastatic disease. Similar findings visible in   the tip ofthe liver. The spleen is not enlarged. 12 mm splenule present.   Pancreatic parenchyma homogeneous. No adrenal lesions identified. No   hydronephrotic changes or renal masses visible. Normal caliber abdominal   aorta. No ascites or retroperitoneal lymphadenopathy. No biliary ductal   dilatation.        No bowel obstruction. Diverticulosis present, no evidence of   diverticulitis. Urinary bladder is filled, no stones evident. Inguinal   regions intact. Multilevel disc degenerative disease lumbar region. No   free pelvic fluid evident.    IMPRESSION:  1. Heterogeneous appearance of posterior aspect of right hepatic lobe and   the tip of the liver raises suspicion for the presence of occult space   occupying lesions, metastatic disease.  2. Diverticulosis  3. See above report                  IRINEO OG M.D.,ATTENDING RADIOLOGIST  This document has been electronically signed. Jun 9 2018 12:37PM    < end of copied text >

## 2018-06-11 DIAGNOSIS — D64.9 ANEMIA, UNSPECIFIED: ICD-10-CM

## 2018-06-11 LAB
HCT VFR BLD CALC: 31.4 % — LOW (ref 34.5–45)
HGB BLD-MCNC: 9.5 G/DL — LOW (ref 11.5–15.5)
MCHC RBC-ENTMCNC: 25.1 PG — LOW (ref 27–34)
MCHC RBC-ENTMCNC: 30.3 GM/DL — LOW (ref 32–36)
MCV RBC AUTO: 83.1 FL — SIGNIFICANT CHANGE UP (ref 80–100)
NRBC # BLD: 0 /100 WBCS — SIGNIFICANT CHANGE UP (ref 0–0)
PLATELET # BLD AUTO: 214 K/UL — SIGNIFICANT CHANGE UP (ref 150–400)
RBC # BLD: 3.78 M/UL — LOW (ref 3.8–5.2)
RBC # FLD: 22 % — HIGH (ref 10.3–14.5)
WBC # BLD: 9.02 K/UL — SIGNIFICANT CHANGE UP (ref 3.8–10.5)
WBC # FLD AUTO: 9.02 K/UL — SIGNIFICANT CHANGE UP (ref 3.8–10.5)

## 2018-06-11 PROCEDURE — 99232 SBSQ HOSP IP/OBS MODERATE 35: CPT

## 2018-06-11 PROCEDURE — 71260 CT THORAX DX C+: CPT | Mod: 26

## 2018-06-11 RX ORDER — POLYETHYLENE GLYCOL 3350 17 G/17G
17 POWDER, FOR SOLUTION ORAL
Qty: 0 | Refills: 0 | Status: DISCONTINUED | OUTPATIENT
Start: 2018-06-11 | End: 2018-06-12

## 2018-06-11 RX ADMIN — Medication 1 GRAM(S): at 05:36

## 2018-06-11 RX ADMIN — Medication 1000 UNIT(S): at 11:05

## 2018-06-11 RX ADMIN — Medication 1 GRAM(S): at 17:09

## 2018-06-11 RX ADMIN — Medication 0.12 MILLIGRAM(S): at 05:36

## 2018-06-11 RX ADMIN — Medication 325 MILLIGRAM(S): at 21:08

## 2018-06-11 RX ADMIN — Medication 100 MILLIGRAM(S): at 05:35

## 2018-06-11 RX ADMIN — PANTOPRAZOLE SODIUM 40 MILLIGRAM(S): 20 TABLET, DELAYED RELEASE ORAL at 17:09

## 2018-06-11 RX ADMIN — Medication 100 MICROGRAM(S): at 05:35

## 2018-06-11 RX ADMIN — Medication 25 MILLIGRAM(S): at 05:36

## 2018-06-11 RX ADMIN — Medication 325 MILLIGRAM(S): at 05:40

## 2018-06-11 RX ADMIN — Medication 81 MILLIGRAM(S): at 11:04

## 2018-06-11 RX ADMIN — PANTOPRAZOLE SODIUM 40 MILLIGRAM(S): 20 TABLET, DELAYED RELEASE ORAL at 05:36

## 2018-06-11 RX ADMIN — Medication 100 MILLIGRAM(S): at 21:08

## 2018-06-11 RX ADMIN — Medication 1 TABLET(S): at 11:04

## 2018-06-11 RX ADMIN — Medication 1 MILLIGRAM(S): at 11:04

## 2018-06-11 RX ADMIN — SIMVASTATIN 20 MILLIGRAM(S): 20 TABLET, FILM COATED ORAL at 21:08

## 2018-06-11 RX ADMIN — Medication 325 MILLIGRAM(S): at 11:04

## 2018-06-11 RX ADMIN — PREGABALIN 100 MICROGRAM(S): 225 CAPSULE ORAL at 11:04

## 2018-06-11 RX ADMIN — Medication 240 MILLIGRAM(S): at 05:35

## 2018-06-11 RX ADMIN — Medication 1 TABLET(S): at 11:05

## 2018-06-11 RX ADMIN — Medication 100 MILLIGRAM(S): at 11:04

## 2018-06-11 NOTE — PROGRESS NOTE ADULT - ASSESSMENT
Dx colon mass  Patient with symptomatic anemia due to colon mass.  R/B/A to lap possible open jenny-colectomy discussed. I discussed with her risks including but not limited to bleeeding infection pertonitis sepsis even death related to anastamotic leak, wound infection, cardio pulmoary events (MI, CHF, Pneumonia atectasis, DVT , PE) and prophalxsis with each, hernia, UTI, urinary retention, and SBO.  Al questions were answered.  I will speak to her son as well.  Await medical and cardio clearance.    Start bowel prep today

## 2018-06-11 NOTE — PROGRESS NOTE ADULT - PROBLEM SELECTOR PLAN 1
s/p colonoscopy with hepatic flexure mass   anemia likely 2/2 mass  trend h/h, transfuse prn  hold anticoagulation  f/u crs recs, possible surgery, started on bowel prep  heme following s/p colonoscopy with hepatic flexure mass   anemia likely 2/2 mass  trend h/h, transfuse prn  hold anticoagulation  f/u crs recs, possible surgery, started on bowel prep, diet as per surgery  heme following

## 2018-06-11 NOTE — PROGRESS NOTE ADULT - SUBJECTIVE AND OBJECTIVE BOX
Patient is a 89y old  Female who presents with a chief complaint of weakness and lethargy, blood level low (05 Jun 2018 21:06)       Pt is seen and examined, pt is awake and is out of bed to chair  pt seems comfortable at this time    HPI:  · HPI Objective Statement: 89 female sent to ER by her cardiologist for a low hg level 7.1. Patient states for the past 2 weeks she has not been feeling well, generalized fatigue, shortness of breath with exertion, sleeping more.	  In ER patient was found to be anemic.  Patient is being admitted for further work up and treatment (05 Jun 2018 15:51)         MEDICATIONS  (STANDING):  aspirin enteric coated 81 milliGRAM(s) Oral daily  bisacodyl 5 milliGRAM(s) Oral at bedtime  calcium carbonate 1250 mG + Vitamin D (OsCal 500 + D) 1 Tablet(s) Oral daily  cholecalciferol 1000 Unit(s) Oral daily  cyanocobalamin 100 MICROGram(s) Oral daily  digoxin     Tablet 0.125 milliGRAM(s) Oral daily  diltiazem    milliGRAM(s) Oral daily  docusate sodium 100 milliGRAM(s) Oral three times a day  ferrous    sulfate 325 milliGRAM(s) Oral three times a day  folic acid 1 milliGRAM(s) Oral daily  levothyroxine 100 MICROGram(s) Oral daily  metoprolol tartrate 25 milliGRAM(s) Oral two times a day  multivitamin/minerals 1 Tablet(s) Oral daily  pantoprazole    Tablet 40 milliGRAM(s) Oral two times a day  polyethylene glycol 3350 17 Gram(s) Oral two times a day  simvastatin 20 milliGRAM(s) Oral at bedtime  sucralfate 1 Gram(s) Oral two times a day    MEDICATIONS  (PRN):      Allergies    amoxicillin (Unknown)    Intolerances        Vital Signs Last 24 Hrs  T(C): 36.8 (11 Jun 2018 15:13), Max: 37.1 (11 Jun 2018 11:57)  T(F): 98.3 (11 Jun 2018 15:13), Max: 98.7 (11 Jun 2018 11:57)  HR: 57 (11 Jun 2018 15:13) (52 - 67)  BP: 137/54 (11 Jun 2018 15:13) (117/76 - 163/71)  BP(mean): --  RR: 15 (11 Jun 2018 15:13) (15 - 17)  SpO2: 97% (11 Jun 2018 15:13) (93% - 99%)    PHYSICAL EXAM  General: adult in NAD  HEENT: clear oropharynx, anicteric sclera, pink conjunctiva  Neck: supple  CV: normal S1/S2 with no murmur rubs or gallops  Lungs: positive air movement b/l ant lungs,clear to auscultation, no wheezes, no rales  Abdomen: soft non-tender non-distended, no hepatosplenomegaly  Ext: no clubbing cyanosis or edema  Skin: no rashes and no petechiae  Neuro: alert and oriented X 4, no focal deficits  LABS:                          9.5    9.02  )-----------( 214      ( 11 Jun 2018 07:40 )             31.4         Mean Cell Volume : 83.1 fl  Mean Cell Hemoglobin : 25.1 pg  Mean Cell Hemoglobin Concentration : 30.3 gm/dL  Auto Neutrophil # : x  Auto Lymphocyte # : x  Auto Monocyte # : x  Auto Eosinophil # : x  Auto Basophil # : x  Auto Neutrophil % : x  Auto Lymphocyte % : x  Auto Monocyte % : x  Auto Eosinophil % : x  Auto Basophil % : x    Serial CBC's  06-11 @ 07:40  Hct-31.4 / Hgb-9.5 / Plat-214 / RBC-3.78 / WBC-9.02          Serial CBC's  06-09 @ 12:19  Hct-30.8 / Hgb-9.5 / Plat--- / RBC--- / WBC---          Serial CBC's  06-08 @ 06:37  Hct-29.7 / Hgb-8.8 / Plat-240 / RBC-3.69 / WBC-8.86          Serial CBC's  06-07 @ 20:20  Hct-30.0 / Hgb-9.0 / Plat--- / RBC--- / WBC---                        Vitamin B12, Serum: >2000 pg/mL (06-07-18 @ 08:03)  Folate, Serum: >20.0 ng/mL (06-07-18 @ 08:03)  Reticulocyte Percent: see note % (06-07-18 @ 06:54)  Iron - Total Binding Capacity.: 393 ug/dL (06-05-18 @ 22:52)  Ferritin, Serum: 46 ng/mL (06-05-18 @ 22:52)    Carcinoembryonic Antigen (06.08.18 @ 21:58)    Carcinoembryonic Antigen: 3.8: METHOD: Roche EIA   The CEA assay should not be used as a cancer screening test. Serum CEA  concentrations should only be used in conjunction with   information available from the clinical evaluation of the patien and  from other diagnostic procedures.   CEA Normal Ranges   _________________   Non-smoker: less than 3.9 ng/mL       Smoker: less than 5.5 ng/mL ng/mL    Carcinoembryonic Antigen (05.29.15 @ 20:12)    Carcinoembryonic Antigen: 2.8: METHOD: Roche EIA   The CEA assay should not be used as a cancer screening test. Serum CEA  concentrations should only be used in conjunction with   information available from the clinical evaluation of the patien and  from other diagnostic procedures.   CEA Normal Ranges   _________________   Non-smoker: less than 3.9 ng/mL       Smoker: less than 5.5 ng/mL ng/mL      Surgical Pathology Final Report -  (08.03.15 @ 09:22)    Surgical Pathology Final Report - :   ACCESSION No:  [id number]    RON ABREU                    1        Surgical Final Report          Specimen Description  1-Antral ulcer  2-Gastric body polyp    Clinical Information  88 year old female with gastric ulcer    Final Diagnosis  1. Diff-quick stain examined.  Ulcerated gastric antral mucosa with no intestinal metaplasia  showing active chronic gastritis, moderate to severe.  Special stain for Helicobacter pylori is negative.  Negative for malignancy.    2. Fundic gland polyp, acutely inflamed.  Special stain for Helicobacter pylori is negative.    Lavelle Ramirez M.D.  (Electronic Signature)  Reported on: 08/04/15    Surgical Pathology Report (10.30.15 @ 16:45)    Surgical Pathology Report:   ACCESSION No:  [id number]    RON ABREU A                    2        Surgical Final Report          Final Diagnosis  1. Gastric antral polyp, polypectomy:  - Inflammatory fibroid polyp, see comment    2. Gastric body, endoscopic biopsy:  -With features of atrophic gastritis  - Negative for H. pylori (Warthin-Starry stain)    3. Gastric fundus, endoscopic biopsy:  - With features of atrophic gastritis  - Negative for H. pylori (Warthin-Starry stain)    4. Gastroesophageal junction, endoscopic biopsy:  - Esophageal squamous and columnar mucosa with features of  reflux-type  esophagitis  - Focal intestinal metaplasia present, see note    Comment:  The antral polyp is composed of bland spindle cells admixed with  blood vessels and inflammatory cells rich in eosinophils.  Immunohistochemical stains are strongly positive for CD34, LCA;  while negative for , DOG1, AE1/AE3, neuroendocrine markers  (CD56, chromogranin, synaptophysin), calponin, and S100. SMA  highlights blood vessels.    The pattern of immunoreactivity and the histological features are  in support of the diagnosis, gastric inflammatory fibroid polyp.    Note:  The presence of intestinal metaplasia in mucosal biopsy obtained  from the “GE Junctional region” mayrepresent one of two  diagnostic possibilities, Smith metaplasia if supported by  corresponding endoscopic findings or chronic carditis with  intestinal metaplasia (CIM). These entities are usually difficult  on morphological grounds and the diagnosis is best resolved from  the clinical history and endoscopic findings.    This case was reviewed for  at the  intradepartmental GI/Liver pathology conference on November 3,  2015.    These immunohistochemical tests have been developed and their  performance characteristics determined by [facility], Department of Pathology, Division of  Immunopathology.  It has not been cleared or approved by the [place]  Food and Drug Administration.  The FDA has determined that such              RON ABREU                    2        Surgical Final Report          clearance or approval is not necessary.  This test is used for  clinical purposes.  The laboratory certified under the CLIA-88 as  qualified toperform high complexity clinical testing.    SHELDON BARRETT M.D.  (Electronic Signature)  Reported on: 11/05/15    Clinical History  anemia, gastritis rule out atrophy, carcinoid    Specimen(s) Submitted  1     Gastric antrum polyp  2     Gastric body biopsy  3        Gastric fundus biopsy  4     GE Junction biopsy    Surgical Pathology Final Report - :   ACCESSION No:  [id number]    RON ABREU                    2        Surgical Final Report          Final Diagnosis    1. Small bowel, duodenum, biopsy:  -  Non-neoplastic small bowel, duodenal mucosa with  preserved villous architecture, negative for inflammation.  The  changes of celiac disease/sprue are not present in this sample.    2.  Stomach, antrum, biopsy:  Diffquick stain examined.  - Non-neoplastic gastric, antral mucosa demonstrating mild  to moderate chronic inactive gastritis, negative for intestinal  metaplasia, negative for dysplasia.  Special stain for H. pylori  micro-organisms is negative.  - Changes of reactive gastropathy are also present.    3.  Stomach, body, biopsy:  Diffquick stain examined.  - Non-neoplastic gastric, oxyntic mucosa demonstrating  moderate active chronic gastritis with foci of intestinal  metaplasia, negative for dysplasia.  Special stain for H. pylori  micro-organisms is negative.    Chris Dalton M.D.  (Electronic Signature)  Reported on: 10/04/16    Clinical Information  89 year old female with anemia    Specimen Description  1-Duodenum  2-Antrum  3-Gastric body    Gross Description  1 - The specimen is received in formalin and the specimen  container is labeled: Duodenum. Itconsists of two fragments of  tan soft tissue, each measuring 0.3 cm in greatest dimension.  Entirely submitted. One cassette.    2 - The specimen is received in formalin and the specimen  container is labeled: Antrum. It consists of multiple fragments  of tan soft tissue measuring in aggregate 0.4 x 0.4 x 0.2 cm.  Entirely submitted. One cassette.    3 - The specimen is received in formalin and the specimen  container is labeled: Gastric body. It consists of a              BALLATO, RON A               2        Surgical Final Report          fragment of tan soft tissue measuring 0.3 cm in greatest  dimension. Entirely submitted. One cassette.    In addition to other data that may appear on the specimen  containers, all labels have been inspected to confirm the  presence of the patient's name and date of birth.    DN 10/04/16 07:05 (10.03.16 @ 10:58)      < from: CT Chest w/ IV Cont (06.11.18 @ 08:34) >    EXAM:  CT CHEST IC                            PROCEDURE DATE:  06/11/2018          INTERPRETATION:  CLINICAL INFORMATION:  Anemia and shortness of breath,   recently diagnosed mass on colonoscopy.    PROCEDURE:  Using multislice helical CT, following the intravenous   administration of 95 cc of Omnipaque 350, 2.5 mm sections were obtained   from the thoracic inlet through the lung bases.  Multiplanar reformatted images    COMPARISON: None available.    FINDINGS:      There is a small left-sidedpleural effusion and underlying compressive   atelectasis.  There is a trace right-sided pleural effusion.    There is bilateral groundglass mosaic attenuation, finding which may   reflect air trapping.  The central airways remain patent; no central endobronchial lesion is   noted.    Patient is status post sternotomy with aortic valve repair and left-sided   cardiac device.  There is aneurysmal dilatation of the ascending aorta measuring up to 4.2   cm. There is arteriosclerotic calcification ofthoracic aorta with   coronary artery calcifications.  No pericardial effusion is noted.    There are prevascular and right paratracheal mediastinal lymph nodes,   measuring up to 12 mm in short axis.  No enlarged hilar lymphadenopathy is noted.    There are multilevel degenerative changes of the thoracic spine.    Impression:    Mosaic attenuation which may reflect air trapping.    Small left-sided pleural effusion and trace right-sided pleural effusion.    Other findings as discussed.    < from: CT Abdomen and Pelvis w/ Oral Cont and w/ IV Cont (06.09.18 @ 12:30) >    EXAM:  CT ABDOMEN AND PELVIS OC IC                            PROCEDURE DATE:  06/09/2018          INTERPRETATION:  Comparison study dated 5/29/2015    Clinical information: Abdominal pain, diarrhea, previous appendectomy,   cholecystectomy.         Axial images obtained, coronal and sagittal images computer reformatted.   IV and oral contrast material administered. 95 cc of Omnipaque 350   intravenously administered, 5 cc discarded.    TAVR aortic valve prosthesis evident. Minimal left pleuraleffusion. No   right pleural effusion. Pacemaker wire and sternotomy sutures evident.    Heterogenicity posterior aspect right hepatic lobe, cannot exclude occult   space occupying lesions, metastatic disease. Similar findings visible in   the tip ofthe liver. The spleen is not enlarged. 12 mm splenule present.   Pancreatic parenchyma homogeneous. No adrenal lesions identified. No   hydronephrotic changes or renal masses visible. Normal caliber abdominal   aorta. No ascites or retroperitoneal lymphadenopathy. No biliary ductal   dilatation.        No bowel obstruction. Diverticulosis present, no evidence of   diverticulitis. Urinary bladder is filled, no stones evident. Inguinal   regions intact. Multilevel disc degenerative disease lumbar region. No   free pelvic fluid evident.    IMPRESSION:  1. Heterogeneous appearance of posterior aspect of right hepatic lobe and   the tip of the liver raises suspicion for the presence of occult space   occupying lesions, metastatic disease.  2. Diverticulosis  3. See above report                  IRINEO OG M.D.,ATTENDING RADIOLOGIST  This document has been electronically signed. Jun 9 2018 12:37PM                < end of copied text >                        ALEX DRISCOLL M.D., ATTENDING RADIOLOGIST  This document has been electronically signed. Jun 11 2018 11:11AM                < end of copied text >

## 2018-06-11 NOTE — PROGRESS NOTE ADULT - SUBJECTIVE AND OBJECTIVE BOX
Patient has no complaints.  She is tolerating diet without N/V or abdominal pain.    ROS  GI colon mass  Alll other ROS arenegative    PE  ABd soft NT ND

## 2018-06-11 NOTE — PROGRESS NOTE ADULT - SUBJECTIVE AND OBJECTIVE BOX
[facility] - Kam Craig, Anaid, Magali, Roselyn, Jaylene Gomez  Office Number:  [phone]    Patient resting comfortably in bed in NAD.  Laying flat with no respiratory distress.  No complaints of chest pain, dyspnea, palpitations, PND, or orthopnea.    ROS: negative unless otherwise mentioned.    Telemetry:  not on tele    MEDICATIONS  (STANDING):  aspirin enteric coated 81 milliGRAM(s) Oral daily  bisacodyl 5 milliGRAM(s) Oral at bedtime  calcium carbonate 1250 mG + Vitamin D (OsCal 500 + D) 1 Tablet(s) Oral daily  cholecalciferol 1000 Unit(s) Oral daily  cyanocobalamin 100 MICROGram(s) Oral daily  digoxin     Tablet 0.125 milliGRAM(s) Oral daily  diltiazem    milliGRAM(s) Oral daily  docusate sodium 100 milliGRAM(s) Oral three times a day  ferrous    sulfate 325 milliGRAM(s) Oral three times a day  folic acid 1 milliGRAM(s) Oral daily  levothyroxine 100 MICROGram(s) Oral daily  metoprolol tartrate 25 milliGRAM(s) Oral two times a day  multivitamin/minerals 1 Tablet(s) Oral daily  pantoprazole    Tablet 40 milliGRAM(s) Oral two times a day  polyethylene glycol 3350 17 Gram(s) Oral two times a day  simvastatin 20 milliGRAM(s) Oral at bedtime  sucralfate 1 Gram(s) Oral two times a day    MEDICATIONS  (PRN):      Allergies    amoxicillin (Unknown)    Intolerances        Vital Signs Last 24 Hrs  T(C): 36.6 (11 Jun 2018 04:56), Max: 37.1 (10 Carmelo 2018 15:05)  T(F): 97.8 (11 Jun 2018 04:56), Max: 98.8 (10 Carmelo 2018 15:05)  HR: 66 (11 Jun 2018 05:34) (52 - 66)  BP: 159/69 (11 Jun 2018 04:56) (117/76 - 159/69)  BP(mean): --  RR: 16 (11 Jun 2018 04:56) (16 - 17)  SpO2: 94% (11 Jun 2018 04:56) (94% - 99%)    I&O's Summary      ON EXAM:    Constitutional: NAD, awake and alert, Frail  HEENT: Moist Mucous Membranes, Anicteric  Pulmonary: Non-labored, breath sounds are clear bilaterally, No wheezing, rales or rhonchi  Cardiovascular: Irregular, S1 and S2, No murmurs, rubs, gallops or clicks  Gastrointestinal: Bowel Sounds present, soft, nontender.   Lymph: Scant lower extremity edema Rt >Lt. No lymphadenopathy.  Skin: No visible rashes or ulcers.  Psych:  Mood & affect appropriate  LABS: All Labs Reviewed:                        9.5    x     )-----------( x        ( 09 Jun 2018 12:19 )             30.8                 Blood Culture:

## 2018-06-11 NOTE — PROGRESS NOTE ADULT - PROBLEM SELECTOR PLAN 3
s/p egd, gi is following  f/u cytopathology  EUS as per gi/pt  gi/dvt prophylaxis  d/w pt at bedside at length.

## 2018-06-11 NOTE — PROGRESS NOTE ADULT - ASSESSMENT
89female was sent to ER by her cardiologist for a low hg level 7.1. Patient states for the past 2 weeks she has not been feeling well, generalized fatigue, shortness of breath with exertion, sleeping more.	In ER patient was found to be anemic, received prbc transfusion this admission.  Patient is being admitted for further work up and treatment , GI called for fobt positive stool,  anemia and for gi w/u, scheduled for gi w/u on 6/8 afternoon.  hematology was consulted for anemia, iron profile was suggestive of iron deficiency and patient received iv venofer----6/6/18---6/8/18  patient underwent egd/colonoscopy by gi on 6/8--per gi , hepatic flexure mass/gastric lesion, requested ct scan by gi/cr surgery, patient was seen by colorectal surgery dr paez and is for possible surgery

## 2018-06-11 NOTE — PROGRESS NOTE ADULT - SUBJECTIVE AND OBJECTIVE BOX
Patient is a 89y old  Female who presents with a chief complaint of weakness and lethargy, blood level low (05 Jun 2018 21:06)      INTERVAL HPI/OVERNIGHT EVENTS: pt stable, await sx on wed    MEDICATIONS  (STANDING):  aspirin enteric coated 81 milliGRAM(s) Oral daily  bisacodyl 5 milliGRAM(s) Oral at bedtime  calcium carbonate 1250 mG + Vitamin D (OsCal 500 + D) 1 Tablet(s) Oral daily  cholecalciferol 1000 Unit(s) Oral daily  cyanocobalamin 100 MICROGram(s) Oral daily  digoxin     Tablet 0.125 milliGRAM(s) Oral daily  diltiazem    milliGRAM(s) Oral daily  docusate sodium 100 milliGRAM(s) Oral three times a day  ferrous    sulfate 325 milliGRAM(s) Oral three times a day  folic acid 1 milliGRAM(s) Oral daily  levothyroxine 100 MICROGram(s) Oral daily  metoprolol tartrate 25 milliGRAM(s) Oral two times a day  multivitamin/minerals 1 Tablet(s) Oral daily  pantoprazole    Tablet 40 milliGRAM(s) Oral two times a day  polyethylene glycol 3350 17 Gram(s) Oral two times a day  simvastatin 20 milliGRAM(s) Oral at bedtime  sucralfate 1 Gram(s) Oral two times a day    MEDICATIONS  (PRN):      Allergies    amoxicillin (Unknown)    Intolerances        REVIEW OF SYSTEMS:  CONSTITUTIONAL: No fever, weight loss, or fatigue  EYES: No eye pain, visual disturbances  ENMT:  No difficulty hearing, tinnitus, vertigo; No sinus or throat pain  NECK: No pain or stiffness  RESPIRATORY: No cough, wheezing, chills or hemoptysis; No shortness of breath  CARDIOVASCULAR: No chest pain, palpitations, dizziness  GASTROINTESTINAL: No abdominal or epigastric pain. No nausea, vomiting, or hematemesis; No diarrhea or constipation. No melena or hematochezia.  GENITOURINARY: No dysuria, frequency, hematuria, or incontinence  NEUROLOGICAL: No headaches, memory loss, loss of strength, numbness, or tremors  SKIN: No itching, burning  LYMPH NODES: No enlarged glands  MUSCULOSKELETAL: No joint pain or swelling; No muscle, back, or extremity pain  PSYCHIATRIC: No depression, mood swings  HEME/LYMPH: No easy bruising, or bleeding gums  ALLERGY AND IMMUNOLOGIC: No hives    Vital Signs Last 24 Hrs  T(C): 37.1 (11 Jun 2018 11:57), Max: 37.1 (10 Carmelo 2018 15:05)  T(F): 98.7 (11 Jun 2018 11:57), Max: 98.8 (10 Carmelo 2018 15:05)  HR: 52 (11 Jun 2018 11:57) (52 - 67)  BP: 136/63 (11 Jun 2018 11:57) (117/76 - 163/71)  BP(mean): --  RR: 15 (11 Jun 2018 11:57) (15 - 17)  SpO2: 96% (11 Jun 2018 11:57) (93% - 99%)    PHYSICAL EXAM:  GENERAL: NAD, well-groomed, well-developed  HEAD:  Atraumatic, Normocephalic  EYES: EOMI, PERRLA, conjunctiva and sclera clear  ENMT: No tonsillar erythema, exudates, or enlargement   NECK: Supple, No JVD  NERVOUS SYSTEM:  Alert & Oriented X3, Good concentration  CHEST/LUNG: Clear to auscultation bilaterally; No rales, rhonchi, wheezing  HEART: Regular rate and rhythm  ABDOMEN: Soft, Nontender, Nondistended; Bowel sounds present  EXTREMITIES:  2+ Peripheral Pulses   LYMPH: No lymphadenopathy noted  SKIN: No rashes     LABS:                        9.5    9.02  )-----------( 214      ( 11 Jun 2018 07:40 )             31.4               CAPILLARY BLOOD GLUCOSE                RADIOLOGY & ADDITIONAL TESTS:  no new      Consultant(s) Notes Reviewed:  [x ] YES  [ ] NO    Care Discussed with Consultants/Other Providers [ x] YES  [ ] NO    Advanced care planning discussed with patient and family, advanced care planning forms reviewed, discussed, and completed.  20 minutes spent.

## 2018-06-11 NOTE — PROGRESS NOTE ADULT - PROBLEM SELECTOR PLAN 1
etiology multifactorial, guaiac pos stool, iron def, hx of gastritis and gastric ulcerated submucosal lesion on prior egds as per gi  s/p prbc on this admisssion 6/5/18  iron deficiency on iron profile, s/p iv iron 100 mg a day --6/6/18---6/8/2018, now on po iron  monitor serial h/h--transfuse prbc as needed for symptomatic anemia

## 2018-06-11 NOTE — PROGRESS NOTE ADULT - ASSESSMENT
89 year old woman with a history of CAD s/p remote CABG, more recent CHARY to the RCA and LCx, severe AS s/p TAVR, chronic AF with TBS s/p PPM, on Xarelto for stroke risk reduction, normal LV function, hypertension, hyperlipidemia who has been feeling weak and fatigued lately, along with having HERNADEZ, admitted with symptomatic anemia.  AF is rate controlled.    - No sign of acute ischemia or decompensated heart failure.  - Trend H/H, now stable  - Continue aspirin  - hold Xarelto for upcoming surgery   - Continue digoxin 125 QD.   - Continue diltiazem 240 QD and metoprolol 25 BID as long as blood pressure can tolerate  - Continue simvastatin   - Monitor and replete lytes keep K >4 and Mg >2  - PPM interrogated in 5/21/2018. Remaining life >5 years. No events noted.  - CT of abd/Pelv 6/9 - possible metastatic disease and diverticulosis  - s/p EGD: Dx hepatic flexure mass, colorectal surgery involved and discussed with patient findings and need for surgical resection next week.   - Currently no active cardiac conditions. No signs of ischemia, ADHF, clinical exam not consistent with severe stenotic valvular disease, no unstable arrhythmias noted.  From cardiology standpoint pt is okay to proceed with resection without any further cardiac workup. Routine hemodynamic monitoring is suggested during the procedure.   - Last echo in 4/2018 with mild MR, s/p TAVR without regurgitation, mild RVE and decreased RV function with moderately elevated pulmonary pressures, and normal LV function.  - will cont to follow

## 2018-06-11 NOTE — PROGRESS NOTE ADULT - ATTENDING COMMENTS
cardiac clearance in place  patient is as medically optimal as possible  discussed with mary cardiac clearance in place  cleared by medical stand point for sx  patient is as medically optimal as possible  discussed with sx

## 2018-06-12 ENCOUNTER — TRANSCRIPTION ENCOUNTER (OUTPATIENT)
Age: 83
End: 2018-06-12

## 2018-06-12 LAB — BLD GP AB SCN SERPL QL: SIGNIFICANT CHANGE UP

## 2018-06-12 PROCEDURE — 99232 SBSQ HOSP IP/OBS MODERATE 35: CPT

## 2018-06-12 RX ORDER — SODIUM CHLORIDE 9 MG/ML
1000 INJECTION INTRAMUSCULAR; INTRAVENOUS; SUBCUTANEOUS
Qty: 0 | Refills: 0 | Status: DISCONTINUED | OUTPATIENT
Start: 2018-06-12 | End: 2018-06-13

## 2018-06-12 RX ORDER — CEFOTETAN DISODIUM 1 G
2 VIAL (EA) INJECTION ONCE
Qty: 0 | Refills: 0 | Status: COMPLETED | OUTPATIENT
Start: 2018-06-13 | End: 2018-06-13

## 2018-06-12 RX ORDER — SOD SULF/SODIUM/NAHCO3/KCL/PEG
2000 SOLUTION, RECONSTITUTED, ORAL ORAL ONCE
Qty: 0 | Refills: 0 | Status: COMPLETED | OUTPATIENT
Start: 2018-06-12 | End: 2018-06-12

## 2018-06-12 RX ORDER — ALVIMOPAN 12 MG/1
12 CAPSULE ORAL ONCE
Qty: 0 | Refills: 0 | Status: COMPLETED | OUTPATIENT
Start: 2018-06-13 | End: 2018-06-13

## 2018-06-12 RX ORDER — NEOMYCIN SULFATE 500 MG/1
1000 TABLET ORAL
Qty: 0 | Refills: 0 | Status: COMPLETED | OUTPATIENT
Start: 2018-06-12 | End: 2018-06-12

## 2018-06-12 RX ORDER — METRONIDAZOLE 500 MG
500 TABLET ORAL
Qty: 0 | Refills: 0 | Status: COMPLETED | OUTPATIENT
Start: 2018-06-12 | End: 2018-06-12

## 2018-06-12 RX ADMIN — NEOMYCIN SULFATE 1000 MILLIGRAM(S): 500 TABLET ORAL at 14:51

## 2018-06-12 RX ADMIN — Medication 325 MILLIGRAM(S): at 05:17

## 2018-06-12 RX ADMIN — Medication 81 MILLIGRAM(S): at 11:06

## 2018-06-12 RX ADMIN — Medication 100 MILLIGRAM(S): at 05:17

## 2018-06-12 RX ADMIN — Medication 25 MILLIGRAM(S): at 05:19

## 2018-06-12 RX ADMIN — NEOMYCIN SULFATE 1000 MILLIGRAM(S): 500 TABLET ORAL at 23:10

## 2018-06-12 RX ADMIN — Medication 325 MILLIGRAM(S): at 21:58

## 2018-06-12 RX ADMIN — PREGABALIN 100 MICROGRAM(S): 225 CAPSULE ORAL at 11:06

## 2018-06-12 RX ADMIN — Medication 100 MICROGRAM(S): at 05:17

## 2018-06-12 RX ADMIN — Medication 240 MILLIGRAM(S): at 05:17

## 2018-06-12 RX ADMIN — Medication 1 MILLIGRAM(S): at 11:06

## 2018-06-12 RX ADMIN — Medication 1 GRAM(S): at 17:10

## 2018-06-12 RX ADMIN — Medication 325 MILLIGRAM(S): at 11:06

## 2018-06-12 RX ADMIN — Medication 0.12 MILLIGRAM(S): at 05:18

## 2018-06-12 RX ADMIN — SIMVASTATIN 20 MILLIGRAM(S): 20 TABLET, FILM COATED ORAL at 21:58

## 2018-06-12 RX ADMIN — NEOMYCIN SULFATE 1000 MILLIGRAM(S): 500 TABLET ORAL at 13:17

## 2018-06-12 RX ADMIN — Medication 500 MILLIGRAM(S): at 21:58

## 2018-06-12 RX ADMIN — Medication 25 MILLIGRAM(S): at 17:10

## 2018-06-12 RX ADMIN — Medication 100 MILLIGRAM(S): at 22:01

## 2018-06-12 RX ADMIN — PANTOPRAZOLE SODIUM 40 MILLIGRAM(S): 20 TABLET, DELAYED RELEASE ORAL at 05:19

## 2018-06-12 RX ADMIN — Medication 1000 UNIT(S): at 11:06

## 2018-06-12 RX ADMIN — Medication 500 MILLIGRAM(S): at 13:16

## 2018-06-12 RX ADMIN — Medication 1 TABLET(S): at 11:07

## 2018-06-12 RX ADMIN — Medication 1 TABLET(S): at 11:06

## 2018-06-12 RX ADMIN — Medication 2000 MILLILITER(S): at 13:24

## 2018-06-12 RX ADMIN — SODIUM CHLORIDE 100 MILLILITER(S): 9 INJECTION INTRAMUSCULAR; INTRAVENOUS; SUBCUTANEOUS at 23:31

## 2018-06-12 RX ADMIN — Medication 1 GRAM(S): at 05:17

## 2018-06-12 RX ADMIN — Medication 500 MILLIGRAM(S): at 14:50

## 2018-06-12 RX ADMIN — PANTOPRAZOLE SODIUM 40 MILLIGRAM(S): 20 TABLET, DELAYED RELEASE ORAL at 17:10

## 2018-06-12 NOTE — PROGRESS NOTE ADULT - SUBJECTIVE AND OBJECTIVE BOX
Patient is a 89y old  Female who presents with a chief complaint of weakness and lethargy, blood level low (05 Jun 2018 21:06)      INTERVAL /OVERNIGHT EVENTS: + diahrea, feels anxious about surgery    MEDICATIONS  (STANDING):  aspirin enteric coated 81 milliGRAM(s) Oral daily  bisacodyl 5 milliGRAM(s) Oral at bedtime  calcium carbonate 1250 mG + Vitamin D (OsCal 500 + D) 1 Tablet(s) Oral daily  cholecalciferol 1000 Unit(s) Oral daily  cyanocobalamin 100 MICROGram(s) Oral daily  digoxin     Tablet 0.125 milliGRAM(s) Oral daily  diltiazem    milliGRAM(s) Oral daily  docusate sodium 100 milliGRAM(s) Oral three times a day  ferrous    sulfate 325 milliGRAM(s) Oral three times a day  folic acid 1 milliGRAM(s) Oral daily  levothyroxine 100 MICROGram(s) Oral daily  metoprolol tartrate 25 milliGRAM(s) Oral two times a day  metroNIDAZOLE    Tablet 500 milliGRAM(s) Oral <User Schedule>  multivitamin/minerals 1 Tablet(s) Oral daily  neomycin 1000 milliGRAM(s) Oral <User Schedule>  pantoprazole    Tablet 40 milliGRAM(s) Oral two times a day  simvastatin 20 milliGRAM(s) Oral at bedtime  sodium chloride 0.9%. 1000 milliLiter(s) (100 mL/Hr) IV Continuous <Continuous>  sucralfate 1 Gram(s) Oral two times a day    MEDICATIONS  (PRN):      Allergies    amoxicillin (Unknown)    Intolerances        REVIEW OF SYSTEMS:  CONSTITUTIONAL: No fever, weight loss, or fatigue  EYES: No eye pain, visual disturbances, or discharge  ENMT:  No difficulty hearing, tinnitus, vertigo; No sinus or throat pain  NECK: No pain or stiffness  RESPIRATORY: No cough, wheezing, chills or hemoptysis; No shortness of breath  CARDIOVASCULAR: No chest pain, palpitations, dizziness, or leg swelling  GASTROINTESTINAL: No abdominal or epigastric pain. No nausea, vomiting, or hematemesis; No diarrhea or constipation. No melena or hematochezia.  GENITOURINARY: No dysuria, frequency, hematuria, or incontinence  NEUROLOGICAL: No headaches, memory loss, loss of strength, numbness, or tremors  SKIN: No itching, burning, rashes, or lesions   LYMPH NODES: No enlarged glands  ENDOCRINE: No heat or cold intolerance; No hair loss; No polydipsia or polyuria  MUSCULOSKELETAL: No joint pain or swelling; No muscle, back, or extremity pain  PSYCHIATRIC: No depression, anxiety, mood swings, or difficulty sleeping  HEME/LYMPH: No easy bruising, or bleeding gums  ALLERGY AND IMMUNOLOGIC: No hives or eczema    Vital Signs Last 24 Hrs  T(C): 36.7 (12 Jun 2018 15:32), Max: 37.1 (11 Jun 2018 19:26)  T(F): 98.1 (12 Jun 2018 15:32), Max: 98.8 (11 Jun 2018 19:26)  HR: 79 (12 Jun 2018 15:32) (57 - 82)  BP: 155/77 (12 Jun 2018 15:32) (137/77 - 159/71)  BP(mean): --  RR: 14 (12 Jun 2018 15:32) (14 - 15)  SpO2: 96% (12 Jun 2018 15:32) (96% - 99%)    PHYSICAL EXAM:  GENERAL: NAD, well-groomed, well-developed  HEAD:  Atraumatic, Normocephalic  EYES: EOMI, PERRLA, conjunctiva and sclera clear  ENMT: No tonsillar erythema, exudates, or enlargement; Moist mucous membranes, Good dentition, No lesions  NECK: Supple, No JVD, Normal thyroid  NERVOUS SYSTEM:  Alert & Oriented X3, Good concentration; Motor Strength 5/5 B/L upper and lower extremities; DTRs 2+ intact and symmetric  CHEST/LUNG: Clear to auscultation bilaterally; No rales, rhonchi, wheezing, or rubs  HEART: Regular rate and rhythm; No murmurs, rubs, or gallops  ABDOMEN: Soft, Nontender, Nondistended; Bowel sounds present  EXTREMITIES:  2+ Peripheral Pulses, No clubbing, cyanosis, or edema  LYMPH: No lymphadenopathy noted  SKIN: No rashes or lesions    LABS:              CAPILLARY BLOOD GLUCOSE          RADIOLOGY & ADDITIONAL TESTS:    Notes Reviewed:  [x ] YES  [ ] NO    Care Discussed with Consultants/Other Providers [x ] YES  [ ] NO

## 2018-06-12 NOTE — PROGRESS NOTE ADULT - SUBJECTIVE AND OBJECTIVE BOX
Patient is a 89y old  Female who presents with a chief complaint of weakness and lethargy, blood level low (05 Jun 2018 21:06)       Pt is seen and examined  pt is awake and lying in bed/out of bed to chair  pt seems comfortable and denies any complaints at this time    HPI:  · HPI Objective Statement: 89 female sent to ER by her cardiologist for a low hg level 7.1. Patient states for the past 2 weeks she has not been feeling well, generalized fatigue, shortness of breath with exertion, sleeping more.	  In ER patient was found to be anemic.  Patient is being admitted for further work up and treatment (05 Jun 2018 15:51)         ROS:  Negative except for:    MEDICATIONS  (STANDING):  aspirin enteric coated 81 milliGRAM(s) Oral daily  bisacodyl 5 milliGRAM(s) Oral at bedtime  calcium carbonate 1250 mG + Vitamin D (OsCal 500 + D) 1 Tablet(s) Oral daily  cholecalciferol 1000 Unit(s) Oral daily  cyanocobalamin 100 MICROGram(s) Oral daily  digoxin     Tablet 0.125 milliGRAM(s) Oral daily  diltiazem    milliGRAM(s) Oral daily  docusate sodium 100 milliGRAM(s) Oral three times a day  ferrous    sulfate 325 milliGRAM(s) Oral three times a day  folic acid 1 milliGRAM(s) Oral daily  levothyroxine 100 MICROGram(s) Oral daily  metoprolol tartrate 25 milliGRAM(s) Oral two times a day  multivitamin/minerals 1 Tablet(s) Oral daily  pantoprazole    Tablet 40 milliGRAM(s) Oral two times a day  polyethylene glycol 3350 17 Gram(s) Oral two times a day  simvastatin 20 milliGRAM(s) Oral at bedtime  sucralfate 1 Gram(s) Oral two times a day    MEDICATIONS  (PRN):      Allergies    amoxicillin (Unknown)    Intolerances        Vital Signs Last 24 Hrs  T(C): 36.9 (12 Jun 2018 08:09), Max: 37.1 (11 Jun 2018 11:57)  T(F): 98.5 (12 Jun 2018 08:09), Max: 98.8 (11 Jun 2018 19:26)  HR: 64 (12 Jun 2018 08:09) (52 - 82)  BP: 159/71 (12 Jun 2018 08:09) (136/63 - 159/71)  BP(mean): --  RR: 14 (12 Jun 2018 08:09) (14 - 15)  SpO2: 99% (12 Jun 2018 08:09) (96% - 99%)    PHYSICAL EXAM  General: adult in NAD  HEENT: clear oropharynx, anicteric sclera, pink conjunctiva  Neck: supple  CV: normal S1/S2 with no murmur rubs or gallops  Lungs: positive air movement b/l ant lungs,clear to auscultation, no wheezes, no rales  Abdomen: soft non-tender non-distended, no hepatosplenomegaly  Ext: no clubbing cyanosis or edema  Skin: no rashes and no petechiae  Neuro: alert and oriented X 4, no focal deficits  LABS:                          9.5    9.02  )-----------( 214      ( 11 Jun 2018 07:40 )             31.4         Mean Cell Volume : 83.1 fl  Mean Cell Hemoglobin : 25.1 pg  Mean Cell Hemoglobin Concentration : 30.3 gm/dL  Auto Neutrophil # : x  Auto Lymphocyte # : x  Auto Monocyte # : x  Auto Eosinophil # : x  Auto Basophil # : x  Auto Neutrophil % : x  Auto Lymphocyte % : x  Auto Monocyte % : x  Auto Eosinophil % : x  Auto Basophil % : x    Serial CBC's  06-11 @ 07:40  Hct-31.4 / Hgb-9.5 / Plat-214 / RBC-3.78 / WBC-9.02          Serial CBC's  06-09 @ 12:19  Hct-30.8 / Hgb-9.5 / Plat--- / RBC--- / WBC---                        Vitamin B12, Serum: >2000 pg/mL (06-07-18 @ 08:03)  Folate, Serum: >20.0 ng/mL (06-07-18 @ 08:03)  Reticulocyte Percent: see note % (06-07-18 @ 06:54)  Iron - Total Binding Capacity.: 393 ug/dL (06-05-18 @ 22:52)  Ferritin, Serum: 46 ng/mL (06-05-18 @ 22:52)                BLOOD SMEAR INTERPRETATION:       RADIOLOGY & ADDITIONAL STUDIES: Patient is a 89y old  Female who presents with a chief complaint of weakness and lethargy, blood level low (05 Jun 2018 21:06)       Pt is seen and examined, pt is awake and is out of bed to chair  pt seems comfortable and denies any complaints at this time    HPI:  · HPI Objective Statement: 89 female sent to ER by her cardiologist for a low hg level 7.1. Patient states for the past 2 weeks she has not been feeling well, generalized fatigue, shortness of breath with exertion, sleeping more.	  In ER patient was found to be anemic.  Patient is being admitted for further work up and treatment (05 Jun 2018 15:51)         MEDICATIONS  (STANDING):  aspirin enteric coated 81 milliGRAM(s) Oral daily  bisacodyl 5 milliGRAM(s) Oral at bedtime  calcium carbonate 1250 mG + Vitamin D (OsCal 500 + D) 1 Tablet(s) Oral daily  cholecalciferol 1000 Unit(s) Oral daily  cyanocobalamin 100 MICROGram(s) Oral daily  digoxin     Tablet 0.125 milliGRAM(s) Oral daily  diltiazem    milliGRAM(s) Oral daily  docusate sodium 100 milliGRAM(s) Oral three times a day  ferrous    sulfate 325 milliGRAM(s) Oral three times a day  folic acid 1 milliGRAM(s) Oral daily  levothyroxine 100 MICROGram(s) Oral daily  metoprolol tartrate 25 milliGRAM(s) Oral two times a day  multivitamin/minerals 1 Tablet(s) Oral daily  pantoprazole    Tablet 40 milliGRAM(s) Oral two times a day  polyethylene glycol 3350 17 Gram(s) Oral two times a day  simvastatin 20 milliGRAM(s) Oral at bedtime  sucralfate 1 Gram(s) Oral two times a day    MEDICATIONS  (PRN):      Allergies    amoxicillin (Unknown)    Intolerances        Vital Signs Last 24 Hrs  T(C): 36.9 (12 Jun 2018 08:09), Max: 37.1 (11 Jun 2018 11:57)  T(F): 98.5 (12 Jun 2018 08:09), Max: 98.8 (11 Jun 2018 19:26)  HR: 64 (12 Jun 2018 08:09) (52 - 82)  BP: 159/71 (12 Jun 2018 08:09) (136/63 - 159/71)  BP(mean): --  RR: 14 (12 Jun 2018 08:09) (14 - 15)  SpO2: 99% (12 Jun 2018 08:09) (96% - 99%)    PHYSICAL EXAM  General: adult in NAD  HEENT: clear oropharynx, anicteric sclera, pink conjunctiva  Neck: supple  CV: normal S1/S2 with no murmur rubs or gallops  Lungs: positive air movement b/l ant lungs,clear to auscultation, no wheezes, no rales  Abdomen: soft non-tender non-distended, no hepatosplenomegaly  Ext: no clubbing cyanosis or edema  Skin: no rashes and no petechiae  Neuro: alert and oriented X 4, no focal deficits  LABS:                          9.5    9.02  )-----------( 214      ( 11 Jun 2018 07:40 )             31.4         Mean Cell Volume : 83.1 fl  Mean Cell Hemoglobin : 25.1 pg  Mean Cell Hemoglobin Concentration : 30.3 gm/dL  Auto Neutrophil # : x  Auto Lymphocyte # : x  Auto Monocyte # : x  Auto Eosinophil # : x  Auto Basophil # : x  Auto Neutrophil % : x  Auto Lymphocyte % : x  Auto Monocyte % : x  Auto Eosinophil % : x  Auto Basophil % : x    Serial CBC's  06-11 @ 07:40  Hct-31.4 / Hgb-9.5 / Plat-214 / RBC-3.78 / WBC-9.02          Serial CBC's  06-09 @ 12:19  Hct-30.8 / Hgb-9.5 / Plat--- / RBC--- / WBC---                        Vitamin B12, Serum: >2000 pg/mL (06-07-18 @ 08:03)  Folate, Serum: >20.0 ng/mL (06-07-18 @ 08:03)  Reticulocyte Percent: see note % (06-07-18 @ 06:54)  Iron - Total Binding Capacity.: 393 ug/dL (06-05-18 @ 22:52)  Ferritin, Serum: 46 ng/mL (06-05-18 @ 22:52)

## 2018-06-12 NOTE — PROGRESS NOTE ADULT - PROBLEM SELECTOR PLAN 3
etiology multifactorial, guaiac pos stool, iron def, hx of gastritis and gastric ulcerated submucosal lesion on prior egds as per gi, had egd/colon--colon mass  s/p prbc on this admisssion 6/5/18  iron deficiency on iron profile, s/p iv iron 100 mg a day --6/6/18---6/8/2018, now on po iron  monitor serial h/h--transfuse prbc as needed for symptomatic anemia.  hb today 9.5  gi/dvt prophylaxis  d/w patient at bedside at length

## 2018-06-12 NOTE — PROGRESS NOTE ADULT - ASSESSMENT
89 year old woman with a history of CAD s/p remote CABG, more recent CHARY to the RCA and LCx, severe AS s/p TAVR, chronic AF with TBS s/p PPM, on Xarelto for stroke risk reduction, normal LV function, hypertension, hyperlipidemia who has been feeling weak and fatigued lately, along with having HERNADEZ, admitted with symptomatic anemia.  AF is rate controlled.    - No sign of acute ischemia or decompensated heart failure.  - Trend H/H, now stable 9.5/31.4  - Continue aspirin  - Continue to hold Xarelto for upcoming surgery open jenny-colectomy   - Continue digoxin 125 QD.   - Continue diltiazem 240 QD and metoprolol 25 BID   - Continue simvastatin   - Monitor and replete lytes keep K >4 and Mg >2  - PPM interrogated in 5/21/2018. Remaining life >5 years. No events noted.  - CT of abd/Pelv 6/9 - possible metastatic disease and diverticulosis  - s/p EGD: Dx hepatic flexure mass, colorectal surgery involved and discussed with patient findings and need for surgical resection next week.   - Currently no active cardiac conditions. No signs of ischemia, ADHF, clinical exam not consistent with severe stenotic valvular disease, no unstable arrhythmias noted.  From cardiology standpoint pt is okay to proceed with resection without any further cardiac workup. Routine hemodynamic monitoring is suggested during the procedure.   - will cont to follow 89 year old woman with a history of CAD s/p remote CABG, more recent CHARY to the RCA and LCx, severe AS s/p TAVR, chronic AF with TBS s/p PPM, on Xarelto for stroke risk reduction, normal LV function, hypertension, hyperlipidemia who has been feeling weak and fatigued lately, along with having HERNADEZ, admitted with symptomatic anemia.  AF is rate controlled.    - No sign of acute ischemia or decompensated heart failure.  - Trend H/H, now stable  - Continue aspirin  - Continue to hold Xarelto for upcoming surgery as she is going for open jenny-colectomy   - Continue digoxin 125 QD.   - Continue diltiazem 240 QD and metoprolol 25 BID.  HR is well controlled on these medications.  - Continue simvastatin   - Monitor and replete lytes keep K >4 and Mg >2  - PPM interrogated in 5/21/2018. Remaining life >5 years. No events noted.  - CT of abd/Pelv 6/9 - possible metastatic disease and diverticulosis  - s/p EGD and colonoscopy: Dx hepatic flexure mass, colorectal surgery involved and discussed with patient findings and need for surgical resection tomorrow  - Currently no active cardiac conditions. No signs of ischemia, ADHF, clinical exam not consistent with severe stenotic valvular disease, no unstable arrhythmias noted.  From cardiology standpoint pt is okay to proceed with resection without any further cardiac workup. Routine hemodynamic monitoring is suggested during the procedure.   - will cont to follow

## 2018-06-12 NOTE — PROGRESS NOTE ADULT - SUBJECTIVE AND OBJECTIVE BOX
Patient has no complaints. Patient is tolearting prep thus far.      ROS  Gi no nausea or vomitng  All other ROS are negative    PE  Abdomen soft NT ND

## 2018-06-12 NOTE — PROGRESS NOTE ADULT - PROBLEM SELECTOR PLAN 1
s/p colonoscopy with hepatic flexure mass --f/u cytopathology  anemia likely multifactorial, blood loss anemia from colon mass , gi/colorectal surgery are following  ct scan---hepatic abnormality--cannot exclude mets disease, nl cea at 3.8  for possible surgery per cr surgery, can have a look at liver/liver bx if undergoes surgery to r/o mets disease--per surgery-i called dr paez office and message left with secreatary for to call back and my cell number is left with dr paez

## 2018-06-12 NOTE — PROGRESS NOTE ADULT - SUBJECTIVE AND OBJECTIVE BOX
HPI:  · HPI Objective Statement: 89 female sent to ER by her cardiologist for a low hg level 7.1. Patient states for the past 2 weeks she has not been feeling well, generalized fatigue, shortness of breath with exertion, sleeping more.	  In ER patient was found to be anemic.  Patient is being admitted for further work up and treatment (2018 15:51)      SUBJECTIVE: Pt. seen, examined and evaluated Pt. resting comfortably in bed in NAD, lying flat with no respiratory distress, no c/o chest pain, dyspnea, palpitations, PND, or orthopnea   Patient is a 89y old  Female who presents with a chief complaint of weakness and lethargy, blood level low (2018 21:06)          OBJECTIVE:  Review Of Systems:  Constitutional: [ ] Fever [ ] Chills [ ] Fatigue [ ] Weight change   HEENT: [ ] Blurred vision [ ] Eye Pain [ ] Headache [ ] Runny nose [ ] Sore Throat   Respiratory: [ ] Cough [ ] Wheezing [ ] Shortness of breath  Cardiovascular: [ ] Chest Pain [ ] Palpitations [ ] HERNADEZ [ ] PND [ ] Orthopnea  Gastrointestinal: [ ] Abdominal Pain [ ] Diarrhea [ ] Constipation [ ] Hemorrhoids [ ] Nausea [ ] Vomiting  Genitourinary: [ ] Nocturia [ ] Dysuria [ ] Incontinence  Extremities: [ ] Swelling [ ] Joint Pain  Neurologic: [ ] Focal deficit [ ] Paresthesias [ ] Syncope  Lymphatic: [ ] Swelling [ ] Lymphadenopathy   Skin: [ ] Rash [ ] Ecchymoses [ ] Wounds [ ] Lesions  Psychiatry: [ ] Depression [ ] Suicidal/Homicidal Ideation [ ] Anxiety [ ] Sleep Disturbances  [ ] 10 point review of systems is otherwise negative except as mentioned above              [ ]Unable to obtain    Allergy:  Allergies    amoxicillin (Unknown)    Intolerances        Medications:  MEDICATIONS  (STANDING):  aspirin enteric coated 81 milliGRAM(s) Oral daily  bisacodyl 5 milliGRAM(s) Oral at bedtime  calcium carbonate 1250 mG + Vitamin D (OsCal 500 + D) 1 Tablet(s) Oral daily  cholecalciferol 1000 Unit(s) Oral daily  cyanocobalamin 100 MICROGram(s) Oral daily  digoxin     Tablet 0.125 milliGRAM(s) Oral daily  diltiazem    milliGRAM(s) Oral daily  docusate sodium 100 milliGRAM(s) Oral three times a day  ferrous    sulfate 325 milliGRAM(s) Oral three times a day  folic acid 1 milliGRAM(s) Oral daily  levothyroxine 100 MICROGram(s) Oral daily  metoprolol tartrate 25 milliGRAM(s) Oral two times a day  multivitamin/minerals 1 Tablet(s) Oral daily  pantoprazole    Tablet 40 milliGRAM(s) Oral two times a day  polyethylene glycol 3350 17 Gram(s) Oral two times a day  simvastatin 20 milliGRAM(s) Oral at bedtime  sucralfate 1 Gram(s) Oral two times a day    MEDICATIONS  (PRN):      PMH/PSH/FH/SH: [ ] Unchanged    Vitals:  T(C): 36.9 (18 @ 05:12), Max: 37.1 (18 @ 11:57)  HR: 82 (18 @ 05:12) (52 - 82)  BP: 137/77 (18 @ 05:12) (136/63 - 163/71)  BP(mean): --  RR: 15 (18 @ 05:12) (15 - 17)  SpO2: 99% (18 @ 05:12) (93% - 99%)  Wt(kg): --  Daily     Daily Weight in k.5 (2018 05:12)  I&O's Summary    2018 07:01  -  2018 06:52  --------------------------------------------------------  IN: 720 mL / OUT: 240 mL / NET: 480 mL        Labs:                        9.5    9.02  )-----------( 214      ( 2018 07:40 )             31.4           ECG:    Echo:  < from: Transthoracic Echocardiogram (18 @ 12:57) >  Conclusions:  1. Mitral annular calcification. Thickened mitral leaflets.  Mild mitral regurgitation.  2. Transcatheter aortic valve replacement. Peak transaortic  valve gradient equals 9 mm Hg, mean transaortic valve  gradient equals 4 mm Hg, which is probably normal in the  presence of a transcatheter aortic valve replacement. No  aortic valve regurgitation seen.  3. Normal left ventricular systolic function. Septal motion  consistent with paced rhythm/conduction defect.  4. Mild right ventricular enlargement with mildly decreased  right ventricular systolic function. A device wire is noted  in the right heart.  5. Estimated pulmonary artery systolic pressure equals 57  mm Hg, assuming right atrial pressure equals 8 mm Hg,  consistent with moderate pulmonary pressures.  *** Compared with echocardiogram of 2/15/2018, results are  similar on today's study.      Stress Testing:     Cath:    Imaging:  < from: CT Abdomen and Pelvis w/ Oral Cont and w/ IV Cont (18 @ 12:30) >  Axial images obtained, coronal and sagittal images computer reformatted.   IV and oral contrast material administered. 95 cc of Omnipaque 350   intravenously administered, 5 cc discarded.    TAVR aortic valve prosthesis evident. Minimal left pleuraleffusion. No   right pleural effusion. Pacemaker wire and sternotomy sutures evident.    Heterogenicity posterior aspect right hepatic lobe, cannot exclude occult   space occupying lesions, metastatic disease. Similar findings visible in   the tip ofthe liver. The spleen is not enlarged. 12 mm splenule present.   Pancreatic parenchyma homogeneous. No adrenal lesions identified. No   hydronephrotic changes or renal masses visible. Normal caliber abdominal   aorta. No ascites or retroperitoneal lymphadenopathy. No biliary ductal   dilatation.        No bowel obstruction. Diverticulosis present, no evidence of   diverticulitis. Urinary bladder is filled, no stones evident. Inguinal   regions intact. Multilevel disc degenerative disease lumbar region. No   free pelvic fluid evident.    IMPRESSION:  1. Heterogeneous appearance of posterior aspect of right hepatic lobe and   the tip of the liver raises suspicion for the presence of occult space   occupying lesions, metastatic disease.  2. Diverticulosis  3. See above report          Interpretation of Telemetry:      Physical Exam:  Appearance: [ ] Normal  [ ] abnormal [ ] NAD   Eyes: [ ] PERRL [ ] EOMI  HEENT: [ ] Normal [ ] Abnormal oral mucosa [ ]NC/AT  Cardiovascular: [ ] S1 [ ] S2 [ ] RRR [ ] m/r/g [ ]edema [ ] JVP  Procedural Access Site: [ ]  hematoma [ ] tender to palpation [ ] 2+ pulse [ ] bruit [ ] Ecchymosis  Respiratory: [ ] Clear to auscultation bilaterally  Gastrointestinal: [ ] Soft [ ] tenderness[ ] distension [ ] BS  Musculoskeletal: [ ] clubbing [ ] joint deformity   Neurologic: [ ] Non-focal  Lymphatic: [ ] lymphadenopathy  Psychiatry: [ ] AAOx3  [ ] confused [ ] disoriented [ ] Mood & affect appropriate  Skin: [ ]  rashes [ ] ecchymoses [ ] cyanosis HPI:  · HPI Objective Statement: 89 female sent to ER by her cardiologist for a low hg level 7.1. Patient states for the past 2 weeks she has not been feeling well, generalized fatigue, shortness of breath with exertion, sleeping more.	  In ER patient was found to be anemic.  Patient is being admitted for further work up and treatment (2018 15:51)      SUBJECTIVE: Pt. seen, examined and evaluated Pt. resting comfortably in chair in NAD,with no respiratory distress, no c/o chest pain, dyspnea, palpitations, PND, or orthopnea   Patient is a 89y old  Female who presents with a chief complaint of weakness and lethargy, blood level low (2018 21:06)    OBJECTIVE:  Review Of Systems:  Constitutional: [ ] Fever [ ] Chills [ ] Fatigue [ ] Weight change   HEENT: [ ] Blurred vision [ ] Eye Pain [ ] Headache [ ] Runny nose [ ] Sore Throat   Respiratory: [ ] Cough [ ] Wheezing [ ] Shortness of breath  Cardiovascular: [ ] Chest Pain [ ] Palpitations [ ] HERNADEZ [ ] PND [ ] Orthopnea  Gastrointestinal: [ ] Abdominal Pain [ ] Diarrhea [ ] Constipation [ ] Hemorrhoids [ ] Nausea [ ] Vomiting  Genitourinary: [ ] Nocturia [ ] Dysuria [ ] Incontinence  Extremities: [ ] Swelling [ ] Joint Pain  Neurologic: [ ] Focal deficit [ ] Paresthesias [ ] Syncope  Lymphatic: [ ] Swelling [ ] Lymphadenopathy   Skin: [ ] Rash [ ] Ecchymoses [ ] Wounds [ ] Lesions  Psychiatry: [ ] Depression [ ] Suicidal/Homicidal Ideation [ ] Anxiety [ ] Sleep Disturbances  [X ] 10 point review of systems is otherwise negative except as mentioned above              [ ]Unable to obtain    Allergy: amoxicillin (Unknown)    Medications:  MEDICATIONS  (STANDING):  aspirin enteric coated 81 milliGRAM(s) Oral daily  bisacodyl 5 milliGRAM(s) Oral at bedtime  calcium carbonate 1250 mG + Vitamin D (OsCal 500 + D) 1 Tablet(s) Oral daily  cholecalciferol 1000 Unit(s) Oral daily  cyanocobalamin 100 MICROGram(s) Oral daily  digoxin     Tablet 0.125 milliGRAM(s) Oral daily  diltiazem    milliGRAM(s) Oral daily  docusate sodium 100 milliGRAM(s) Oral three times a day  ferrous    sulfate 325 milliGRAM(s) Oral three times a day  folic acid 1 milliGRAM(s) Oral daily  levothyroxine 100 MICROGram(s) Oral daily  metoprolol tartrate 25 milliGRAM(s) Oral two times a day  multivitamin/minerals 1 Tablet(s) Oral daily  pantoprazole    Tablet 40 milliGRAM(s) Oral two times a day  polyethylene glycol 3350 17 Gram(s) Oral two times a day  simvastatin 20 milliGRAM(s) Oral at bedtime  sucralfate 1 Gram(s) Oral two times a day    MEDICATIONS  (PRN):      PMH/PSH/FH/SH: [ ] Unchanged    Vitals:  T(C): 36.9 (18 @ 05:12), Max: 37.1 (18 @ 11:57)  HR: 82 (18 @ 05:12) (52 - 82)  BP: 137/77 (18 @ 05:12) (136/63 - 163/71)  BP(mean): --  RR: 15 (18 @ 05:12) (15 - 17)  SpO2: 99% (18 @ 05:12) (93% - 99%)  Wt(kg): --  Daily     Daily Weight in k.5 (2018 05:12)  I&O's Summary    2018 07:01  -  2018 06:52  --------------------------------------------------------  IN: 720 mL / OUT: 240 mL / NET: 480 mL        Labs:                                   9.5    9.02  )-----------( 214      ( 2018 07:40 )             31.4         ECG:    Echo:  < from: Transthoracic Echocardiogram (18 @ 12:57) >  Conclusions:  1. Mitral annular calcification. Thickened mitral leaflets.  Mild mitral regurgitation.  2. Transcatheter aortic valve replacement. Peak transaortic  valve gradient equals 9 mm Hg, mean transaortic valve  gradient equals 4 mm Hg, which is probably normal in the  presence of a transcatheter aortic valve replacement. No  aortic valve regurgitation seen.  3. Normal left ventricular systolic function. Septal motion  consistent with paced rhythm/conduction defect.  4. Mild right ventricular enlargement with mildly decreased  right ventricular systolic function. A device wire is noted  in the right heart.  5. Estimated pulmonary artery systolic pressure equals 57  mm Hg, assuming right atrial pressure equals 8 mm Hg,  consistent with moderate pulmonary pressures.  *** Compared with echocardiogram of 2/15/2018, results are  similar on today's study.      Stress Testing:     Cath:    Imaging:  < from: CT Abdomen and Pelvis w/ Oral Cont and w/ IV Cont (18 @ 12:30) >  Axial images obtained, coronal and sagittal images computer reformatted.   IV and oral contrast material administered. 95 cc of Omnipaque 350   intravenously administered, 5 cc discarded.    TAVR aortic valve prosthesis evident. Minimal left pleuraleffusion. No   right pleural effusion. Pacemaker wire and sternotomy sutures evident.    Heterogenicity posterior aspect right hepatic lobe, cannot exclude occult   space occupying lesions, metastatic disease. Similar findings visible in   the tip ofthe liver. The spleen is not enlarged. 12 mm splenule present.   Pancreatic parenchyma homogeneous. No adrenal lesions identified. No   hydronephrotic changes or renal masses visible. Normal caliber abdominal   aorta. No ascites or retroperitoneal lymphadenopathy. No biliary ductal   dilatation.        No bowel obstruction. Diverticulosis present, no evidence of   diverticulitis. Urinary bladder is filled, no stones evident. Inguinal   regions intact. Multilevel disc degenerative disease lumbar region. No   free pelvic fluid evident.    IMPRESSION:  1. Heterogeneous appearance of posterior aspect of right hepatic lobe and   the tip of the liver raises suspicion for the presence of occult space   occupying lesions, metastatic disease.  2. Diverticulosis  3. See above report          Interpretation of Telemetry:      Physical Exam:  Appearance: [ ] Normal  [ ] abnormal [ ] NAD   Eyes: [ ] PERRL [ ] EOMI  HEENT: [ ] Normal [ ] Abnormal oral mucosa [ ]NC/AT  Cardiovascular: [ ] S1 [ ] S2 [ ] RRR [ ] m/r/g [ ]edema [ ] JVP  Procedural Access Site: [ ]  hematoma [ ] tender to palpation [ ] 2+ pulse [ ] bruit [ ] Ecchymosis  Respiratory: [ ] Clear to auscultation bilaterally  Gastrointestinal: [ ] Soft [ ] tenderness[ ] distension [ ] BS  Musculoskeletal: [ ] clubbing [ ] joint deformity   Neurologic: [ ] Non-focal  Lymphatic: [ ] lymphadenopathy  Psychiatry: [ ] AAOx3  [ ] confused [ ] disoriented [ ] Mood & affect appropriate  Skin: [ ]  rashes [ ] ecchymoses [ ] cyanosis HPI:  · HPI Objective Statement: 89 female sent to ER by her cardiologist for a low hg level 7.1. Patient states for the past 2 weeks she has not been feeling well, generalized fatigue, shortness of breath with exertion, sleeping more.	  In ER patient was found to be anemic.  Patient is being admitted for further work up and treatment (2018 15:51)      SUBJECTIVE: Pt. seen, examined and evaluated Pt. resting comfortably in chair in NAD,with no respiratory distress, no c/o chest pain, dyspnea, palpitations, PND, or orthopnea   Patient is a 89y old  Female who presents with a chief complaint of weakness and lethargy, blood level low (2018 21:06)    OBJECTIVE:  Review Of Systems:  Constitutional: [ ] Fever [ ] Chills [ ] Fatigue [ ] Weight change   HEENT: [ ] Blurred vision [ ] Eye Pain [ ] Headache [ ] Runny nose [ ] Sore Throat   Respiratory: [ ] Cough [ ] Wheezing [ ] Shortness of breath  Cardiovascular: [ ] Chest Pain [ ] Palpitations [ ] HERNADEZ [ ] PND [ ] Orthopnea  Gastrointestinal: [ ] Abdominal Pain [ ] Diarrhea [ ] Constipation [ ] Hemorrhoids [ ] Nausea [ ] Vomiting  Genitourinary: [ ] Nocturia [ ] Dysuria [ ] Incontinence  Extremities: [ ] Swelling [ ] Joint Pain  Neurologic: [ ] Focal deficit [ ] Paresthesias [ ] Syncope  Lymphatic: [ ] Swelling [ ] Lymphadenopathy   Skin: [ ] Rash [ ] Ecchymoses [ ] Wounds [ ] Lesions  Psychiatry: [ ] Depression [ ] Suicidal/Homicidal Ideation [ ] Anxiety [ ] Sleep Disturbances  [X ] 10 point review of systems is otherwise negative except as mentioned above              [ ]Unable to obtain    Allergy: amoxicillin (Unknown)    Medications:  MEDICATIONS  (STANDING):  aspirin enteric coated 81 milliGRAM(s) Oral daily  bisacodyl 5 milliGRAM(s) Oral at bedtime  calcium carbonate 1250 mG + Vitamin D (OsCal 500 + D) 1 Tablet(s) Oral daily  cholecalciferol 1000 Unit(s) Oral daily  cyanocobalamin 100 MICROGram(s) Oral daily  digoxin     Tablet 0.125 milliGRAM(s) Oral daily  diltiazem    milliGRAM(s) Oral daily  docusate sodium 100 milliGRAM(s) Oral three times a day  ferrous    sulfate 325 milliGRAM(s) Oral three times a day  folic acid 1 milliGRAM(s) Oral daily  levothyroxine 100 MICROGram(s) Oral daily  metoprolol tartrate 25 milliGRAM(s) Oral two times a day  multivitamin/minerals 1 Tablet(s) Oral daily  pantoprazole    Tablet 40 milliGRAM(s) Oral two times a day  polyethylene glycol 3350 17 Gram(s) Oral two times a day  simvastatin 20 milliGRAM(s) Oral at bedtime  sucralfate 1 Gram(s) Oral two times a day    MEDICATIONS  (PRN):      PMH/PSH/FH/SH: [ ] Unchanged    Vitals:  T(C): 36.9 (18 @ 05:12), Max: 37.1 (18 @ 11:57)  HR: 82 (18 @ 05:12) (52 - 82)  BP: 137/77 (18 @ 05:12) (136/63 - 163/71)  BP(mean): --  RR: 15 (18 @ 05:12) (15 - 17)  SpO2: 99% (18 @ 05:12) (93% - 99%)  Wt(kg): --  Daily     Daily Weight in k.5 (2018 05:12)  I&O's Summary    2018 07:01  -  2018 06:52  --------------------------------------------------------  IN: 720 mL / OUT: 240 mL / NET: 480 mL        Labs:                                   9.5    9.02  )-----------( 214      ( 2018 07:40 )             31.4         ECG:    Echo:  < from: Transthoracic Echocardiogram (18 @ 12:57) >  Conclusions:  1. Mitral annular calcification. Thickened mitral leaflets.  Mild mitral regurgitation.  2. Transcatheter aortic valve replacement. Peak transaortic  valve gradient equals 9 mm Hg, mean transaortic valve  gradient equals 4 mm Hg, which is probably normal in the  presence of a transcatheter aortic valve replacement. No  aortic valve regurgitation seen.  3. Normal left ventricular systolic function. Septal motion  consistent with paced rhythm/conduction defect.  4. Mild right ventricular enlargement with mildly decreased  right ventricular systolic function. A device wire is noted  in the right heart.  5. Estimated pulmonary artery systolic pressure equals 57  mm Hg, assuming right atrial pressure equals 8 mm Hg,  consistent with moderate pulmonary pressures.  *** Compared with echocardiogram of 2/15/2018, results are  similar on today's study.      Stress Testing:     Cath:    Imaging:  < from: CT Abdomen and Pelvis w/ Oral Cont and w/ IV Cont (18 @ 12:30) >  Axial images obtained, coronal and sagittal images computer reformatted.   IV and oral contrast material administered. 95 cc of Omnipaque 350   intravenously administered, 5 cc discarded.    TAVR aortic valve prosthesis evident. Minimal left pleuraleffusion. No   right pleural effusion. Pacemaker wire and sternotomy sutures evident.    Heterogenicity posterior aspect right hepatic lobe, cannot exclude occult   space occupying lesions, metastatic disease. Similar findings visible in   the tip ofthe liver. The spleen is not enlarged. 12 mm splenule present.   Pancreatic parenchyma homogeneous. No adrenal lesions identified. No   hydronephrotic changes or renal masses visible. Normal caliber abdominal   aorta. No ascites or retroperitoneal lymphadenopathy. No biliary ductal   dilatation.        No bowel obstruction. Diverticulosis present, no evidence of   diverticulitis. Urinary bladder is filled, no stones evident. Inguinal   regions intact. Multilevel disc degenerative disease lumbar region. No   free pelvic fluid evident.    IMPRESSION:  1. Heterogeneous appearance of posterior aspect of right hepatic lobe and   the tip of the liver raises suspicion for the presence of occult space   occupying lesions, metastatic disease.  2. Diverticulosis  3. See above report          Interpretation of Telemetry: Pt. is not on telemetry      Physical Exam:  Appearance: [ ] Normal  [ ] abnormal [X ] NAD   Eyes: [ ] PERRL [ ] EOMI  HEENT: [ ] Normal [ ] Abnormal oral mucosa [ ]NC/AT  Cardiovascular: [X ] S1 [X ] S2 [ ] RRR [ ] m/r/g [ ]edema [ ] JVP  Procedural Access Site: [ ]  hematoma [ ] tender to palpation [ ] 2+ pulse [ ] bruit [ ] Ecchymosis  Respiratory: [X ] Clear to auscultation bilaterally  Gastrointestinal: [X ] Soft [ ] tenderness[ ] distension [X ] BS  Musculoskeletal: [ ] clubbing [ ] joint deformity   Neurologic: [ ] Non-focal  Lymphatic: [ ] lymphadenopathy  Psychiatry: [X ] AAOx3  [ ] confused [ ] disoriented [ ] Mood & affect appropriate  Skin: [ ]  rashes [ ] ecchymoses [ ] cyanosis

## 2018-06-12 NOTE — PROGRESS NOTE ADULT - PROBLEM SELECTOR PLAN 1
no new labs  s/p colonoscopy with hepatic flexure mass   anemia likely 2/2 mass  trend h/h, transfuse prn  hold anticoagulation  f/u crs recs, possible surgery, started on bowel prep, diet as per surgery  heme following

## 2018-06-12 NOTE — PROGRESS NOTE ADULT - SUBJECTIVE AND OBJECTIVE BOX
INTERVAL HPI/OVERNIGHT EVENTS:  pt seen and examined  pt denies n/v/d/abd pain/melena/brbpr  +bm yesterday  per rn no s/s overt gib overnight  no new labs to assess afebrile overnight    MEDICATIONS  (STANDING):  aspirin enteric coated 81 milliGRAM(s) Oral daily  bisacodyl 5 milliGRAM(s) Oral at bedtime  calcium carbonate 1250 mG + Vitamin D (OsCal 500 + D) 1 Tablet(s) Oral daily  cholecalciferol 1000 Unit(s) Oral daily  cyanocobalamin 100 MICROGram(s) Oral daily  digoxin     Tablet 0.125 milliGRAM(s) Oral daily  diltiazem    milliGRAM(s) Oral daily  docusate sodium 100 milliGRAM(s) Oral three times a day  ferrous    sulfate 325 milliGRAM(s) Oral three times a day  folic acid 1 milliGRAM(s) Oral daily  levothyroxine 100 MICROGram(s) Oral daily  metoprolol tartrate 25 milliGRAM(s) Oral two times a day  multivitamin/minerals 1 Tablet(s) Oral daily  pantoprazole    Tablet 40 milliGRAM(s) Oral two times a day  polyethylene glycol 3350 17 Gram(s) Oral two times a day  simvastatin 20 milliGRAM(s) Oral at bedtime  sucralfate 1 Gram(s) Oral two times a day    MEDICATIONS  (PRN):      Allergies    amoxicillin (Unknown)    Intolerances        Review of Systems:    General:  No wt loss, fevers, chills, night sweats, fatigue   Eyes:  Good vision, no reported pain  ENT:  No sore throat, pain, runny nose, dysphagia  CV:  No pain, palpitations, hypo/hypertension  Resp:  No dyspnea, cough, tachypnea, wheezing  GI:  No pain, No nausea, No vomiting, No diarrhea, No constipation, No weight loss, No fever, No pruritis, No rectal bleeding, No melena, No dysphagia  :  No pain, bleeding, incontinence, nocturia  Muscle:  No pain, weakness  Neuro:  No weakness, tingling, memory problems  Psych:  No fatigue, insomnia, mood problems, depression  Endocrine:  No polyuria, polydypsia, cold/heat intolerance  Heme:  No petechiae, ecchymosis, easy bruisability  Skin:  No rash, tattoos, scars, edema      Vital Signs Last 24 Hrs  T(C): 36.9 (12 Jun 2018 08:09), Max: 37.1 (11 Jun 2018 11:57)  T(F): 98.5 (12 Jun 2018 08:09), Max: 98.8 (11 Jun 2018 19:26)  HR: 64 (12 Jun 2018 08:09) (52 - 82)  BP: 159/71 (12 Jun 2018 08:09) (136/63 - 159/71)  BP(mean): --  RR: 14 (12 Jun 2018 08:09) (14 - 15)  SpO2: 99% (12 Jun 2018 08:09) (96% - 99%)    PHYSICAL EXAM:    General:  oob in chair in n ad  HEENT:  NC/AT,  conjunctivae clear and pink, anicteric  Chest:  cta  CV: S1S2 irreg  Abdomen:  Soft, non-tender, mild dt +bs  Extremities:  mild edema  Skin:  No rash  Neuro/Psych:  A&Ox3    LABS:                        9.5    9.02  )-----------( 214      ( 11 Jun 2018 07:40 )             31.4                 RADIOLOGY & ADDITIONAL TESTS:

## 2018-06-12 NOTE — CHART NOTE - NSCHARTNOTEFT_GEN_A_CORE
Assessment/Follow up: Pt tolerating clear liquid diet well x 2 days; 100% per documentation. No report N/V/D. Moderate soft BM 6/10. No overt s/s GI bleed. Noted colon mass with mets; hepatic flexure mass. Plan for hemicolectomy 6/13.      Factors impacting intake: [ ] none [ ] nausea  [ ] vomiting [ ] diarrhea [ ] constipation  [ ]chewing problems [ ] swallowing issues  [x ] other: rectal bleeding on admission, colon mass for sx.     Diet Presciption: Diet, Clear Liquid (06-11-18 @ 06:52)      Current Weight: Weight (kg): 59.4 (06-05 @ 11:59); 137.7lbs (6/12). Giuseppe ankles 1+edema.    Pertinent Medications: MEDICATIONS  (STANDING):  aspirin enteric coated 81 milliGRAM(s) Oral daily  bisacodyl 5 milliGRAM(s) Oral at bedtime  calcium carbonate 1250 mG + Vitamin D (OsCal 500 + D) 1 Tablet(s) Oral daily  cholecalciferol 1000 Unit(s) Oral daily  cyanocobalamin 100 MICROGram(s) Oral daily  digoxin     Tablet 0.125 milliGRAM(s) Oral daily  diltiazem    milliGRAM(s) Oral daily  docusate sodium 100 milliGRAM(s) Oral three times a day  ferrous    sulfate 325 milliGRAM(s) Oral three times a day  folic acid 1 milliGRAM(s) Oral daily  levothyroxine 100 MICROGram(s) Oral daily  metoprolol tartrate 25 milliGRAM(s) Oral two times a day  multivitamin/minerals 1 Tablet(s) Oral daily  pantoprazole    Tablet 40 milliGRAM(s) Oral two times a day  polyethylene glycol 3350 17 Gram(s) Oral two times a day  simvastatin 20 milliGRAM(s) Oral at bedtime  sucralfate 1 Gram(s) Oral two times a day    MEDICATIONS  (PRN):    Pertinent Labs:  06-05 Alb 3.7 g/dL.      Skin: intact. Drake 19.     Estimated Needs:   [x ] no change since previous assessment  [ ] recalculated:     Previous Nutrition Diagnosis:   [x ] Altered nutrition related labs (Fe)    Nutrition Diagnosis is [x ] ongoing  [ ] resolved [ ] not applicable     New Nutrition Diagnosis: [x ] Altered GI function related to alteration in GI tract structure/function as evidenced by dx colon mass, plan for hemicolectomy.       Interventions:   Recommend  [x ] Change Diet To: Recommend clear liquid diet post sx & advance as tolerated to Low Na, low fiber.    [ ] Nutrition Supplement  [ ] Nutrition Support  [x ] Other: Vit C supplementation for increase Fe absorption.     Monitoring and Evaluation:   [x ] PO intake [ x ] Tolerance to diet prescription [ x ] weights [ x ] labs[ x ] follow up per protocol  [ ] other:

## 2018-06-12 NOTE — PROGRESS NOTE ADULT - ASSESSMENT
Dx colon mass  Reviewed R/B?a with patient.  She is aware and wishes to proceed.  Bowel prep today and surgery tomorrow.

## 2018-06-12 NOTE — PROGRESS NOTE ADULT - ATTENDING COMMENTS
I personally saw and examined the patient in detail.  I have spoken to the above provider regarding the assessment and plan of care.  I reviewed the above assessment and plan of care, and agree.  I have made changes in the body of the note where appropriate.  For OR tomorrow. Hold AC.  Will follow perioperatively.

## 2018-06-13 ENCOUNTER — RESULT REVIEW (OUTPATIENT)
Age: 83
End: 2018-06-13

## 2018-06-13 LAB
ALBUMIN SERPL ELPH-MCNC: 2.9 G/DL — LOW (ref 3.3–5)
ALP SERPL-CCNC: 75 U/L — SIGNIFICANT CHANGE UP (ref 40–120)
ALT FLD-CCNC: 14 U/L — SIGNIFICANT CHANGE UP (ref 12–78)
ANION GAP SERPL CALC-SCNC: 10 MMOL/L — SIGNIFICANT CHANGE UP (ref 5–17)
AST SERPL-CCNC: 34 U/L — SIGNIFICANT CHANGE UP (ref 15–37)
BILIRUB SERPL-MCNC: 0.5 MG/DL — SIGNIFICANT CHANGE UP (ref 0.2–1.2)
BUN SERPL-MCNC: 7 MG/DL — SIGNIFICANT CHANGE UP (ref 7–23)
CALCIUM SERPL-MCNC: 8.2 MG/DL — LOW (ref 8.5–10.1)
CHLORIDE SERPL-SCNC: 108 MMOL/L — SIGNIFICANT CHANGE UP (ref 96–108)
CO2 SERPL-SCNC: 26 MMOL/L — SIGNIFICANT CHANGE UP (ref 22–31)
CREAT SERPL-MCNC: 1.1 MG/DL — SIGNIFICANT CHANGE UP (ref 0.5–1.3)
GLUCOSE SERPL-MCNC: 157 MG/DL — HIGH (ref 70–99)
POTASSIUM SERPL-MCNC: 3.7 MMOL/L — SIGNIFICANT CHANGE UP (ref 3.5–5.3)
POTASSIUM SERPL-SCNC: 3.7 MMOL/L — SIGNIFICANT CHANGE UP (ref 3.5–5.3)
PROT SERPL-MCNC: 6.2 G/DL — SIGNIFICANT CHANGE UP (ref 6–8.3)
SODIUM SERPL-SCNC: 144 MMOL/L — SIGNIFICANT CHANGE UP (ref 135–145)

## 2018-06-13 PROCEDURE — 88305 TISSUE EXAM BY PATHOLOGIST: CPT | Mod: 26

## 2018-06-13 PROCEDURE — 99232 SBSQ HOSP IP/OBS MODERATE 35: CPT

## 2018-06-13 PROCEDURE — 88307 TISSUE EXAM BY PATHOLOGIST: CPT | Mod: 26

## 2018-06-13 RX ORDER — ONDANSETRON 8 MG/1
4 TABLET, FILM COATED ORAL EVERY 6 HOURS
Qty: 0 | Refills: 0 | Status: DISCONTINUED | OUTPATIENT
Start: 2018-06-13 | End: 2018-06-16

## 2018-06-13 RX ORDER — OXYCODONE AND ACETAMINOPHEN 5; 325 MG/1; MG/1
1 TABLET ORAL EVERY 4 HOURS
Qty: 0 | Refills: 0 | Status: DISCONTINUED | OUTPATIENT
Start: 2018-06-13 | End: 2018-06-15

## 2018-06-13 RX ORDER — ONDANSETRON 8 MG/1
4 TABLET, FILM COATED ORAL ONCE
Qty: 0 | Refills: 0 | Status: COMPLETED | OUTPATIENT
Start: 2018-06-13 | End: 2018-06-13

## 2018-06-13 RX ORDER — CEFOTETAN DISODIUM 1 G
1 VIAL (EA) INJECTION EVERY 12 HOURS
Qty: 0 | Refills: 0 | Status: COMPLETED | OUTPATIENT
Start: 2018-06-13 | End: 2018-06-14

## 2018-06-13 RX ORDER — OXYCODONE HYDROCHLORIDE 5 MG/1
5 TABLET ORAL EVERY 4 HOURS
Qty: 0 | Refills: 0 | Status: DISCONTINUED | OUTPATIENT
Start: 2018-06-13 | End: 2018-06-13

## 2018-06-13 RX ORDER — HEPARIN SODIUM 5000 [USP'U]/ML
5000 INJECTION INTRAVENOUS; SUBCUTANEOUS ONCE
Qty: 0 | Refills: 0 | Status: COMPLETED | OUTPATIENT
Start: 2018-06-13 | End: 2018-06-13

## 2018-06-13 RX ORDER — ACETAMINOPHEN 500 MG
650 TABLET ORAL EVERY 6 HOURS
Qty: 0 | Refills: 0 | Status: DISCONTINUED | OUTPATIENT
Start: 2018-06-13 | End: 2018-06-18

## 2018-06-13 RX ORDER — HEPARIN SODIUM 5000 [USP'U]/ML
5000 INJECTION INTRAVENOUS; SUBCUTANEOUS EVERY 8 HOURS
Qty: 0 | Refills: 0 | Status: DISCONTINUED | OUTPATIENT
Start: 2018-06-13 | End: 2018-06-16

## 2018-06-13 RX ORDER — HYDROMORPHONE HYDROCHLORIDE 2 MG/ML
0.5 INJECTION INTRAMUSCULAR; INTRAVENOUS; SUBCUTANEOUS EVERY 4 HOURS
Qty: 0 | Refills: 0 | Status: DISCONTINUED | OUTPATIENT
Start: 2018-06-13 | End: 2018-06-16

## 2018-06-13 RX ORDER — ALVIMOPAN 12 MG/1
12 CAPSULE ORAL
Qty: 0 | Refills: 0 | Status: DISCONTINUED | OUTPATIENT
Start: 2018-06-13 | End: 2018-06-16

## 2018-06-13 RX ORDER — CIPROFLOXACIN LACTATE 400MG/40ML
400 VIAL (ML) INTRAVENOUS ONCE
Qty: 0 | Refills: 0 | Status: DISCONTINUED | OUTPATIENT
Start: 2018-06-13 | End: 2018-06-13

## 2018-06-13 RX ORDER — SODIUM CHLORIDE 9 MG/ML
1000 INJECTION, SOLUTION INTRAVENOUS
Qty: 0 | Refills: 0 | Status: DISCONTINUED | OUTPATIENT
Start: 2018-06-13 | End: 2018-06-16

## 2018-06-13 RX ORDER — MORPHINE SULFATE 50 MG/1
1 CAPSULE, EXTENDED RELEASE ORAL
Qty: 0 | Refills: 0 | Status: DISCONTINUED | OUTPATIENT
Start: 2018-06-13 | End: 2018-06-13

## 2018-06-13 RX ORDER — SODIUM CHLORIDE 9 MG/ML
1000 INJECTION, SOLUTION INTRAVENOUS
Qty: 0 | Refills: 0 | Status: DISCONTINUED | OUTPATIENT
Start: 2018-06-13 | End: 2018-06-13

## 2018-06-13 RX ORDER — METRONIDAZOLE 500 MG
500 TABLET ORAL ONCE
Qty: 0 | Refills: 0 | Status: DISCONTINUED | OUTPATIENT
Start: 2018-06-13 | End: 2018-06-13

## 2018-06-13 RX ORDER — PANTOPRAZOLE SODIUM 20 MG/1
40 TABLET, DELAYED RELEASE ORAL DAILY
Qty: 0 | Refills: 0 | Status: DISCONTINUED | OUTPATIENT
Start: 2018-06-13 | End: 2018-06-15

## 2018-06-13 RX ADMIN — Medication 0.12 MILLIGRAM(S): at 06:28

## 2018-06-13 RX ADMIN — ALVIMOPAN 12 MILLIGRAM(S): 12 CAPSULE ORAL at 14:39

## 2018-06-13 RX ADMIN — MORPHINE SULFATE 1 MILLIGRAM(S): 50 CAPSULE, EXTENDED RELEASE ORAL at 19:11

## 2018-06-13 RX ADMIN — Medication 1 GRAM(S): at 06:28

## 2018-06-13 RX ADMIN — PANTOPRAZOLE SODIUM 40 MILLIGRAM(S): 20 TABLET, DELAYED RELEASE ORAL at 06:29

## 2018-06-13 RX ADMIN — Medication 100 MICROGRAM(S): at 06:28

## 2018-06-13 RX ADMIN — MORPHINE SULFATE 1 MILLIGRAM(S): 50 CAPSULE, EXTENDED RELEASE ORAL at 19:37

## 2018-06-13 RX ADMIN — HYDROMORPHONE HYDROCHLORIDE 0.5 MILLIGRAM(S): 2 INJECTION INTRAMUSCULAR; INTRAVENOUS; SUBCUTANEOUS at 19:47

## 2018-06-13 RX ADMIN — HYDROMORPHONE HYDROCHLORIDE 0.5 MILLIGRAM(S): 2 INJECTION INTRAMUSCULAR; INTRAVENOUS; SUBCUTANEOUS at 19:37

## 2018-06-13 RX ADMIN — Medication 25 MILLIGRAM(S): at 06:28

## 2018-06-13 RX ADMIN — Medication 240 MILLIGRAM(S): at 06:28

## 2018-06-13 RX ADMIN — MORPHINE SULFATE 1 MILLIGRAM(S): 50 CAPSULE, EXTENDED RELEASE ORAL at 19:27

## 2018-06-13 RX ADMIN — ONDANSETRON 4 MILLIGRAM(S): 8 TABLET, FILM COATED ORAL at 18:53

## 2018-06-13 RX ADMIN — Medication 100 MILLIGRAM(S): at 06:28

## 2018-06-13 RX ADMIN — SODIUM CHLORIDE 75 MILLILITER(S): 9 INJECTION, SOLUTION INTRAVENOUS at 18:54

## 2018-06-13 RX ADMIN — Medication 325 MILLIGRAM(S): at 06:28

## 2018-06-13 NOTE — PROGRESS NOTE ADULT - SUBJECTIVE AND OBJECTIVE BOX
[facility]       HPI:  · HPI Objective Statement: 89 female sent to ER by her cardiologist for a low hg level 7.1. Patient states for the past 2 weeks she has not been feeling well, generalized fatigue, shortness of breath with exertion, sleeping more.	  In ER patient was found to be anemic.  Patient is being admitted for further work up and treatment (05 Jun 2018 15:51)        SUBJECTIVE:      ALLERGIES:  Allergies    amoxicillin (Unknown)    Intolerances          MEDICATIONS  (STANDING):  alvimopan 12 milliGRAM(s) Oral two times a day  cefoTEtan  IVPB 1 Gram(s) IV Intermittent every 12 hours  heparin  Injectable 5000 Unit(s) SubCutaneous every 8 hours  lactated ringers. 1000 milliLiter(s) (100 mL/Hr) IV Continuous <Continuous>  multivitamin 1 Tablet(s) Oral daily  pantoprazole  Injectable 40 milliGRAM(s) IV Push daily    MEDICATIONS  (PRN):  acetaminophen   Tablet. 650 milliGRAM(s) Oral every 6 hours PRN Mild Pain (1 - 3)  HYDROmorphone  Injectable 0.5 milliGRAM(s) IV Push every 4 hours PRN Severe Pain  ondansetron Injectable 4 milliGRAM(s) IV Push every 6 hours PRN Nausea and/or Vomiting  oxyCODONE    5 mG/acetaminophen 325 mG 1 Tablet(s) Oral every 4 hours PRN Moderate Pain      REVIEW OF SYSTEMS:  CONSTITUTIONAL: No fever,  RESPIRATORY: No cough, wheezing, shortness of breath  CARDIOVASCULAR: No chest pain, dyspnea, palpitations, dizziness, syncope, paroxysmal nocturnal dyspnea, orthopnea, or arm or leg swelling  GASTROINTESTINAL: No abdominal  or epigastric pain, nausea, vomiting,  diarrhea  NEUROLOGICAL: No headaches,  loss of strength, numbness, or tremors    Vital Signs Last 24 Hrs  T(C): 36.3 (13 Jun 2018 21:37), Max: 37.2 (13 Jun 2018 18:46)  T(F): 97.3 (13 Jun 2018 21:37), Max: 99 (13 Jun 2018 18:46)  HR: 72 (13 Jun 2018 21:37) (62 - 77)  BP: 136/85 (13 Jun 2018 21:37) (129/70 - 176/66)  BP(mean): --  RR: 15 (13 Jun 2018 21:37) (15 - 25)  SpO2: 100% (13 Jun 2018 21:37) (96% - 100%)    PHYSICAL EXAM:  HEAD:  Atraumatic, Normocephalic  NECK: Supple and normal thyroid.  No JVD or carotid bruit.   HEART: S1, S2 regular , 1/6 soft ejection systolic murmur in mitral area , no thrill and no gallops .  PULMONARY: Bilateral vesicular breathing , few scattered ronchi and few scattered rales are present .  ABDOMEN: Soft nontender and positive bowl sounds   EXTREMITIES:  No clubbing, cyanosis, or pedal  edema  NEUROLOGICAL: AAOX3 , no focal deficit .    LABS:    06-13    144  |  108  |  7   ----------------------------<  157<H>  3.7   |  26  |  1.10    Ca    8.2<L>      13 Jun 2018 19:58    TPro  6.2  /  Alb  2.9<L>  /  TBili  0.5  /  DBili  x   /  AST  34  /  ALT  14  /  AlkPhos  75  06-13            BNP      EKG:  ECHO:  IMAGING:    Assessment/Plan    Will continue to follow during hospital course and give further recommendations as needed . Thanks for your referral . if any questions please contact me at office ([phone] [phone]) COVERING FOR DR IRIZARRY  MEDICAL PROGRESS NOTE     HPI:  · HPI Objective Statement: 89 female sent to ER by her cardiologist for a low hg level 7.1. Patient states for the past 2 weeks she has not been feeling well, generalized fatigue, shortness of breath with exertion, sleeping more.	  In ER patient was found to be anemic.  Patient is being admitted for further work up and treatment (05 Jun 2018 15:51)        SUBJECTIVE: Patient post op and lying comfortable . no chest pain and SOB .      ALLERGIES:  Allergies    amoxicillin (Unknown)    Intolerances          MEDICATIONS  (STANDING):  alvimopan 12 milliGRAM(s) Oral two times a day  cefoTEtan  IVPB 1 Gram(s) IV Intermittent every 12 hours  heparin  Injectable 5000 Unit(s) SubCutaneous every 8 hours  lactated ringers. 1000 milliLiter(s) (100 mL/Hr) IV Continuous <Continuous>  multivitamin 1 Tablet(s) Oral daily  pantoprazole  Injectable 40 milliGRAM(s) IV Push daily    MEDICATIONS  (PRN):  acetaminophen   Tablet. 650 milliGRAM(s) Oral every 6 hours PRN Mild Pain (1 - 3)  HYDROmorphone  Injectable 0.5 milliGRAM(s) IV Push every 4 hours PRN Severe Pain  ondansetron Injectable 4 milliGRAM(s) IV Push every 6 hours PRN Nausea and/or Vomiting  oxyCODONE    5 mG/acetaminophen 325 mG 1 Tablet(s) Oral every 4 hours PRN Moderate Pain      REVIEW OF SYSTEMS:  CONSTITUTIONAL: No fever,  RESPIRATORY: No cough, wheezing, shortness of breath  CARDIOVASCULAR: No chest pain, dyspnea, palpitations, dizziness, syncope, paroxysmal nocturnal dyspnea, orthopnea, or arm or leg swelling  GASTROINTESTINAL: No abdominal  or epigastric pain, nausea, vomiting,  diarrhea  NEUROLOGICAL: No headaches,  loss of strength, numbness, or tremors    Vital Signs Last 24 Hrs  T(C): 36.3 (13 Jun 2018 21:37), Max: 37.2 (13 Jun 2018 18:46)  T(F): 97.3 (13 Jun 2018 21:37), Max: 99 (13 Jun 2018 18:46)  HR: 72 (13 Jun 2018 21:37) (62 - 77)  BP: 136/85 (13 Jun 2018 21:37) (129/70 - 176/66)  BP(mean): --  RR: 15 (13 Jun 2018 21:37) (15 - 25)  SpO2: 100% (13 Jun 2018 21:37) (96% - 100%)    PHYSICAL EXAM:  HEAD:  Atraumatic, Normocephalic  NECK: Supple and normal thyroid.  No JVD or carotid bruit.   HEART: S1, S2 regular , 1/6 soft ejection systolic murmur in mitral area , no thrill and no gallops .  PULMONARY: Bilateral vesicular breathing , few scattered ronchi and few scattered rales are present .  ABDOMEN: Soft nontender and positive bowl sounds   EXTREMITIES:  No clubbing, cyanosis, or pedal  edema  NEUROLOGICAL: AAOX3 , no focal deficit .    LABS:    06-13    144  |  108  |  7   ----------------------------<  157<H>  3.7   |  26  |  1.10    Ca    8.2<L>      13 Jun 2018 19:58    TPro  6.2  /  Alb  2.9<L>  /  TBili  0.5  /  DBili  x   /  AST  34  /  ALT  14  /  AlkPhos  75  06-13            Assessment/Plan  Patient has :  1) S/P Colon surgery .  2) Hypertension and stable .  3) Atrial fibrillation and stable .  4) H/O TAVR .  5) Anemia .  Plan : Post operative patient stable so far . 2) Mi nue I/V hydration and monitor electrolytes .   Will continue to follow during hospital course and give further recommendations as needed . Thanks for your referral . if any questions please contact me at office ([phone] [phone])

## 2018-06-13 NOTE — PROGRESS NOTE ADULT - ASSESSMENT
89 year old woman with a history of CAD s/p remote CABG, more recent CHARY to the RCA and LCx, severe AS s/p TAVR, chronic AF with TBS s/p PPM, on Xarelto for stroke risk reduction, normal LV function, hypertension, hyperlipidemia who has been feeling weak and fatigued lately, along with having HERNADEZ, admitted with symptomatic anemia.  AF is rate controlled.    -there is no evidence of acute ischemia.  -there is no evidence of significant arrhythmia.  -there is no evidence for meaningful  volume overload.  - Continue aspirin  - Xarelto on hold for planned surgery, resume ac postop when it is safe to do so   - Continue digoxin 125 QD.   - Continue diltiazem 240 QD  - continue metoprolol 25 BID.     - Continue simvastatin   - Monitor and replete lytes keep K >4 and Mg >2  - PPM interrogated in 5/21/2018. Remaining life >5 years. No events noted.  - CT of abd/Pelv 6/9 - possible metastatic disease and diverticulosis  - Currently no active cardiac conditions. No signs of ischemia, ADHF, clinical exam not consistent with severe stenotic valvular disease, no unstable arrhythmias noted.  From cardiology standpoint pt is okay to proceed with resection without any further cardiac workup. Routine hemodynamic monitoring is suggested during the procedure.   - will cont to follow

## 2018-06-13 NOTE — BRIEF OPERATIVE NOTE - PROCEDURE
<<-----Click on this checkbox to enter Procedure Right hemicolectomy with anastomosis of ileum to colon  06/13/2018    Active  CSANZ  Lysis of adhesions  06/13/2018  Small bowel laproscopic  Active  CSANZ

## 2018-06-13 NOTE — PROGRESS NOTE ADULT - PROBLEM SELECTOR PLAN 1
no new labs  s/p colonoscopy with hepatic flexure mass   anemia likely 2/2 mass  path showed TVA, no evidence of malignancy but possibility of underlying lesion not excluded  trend h/h, transfuse prn  hold anticoagulation  f/u crs recs, for OR today  heme following

## 2018-06-13 NOTE — BRIEF OPERATIVE NOTE - POST-OP DX
Iron deficiency anemia due to chronic blood loss  06/13/2018    Matias Alvares  Malignant neoplasm of ascending colon  06/13/2018    Matias Alvares

## 2018-06-13 NOTE — PROGRESS NOTE ADULT - SUBJECTIVE AND OBJECTIVE BOX
INTERVAL HPI/OVERNIGHT EVENTS:  pt seen and examined  denies n/v//c/abd pain/melena/brbpr  +bms s/p prep  for or today  per rn no overt s/s gib overnight  no new labs afebrile    MEDICATIONS  (STANDING):  alvimopan 12 milliGRAM(s) Oral once  aspirin enteric coated 81 milliGRAM(s) Oral daily  bisacodyl 5 milliGRAM(s) Oral at bedtime  calcium carbonate 1250 mG + Vitamin D (OsCal 500 + D) 1 Tablet(s) Oral daily  cefoTEtan  IVPB 2 Gram(s) IV Intermittent once  cholecalciferol 1000 Unit(s) Oral daily  cyanocobalamin 100 MICROGram(s) Oral daily  digoxin     Tablet 0.125 milliGRAM(s) Oral daily  diltiazem    milliGRAM(s) Oral daily  docusate sodium 100 milliGRAM(s) Oral three times a day  ferrous    sulfate 325 milliGRAM(s) Oral three times a day  folic acid 1 milliGRAM(s) Oral daily  levothyroxine 100 MICROGram(s) Oral daily  metoprolol tartrate 25 milliGRAM(s) Oral two times a day  multivitamin/minerals 1 Tablet(s) Oral daily  pantoprazole    Tablet 40 milliGRAM(s) Oral two times a day  simvastatin 20 milliGRAM(s) Oral at bedtime  sodium chloride 0.9%. 1000 milliLiter(s) (100 mL/Hr) IV Continuous <Continuous>  sucralfate 1 Gram(s) Oral two times a day    MEDICATIONS  (PRN):      Allergies    amoxicillin (Unknown)    Intolerances        Review of Systems:    General:  No wt loss, fevers, chills, night sweats, fatigue   Eyes:  Good vision, no reported pain  ENT:  No sore throat, pain, runny nose, dysphagia  CV:  No pain, palpitations, hypo/hypertension  Resp:  No dyspnea, cough, tachypnea, wheezing  GI:  No pain, No nausea, No vomiting, No diarrhea, No constipation, No weight loss, No fever, No pruritis, No rectal bleeding, No melena, No dysphagia  :  No pain, bleeding, incontinence, nocturia  Muscle:  No pain, weakness  Neuro:  No weakness, tingling, memory problems  Psych:  No fatigue, insomnia, mood problems, depression  Endocrine:  No polyuria, polydypsia, cold/heat intolerance  Heme:  No petechiae, ecchymosis, easy bruisability  Skin:  No rash, tattoos, scars, edema      Vital Signs Last 24 Hrs  T(C): 36.8 (13 Jun 2018 09:00), Max: 36.8 (12 Jun 2018 11:19)  T(F): 98.2 (13 Jun 2018 09:00), Max: 98.2 (12 Jun 2018 11:19)  HR: 62 (13 Jun 2018 09:00) (54 - 79)  BP: 165/74 (13 Jun 2018 09:00) (129/70 - 165/74)  BP(mean): --  RR: 15 (13 Jun 2018 09:00) (14 - 16)  SpO2: 97% (13 Jun 2018 09:00) (96% - 99%)    PHYSICAL EXAM:  General:  lying in bed in nad  HEENT:  NC/AT,  conjunctivae clear and pink, anicteric  Chest:  cta  CV: S1S2 irreg  Abdomen:  Soft, non-tender, mild dt +bs  Extremities:  no edema  Skin:  No rash  Neuro/Psych:  A&Ox3    LABS:                RADIOLOGY & ADDITIONAL TESTS:  Surgical Pathology Final Report -  (06.08.18 @ 08:06)    Surgical Pathology Final Report - :   ACCESSION No:  30 [id number]    RON ABREU A                    2        Surgical Final Report          Final Diagnosis    1. Gastric antrum, biopsy:  Diffquick stain examined.  Gastric antral mucosa showing minimal active and mild chronic  inflammation with changes of reactive gastropathy.  Special stain for Helicobacter pylori organisms is negative.    2. Gastric nodule, biopsy, exact site not specified:  Diffquick stain examined.  Gastric mucosa showing minimal active and mild chronic  inflammation with changes of reactive gastropathy.  Special stain for Helicobacter pylori organisms is negative.    3. Hepatic flexure mass, biopsy:  Villotubular adenoma, fragments of.  See note.    Note: The specimen is superficial and fragmented.  No evidence of  malignancy is seen in this sample.  Possibility of  a more significant underlying lesion cannot be ruled out.  Recommend clinical pathological correlation.    Hellen Osborne MD  (Electronic Signature)  Reported on: 06/12/18    Comment  Case findings were discussed with Dr. Emerson on 6/12/18 at  12:10 PM.  The    Clinical Information  Colonic mass/gastric nodule  Procedure: EGD with biopsies, colonoscopy with biopsy and  bleeding gastritis    Specimen Description  1-Gastric antral biopsy, rule out H. pylori  2-Gastric nodule, rule out carcinoma  3-Colonic mass, hepatic flexure, rule out carcinoma,    Gross Description  1.   The specimen is received in formalin and the specimen  container is labeled: Gastric antrum biopsy.  It consists of a  single fragment of tan soft tissue measuring 0.5 cm in greatest  dimension.  Special stains are requested.  Entirely submitted.  One cassette.                RON ABREU A                    2        Surgical Final Report          2.   The specimen is received in formalin and the specimen  container is labeled: Gastric nodule.  It consists of a single  fragment of tan soft tissue measuring 0.2 cm in greatest  dimension.  Special stains are requested.  Entirely submitted.  Onecassette.    3.   The specimen is received in formalin and the specimen  container is labeled: hepatic flexure mass.  It consists of three  fragments of tan lobulated soft tissue ranging from 0.2-0.3 cm in  greatest dimension.  Entirely submitted.  One cassette.    In addition to other data that may appear on the specimen  containers, all labels have been inspected to confirm the  presence of the patient's name and date of birth.  MUN06/11/18 09:52

## 2018-06-13 NOTE — BRIEF OPERATIVE NOTE - PRE-OP DX
Iron deficiency anemia due to chronic blood loss  06/13/2018    Matias Alvares  Malignant neoplasm of hepatic flexure  06/13/2018    Matias Alvares

## 2018-06-13 NOTE — PROGRESS NOTE ADULT - SUBJECTIVE AND OBJECTIVE BOX
[facility]    Kam Craig, Anaid, Magali, Roselyn, Jason, Jaylene      [phone]    CHIEF COMPLAINT: Patient is a 89y old  Female who presents with a chief complaint of weakness and lethargy, blood level low (05 Jun 2018 21:06)      Follow Up: af, cad, tavr, colon mass, planned for rsxn    Interim history: The patient reports no new symptoms.  Denies chest discomfort and shortness of breath.  No abdominal pain.  No new neurologic symptoms.      MEDICATIONS  (STANDING):  alvimopan 12 milliGRAM(s) Oral once  aspirin enteric coated 81 milliGRAM(s) Oral daily  bisacodyl 5 milliGRAM(s) Oral at bedtime  calcium carbonate 1250 mG + Vitamin D (OsCal 500 + D) 1 Tablet(s) Oral daily  cefoTEtan  IVPB 2 Gram(s) IV Intermittent once  cholecalciferol 1000 Unit(s) Oral daily  cyanocobalamin 100 MICROGram(s) Oral daily  digoxin     Tablet 0.125 milliGRAM(s) Oral daily  diltiazem    milliGRAM(s) Oral daily  docusate sodium 100 milliGRAM(s) Oral three times a day  ferrous    sulfate 325 milliGRAM(s) Oral three times a day  folic acid 1 milliGRAM(s) Oral daily  levothyroxine 100 MICROGram(s) Oral daily  metoprolol tartrate 25 milliGRAM(s) Oral two times a day  multivitamin/minerals 1 Tablet(s) Oral daily  pantoprazole    Tablet 40 milliGRAM(s) Oral two times a day  simvastatin 20 milliGRAM(s) Oral at bedtime  sodium chloride 0.9%. 1000 milliLiter(s) (100 mL/Hr) IV Continuous <Continuous>  sucralfate 1 Gram(s) Oral two times a day    MEDICATIONS  (PRN):      REVIEW OF SYSTEMS:  eye, ent, GI, , allergic, dermatologic, musculoskeletal and neurologic are negative except as described above    Vital Signs Last 24 Hrs  T(C): 36.7 (13 Jun 2018 04:27), Max: 36.8 (12 Jun 2018 11:19)  T(F): 98.1 (13 Jun 2018 04:27), Max: 98.2 (12 Jun 2018 11:19)  HR: 64 (13 Jun 2018 04:27) (54 - 79)  BP: 129/70 (13 Jun 2018 04:27) (129/70 - 155/77)  BP(mean): --  RR: 16 (13 Jun 2018 04:27) (14 - 16)  SpO2: 96% (13 Jun 2018 04:27) (96% - 99%)    I&O's Summary    12 Jun 2018 07:01  -  13 Jun 2018 07:00  --------------------------------------------------------  IN: 1150 mL / OUT: 0 mL / NET: 1150 mL        Telemetry past 24h: off    PHYSICAL EXAM:    Constitutional: well-nourished, well-developed, NAD   HEENT:  MMM, sclerae anicteric, conjunctivae clear, no oral cyanosis.  Pulmonary: Non-labored, breath sounds are clear bilaterally, No wheezing, rales or rhonchi  Cardiovascular: Regular, S1 and S2.  1/6 sys murmur.  No rubs, gallops or clicks  Gastrointestinal: Bowel Sounds present, soft, nontender.   Lymph: No peripheral edema.   Neurological: Alert, no focal deficits  Skin: No rashes.  Psych:  Mood & affect appropriate    LABS: All Labs Reviewed:                        9.5    9.02  )-----------( 214      ( 11 Jun 2018 07:40 )             31.4                 Blood Culture:         RADIOLOGY:    EKG:    Echo:

## 2018-06-14 LAB
ALBUMIN SERPL ELPH-MCNC: 2.5 G/DL — LOW (ref 3.3–5)
ALP SERPL-CCNC: 63 U/L — SIGNIFICANT CHANGE UP (ref 40–120)
ALT FLD-CCNC: 14 U/L — SIGNIFICANT CHANGE UP (ref 12–78)
ANION GAP SERPL CALC-SCNC: 10 MMOL/L — SIGNIFICANT CHANGE UP (ref 5–17)
AST SERPL-CCNC: 19 U/L — SIGNIFICANT CHANGE UP (ref 15–37)
BASOPHILS # BLD AUTO: 0 K/UL — SIGNIFICANT CHANGE UP (ref 0–0.2)
BASOPHILS NFR BLD AUTO: 0 % — SIGNIFICANT CHANGE UP (ref 0–2)
BILIRUB SERPL-MCNC: 0.4 MG/DL — SIGNIFICANT CHANGE UP (ref 0.2–1.2)
BUN SERPL-MCNC: 8 MG/DL — SIGNIFICANT CHANGE UP (ref 7–23)
CALCIUM SERPL-MCNC: 8.3 MG/DL — LOW (ref 8.5–10.1)
CHLORIDE SERPL-SCNC: 110 MMOL/L — HIGH (ref 96–108)
CO2 SERPL-SCNC: 27 MMOL/L — SIGNIFICANT CHANGE UP (ref 22–31)
CREAT SERPL-MCNC: 1.1 MG/DL — SIGNIFICANT CHANGE UP (ref 0.5–1.3)
EOSINOPHIL # BLD AUTO: 0 K/UL — SIGNIFICANT CHANGE UP (ref 0–0.5)
EOSINOPHIL NFR BLD AUTO: 0 % — SIGNIFICANT CHANGE UP (ref 0–6)
GLUCOSE SERPL-MCNC: 117 MG/DL — HIGH (ref 70–99)
HCT VFR BLD CALC: 29 % — LOW (ref 34.5–45)
HGB BLD-MCNC: 8.7 G/DL — LOW (ref 11.5–15.5)
HYPOCHROMIA BLD QL: SLIGHT — SIGNIFICANT CHANGE UP
LYMPHOCYTES # BLD AUTO: 0.43 K/UL — LOW (ref 1–3.3)
LYMPHOCYTES # BLD AUTO: 4 % — LOW (ref 13–44)
MACROCYTES BLD QL: SLIGHT — SIGNIFICANT CHANGE UP
MAGNESIUM SERPL-MCNC: 1.9 MG/DL — SIGNIFICANT CHANGE UP (ref 1.6–2.6)
MANUAL SMEAR VERIFICATION: SIGNIFICANT CHANGE UP
MCHC RBC-ENTMCNC: 25.1 PG — LOW (ref 27–34)
MCHC RBC-ENTMCNC: 30 GM/DL — LOW (ref 32–36)
MCV RBC AUTO: 83.6 FL — SIGNIFICANT CHANGE UP (ref 80–100)
MONOCYTES # BLD AUTO: 0.43 K/UL — SIGNIFICANT CHANGE UP (ref 0–0.9)
MONOCYTES NFR BLD AUTO: 4 % — SIGNIFICANT CHANGE UP (ref 2–14)
NEUTROPHILS # BLD AUTO: 9.78 K/UL — HIGH (ref 1.8–7.4)
NEUTROPHILS NFR BLD AUTO: 84 % — HIGH (ref 43–77)
NEUTS BAND # BLD: 7 % — SIGNIFICANT CHANGE UP (ref 0–8)
NRBC # BLD: 0 — SIGNIFICANT CHANGE UP
NRBC # BLD: SIGNIFICANT CHANGE UP /100 WBCS (ref 0–0)
PHOSPHATE SERPL-MCNC: 4.3 MG/DL — SIGNIFICANT CHANGE UP (ref 2.5–4.5)
PLAT MORPH BLD: NORMAL — SIGNIFICANT CHANGE UP
PLATELET # BLD AUTO: 209 K/UL — SIGNIFICANT CHANGE UP (ref 150–400)
POTASSIUM SERPL-MCNC: 3.9 MMOL/L — SIGNIFICANT CHANGE UP (ref 3.5–5.3)
POTASSIUM SERPL-SCNC: 3.9 MMOL/L — SIGNIFICANT CHANGE UP (ref 3.5–5.3)
PROT SERPL-MCNC: 5.5 G/DL — LOW (ref 6–8.3)
RBC # BLD: 3.47 M/UL — LOW (ref 3.8–5.2)
RBC # FLD: 22.9 % — HIGH (ref 10.3–14.5)
RBC BLD AUTO: SIGNIFICANT CHANGE UP
SODIUM SERPL-SCNC: 147 MMOL/L — HIGH (ref 135–145)
VARIANT LYMPHS # BLD: 1 % — SIGNIFICANT CHANGE UP (ref 0–6)
WBC # BLD: 10.75 K/UL — HIGH (ref 3.8–10.5)
WBC # FLD AUTO: 10.75 K/UL — HIGH (ref 3.8–10.5)

## 2018-06-14 PROCEDURE — 99232 SBSQ HOSP IP/OBS MODERATE 35: CPT

## 2018-06-14 RX ORDER — DILTIAZEM HCL 120 MG
240 CAPSULE, EXT RELEASE 24 HR ORAL DAILY
Qty: 0 | Refills: 0 | Status: DISCONTINUED | OUTPATIENT
Start: 2018-06-14 | End: 2018-06-18

## 2018-06-14 RX ORDER — LEVOTHYROXINE SODIUM 125 MCG
100 TABLET ORAL DAILY
Qty: 0 | Refills: 0 | Status: DISCONTINUED | OUTPATIENT
Start: 2018-06-14 | End: 2018-06-18

## 2018-06-14 RX ORDER — ASPIRIN/CALCIUM CARB/MAGNESIUM 324 MG
81 TABLET ORAL DAILY
Qty: 0 | Refills: 0 | Status: DISCONTINUED | OUTPATIENT
Start: 2018-06-14 | End: 2018-06-18

## 2018-06-14 RX ORDER — ACETAMINOPHEN 500 MG
1000 TABLET ORAL EVERY 8 HOURS
Qty: 0 | Refills: 0 | Status: DISCONTINUED | OUTPATIENT
Start: 2018-06-14 | End: 2018-06-15

## 2018-06-14 RX ORDER — SIMVASTATIN 20 MG/1
20 TABLET, FILM COATED ORAL AT BEDTIME
Qty: 0 | Refills: 0 | Status: DISCONTINUED | OUTPATIENT
Start: 2018-06-14 | End: 2018-06-18

## 2018-06-14 RX ORDER — FOLIC ACID 0.8 MG
0.8 TABLET ORAL DAILY
Qty: 0 | Refills: 0 | Status: DISCONTINUED | OUTPATIENT
Start: 2018-06-14 | End: 2018-06-16

## 2018-06-14 RX ORDER — METOPROLOL TARTRATE 50 MG
25 TABLET ORAL
Qty: 0 | Refills: 0 | Status: DISCONTINUED | OUTPATIENT
Start: 2018-06-14 | End: 2018-06-18

## 2018-06-14 RX ORDER — DIGOXIN 250 MCG
0.12 TABLET ORAL DAILY
Qty: 0 | Refills: 0 | Status: DISCONTINUED | OUTPATIENT
Start: 2018-06-14 | End: 2018-06-18

## 2018-06-14 RX ADMIN — HYDROMORPHONE HYDROCHLORIDE 0.5 MILLIGRAM(S): 2 INJECTION INTRAMUSCULAR; INTRAVENOUS; SUBCUTANEOUS at 11:42

## 2018-06-14 RX ADMIN — Medication 0.12 MILLIGRAM(S): at 11:41

## 2018-06-14 RX ADMIN — HEPARIN SODIUM 5000 UNIT(S): 5000 INJECTION INTRAVENOUS; SUBCUTANEOUS at 14:19

## 2018-06-14 RX ADMIN — Medication 0.8 MILLIGRAM(S): at 11:41

## 2018-06-14 RX ADMIN — HEPARIN SODIUM 5000 UNIT(S): 5000 INJECTION INTRAVENOUS; SUBCUTANEOUS at 21:59

## 2018-06-14 RX ADMIN — HYDROMORPHONE HYDROCHLORIDE 0.5 MILLIGRAM(S): 2 INJECTION INTRAMUSCULAR; INTRAVENOUS; SUBCUTANEOUS at 12:30

## 2018-06-14 RX ADMIN — SODIUM CHLORIDE 75 MILLILITER(S): 9 INJECTION, SOLUTION INTRAVENOUS at 11:31

## 2018-06-14 RX ADMIN — SODIUM CHLORIDE 75 MILLILITER(S): 9 INJECTION, SOLUTION INTRAVENOUS at 22:01

## 2018-06-14 RX ADMIN — Medication 25 MILLIGRAM(S): at 18:21

## 2018-06-14 RX ADMIN — Medication 100 GRAM(S): at 18:20

## 2018-06-14 RX ADMIN — SIMVASTATIN 20 MILLIGRAM(S): 20 TABLET, FILM COATED ORAL at 21:59

## 2018-06-14 RX ADMIN — ALVIMOPAN 12 MILLIGRAM(S): 12 CAPSULE ORAL at 18:21

## 2018-06-14 RX ADMIN — Medication 100 GRAM(S): at 06:04

## 2018-06-14 RX ADMIN — ALVIMOPAN 12 MILLIGRAM(S): 12 CAPSULE ORAL at 06:04

## 2018-06-14 RX ADMIN — HEPARIN SODIUM 5000 UNIT(S): 5000 INJECTION INTRAVENOUS; SUBCUTANEOUS at 22:01

## 2018-06-14 RX ADMIN — Medication 1 TABLET(S): at 14:12

## 2018-06-14 RX ADMIN — HEPARIN SODIUM 5000 UNIT(S): 5000 INJECTION INTRAVENOUS; SUBCUTANEOUS at 06:05

## 2018-06-14 RX ADMIN — PANTOPRAZOLE SODIUM 40 MILLIGRAM(S): 20 TABLET, DELAYED RELEASE ORAL at 14:09

## 2018-06-14 NOTE — PROGRESS NOTE ADULT - ASSESSMENT
Dx S/p right hemicolcetomy  Start clears  out of bed and ambulate  continue pain control  Tylenol IV

## 2018-06-14 NOTE — PROGRESS NOTE ADULT - SUBJECTIVE AND OBJECTIVE BOX
Patient with hepatic flexure mass.  She is status post right hemicolectomy.  Patient has no nausea and no vomiting this am.  Patient has no flatus or bm yet.  Patient pain is well controlled    ROS  GI abdominal pain  All other ROS are negative    PE  Female in NAD  Abdomen soft NT ND  Dressing C/D/ I  Extremities Mild edema

## 2018-06-14 NOTE — PROGRESS NOTE ADULT - SUBJECTIVE AND OBJECTIVE BOX
Patient is a 89y old  Female who presents with a chief complaint of weakness and lethargy, blood level low (05 Jun 2018 21:06)      INTERVAL /OVERNIGHT EVENTS: ambulated with PT    MEDICATIONS  (STANDING):  alvimopan 12 milliGRAM(s) Oral two times a day  aspirin enteric coated 81 milliGRAM(s) Oral daily  digoxin     Tablet 0.125 milliGRAM(s) Oral daily  diltiazem    milliGRAM(s) Oral daily  folic acid 0.8 milliGRAM(s) Oral daily  heparin  Injectable 5000 Unit(s) SubCutaneous every 8 hours  lactated ringers. 1000 milliLiter(s) (75 mL/Hr) IV Continuous <Continuous>  levothyroxine 100 MICROGram(s) Oral daily  metoprolol tartrate 25 milliGRAM(s) Oral two times a day  multivitamin 1 Tablet(s) Oral daily  pantoprazole  Injectable 40 milliGRAM(s) IV Push daily  simvastatin 20 milliGRAM(s) Oral at bedtime    MEDICATIONS  (PRN):  acetaminophen   Tablet. 650 milliGRAM(s) Oral every 6 hours PRN Mild Pain (1 - 3)  acetaminophen  IVPB. 1000 milliGRAM(s) IV Intermittent every 8 hours PRN Moderate Pain (4 - 6)  HYDROmorphone  Injectable 0.5 milliGRAM(s) IV Push every 4 hours PRN Severe Pain  ondansetron Injectable 4 milliGRAM(s) IV Push every 6 hours PRN Nausea and/or Vomiting  oxyCODONE    5 mG/acetaminophen 325 mG 1 Tablet(s) Oral every 4 hours PRN Moderate Pain      Allergies    amoxicillin (Unknown)    Intolerances        REVIEW OF SYSTEMS:  CONSTITUTIONAL: No fever, weight loss, or fatigue  EYES: No eye pain, visual disturbances, or discharge  ENMT:  No difficulty hearing, tinnitus, vertigo; No sinus or throat pain  NECK: No pain or stiffness  RESPIRATORY: No cough, wheezing, chills or hemoptysis; No shortness of breath  CARDIOVASCULAR: No chest pain, palpitations, dizziness, or leg swelling  GASTROINTESTINAL: + abdominal or epigastric pain. No nausea, vomiting, or hematemesis; No diarrhea or constipation. No melena or hematochezia.  GENITOURINARY: No dysuria, frequency, hematuria, or incontinence  NEUROLOGICAL: No headaches, memory loss, loss of strength, numbness, or tremors  SKIN: No itching, burning, rashes, or lesions   LYMPH NODES: No enlarged glands  ENDOCRINE: No heat or cold intolerance; No hair loss; No polydipsia or polyuria  MUSCULOSKELETAL: No joint pain or swelling; No muscle, back, or extremity pain  PSYCHIATRIC: No depression, anxiety, mood swings, or difficulty sleeping  HEME/LYMPH: No easy bruising, or bleeding gums  ALLERGY AND IMMUNOLOGIC: No hives or eczema    Vital Signs Last 24 Hrs  T(C): 37.5 (14 Jun 2018 19:58), Max: 37.5 (14 Jun 2018 19:58)  T(F): 99.5 (14 Jun 2018 19:58), Max: 99.5 (14 Jun 2018 19:58)  HR: 79 (14 Jun 2018 19:58) (72 - 83)  BP: 125/79 (14 Jun 2018 19:58) (100/63 - 136/85)  BP(mean): --  RR: 17 (14 Jun 2018 19:58) (15 - 18)  SpO2: 92% (14 Jun 2018 19:58) (92% - 100%)    PHYSICAL EXAM:  GENERAL: NAD, well-groomed, well-developed  HEAD:  Atraumatic, Normocephalic  EYES: EOMI, PERRLA, conjunctiva and sclera clear  ENMT: No tonsillar erythema, exudates, or enlargement; Moist mucous membranes, Good dentition, No lesions  NECK: Supple, No JVD, Normal thyroid  NERVOUS SYSTEM:  Alert & Oriented X3, Good concentration; Motor Strength 5/5 B/L upper and lower extremities; DTRs 2+ intact and symmetric  CHEST/LUNG: Clear to auscultation bilaterally; No rales, rhonchi, wheezing, or rubs  HEART: Regular rate and rhythm; No murmurs, rubs, or gallops  ABDOMEN: Soft, Nontender, Nondistended; Bowel sounds present  EXTREMITIES:  2+ Peripheral Pulses, No clubbing, cyanosis, or edema  LYMPH: No lymphadenopathy noted  SKIN: No rashes or lesions    LABS:                        8.7    10.75 )-----------( 209      ( 14 Jun 2018 07:29 )             29.0     14 Jun 2018 07:29    147    |  110    |  8      ----------------------------<  117    3.9     |  27     |  1.10     Ca    8.3        14 Jun 2018 07:29  Phos  4.3       14 Jun 2018 07:29  Mg     1.9       14 Jun 2018 07:29    TPro  5.5    /  Alb  2.5    /  TBili  0.4    /  DBili  x      /  AST  19     /  ALT  14     /  AlkPhos  63     14 Jun 2018 07:29        CAPILLARY BLOOD GLUCOSE          RADIOLOGY & ADDITIONAL TESTS:    Notes Reviewed:  [x ] YES  [ ] NO    Care Discussed with Consultants/Other Providers [x ] YES  [ ] NO

## 2018-06-14 NOTE — PROGRESS NOTE ADULT - ASSESSMENT
89 year old woman with a history of CAD s/p remote CABG, more recent CHARY to the RCA and LCx, severe AS s/p TAVR, chronic AF with TBS s/p PPM, on Xarelto for stroke risk reduction, normal LV function, hypertension, hyperlipidemia who has been feeling weak and fatigued lately, along with having HERNADEZ, admitted with symptomatic anemia.  AF is rate controlled.    - s/p right hemicolectomy with anastomosis of ileum to colon POD#1.  Tolerated procedure without obvious cardiac complications  - there is no evidence of acute ischemia.  - there is no evidence of significant arrhythmia.  Patient with know Afib, rate-controlled  - there is no evidence for meaningful  volume overload.  - Please resume Xarelto when cleared by Surgery.  Patient is to start on clear liquids today per Sx  - Continue aspirin  - Continue digoxin 125 QD.   - Continue diltiazem 240 QD  - continue metoprolol 25 BID.     - Continue simvastatin   - Monitor and replete lytes keep K >4 and Mg >2  - PPM interrogated in 5/21/2018. Remaining life >5 years. No events noted.  - CT of chest showed small Lt and trace right pleural effusion.    - Pain control  - Will continue to follow    Gila Sevilla NP  Cardiology 89 year old woman with a history of CAD s/p remote CABG, more recent CHARY to the RCA and LCx, severe AS s/p TAVR, chronic AF with TBS s/p PPM, on Xarelto for stroke risk reduction, normal LV function, hypertension, hyperlipidemia who has been feeling weak and fatigued lately, along with having HERNADEZ, admitted with symptomatic anemia.  AF is rate controlled.    - s/p right hemicolectomy with anastomosis of ileum to colon POD#1.  Tolerated procedure without obvious cardiac complications  - there is no evidence of acute ischemia.  - there is no evidence of significant arrhythmia.  Patient with know Afib, rate-controlled  - there is no evidence for meaningful  volume overload.  - Please resume Xarelto when cleared by Surgery.  Patient is to start on clear liquids today per Sx  - Continue aspirin  - Continue digoxin 125 QD.   - Continue diltiazem 240 QD  - continue metoprolol 25 BID.     - Continue simvastatin   - Monitor and replete lytes keep K >4 and Mg >2  - PPM interrogated in 5/21/2018. Remaining life >5 years. No events noted.  - CT of chest showed small Lt and trace right pleural effusion.    - Pain control  - can dc tele  - Will continue to follow    Gila Sevilla NP  Cardiology

## 2018-06-14 NOTE — PROGRESS NOTE ADULT - PROBLEM SELECTOR PLAN 3
etiology multifactorial, guaiac pos stool, iron def, hx of gastritis and gastric ulcerated submucosal lesion on prior egds as per gi, had egd/colon--colon mass, s/p surgery  s/p prbc on this admisssion 6/5/18  iron deficiency on iron profile, s/p iv iron 100 mg a day --6/6/18---6/8/2018, now on po iron  monitor serial h/h--transfuse prbc as needed for symptomatic anemia.  hb today 8.7  gi/dvt prophylaxis  d/w patient at bedside at length.

## 2018-06-14 NOTE — PROGRESS NOTE ADULT - PROBLEM SELECTOR PLAN 1
s/p colonoscopy with hepatic flexure mass   anemia likely 2/2 mass  path showed TVA, no evidence of malignancy but possibility of underlying lesion not excluded  s/p OR 6/13 for RHC and AILEEN  f/u path  f/u surgery recs

## 2018-06-14 NOTE — PROGRESS NOTE ADULT - SUBJECTIVE AND OBJECTIVE BOX
Patient is a 89y old  Female who presents with a chief complaint of weakness and lethargy, blood level low (05 Jun 2018 21:06)       Pt is seen and examined, pt is awake and is sitting in chair  pt seems comfortable and denies any complaints at this time    HPI:  · HPI Objective Statement: 89 female sent to ER by her cardiologist for a low hg level 7.1. Patient states for the past 2 weeks she has not been feeling well, generalized fatigue, shortness of breath with exertion, sleeping more.	  In ER patient was found to be anemic.  Patient is being admitted for further work up and treatment (05 Jun 2018 15:51)           MEDICATIONS  (STANDING):  alvimopan 12 milliGRAM(s) Oral two times a day  aspirin enteric coated 81 milliGRAM(s) Oral daily  digoxin     Tablet 0.125 milliGRAM(s) Oral daily  diltiazem    milliGRAM(s) Oral daily  folic acid 0.8 milliGRAM(s) Oral daily  heparin  Injectable 5000 Unit(s) SubCutaneous every 8 hours  lactated ringers. 1000 milliLiter(s) (75 mL/Hr) IV Continuous <Continuous>  levothyroxine 100 MICROGram(s) Oral daily  metoprolol tartrate 25 milliGRAM(s) Oral two times a day  multivitamin 1 Tablet(s) Oral daily  pantoprazole  Injectable 40 milliGRAM(s) IV Push daily  simvastatin 20 milliGRAM(s) Oral at bedtime    MEDICATIONS  (PRN):  acetaminophen   Tablet. 650 milliGRAM(s) Oral every 6 hours PRN Mild Pain (1 - 3)  acetaminophen  IVPB. 1000 milliGRAM(s) IV Intermittent every 8 hours PRN Moderate Pain (4 - 6)  HYDROmorphone  Injectable 0.5 milliGRAM(s) IV Push every 4 hours PRN Severe Pain  ondansetron Injectable 4 milliGRAM(s) IV Push every 6 hours PRN Nausea and/or Vomiting  oxyCODONE    5 mG/acetaminophen 325 mG 1 Tablet(s) Oral every 4 hours PRN Moderate Pain      Allergies    amoxicillin (Unknown)    Intolerances        Vital Signs Last 24 Hrs  T(C): 36.8 (14 Jun 2018 15:41), Max: 37.3 (14 Jun 2018 04:35)  T(F): 98.3 (14 Jun 2018 15:41), Max: 99.1 (14 Jun 2018 04:35)  HR: 81 (14 Jun 2018 15:41) (65 - 83)  BP: 116/71 (14 Jun 2018 15:41) (100/63 - 170/68)  BP(mean): --  RR: 17 (14 Jun 2018 15:41) (15 - 18)  SpO2: 95% (14 Jun 2018 15:41) (95% - 100%)    PHYSICAL EXAM  General: adult in NAD  HEENT: clear oropharynx, anicteric sclera, pink conjunctiva  Neck: supple  CV: normal S1/S2 with no murmur rubs or gallops  Lungs: positive air movement b/l ant lungs,clear to auscultation, no wheezes, no rales  Abdomen: soft non-tender non-distended, no hepatosplenomegaly  Ext: no clubbing cyanosis or edema  Skin: no rashes and no petechiae  Neuro: alert and oriented X 4, no focal deficits  LABS:                          8.7    10.75 )-----------( 209      ( 14 Jun 2018 07:29 )             29.0         Mean Cell Volume : 83.6 fl  Mean Cell Hemoglobin : 25.1 pg  Mean Cell Hemoglobin Concentration : 30.0 gm/dL  Auto Neutrophil # : 9.78 K/uL  Auto Lymphocyte # : 0.43 K/uL  Auto Monocyte # : 0.43 K/uL  Auto Eosinophil # : 0.00 K/uL  Auto Basophil # : 0.00 K/uL  Auto Neutrophil % : 84.0 %  Auto Lymphocyte % : 4.0 %  Auto Monocyte % : 4.0 %  Auto Eosinophil % : 0.0 %  Auto Basophil % : 0.0 %    Serial CBC's  06-14 @ 07:29  Hct-29.0 / Hgb-8.7 / Plat-209 / RBC-3.47 / WBC-10.75          Serial CBC's  06-11 @ 07:40  Hct-31.4 / Hgb-9.5 / Plat-214 / RBC-3.78 / WBC-9.02            06-14    147<H>  |  110<H>  |  8   ----------------------------<  117<H>  3.9   |  27  |  1.10    Ca    8.3<L>      14 Jun 2018 07:29  Phos  4.3     06-14  Mg     1.9     06-14    TPro  5.5<L>  /  Alb  2.5<L>  /  TBili  0.4  /  DBili  x   /  AST  19  /  ALT  14  /  AlkPhos  63  06-14          Vitamin B12, Serum: >2000 pg/mL (06-07-18 @ 08:03)  Folate, Serum: >20.0 ng/mL (06-07-18 @ 08:03)  Reticulocyte Percent: see note % (06-07-18 @ 06:54)  Iron - Total Binding Capacity.: 393 ug/dL (06-05-18 @ 22:52)  Ferritin, Serum: 46 ng/mL (06-05-18 @ 22:52)    Surgical Pathology Final Report -  (06.08.18 @ 08:06)    Surgical Pathology Final Report - :   ACCESSION No:  [id number]    LOIS RON VILLEDA                    2        Surgical Final Report          Final Diagnosis    1. Gastric antrum, biopsy:  Diffquick stain examined.  Gastric antral mucosa showing minimal active and mild chronic  inflammation with changes of reactive gastropathy.  Special stain for Helicobacter pylori organisms is negative.    2. Gastric nodule, biopsy, exact site not specified:  Diffquick stain examined.  Gastric mucosa showing minimal active and mild chronic  inflammation with changes of reactive gastropathy.  Special stain for Helicobacter pylori organisms is negative.    3. Hepatic flexure mass, biopsy:  Villotubular adenoma, fragments of.  See note.    Note: The specimen is superficial and fragmented.  No evidence of  malignancy is seen in this sample.  Possibility of  a more significant underlying lesion cannot be ruled out.  Recommend clinical pathological correlation.    Hellen Osborne MD  (Electronic Signature)  Reported on: 06/12/18    Comment  Case findings were discussed with Dr. Emerson on 6/12/18 at  12:10 PM.  The    Clinical Information  Colonic mass/gastric nodule  Procedure: EGD with biopsies, colonoscopy with biopsy and  bleeding gastritis    Specimen Description  1-Gastric antral biopsy, rule out H. pylori  2-Gastric nodule, rule out carcinoma  3-Colonic mass, hepatic flexure, rule out carcinoma,    Gross Description  1.   The specimen is received in formalin and the specimen  container is labeled: Gastric antrum biopsy.  It consists of a  single fragment of tan soft tissue measuring 0.5 cm in greatest  dimension.  Special stains are requested.  Entirely submitted.  One cassette.                RON ABREU                    2        Surgical Final Report          2.   The specimen is received in formalin and the specimen  container is labeled: Gastric nodule.  It consists of a single  fragment of tan soft tissue measuring 0.2 cm in greatest  dimension.  Special stains are requested.  Entirely submitted.  Onecassette.    Surgical Pathology Final Report -  (10.03.16 @ 10:58)    Surgical Pathology Final Report - :   ACCESSION No:  [id number]    RON ABREU A                    2        Surgical Final Report          Final Diagnosis    1. Small bowel, duodenum, biopsy:  -  Non-neoplastic small bowel, duodenal mucosa with  preserved villous architecture, negative for inflammation.  The  changes of celiac disease/sprue are not present in this sample.    2.  Stomach, antrum, biopsy:  Diffquick stain examined.  - Non-neoplastic gastric, antral mucosa demonstrating mild  to moderate chronic inactive gastritis, negative for intestinal  metaplasia, negative for dysplasia.  Special stain for H. pylori  micro-organisms is negative.  - Changes of reactive gastropathy are also present.    3.  Stomach, body, biopsy:  Diffquick stain examined.  - Non-neoplastic gastric, oxyntic mucosa demonstrating  moderate active chronic gastritis with foci of intestinal  metaplasia, negative for dysplasia.  Special stain for H. pylori  micro-organisms is negative.    Chris Dalton M.D.  (Electronic Signature)  Reported on: 10/04/16    Clinical Information  89 year old female with anemia    Specimen Description  1-Duodenum  2-Antrum  3-Gastric body    Gross Description  1 - The specimen is received in formalin and the specimen  container is labeled: Duodenum. Itconsists of two fragments of  tan soft tissue, each measuring 0.3 cm in greatest dimension.  Entirely submitted. One cassette.    2 - The specimen is received in formalin and the specimen  container is labeled: Antrum. It consists of multiple fragments  of tan soft tissue measuring in aggregate 0.4 x 0.4 x 0.2 cm.  Entirely submitted. One cassette.    3 - The specimen is received in formalin and the specimen  container is labeled: Gastric body. It consists of a              RON ABREU A               2        Surgical Final Report          fragment of tan soft tissue measuring 0.3 cm in greatest  dimension. Entirely submitted. One cassette.    In addition to other data that may appear on the specimen  containers, all labels have been inspected to confirm the  presence of the patient's name and date of birth.    DN 10/04/16 07:05    3.   The specimen is received in formalin and the specimen  container is labeled: hepatic flexure mass.  It consists of three  fragments of tan lobulated soft tissue ranging from 0.2-0.3 cm in  greatest dimension.  Entirely submitted.  One cassette.    In addition to other data that may appear on the specimen  containers, all labels have been inspected to confirm the  presence of the patient's name and date of birth.  MUN06/11/18 09:52

## 2018-06-14 NOTE — PROGRESS NOTE ADULT - SUBJECTIVE AND OBJECTIVE BOX
INTERVAL HPI/OVERNIGHT EVENTS:  pt seen and examined  s/p OR yesterday  denies n/v, c/o post op abd pain, no flatus/bm  no overt s/s gib overnight per rn  labs noted afebrile overnight    MEDICATIONS  (STANDING):  alvimopan 12 milliGRAM(s) Oral two times a day  cefoTEtan  IVPB 1 Gram(s) IV Intermittent every 12 hours  digoxin     Tablet 0.125 milliGRAM(s) Oral daily  diltiazem    milliGRAM(s) Oral daily  folic acid 0.8 milliGRAM(s) Oral daily  heparin  Injectable 5000 Unit(s) SubCutaneous every 8 hours  lactated ringers. 1000 milliLiter(s) (100 mL/Hr) IV Continuous <Continuous>  levothyroxine 100 MICROGram(s) Oral daily  metoprolol tartrate 25 milliGRAM(s) Oral two times a day  multivitamin 1 Tablet(s) Oral daily  pantoprazole  Injectable 40 milliGRAM(s) IV Push daily  simvastatin 20 milliGRAM(s) Oral at bedtime    MEDICATIONS  (PRN):  acetaminophen   Tablet. 650 milliGRAM(s) Oral every 6 hours PRN Mild Pain (1 - 3)  acetaminophen  IVPB. 1000 milliGRAM(s) IV Intermittent every 8 hours PRN Moderate Pain (4 - 6)  HYDROmorphone  Injectable 0.5 milliGRAM(s) IV Push every 4 hours PRN Severe Pain  ondansetron Injectable 4 milliGRAM(s) IV Push every 6 hours PRN Nausea and/or Vomiting  oxyCODONE    5 mG/acetaminophen 325 mG 1 Tablet(s) Oral every 4 hours PRN Moderate Pain      Allergies    amoxicillin (Unknown)    Intolerances        Review of Systems:    General:  No wt loss, fevers, chills, night sweats, fatigue   Eyes:  Good vision, no reported pain  ENT:  No sore throat, pain, runny nose, dysphagia  CV:  No pain, palpitations, hypo/hypertension  Resp:  No dyspnea, cough, tachypnea, wheezing  GI:  +pain, No nausea, No vomiting, No diarrhea, No constipation, No weight loss, No fever, No pruritis, No rectal bleeding, No melena, No dysphagia  :  No pain, bleeding, incontinence, nocturia  Muscle:  No pain, weakness  Neuro:  No weakness, tingling, memory problems  Psych:  No fatigue, insomnia, mood problems, depression  Endocrine:  No polyuria, polydypsia, cold/heat intolerance  Heme:  No petechiae, ecchymosis, easy bruisability  Skin:  No rash, tattoos, scars, edema      Vital Signs Last 24 Hrs  T(C): 36.8 (14 Jun 2018 07:51), Max: 37.3 (14 Jun 2018 04:35)  T(F): 98.3 (14 Jun 2018 07:51), Max: 99.1 (14 Jun 2018 04:35)  HR: 79 (14 Jun 2018 07:51) (62 - 81)  BP: 109/63 (14 Jun 2018 07:51) (100/63 - 176/66)  BP(mean): --  RR: 17 (14 Jun 2018 07:51) (15 - 25)  SpO2: 98% (14 Jun 2018 07:51) (97% - 100%)    PHYSICAL EXAM:  General:  lying in bed in nad  HEENT:  NC/AT,  conjunctivae clear and pink, anicteric  Chest:  cta  CV: S1S2 irreg  Abdomen:  Soft, mild dt, mild ttp, dressing c/d/i  Extremities:  no edema  Skin:  No rash  Neuro/Psych:  A&Ox3      LABS:                        8.7    10.75 )-----------( 209      ( 14 Jun 2018 07:29 )             29.0     06-14    147<H>  |  110<H>  |  8   ----------------------------<  117<H>  3.9   |  27  |  1.10    Ca    8.3<L>      14 Jun 2018 07:29  Phos  4.3     06-14  Mg     1.9     06-14    TPro  5.5<L>  /  Alb  2.5<L>  /  TBili  0.4  /  DBili  x   /  AST  19  /  ALT  14  /  AlkPhos  63  06-14          RADIOLOGY & ADDITIONAL TESTS:

## 2018-06-14 NOTE — PROGRESS NOTE ADULT - SUBJECTIVE AND OBJECTIVE BOX
CARDIOLOGY FOLLOW UP:    SUBJECTIVE:  Patient is a 89y old  Female who presents with a chief complaint of weakness and lethargy, blood level low (2018 21:06)    Awake and alert, comfortable on nasal cannula.  Denies CP, SOB, or palpitation, however, c/o post op pain.  Denies nausea or vomiting.    OBJECTIVE:  Review Of Systems:  Constitutional: [ ] Fever [ ] Chills [ ] Fatigue [ ] Weight change   HEENT: [ ] Blurred vision [ ] Eye Pain [ ] Headache [ ] Runny nose [ ] Sore Throat   Respiratory: [ ] Cough [ ] Wheezing [ ] Shortness of breath  Cardiovascular: [ ] Chest Pain [ ] Palpitations [ ] HERNADEZ [ ] PND [ ] Orthopnea  Gastrointestinal: [ ] Abdominal Pain [ ] Diarrhea [ ] Constipation [ ] Hemorrhoids [ ] Nausea [ ] Vomiting  Genitourinary: [ ] Nocturia [ ] Dysuria [ ] Incontinence  Extremities: [ ] Swelling [ ] Joint Pain  Neurologic: [ ] Focal deficit [ ] Paresthesias [ ] Syncope  Lymphatic: [ ] Swelling [ ] Lymphadenopathy   Skin: [ ] Rash [ ] Ecchymoses [ ] Wounds [ ] Lesions  Psychiatry: [ ] Depression [ ] Suicidal/Homicidal Ideation [ ] Anxiety [ ] Sleep Disturbances  [ x] 10 point review of systems is otherwise negative except as mentioned above            [ ]Unable to obtain    Allergy:  Allergies    amoxicillin (Unknown)    Intolerances    Medications:  MEDICATIONS  (STANDING):  alvimopan 12 milliGRAM(s) Oral two times a day  cefoTEtan  IVPB 1 Gram(s) IV Intermittent every 12 hours  digoxin     Tablet 0.125 milliGRAM(s) Oral daily  diltiazem    milliGRAM(s) Oral daily  folic acid 0.8 milliGRAM(s) Oral daily  heparin  Injectable 5000 Unit(s) SubCutaneous every 8 hours  lactated ringers. 1000 milliLiter(s) (100 mL/Hr) IV Continuous <Continuous>  levothyroxine 100 MICROGram(s) Oral daily  metoprolol tartrate 25 milliGRAM(s) Oral two times a day  multivitamin 1 Tablet(s) Oral daily  pantoprazole  Injectable 40 milliGRAM(s) IV Push daily  simvastatin 20 milliGRAM(s) Oral at bedtime    MEDICATIONS  (PRN):  acetaminophen   Tablet. 650 milliGRAM(s) Oral every 6 hours PRN Mild Pain (1 - 3)  acetaminophen  IVPB. 1000 milliGRAM(s) IV Intermittent every 8 hours PRN Moderate Pain (4 - 6)  HYDROmorphone  Injectable 0.5 milliGRAM(s) IV Push every 4 hours PRN Severe Pain  ondansetron Injectable 4 milliGRAM(s) IV Push every 6 hours PRN Nausea and/or Vomiting  oxyCODONE    5 mG/acetaminophen 325 mG 1 Tablet(s) Oral every 4 hours PRN Moderate Pain    PMH/PSH/FH/SH: [ ] Unchanged    Vitals:  T(C): 36.8 (18 @ 07:51), Max: 37.3 (18 @ 04:35)  HR: 79 (18 @ 07:51) (62 - 81)  BP: 109/63 (18 @ 07:51) (100/63 - 176/66)  BP(mean): --  RR: 17 (18 @ 07:51) (15 - 25)  SpO2: 98% (18 @ 07:51) (97% - 100%)  Wt(kg): --  Daily     Daily Weight in k.2 (2018 04:35)  I&O's Summary    2018 07:01  -  2018 07:00  --------------------------------------------------------  IN: 1225 mL / OUT: 450 mL / NET: 775 mL    Labs:                        8.7    10.75 )-----------( 209      ( 2018 07:29 )             29.0         147<H>  |  110<H>  |  8   ----------------------------<  117<H>  3.9   |  27  |  1.10    Ca    8.3<L>      2018 07:29  Phos  4.3       Mg     1.9         TPro  5.5<L>  /  Alb  2.5<L>  /  TBili  0.4  /  DBili  x   /  AST  19  /  ALT  14  /  AlkPhos  63      Magnesium, Serum: 1.9 mg/dL ( @ 07:29)  Phosphorus Level, Serum: 4.3 mg/dL ( @ 07:29)    ECG:  < from: 12 Lead ECG (18 @ 12:32) >    Ventricular Rate 64 BPM    Atrial Rate 300 BPM    QRS Duration 130 ms    Q-T Interval 440 ms    QTC Calculation(Bezet) 453 ms    R Axis 7 degrees    T Axis 208 degrees    Diagnosis Line Atrial fibrillation  Left bundle branch block  Abnormal ECG  When compared with ECG of 05-MAR-2016 00:38,  Left bundle branch block is now present  Confirmed by Dawn Wilson ([id number]) on 2018 10:46:42 AM    < end of copied text >    Echo:  < from: Transthoracic Echocardiogram (18 @ 12:57) >    Patient name: RNO ABREU  YOB: 1926   Age: 89 (F)   MR#: [id number]  Study Date: 4/3/2018  Location: O/PSonographer: Sangita Ramírez RDCS  Study quality: Technically difficult  Referring Physician: DARIA MINAYA MD  Blood Pressure: 183/77 mmHg  Height: 140 cm  Weight: 58 kg  BSA: 1.5 m2  ------------------------------------------------------------------------  PROCEDURE: Transthoracic echocardiogram with 2-D, M-Mode  and complete spectral and color flow Doppler.  INDICATION:Presence of prosthetic heart valve (Z95.2)  ------------------------------------------------------------------------  Dimensions:    Normal Values:  LA:     5.0    2.0 - 4.0 cm  Ao:     2.5    2.0 - 3.8 cm  SEPTUM: 0.6    0.6 - 1.2 cm  PWT:    0.80.6 - 1.1 cm  LVIDd:  4.4    3.0 - 5.6 cm  LVIDs:  2.7    1.8 - 4.0 cm  Derived variables:  LVMI: 63 g/m2  RWT: 0.36  Fractional short: 39 %  EF (Visual Estimate): 70 %  Doppler Peak Velocity (m/sec): AoV=1.5  ------------------------------------------------------------------------  Observations:  Mitral Valve: Mitral annular calcification. Thickened  mitral leaflets. Mild mitral regurgitation.  Aortic Valve/Aorta: Transcatheter aortic valve replacement.  Peak transaortic valve gradient equals 9 mm Hg, mean  transaortic valve gradient equals 4 mm Hg, which is  probably normal in the presence of a transcatheter aortic  valve replacement. No aortic valve regurgitation seen. Peak  left ventricular outflow tract gradient equals 1 mm Hg,  mean gradient is equal to 1 mm Hg, LVOT velocity time  integral equals 14 cm.  Aortic Root: 2.5 cm.  Left Atrium: Severely dilated left atrium.  LA volume index  = 69 cc/m2.  Left Ventricle: Normal left ventricular systolic function.  Septal motion consistentwith paced rhythm/conduction  defect. Normal left ventricular internal dimensions and  wall thicknesses.  Right Heart: Right atrium not well visualized, appears  mildly dilated. Mild right ventricular enlargement with  mildly decreased right ventricular systolic function. A  device wire is noted in the right heart. Normal tricuspid  valve. Mild-moderate tricuspid regurgitation. Normal  pulmonic valve. Mild pulmonic regurgitation.  Pericardium/Pleura: Normal pericardium with no pericardial  effusion.  Hemodynamic: Estimated right atrial pressure is 8 mm Hg.  Estimated right ventricular systolic pressure equals 57 mm  Hg, assuming right atrial pressure equals 8 mm Hg,  consistent with moderate pulmonary hypertension.  ------------------------------------------------------------------------  Conclusions:  1. Mitral annular calcification. Thickened mitral leaflets.  Mild mitral regurgitation.  2. Transcatheter aortic valve replacement. Peak transaortic  valve gradient equals 9 mm Hg, mean transaortic valve  gradient equals 4 mm Hg, which is probably normal in the  presence of a transcatheter aortic valve replacement. No  aortic valve regurgitation seen.  3. Normal left ventricular systolic function. Septal motion  consistent with paced rhythm/conduction defect.  4. Mild right ventricular enlargement with mildly decreased  right ventricular systolic function. A device wire is noted  in the right heart.  5. Estimated pulmonary artery systolic pressure equals 57  mm Hg, assuming right atrial pressure equals 8 mm Hg,  consistent with moderate pulmonary pressures.  *** Compared with echocardiogram of 2/15/2018, results are  similar on today's study.  ------------------------------------------------------------------------  Confirmed on 4/3/2018 - 15:19:53 by Meagan Carter M.D.  ------------------------------------------------------------------------    < end of copied text >    Stress Testing:     Cath:    Imaging:  < from: CT Chest w/ IV Cont (18 @ 08:34) >    EXAM:  CT CHEST IC                            PROCEDURE DATE:  2018          INTERPRETATION:  CLINICAL INFORMATION:  Anemia and shortness of breath,   recently diagnosed mass on colonoscopy.    PROCEDURE:  Using multislice helical CT, following the intravenous   administration of 95 cc of Omnipaque 350, 2.5 mm sections were obtained   from the thoracic inlet through the lung bases.  Multiplanar reformatted images    COMPARISON: None available.    FINDINGS:      There is a small left-sidedpleural effusion and underlying compressive   atelectasis.  There is a trace right-sided pleural effusion.    There is bilateral groundglass mosaic attenuation, finding which may   reflect air trapping.  The central airways remain patent; no central endobronchial lesion is   noted.    Patient is status post sternotomy with aortic valve repair and left-sided   cardiac device.  There is aneurysmal dilatation of the ascending aorta measuring up to 4.2   cm. There is arteriosclerotic calcification ofthoracic aorta with   coronary artery calcifications.  No pericardial effusion is noted.    There are prevascular and right paratracheal mediastinal lymph nodes,   measuring up to 12 mm in short axis.  No enlarged hilar lymphadenopathy is noted.    There are multilevel degenerative changes of the thoracic spine.    Impression:    Mosaic attenuation which may reflect air trapping.    Small left-sided pleural effusion and trace right-sided pleural effusion.    Other findings as discussed.    ALEX DRISCOLL M.D., ATTENDING RADIOLOGIST  This document has been electronically signed. 2018 11:11AM      < end of copied text >    Interpretation of Telemetry:  Afib at 60's    Physical Exam:  Appearance: [ ] Normal  [ ] abnormal [x ] NAD   Eyes: [ ] PERRL [ ] EOMI  HENT: [ ] Normal [ ] Abnormal oral muscosa [ ]NC/AT  Cardiovascular: [x ] S1 [ x] S2 [ ] RRR [x] Irregularly irregular  [x ] m/r/g [ ]edema [ ] JVP  Procedural Access Site: [ ]  hematoma [ ] tender to palpation [ ] 2+ pulse [ ] bruit [ ] Ecchymosis  Respiratory: [ x] Clear to auscultation bilaterally  Gastrointestinal: [ x] Soft [x ] tenderness[ ] distension [x ] BS  Musculoskeletal: [ ] clubbing [ ] joint deformity   Neurologic: [ x] Non-focal  Lymphatic: [ ] lymphadenopathy  Psychiatry: [ x] AAOx3  [ ] confused [ ] disoriented [x ] Mood & affect appropriate  Skin: [ ]  rashes [ ] ecchymoses [ ] cyanosis  [x] Abdominal incision with dressing dry and intact

## 2018-06-14 NOTE — PROGRESS NOTE ADULT - PROBLEM SELECTOR PLAN 1
s/p colonoscopy with hepatic flexure mass   anemia likely 2/2 mass  path showed TVA, no evidence of malignancy but possibility of underlying lesion not excluded  s/p OR 6/13 for RHC and AILEEN  f/u path  f/u surgery recs  diet as per surgery, started on clears  await return of bowel function  trend h/h, transfuse prn  heme following

## 2018-06-15 LAB
ANION GAP SERPL CALC-SCNC: 7 MMOL/L — SIGNIFICANT CHANGE UP (ref 5–17)
BUN SERPL-MCNC: 8 MG/DL — SIGNIFICANT CHANGE UP (ref 7–23)
CALCIUM SERPL-MCNC: 8.2 MG/DL — LOW (ref 8.5–10.1)
CHLORIDE SERPL-SCNC: 105 MMOL/L — SIGNIFICANT CHANGE UP (ref 96–108)
CO2 SERPL-SCNC: 29 MMOL/L — SIGNIFICANT CHANGE UP (ref 22–31)
CREAT SERPL-MCNC: 0.9 MG/DL — SIGNIFICANT CHANGE UP (ref 0.5–1.3)
GLUCOSE SERPL-MCNC: 140 MG/DL — HIGH (ref 70–99)
HCT VFR BLD CALC: 28.8 % — LOW (ref 34.5–45)
HCT VFR BLD CALC: 29.9 % — LOW (ref 34.5–45)
HGB BLD-MCNC: 8.8 G/DL — LOW (ref 11.5–15.5)
HGB BLD-MCNC: 8.8 G/DL — LOW (ref 11.5–15.5)
MCHC RBC-ENTMCNC: 25.8 PG — LOW (ref 27–34)
MCHC RBC-ENTMCNC: 30.6 GM/DL — LOW (ref 32–36)
MCV RBC AUTO: 84.5 FL — SIGNIFICANT CHANGE UP (ref 80–100)
NRBC # BLD: 0 /100 WBCS — SIGNIFICANT CHANGE UP (ref 0–0)
PLATELET # BLD AUTO: 189 K/UL — SIGNIFICANT CHANGE UP (ref 150–400)
POTASSIUM SERPL-MCNC: 3.9 MMOL/L — SIGNIFICANT CHANGE UP (ref 3.5–5.3)
POTASSIUM SERPL-SCNC: 3.9 MMOL/L — SIGNIFICANT CHANGE UP (ref 3.5–5.3)
RBC # BLD: 3.41 M/UL — LOW (ref 3.8–5.2)
RBC # FLD: 23.1 % — HIGH (ref 10.3–14.5)
SODIUM SERPL-SCNC: 141 MMOL/L — SIGNIFICANT CHANGE UP (ref 135–145)
WBC # BLD: 10.92 K/UL — HIGH (ref 3.8–10.5)
WBC # FLD AUTO: 10.92 K/UL — HIGH (ref 3.8–10.5)

## 2018-06-15 PROCEDURE — 99232 SBSQ HOSP IP/OBS MODERATE 35: CPT

## 2018-06-15 RX ORDER — PANTOPRAZOLE SODIUM 20 MG/1
40 TABLET, DELAYED RELEASE ORAL
Qty: 0 | Refills: 0 | Status: DISCONTINUED | OUTPATIENT
Start: 2018-06-15 | End: 2018-06-18

## 2018-06-15 RX ORDER — OXYCODONE HYDROCHLORIDE 5 MG/1
5 TABLET ORAL
Qty: 0 | Refills: 0 | Status: DISCONTINUED | OUTPATIENT
Start: 2018-06-15 | End: 2018-06-18

## 2018-06-15 RX ORDER — TRAMADOL HYDROCHLORIDE 50 MG/1
25 TABLET ORAL
Qty: 0 | Refills: 0 | Status: DISCONTINUED | OUTPATIENT
Start: 2018-06-15 | End: 2018-06-18

## 2018-06-15 RX ADMIN — PANTOPRAZOLE SODIUM 40 MILLIGRAM(S): 20 TABLET, DELAYED RELEASE ORAL at 17:27

## 2018-06-15 RX ADMIN — HEPARIN SODIUM 5000 UNIT(S): 5000 INJECTION INTRAVENOUS; SUBCUTANEOUS at 05:15

## 2018-06-15 RX ADMIN — Medication 0.12 MILLIGRAM(S): at 05:15

## 2018-06-15 RX ADMIN — Medication 100 MICROGRAM(S): at 05:15

## 2018-06-15 RX ADMIN — HEPARIN SODIUM 5000 UNIT(S): 5000 INJECTION INTRAVENOUS; SUBCUTANEOUS at 21:39

## 2018-06-15 RX ADMIN — HEPARIN SODIUM 5000 UNIT(S): 5000 INJECTION INTRAVENOUS; SUBCUTANEOUS at 17:27

## 2018-06-15 RX ADMIN — Medication 25 MILLIGRAM(S): at 17:27

## 2018-06-15 RX ADMIN — SIMVASTATIN 20 MILLIGRAM(S): 20 TABLET, FILM COATED ORAL at 21:39

## 2018-06-15 RX ADMIN — Medication 1 TABLET(S): at 11:42

## 2018-06-15 RX ADMIN — Medication 25 MILLIGRAM(S): at 05:15

## 2018-06-15 RX ADMIN — SODIUM CHLORIDE 50 MILLILITER(S): 9 INJECTION, SOLUTION INTRAVENOUS at 09:31

## 2018-06-15 RX ADMIN — Medication 81 MILLIGRAM(S): at 11:42

## 2018-06-15 RX ADMIN — SODIUM CHLORIDE 75 MILLILITER(S): 9 INJECTION, SOLUTION INTRAVENOUS at 08:40

## 2018-06-15 RX ADMIN — Medication 240 MILLIGRAM(S): at 05:15

## 2018-06-15 RX ADMIN — Medication 0.8 MILLIGRAM(S): at 11:42

## 2018-06-15 RX ADMIN — ALVIMOPAN 12 MILLIGRAM(S): 12 CAPSULE ORAL at 17:27

## 2018-06-15 RX ADMIN — ALVIMOPAN 12 MILLIGRAM(S): 12 CAPSULE ORAL at 05:14

## 2018-06-15 NOTE — PROGRESS NOTE ADULT - PROBLEM SELECTOR PLAN 1
no new labs to assess  s/p colonoscopy with hepatic flexure mass   anemia likely 2/2 mass  path showed TVA, no evidence of malignancy but possibility of underlying lesion not excluded  s/p OR 6/13 for RHC and AILEEN  f/u path  f/u surgery recs  diet as per surgery, possibly advance to fulls today per crs note  await return of bowel function  trend h/h, transfuse prn  heme following

## 2018-06-15 NOTE — PROGRESS NOTE ADULT - PROBLEM SELECTOR PLAN 3
etiology multifactorial, guaiac pos stool, iron def, hx of gastritis and gastric ulcerated submucosal lesion on prior egds as per gi, had egd/colon--colon mass, s/p surgery  s/p prbc on this admisssion 6/5/18  iron deficiency on iron profile, s/p iv iron 100 mg a day --6/6/18---6/8/2018, now on po iron  monitor serial h/h--transfuse prbc as needed for symptomatic anemia.  hb today 8.7  gi/dvt prophylaxis--heparin s/c  d/w patient at bedside at length

## 2018-06-15 NOTE — PROGRESS NOTE ADULT - ASSESSMENT
89 year old woman with a history of CAD s/p remote CABG, more recent CHARY to the RCA and LCx, severe AS s/p TAVR, chronic AF with TBS s/p PPM, on Xarelto for stroke risk reduction, normal LV function, hypertension, hyperlipidemia who has been feeling weak and fatigued lately, along with having HERNADEZ, admitted with symptomatic anemia.  AF is rate controlled.    - s/p right hemicolectomy with anastomosis of ileum to colon POD#2.  Tolerated procedure without obvious cardiac complications  - there is no evidence of acute ischemia.  - there is no evidence of significant arrhythmia.  Patient with known Afib, rate-controlled  - there is no evidence for meaningful  volume overload.  - Please resume Xarelto when cleared by Surgery.  Patient continues on clears denies passing flatus so no advancement with no n/v.  Elevated NA+ on yesterdays labs may be dry.  Today's labs ordered results pending.  - Continue aspirin  - Continue digoxin 125 QD.  Level 1.1  - Continue diltiazem 240 QD  - continue metoprolol 25 BID with hold parameters  - Continue simvastatin   - Monitor and replete lytes keep K >4 and Mg >2  - PPM interrogated in 5/21/2018. Remaining life >5 years. No events noted.  - CT of chest showed small Lt and trace right pleural effusion.    - Encourage I/S   - Pain control  - cont tele has been AF 80s now in the 50s if no events will d/c tomorrow  - Will continue to follow    Ksenia Hutson NP-C  Cardiology

## 2018-06-15 NOTE — PROGRESS NOTE ADULT - PROBLEM SELECTOR PLAN 1
s/p colonoscopy with hepatic flexure mass   anemia likely 2/2 mass  path showed TVA, no evidence of malignancy but possibility of underlying lesion not excluded  s/p OR 6/13 for RHC and AILEEN  f/u cytopathology when available  f/u surgery recs

## 2018-06-15 NOTE — PROGRESS NOTE ADULT - SUBJECTIVE AND OBJECTIVE BOX
INTERVAL HPI/OVERNIGHT EVENTS:  pt seen and examined  denies n/v/abd pain, no gas or bm yest  no new labs afebrile overnight    MEDICATIONS  (STANDING):  alvimopan 12 milliGRAM(s) Oral two times a day  aspirin enteric coated 81 milliGRAM(s) Oral daily  digoxin     Tablet 0.125 milliGRAM(s) Oral daily  diltiazem    milliGRAM(s) Oral daily  folic acid 0.8 milliGRAM(s) Oral daily  heparin  Injectable 5000 Unit(s) SubCutaneous every 8 hours  lactated ringers. 1000 milliLiter(s) (50 mL/Hr) IV Continuous <Continuous>  levothyroxine 100 MICROGram(s) Oral daily  metoprolol tartrate 25 milliGRAM(s) Oral two times a day  multivitamin 1 Tablet(s) Oral daily  pantoprazole  Injectable 40 milliGRAM(s) IV Push daily  simvastatin 20 milliGRAM(s) Oral at bedtime    MEDICATIONS  (PRN):  acetaminophen   Tablet. 650 milliGRAM(s) Oral every 6 hours PRN Mild Pain (1 - 3)  acetaminophen  IVPB. 1000 milliGRAM(s) IV Intermittent every 8 hours PRN Moderate Pain (4 - 6)  HYDROmorphone  Injectable 0.5 milliGRAM(s) IV Push every 4 hours PRN Severe Pain  ondansetron Injectable 4 milliGRAM(s) IV Push every 6 hours PRN Nausea and/or Vomiting  oxyCODONE    5 mG/acetaminophen 325 mG 1 Tablet(s) Oral every 4 hours PRN Moderate Pain      Allergies    amoxicillin (Unknown)    Intolerances        Review of Systems:    General:  No wt loss, fevers, chills, night sweats, fatigue   Eyes:  Good vision, no reported pain  ENT:  No sore throat, pain, runny nose, dysphagia  CV:  No pain, palpitations, hypo/hypertension  Resp:  No dyspnea, cough, tachypnea, wheezing  GI:  No pain, No nausea, No vomiting, No diarrhea, No constipation, No weight loss, No fever, No pruritis, No rectal bleeding, No melena, No dysphagia  :  No pain, bleeding, incontinence, nocturia  Muscle:  No pain, weakness  Neuro:  No weakness, tingling, memory problems  Psych:  No fatigue, insomnia, mood problems, depression  Endocrine:  No polyuria, polydypsia, cold/heat intolerance  Heme:  No petechiae, ecchymosis, easy bruisability  Skin:  No rash, tattoos, scars, edema      Vital Signs Last 24 Hrs  T(C): 37.6 (15 Carmelo 2018 08:00), Max: 37.6 (14 Jun 2018 23:35)  T(F): 99.6 (15 Carmelo 2018 08:00), Max: 99.7 (14 Jun 2018 23:35)  HR: 70 (15 Carmelo 2018 08:00) (70 - 87)  BP: 161/72 (15 Carmelo 2018 08:00) (116/71 - 165/87)  BP(mean): --  RR: 16 (15 Carmelo 2018 08:00) (16 - 17)  SpO2: 98% (15 Carmelo 2018 08:00) (92% - 99%)    PHYSICAL EXAM:  General:  lying in bed in nad  HEENT:  NC/AT,  conjunctivae clear and pink, anicteric  Chest:  cta  CV: S1S2 irreg  Abdomen:  Soft, mild dt, +incisional tenderness, steristrips c/d/i  Extremities:  no edema  Skin:  No rash  Neuro/Psych:  A&Ox3        LABS:                        8.7    10.75 )-----------( 209      ( 14 Jun 2018 07:29 )             29.0     06-14    147<H>  |  110<H>  |  8   ----------------------------<  117<H>  3.9   |  27  |  1.10    Ca    8.3<L>      14 Jun 2018 07:29  Phos  4.3     06-14  Mg     1.9     06-14    TPro  5.5<L>  /  Alb  2.5<L>  /  TBili  0.4  /  DBili  x   /  AST  19  /  ALT  14  /  AlkPhos  63  06-14          RADIOLOGY & ADDITIONAL TESTS:

## 2018-06-15 NOTE — PROGRESS NOTE ADULT - SUBJECTIVE AND OBJECTIVE BOX
Patient is a 89y old  Female who presents with a chief complaint of weakness and lethargy, blood level low (05 Jun 2018 21:06)      INTERVAL /OVERNIGHT EVENTS: feels hungry, wants to eat, + flatus occasionally    MEDICATIONS  (STANDING):  alvimopan 12 milliGRAM(s) Oral two times a day  aspirin enteric coated 81 milliGRAM(s) Oral daily  digoxin     Tablet 0.125 milliGRAM(s) Oral daily  diltiazem    milliGRAM(s) Oral daily  folic acid 0.8 milliGRAM(s) Oral daily  heparin  Injectable 5000 Unit(s) SubCutaneous every 8 hours  lactated ringers. 1000 milliLiter(s) (50 mL/Hr) IV Continuous <Continuous>  levothyroxine 100 MICROGram(s) Oral daily  metoprolol tartrate 25 milliGRAM(s) Oral two times a day  multivitamin 1 Tablet(s) Oral daily  pantoprazole    Tablet 40 milliGRAM(s) Oral before breakfast  simvastatin 20 milliGRAM(s) Oral at bedtime    MEDICATIONS  (PRN):  acetaminophen   Tablet. 650 milliGRAM(s) Oral every 6 hours PRN Mild Pain (1 - 3)  HYDROmorphone  Injectable 0.5 milliGRAM(s) IV Push every 4 hours PRN Severe Pain  ondansetron Injectable 4 milliGRAM(s) IV Push every 6 hours PRN Nausea and/or Vomiting  oxyCODONE    IR 5 milliGRAM(s) Oral four times a day PRN Moderate Pain (4 - 6)  traMADol 25 milliGRAM(s) Oral four times a day PRN Mild Pain (1 - 3)      Allergies    amoxicillin (Unknown)    Intolerances        REVIEW OF SYSTEMS:  CONSTITUTIONAL: No fever, weight loss, or fatigue  EYES: No eye pain, visual disturbances, or discharge  ENMT:  No difficulty hearing, tinnitus, vertigo; No sinus or throat pain  NECK: No pain or stiffness  RESPIRATORY: No cough, wheezing, chills or hemoptysis; No shortness of breath  CARDIOVASCULAR: No chest pain, palpitations, dizziness, or leg swelling  GASTROINTESTINAL: No abdominal or epigastric pain. No nausea, vomiting, or hematemesis; No diarrhea or constipation. No melena or hematochezia.  GENITOURINARY: No dysuria, frequency, hematuria, or incontinence  NEUROLOGICAL: No headaches, memory loss, loss of strength, numbness, or tremors  SKIN: No itching, burning, rashes, or lesions   LYMPH NODES: No enlarged glands  ENDOCRINE: No heat or cold intolerance; No hair loss; No polydipsia or polyuria  MUSCULOSKELETAL: No joint pain or swelling; No muscle, back, or extremity pain  PSYCHIATRIC: No depression, anxiety, mood swings, or difficulty sleeping  HEME/LYMPH: No easy bruising, or bleeding gums  ALLERGY AND IMMUNOLOGIC: No hives or eczema    Vital Signs Last 24 Hrs  T(C): 37.1 (15 Carmelo 2018 15:58), Max: 37.6 (14 Jun 2018 23:35)  T(F): 98.7 (15 Carmelo 2018 15:58), Max: 99.7 (14 Jun 2018 23:35)  HR: 64 (15 Carmelo 2018 15:58) (59 - 87)  BP: 151/71 (15 Carmelo 2018 15:58) (125/79 - 165/87)  BP(mean): --  RR: 16 (15 Carmelo 2018 15:58) (16 - 17)  SpO2: 92% (15 Carmelo 2018 15:58) (92% - 100%)    PHYSICAL EXAM:  GENERAL: NAD, well-groomed, well-developed  HEAD:  Atraumatic, Normocephalic  EYES: EOMI, PERRLA, conjunctiva and sclera clear  ENMT: No tonsillar erythema, exudates, or enlargement; Moist mucous membranes, Good dentition, No lesions  NECK: Supple, No JVD, Normal thyroid  NERVOUS SYSTEM:  Alert & Oriented X3, Good concentration; Motor Strength 5/5 B/L upper and lower extremities; DTRs 2+ intact and symmetric  CHEST/LUNG: Clear to auscultation bilaterally; No rales, rhonchi, wheezing, or rubs  HEART: Regular rate and rhythm; No murmurs, rubs, or gallops  ABDOMEN: Soft, Nontender, Nondistended; Bowel sounds present  EXTREMITIES:  2+ Peripheral Pulses, No clubbing, cyanosis, or edema  LYMPH: No lymphadenopathy noted  SKIN: No rashes or lesions    LABS:                        8.8    10.92 )-----------( 189      ( 15 Carmelo 2018 10:58 )             28.8     15 Carmelo 2018 10:58    141    |  105    |  8      ----------------------------<  140    3.9     |  29     |  0.90     Ca    8.2        15 Carmelo 2018 10:58          CAPILLARY BLOOD GLUCOSE          RADIOLOGY & ADDITIONAL TESTS:    Notes Reviewed:  [x ] YES  [ ] NO    Care Discussed with Consultants/Other Providers [x ] YES  [ ] NO

## 2018-06-15 NOTE — PROGRESS NOTE ADULT - SUBJECTIVE AND OBJECTIVE BOX
patient with no complaints of pain.  It is well controlled.    Patient is tolerating clears without nausea.  Patient has no flatus or bm.    ROS  GI abdominal pain  All other ROs are negaitve    PE  Abdomen soft NT nd

## 2018-06-15 NOTE — PROGRESS NOTE ADULT - SUBJECTIVE AND OBJECTIVE BOX
[facility] -- Kam Craig, Magali Worrell, Jason Dempsey Savella  Office # [phone]      Follow Up:  AF, CAD s/p Colon resection post op check    Subjective/Observations: Seen and examined.  Pt sitting comfortably in the chair.  Pain is controlled.  No n/v or reports of cp, sob or palpitations.  No signs of orthopnea or PND.       REVIEW OF SYSTEMS: All other review of systems is negative unless indicated above    PAST MEDICAL & SURGICAL HISTORY:  Xerophthalmia  Anemia  Constipation  Gastrointestinal hemorrhage, unspecified gastritis, unspecified gastrointestinal hemorrhage type  Gastritis  Atrial fibrillation, unspecified  Hyperlipidemia  Gastroesophageal reflux disease without esophagitis  Hypothyroidism  Obesity  Dyslipidemia  Hypertension  CAD (coronary artery disease): S/P CABG 1999  History of colon polyps: removal 2014  H/O abdominal hysterectomy  Rectal mass: removal in 6/2015  S/P CABG (coronary artery bypass graft): in 1999      MEDICATIONS  (STANDING):  alvimopan 12 milliGRAM(s) Oral two times a day  aspirin enteric coated 81 milliGRAM(s) Oral daily  digoxin     Tablet 0.125 milliGRAM(s) Oral daily  diltiazem    milliGRAM(s) Oral daily  folic acid 0.8 milliGRAM(s) Oral daily  heparin  Injectable 5000 Unit(s) SubCutaneous every 8 hours  lactated ringers. 1000 milliLiter(s) (50 mL/Hr) IV Continuous <Continuous>  levothyroxine 100 MICROGram(s) Oral daily  metoprolol tartrate 25 milliGRAM(s) Oral two times a day  multivitamin 1 Tablet(s) Oral daily  pantoprazole  Injectable 40 milliGRAM(s) IV Push daily  simvastatin 20 milliGRAM(s) Oral at bedtime    MEDICATIONS  (PRN):  acetaminophen   Tablet. 650 milliGRAM(s) Oral every 6 hours PRN Mild Pain (1 - 3)  acetaminophen  IVPB. 1000 milliGRAM(s) IV Intermittent every 8 hours PRN Moderate Pain (4 - 6)  HYDROmorphone  Injectable 0.5 milliGRAM(s) IV Push every 4 hours PRN Severe Pain  ondansetron Injectable 4 milliGRAM(s) IV Push every 6 hours PRN Nausea and/or Vomiting  oxyCODONE    5 mG/acetaminophen 325 mG 1 Tablet(s) Oral every 4 hours PRN Moderate Pain      Allergies    amoxicillin (Unknown)    Intolerances            Vital Signs Last 24 Hrs  T(C): 37.6 (15 Carmelo 2018 08:00), Max: 37.6 (14 Jun 2018 23:35)  T(F): 99.6 (15 Carmelo 2018 08:00), Max: 99.7 (14 Jun 2018 23:35)  HR: 70 (15 Carmelo 2018 08:00) (70 - 87)  BP: 161/72 (15 Carmelo 2018 08:00) (116/71 - 165/87)  BP(mean): --  RR: 16 (15 Carmelo 2018 08:00) (16 - 17)  SpO2: 98% (15 Carmelo 2018 08:00) (92% - 99%)    I&O's Summary    14 Jun 2018 07:01  -  15 Carmelo 2018 07:00  --------------------------------------------------------  IN: 2530 mL / OUT: 500 mL / NET: 2030 mL    15 Carmelo 2018 07:01  -  15 Carmelo 2018 10:33  --------------------------------------------------------  IN: 150 mL / OUT: 0 mL / NET: 150 mL          PHYSICAL EXAM:  TELE: AF 50-80s  Constitutional: NAD, awake and alert, Frail  HEENT: Moist Mucous Membranes, Anicteric  Pulmonary: Non-labored, breath sounds decreased in the bases bilaterally, No wheezing, rales or rhonchi  Cardiovascular: Irregular, S1 and S2, No murmurs, rubs, gallops or clicks  Gastrointestinal: Bowel Sounds present, soft, tender to touch.  Midline incision steris c/d/i.   Lymph: No peripheral edema. No lymphadenopathy.  Skin: No visible rashes or ulcers.  Psych:  Mood & affect appropriate    LABS: All Labs Reviewed:                        8.7    10.75 )-----------( 209      ( 14 Jun 2018 07:29 )             29.0     14 Jun 2018 07:29    147    |  110    |  8      ----------------------------<  117    3.9     |  27     |  1.10   13 Jun 2018 19:58    144    |  108    |  7      ----------------------------<  157    3.7     |  26     |  1.10     Ca    8.3        14 Jun 2018 07:29  Ca    8.2        13 Jun 2018 19:58  Phos  4.3       14 Jun 2018 07:29  Mg     1.9       14 Jun 2018 07:29    TPro  5.5    /  Alb  2.5    /  TBili  0.4    /  DBili  x      /  AST  19     /  ALT  14     /  AlkPhos  63     14 Jun 2018 07:29  TPro  6.2    /  Alb  2.9    /  TBili  0.5    /  DBili  x      /  AST  34     /  ALT  14     /  AlkPhos  75     13 Jun 2018 19:58    < from: 12 Lead ECG (06.05.18 @ 12:32) >  Ventricular Rate 64 BPM    Atrial Rate 300 BPM    QRS Duration 130 ms    Q-T Interval 440 ms    QTC Calculation(Bezet) 453 ms    R Axis 7 degrees    T Axis 208 degrees    Diagnosis Line Atrial fibrillation  Left bundle branch block  Abnormal ECG  When compared with ECG of 05-MAR-2016 00:38,  Left bundle branch block is now present  Confirmed by Dawn Wilson ([id number]) on 6/6/2018 10:46:42 AM    < end of copied text >    < from: Transthoracic Echocardiogram (04.03.18 @ 12:57) >  Patient name: RON ABREU  YOB: 1926   Age: 89 (F)   MR#: [id number]  Study Date: 4/3/2018  Location: O/PSonographer: Sangita Ramírez RDCS  Study quality: Technically difficult  Referring Physician: DARIA MINAYA MD  Blood Pressure: 183/77 mmHg  Height: 140 cm  Weight: 58 kg  BSA: 1.5 m2  ------------------------------------------------------------------------  PROCEDURE: Transthoracic echocardiogram with 2-D, M-Mode  and complete spectral and color flow Doppler.  INDICATION:Presence of prosthetic heart valve (Z95.2)  ------------------------------------------------------------------------  Dimensions:    Normal Values:  LA:     5.0    2.0 - 4.0 cm  Ao:     2.5    2.0 - 3.8 cm  SEPTUM: 0.6    0.6 - 1.2 cm  PWT:    0.80.6 - 1.1 cm  LVIDd:  4.4    3.0 - 5.6 cm  LVIDs:  2.7    1.8 - 4.0 cm  Derived variables:  LVMI: 63 g/m2  RWT: 0.36  Fractional short: 39 %  EF (Visual Estimate): 70 %  Doppler Peak Velocity (m/sec): AoV=1.5  ------------------------------------------------------------------------  Observations:  Mitral Valve: Mitral annular calcification. Thickened  mitral leaflets. Mild mitral regurgitation.  Aortic Valve/Aorta: Transcatheter aortic valve replacement.  Peak transaortic valve gradient equals 9 mm Hg, mean  transaortic valve gradient equals 4 mm Hg, which is  probably normal in the presence of a transcatheter aortic  valve replacement. No aortic valve regurgitation seen. Peak  left ventricular outflow tract gradient equals 1 mm Hg,  mean gradient is equal to 1 mm Hg, LVOT velocity time  integral equals 14 cm.  Aortic Root: 2.5 cm.  Left Atrium: Severely dilated left atrium.  LA volume index  = 69 cc/m2.  Left Ventricle: Normal left ventricular systolic function.  Septal motion consistentwith paced rhythm/conduction  defect. Normal left ventricular internal dimensions and  wall thicknesses.  Right Heart: Right atrium not well visualized, appears  mildly dilated. Mild right ventricular enlargement with  mildly decreased right ventricular systolic function. A  device wire is noted in the right heart. Normal tricuspid  valve. Mild-moderate tricuspid regurgitation. Normal  pulmonic valve. Mild pulmonic regurgitation.  Pericardium/Pleura: Normal pericardium with no pericardial  effusion.  Hemodynamic: Estimated right atrial pressure is 8 mm Hg.  Estimated right ventricular systolic pressure equals 57 mm  Hg, assuming right atrial pressure equals 8 mm Hg,  consistent with moderate pulmonary hypertension.  ------------------------------------------------------------------------  Conclusions:  1. Mitral annular calcification. Thickened mitral leaflets.  Mild mitral regurgitation.  2. Transcatheter aortic valve replacement. Peak transaortic  valve gradient equals 9 mm Hg, mean transaortic valve  gradient equals 4 mm Hg, which is probably normal in the  presence of a transcatheter aortic valve replacement. No  aortic valve regurgitation seen.  3. Normal left ventricular systolic function. Septal motion  consistent with paced rhythm/conduction defect.  4. Mild right ventricular enlargement with mildly decreased  right ventricular systolic function. A device wire is noted  in the right heart.  5. Estimated pulmonary artery systolic pressure equals 57  mm Hg, assuming right atrial pressure equals 8 mm Hg,  consistent with moderate pulmonary pressures.  *** Compared with echocardiogram of 2/15/2018, results are  similar on today's study.  ------------------------------------------------------------------------  Confirmed on 4/3/2018 - 15:19:53 by Meagan Carter M.D.  ------------------------------------------------------------------------    < end of copied text >      < from: CT Chest w/ IV Cont (06.11.18 @ 08:34) >  EXAM:  CT CHEST IC                            PROCEDURE DATE:  06/11/2018          INTERPRETATION:  CLINICAL INFORMATION:  Anemia and shortness of breath,   recently diagnosed mass on colonoscopy.    PROCEDURE:  Using multislice helical CT, following the intravenous   administration of 95 cc of Omnipaque 350, 2.5 mm sections were obtained   from the thoracic inlet through the lung bases.  Multiplanar reformatted images    COMPARISON: None available.    FINDINGS:      There is a small left-sidedpleural effusion and underlying compressive   atelectasis.  There is a trace right-sided pleural effusion.    There is bilateral groundglass mosaic attenuation, finding which may   reflect air trapping.  The central airways remain patent; no central endobronchial lesion is   noted.    Patient is status post sternotomy with aortic valve repair and left-sided   cardiac device.  There is aneurysmal dilatation of the ascending aorta measuring up to 4.2   cm. There is arteriosclerotic calcification ofthoracic aorta with   coronary artery calcifications.  No pericardial effusion is noted.    There are prevascular and right paratracheal mediastinal lymph nodes,   measuring up to 12 mm in short axis.  No enlarged hilar lymphadenopathy is noted.    There are multilevel degenerative changes of the thoracic spine.    Impression:    Mosaic attenuation which may reflect air trapping.    Small left-sided pleural effusion and trace right-sided pleural effusion.    Other findings as discussed.          < end of copied text >

## 2018-06-15 NOTE — PROGRESS NOTE ADULT - SUBJECTIVE AND OBJECTIVE BOX
Patient is a 89y old  Female who presents with a chief complaint of weakness and lethargy, blood level low (05 Jun 2018 21:06)       Pt is seen and examined, pt is awake and is out of bed to chair  pt seems comfortable and denies any complaints at this time    HPI:  · HPI Objective Statement: 89 female sent to ER by her cardiologist for a low hg level 7.1. Patient states for the past 2 weeks she has not been feeling well, generalized fatigue, shortness of breath with exertion, sleeping more.	  In ER patient was found to be anemic.  Patient is being admitted for further work up and treatment (05 Jun 2018 15:51)         MEDICATIONS  (STANDING):  alvimopan 12 milliGRAM(s) Oral two times a day  aspirin enteric coated 81 milliGRAM(s) Oral daily  digoxin     Tablet 0.125 milliGRAM(s) Oral daily  diltiazem    milliGRAM(s) Oral daily  folic acid 0.8 milliGRAM(s) Oral daily  heparin  Injectable 5000 Unit(s) SubCutaneous every 8 hours  lactated ringers. 1000 milliLiter(s) (50 mL/Hr) IV Continuous <Continuous>  levothyroxine 100 MICROGram(s) Oral daily  metoprolol tartrate 25 milliGRAM(s) Oral two times a day  multivitamin 1 Tablet(s) Oral daily  pantoprazole    Tablet 40 milliGRAM(s) Oral before breakfast  simvastatin 20 milliGRAM(s) Oral at bedtime    MEDICATIONS  (PRN):  acetaminophen   Tablet. 650 milliGRAM(s) Oral every 6 hours PRN Mild Pain (1 - 3)  HYDROmorphone  Injectable 0.5 milliGRAM(s) IV Push every 4 hours PRN Severe Pain  ondansetron Injectable 4 milliGRAM(s) IV Push every 6 hours PRN Nausea and/or Vomiting  oxyCODONE    IR 5 milliGRAM(s) Oral four times a day PRN Moderate Pain (4 - 6)  traMADol 25 milliGRAM(s) Oral four times a day PRN Mild Pain (1 - 3)      Allergies    amoxicillin (Unknown)    Intolerances        Vital Signs Last 24 Hrs  T(C): 37.1 (15 Carmelo 2018 15:58), Max: 37.6 (14 Jun 2018 23:35)  T(F): 98.7 (15 Carmelo 2018 15:58), Max: 99.7 (14 Jun 2018 23:35)  HR: 64 (15 Carmelo 2018 15:58) (59 - 87)  BP: 151/71 (15 Carmelo 2018 15:58) (125/79 - 165/87)  BP(mean): --  RR: 16 (15 Carmelo 2018 15:58) (16 - 17)  SpO2: 92% (15 Carmelo 2018 15:58) (92% - 100%)    PHYSICAL EXAM  General: adult in NAD  HEENT: clear oropharynx, anicteric sclera, pink conjunctiva  Neck: supple  CV: normal S1/S2 with no murmur rubs or gallops  Lungs: positive air movement b/l ant lungs,clear to auscultation, no wheezes, no rales  Abdomen: soft non-tender non-distended, no hepatosplenomegaly  Ext: no clubbing cyanosis or edema  Skin: no rashes and no petechiae  Neuro: alert and oriented X 4, no focal deficits  LABS:                          8.8    10.92 )-----------( 189      ( 15 Carmelo 2018 10:58 )             28.8         Mean Cell Volume : 84.5 fl  Mean Cell Hemoglobin : 25.8 pg  Mean Cell Hemoglobin Concentration : 30.6 gm/dL  Auto Neutrophil # : x  Auto Lymphocyte # : x  Auto Monocyte # : x  Auto Eosinophil # : x  Auto Basophil # : x  Auto Neutrophil % : x  Auto Lymphocyte % : x  Auto Monocyte % : x  Auto Eosinophil % : x  Auto Basophil % : x    Serial CBC's  06-15 @ 10:58  Hct-28.8 / Hgb-8.8 / Plat-189 / RBC-3.41 / WBC-10.92          Serial CBC's  06-15 @ 10:30  Hct-29.9 / Hgb-8.8 / Plat--- / RBC--- / WBC---          Serial CBC's  06-14 @ 07:29  Hct-29.0 / Hgb-8.7 / Plat-209 / RBC-3.47 / WBC-10.75            06-15    141  |  105  |  8   ----------------------------<  140<H>  3.9   |  29  |  0.90    Ca    8.2<L>      15 Carmelo 2018 10:58  Phos  4.3     06-14  Mg     1.9     06-14    TPro  5.5<L>  /  Alb  2.5<L>  /  TBili  0.4  /  DBili  x   /  AST  19  /  ALT  14  /  AlkPhos  63  06-14          Vitamin B12, Serum: >2000 pg/mL (06-07-18 @ 08:03)  Folate, Serum: >20.0 ng/mL (06-07-18 @ 08:03)  Reticulocyte Percent: see note % (06-07-18 @ 06:54)  Iron - Total Binding Capacity.: 393 ug/dL (06-05-18 @ 22:52)  Ferritin, Serum: 46 ng/mL (06-05-18 @ 22:52)

## 2018-06-16 LAB
HCT VFR BLD CALC: 29.7 % — LOW (ref 34.5–45)
HGB BLD-MCNC: 8.8 G/DL — LOW (ref 11.5–15.5)
MCHC RBC-ENTMCNC: 24.9 PG — LOW (ref 27–34)
MCHC RBC-ENTMCNC: 29.6 GM/DL — LOW (ref 32–36)
MCV RBC AUTO: 83.9 FL — SIGNIFICANT CHANGE UP (ref 80–100)
NRBC # BLD: 0 /100 WBCS — SIGNIFICANT CHANGE UP (ref 0–0)
PLATELET # BLD AUTO: 217 K/UL — SIGNIFICANT CHANGE UP (ref 150–400)
RBC # BLD: 3.54 M/UL — LOW (ref 3.8–5.2)
RBC # FLD: 22.5 % — HIGH (ref 10.3–14.5)
WBC # BLD: 9.06 K/UL — SIGNIFICANT CHANGE UP (ref 3.8–10.5)
WBC # FLD AUTO: 9.06 K/UL — SIGNIFICANT CHANGE UP (ref 3.8–10.5)

## 2018-06-16 PROCEDURE — 99232 SBSQ HOSP IP/OBS MODERATE 35: CPT

## 2018-06-16 RX ORDER — RIVAROXABAN 15 MG-20MG
1 KIT ORAL
Qty: 0 | Refills: 0 | COMMUNITY

## 2018-06-16 RX ORDER — RIVAROXABAN 15 MG-20MG
20 KIT ORAL EVERY 24 HOURS
Qty: 0 | Refills: 0 | Status: DISCONTINUED | OUTPATIENT
Start: 2018-06-16 | End: 2018-06-16

## 2018-06-16 RX ORDER — DIGOXIN 250 MCG
1 TABLET ORAL
Qty: 0 | Refills: 0 | DISCHARGE
Start: 2018-06-16

## 2018-06-16 RX ORDER — DIGOXIN 250 MCG
0 TABLET ORAL
Qty: 34 | Refills: 0 | COMMUNITY

## 2018-06-16 RX ORDER — ASCORBIC ACID 60 MG
1 TABLET,CHEWABLE ORAL
Qty: 0 | Refills: 0 | DISCHARGE
Start: 2018-06-16

## 2018-06-16 RX ORDER — RIVAROXABAN 15 MG-20MG
1 KIT ORAL
Qty: 30 | Refills: 0
Start: 2018-06-16 | End: 2018-07-15

## 2018-06-16 RX ORDER — RIVAROXABAN 15 MG-20MG
15 KIT ORAL EVERY 24 HOURS
Qty: 0 | Refills: 0 | Status: DISCONTINUED | OUTPATIENT
Start: 2018-06-16 | End: 2018-06-18

## 2018-06-16 RX ORDER — FOLIC ACID 0.8 MG
1 TABLET ORAL
Qty: 0 | Refills: 0 | DISCHARGE
Start: 2018-06-16

## 2018-06-16 RX ORDER — ASCORBIC ACID 60 MG
500 TABLET,CHEWABLE ORAL DAILY
Qty: 0 | Refills: 0 | Status: DISCONTINUED | OUTPATIENT
Start: 2018-06-16 | End: 2018-06-18

## 2018-06-16 RX ORDER — RIVAROXABAN 15 MG-20MG
1 KIT ORAL
Qty: 30 | Refills: 0 | DISCHARGE
Start: 2018-06-16 | End: 2018-07-15

## 2018-06-16 RX ORDER — FOLIC ACID 0.8 MG
1 TABLET ORAL DAILY
Qty: 0 | Refills: 0 | Status: DISCONTINUED | OUTPATIENT
Start: 2018-06-16 | End: 2018-06-18

## 2018-06-16 RX ADMIN — Medication 240 MILLIGRAM(S): at 05:42

## 2018-06-16 RX ADMIN — SIMVASTATIN 20 MILLIGRAM(S): 20 TABLET, FILM COATED ORAL at 21:29

## 2018-06-16 RX ADMIN — Medication 100 MICROGRAM(S): at 05:42

## 2018-06-16 RX ADMIN — Medication 1 TABLET(S): at 11:11

## 2018-06-16 RX ADMIN — Medication 25 MILLIGRAM(S): at 17:14

## 2018-06-16 RX ADMIN — RIVAROXABAN 15 MILLIGRAM(S): KIT at 17:14

## 2018-06-16 RX ADMIN — ALVIMOPAN 12 MILLIGRAM(S): 12 CAPSULE ORAL at 05:42

## 2018-06-16 RX ADMIN — Medication 0.12 MILLIGRAM(S): at 05:42

## 2018-06-16 RX ADMIN — PANTOPRAZOLE SODIUM 40 MILLIGRAM(S): 20 TABLET, DELAYED RELEASE ORAL at 05:42

## 2018-06-16 RX ADMIN — Medication 0.8 MILLIGRAM(S): at 11:11

## 2018-06-16 RX ADMIN — HEPARIN SODIUM 5000 UNIT(S): 5000 INJECTION INTRAVENOUS; SUBCUTANEOUS at 05:42

## 2018-06-16 RX ADMIN — Medication 81 MILLIGRAM(S): at 11:11

## 2018-06-16 NOTE — PROGRESS NOTE ADULT - ASSESSMENT
Dx S/p right colectomy  Continue to advance diet  If ramians stable possible d/c in next 24-48 hours

## 2018-06-16 NOTE — PROGRESS NOTE ADULT - PROBLEM SELECTOR PLAN 1
s/p colonoscopy with hepatic flexure mass   anemia likely 2/2 mass  path showed TVA, no evidence of malignancy but possibility of underlying lesion not excluded  s/p OR 6/13 for RHC and AILEEN  f/u cytopathology when available  post op care as per surgery.

## 2018-06-16 NOTE — PROGRESS NOTE ADULT - SUBJECTIVE AND OBJECTIVE BOX
Patient is a 89y old  Female who presents with a chief complaint of weakness and lethargy, blood level low (05 Jun 2018 21:06)      INTERVAL / OVERNIGHT EVENTS: tolerating regular diet now,     MEDICATIONS  (STANDING):  ascorbic acid 500 milliGRAM(s) Oral daily  aspirin enteric coated 81 milliGRAM(s) Oral daily  digoxin     Tablet 0.125 milliGRAM(s) Oral daily  diltiazem    milliGRAM(s) Oral daily  folic acid 1 milliGRAM(s) Oral daily  levothyroxine 100 MICROGram(s) Oral daily  metoprolol tartrate 25 milliGRAM(s) Oral two times a day  multivitamin 1 Tablet(s) Oral daily  pantoprazole    Tablet 40 milliGRAM(s) Oral before breakfast  rivaroxaban 15 milliGRAM(s) Oral every 24 hours  simvastatin 20 milliGRAM(s) Oral at bedtime    MEDICATIONS  (PRN):  acetaminophen   Tablet. 650 milliGRAM(s) Oral every 6 hours PRN Mild Pain (1 - 3)  oxyCODONE    IR 5 milliGRAM(s) Oral four times a day PRN Moderate Pain (4 - 6)  traMADol 25 milliGRAM(s) Oral four times a day PRN Mild Pain (1 - 3)      Allergies    amoxicillin (Unknown)    Intolerances        REVIEW OF SYSTEMS:  CONSTITUTIONAL: No fever, weight loss, or fatigue  EYES: No eye pain, visual disturbances, or discharge  ENMT:  No difficulty hearing, tinnitus, vertigo; No sinus or throat pain  NECK: No pain or stiffness  RESPIRATORY: No cough, wheezing, chills or hemoptysis; No shortness of breath  CARDIOVASCULAR: No chest pain, palpitations, dizziness, or leg swelling  GASTROINTESTINAL: No abdominal or epigastric pain. No nausea, vomiting, or hematemesis; No diarrhea or constipation. No melena or hematochezia.  GENITOURINARY: No dysuria, frequency, hematuria, or incontinence  NEUROLOGICAL: No headaches, memory loss, loss of strength, numbness, or tremors  SKIN: No itching, burning, rashes, or lesions   LYMPH NODES: No enlarged glands  ENDOCRINE: No heat or cold intolerance; No hair loss; No polydipsia or polyuria  MUSCULOSKELETAL: No joint pain or swelling; No muscle, back, or extremity pain  PSYCHIATRIC: No depression, anxiety, mood swings, or difficulty sleeping  HEME/LYMPH: No easy bruising, or bleeding gums  ALLERGY AND IMMUNOLOGIC: No hives or eczema    Vital Signs Last 24 Hrs  T(C): 36.7 (16 Jun 2018 15:27), Max: 36.9 (15 Carmelo 2018 19:58)  T(F): 98 (16 Jun 2018 15:27), Max: 98.5 (15 Carmelo 2018 23:15)  HR: 72 (16 Jun 2018 17:15) (51 - 72)  BP: 176/68 (16 Jun 2018 17:15) (120/64 - 176/68)  BP(mean): --  RR: 16 (16 Jun 2018 15:27) (16 - 16)  SpO2: 96% (16 Jun 2018 15:27) (92% - 97%)    PHYSICAL EXAM:  GENERAL: NAD, well-groomed, well-developed  HEAD:  Atraumatic, Normocephalic  EYES: EOMI, PERRLA, conjunctiva and sclera clear  ENMT: No tonsillar erythema, exudates, or enlargement; Moist mucous membranes, Good dentition, No lesions  NECK: Supple, No JVD, Normal thyroid  NERVOUS SYSTEM:  Alert & Oriented X3, Good concentration; Motor Strength 5/5 B/L upper and lower extremities; DTRs 2+ intact and symmetric  CHEST/LUNG: Clear to auscultation bilaterally; No rales, rhonchi, wheezing, or rubs  HEART: Regular rate and rhythm; No murmurs, rubs, or gallops  ABDOMEN: Soft, Nontender, Nondistended; Bowel sounds present  EXTREMITIES:  2+ Peripheral Pulses, No clubbing, cyanosis, or edema  LYMPH: No lymphadenopathy noted  SKIN: No rashes or lesions    LABS:                        8.8    9.06  )-----------( 217      ( 16 Jun 2018 11:47 )             29.7       Ca    8.2        15 Carmelo 2018 10:58          CAPILLARY BLOOD GLUCOSE          RADIOLOGY & ADDITIONAL TESTS:    Notes Reviewed:  [x ] YES  [ ] NO    Care Discussed with Consultants/Other Providers [x ] YES  [ ] NO

## 2018-06-16 NOTE — PROGRESS NOTE ADULT - SUBJECTIVE AND OBJECTIVE BOX
Patient is a 89y old  Female who presents with a chief complaint of weakness and lethargy, blood level low (05 Jun 2018 21:06)       Pt is seen and examined  pt is awake and lying in bed/out of bed to chair  pt seems comfortable and denies any complaints at this time    HPI:  · HPI Objective Statement: 89 female sent to ER by her cardiologist for a low hg level 7.1. Patient states for the past 2 weeks she has not been feeling well, generalized fatigue, shortness of breath with exertion, sleeping more.	  In ER patient was found to be anemic.  Patient is being admitted for further work up and treatment (05 Jun 2018 15:51)         ROS:  Negative except for:    MEDICATIONS  (STANDING):  aspirin enteric coated 81 milliGRAM(s) Oral daily  digoxin     Tablet 0.125 milliGRAM(s) Oral daily  diltiazem    milliGRAM(s) Oral daily  folic acid 0.8 milliGRAM(s) Oral daily  heparin  Injectable 5000 Unit(s) SubCutaneous every 8 hours  levothyroxine 100 MICROGram(s) Oral daily  metoprolol tartrate 25 milliGRAM(s) Oral two times a day  multivitamin 1 Tablet(s) Oral daily  pantoprazole    Tablet 40 milliGRAM(s) Oral before breakfast  simvastatin 20 milliGRAM(s) Oral at bedtime    MEDICATIONS  (PRN):  acetaminophen   Tablet. 650 milliGRAM(s) Oral every 6 hours PRN Mild Pain (1 - 3)  ondansetron Injectable 4 milliGRAM(s) IV Push every 6 hours PRN Nausea and/or Vomiting  oxyCODONE    IR 5 milliGRAM(s) Oral four times a day PRN Moderate Pain (4 - 6)  traMADol 25 milliGRAM(s) Oral four times a day PRN Mild Pain (1 - 3)      Allergies    amoxicillin (Unknown)    Intolerances        Vital Signs Last 24 Hrs  T(C): 36.9 (16 Jun 2018 07:38), Max: 37.1 (15 Carmelo 2018 15:58)  T(F): 98.5 (16 Jun 2018 07:38), Max: 98.7 (15 Carmelo 2018 15:58)  HR: 61 (16 Jun 2018 07:38) (51 - 64)  BP: 155/71 (16 Jun 2018 07:38) (120/64 - 155/71)  BP(mean): --  RR: 16 (16 Jun 2018 07:38) (16 - 16)  SpO2: 97% (16 Jun 2018 07:38) (92% - 100%)    PHYSICAL EXAM  General: adult in NAD  HEENT: clear oropharynx, anicteric sclera, pink conjunctiva  Neck: supple  CV: normal S1/S2 with no murmur rubs or gallops  Lungs: positive air movement b/l ant lungs,clear to auscultation, no wheezes, no rales  Abdomen: soft non-tender non-distended, no hepatosplenomegaly  Ext: no clubbing cyanosis or edema  Skin: no rashes and no petechiae  Neuro: alert and oriented X 4, no focal deficits  LABS:                          8.8    10.92 )-----------( 189      ( 15 Carmelo 2018 10:58 )             28.8         Mean Cell Volume : 84.5 fl  Mean Cell Hemoglobin : 25.8 pg  Mean Cell Hemoglobin Concentration : 30.6 gm/dL  Auto Neutrophil # : x  Auto Lymphocyte # : x  Auto Monocyte # : x  Auto Eosinophil # : x  Auto Basophil # : x  Auto Neutrophil % : x  Auto Lymphocyte % : x  Auto Monocyte % : x  Auto Eosinophil % : x  Auto Basophil % : x    Serial CBC's  06-15 @ 10:58  Hct-28.8 / Hgb-8.8 / Plat-189 / RBC-3.41 / WBC-10.92          Serial CBC's  06-15 @ 10:30  Hct-29.9 / Hgb-8.8 / Plat--- / RBC--- / WBC---          Serial CBC's  06-14 @ 07:29  Hct-29.0 / Hgb-8.7 / Plat-209 / RBC-3.47 / WBC-10.75            06-15    141  |  105  |  8   ----------------------------<  140<H>  3.9   |  29  |  0.90    Ca    8.2<L>      15 Carmelo 2018 10:58            Vitamin B12, Serum: >2000 pg/mL (06-07-18 @ 08:03)  Folate, Serum: >20.0 ng/mL (06-07-18 @ 08:03)  Reticulocyte Percent: see note % (06-07-18 @ 06:54)                BLOOD SMEAR INTERPRETATION:       RADIOLOGY & ADDITIONAL STUDIES: Patient is a 89y old  Female who presents with a chief complaint of weakness and lethargy, blood level low (05 Jun 2018 21:06)       Pt is seen and examined, pt is awake and is sitting in chair  pt seems comfortable and denies any complaints at this time    HPI:  · HPI Objective Statement: 89 female sent to ER by her cardiologist for a low hg level 7.1. Patient states for the past 2 weeks she has not been feeling well, generalized fatigue, shortness of breath with exertion, sleeping more.	  In ER patient was found to be anemic.  Patient is being admitted for further work up and treatment (05 Jun 2018 15:51)           MEDICATIONS  (STANDING):  aspirin enteric coated 81 milliGRAM(s) Oral daily  digoxin     Tablet 0.125 milliGRAM(s) Oral daily  diltiazem    milliGRAM(s) Oral daily  folic acid 0.8 milliGRAM(s) Oral daily  heparin  Injectable 5000 Unit(s) SubCutaneous every 8 hours  levothyroxine 100 MICROGram(s) Oral daily  metoprolol tartrate 25 milliGRAM(s) Oral two times a day  multivitamin 1 Tablet(s) Oral daily  pantoprazole    Tablet 40 milliGRAM(s) Oral before breakfast  simvastatin 20 milliGRAM(s) Oral at bedtime    MEDICATIONS  (PRN):  acetaminophen   Tablet. 650 milliGRAM(s) Oral every 6 hours PRN Mild Pain (1 - 3)  ondansetron Injectable 4 milliGRAM(s) IV Push every 6 hours PRN Nausea and/or Vomiting  oxyCODONE    IR 5 milliGRAM(s) Oral four times a day PRN Moderate Pain (4 - 6)  traMADol 25 milliGRAM(s) Oral four times a day PRN Mild Pain (1 - 3)      Allergies    amoxicillin (Unknown)    Intolerances        Vital Signs Last 24 Hrs  T(C): 36.9 (16 Jun 2018 07:38), Max: 37.1 (15 Carmelo 2018 15:58)  T(F): 98.5 (16 Jun 2018 07:38), Max: 98.7 (15 Carmelo 2018 15:58)  HR: 61 (16 Jun 2018 07:38) (51 - 64)  BP: 155/71 (16 Jun 2018 07:38) (120/64 - 155/71)  BP(mean): --  RR: 16 (16 Jun 2018 07:38) (16 - 16)  SpO2: 97% (16 Jun 2018 07:38) (92% - 100%)    PHYSICAL EXAM  General: adult in NAD  HEENT: clear oropharynx, anicteric sclera, pink conjunctiva  Neck: supple  CV: normal S1/S2 with no murmur rubs or gallops  Lungs: positive air movement b/l ant lungs,clear to auscultation, no wheezes, no rales  Abdomen: soft non-tender non-distended, no hepatosplenomegaly  Ext: no clubbing cyanosis or edema  Skin: no rashes and no petechiae  Neuro: alert and oriented X 4, no focal deficits  LABS:                          8.8    10.92 )-----------( 189      ( 15 Carmelo 2018 10:58 )             28.8         Mean Cell Volume : 84.5 fl  Mean Cell Hemoglobin : 25.8 pg  Mean Cell Hemoglobin Concentration : 30.6 gm/dL  Auto Neutrophil # : x  Auto Lymphocyte # : x  Auto Monocyte # : x  Auto Eosinophil # : x  Auto Basophil # : x  Auto Neutrophil % : x  Auto Lymphocyte % : x  Auto Monocyte % : x  Auto Eosinophil % : x  Auto Basophil % : x    Serial CBC's  06-15 @ 10:58  Hct-28.8 / Hgb-8.8 / Plat-189 / RBC-3.41 / WBC-10.92          Serial CBC's  06-15 @ 10:30  Hct-29.9 / Hgb-8.8 / Plat--- / RBC--- / WBC---          Serial CBC's  06-14 @ 07:29  Hct-29.0 / Hgb-8.7 / Plat-209 / RBC-3.47 / WBC-10.75            06-15    141  |  105  |  8   ----------------------------<  140<H>  3.9   |  29  |  0.90    Ca    8.2<L>      15 Carmelo 2018 10:58            Vitamin B12, Serum: >2000 pg/mL (06-07-18 @ 08:03)  Folate, Serum: >20.0 ng/mL (06-07-18 @ 08:03)  Reticulocyte Percent: see note % (06-07-18 @ 06:54)

## 2018-06-16 NOTE — PROGRESS NOTE ADULT - PROBLEM SELECTOR PLAN 3
etiology multifactorial, guaiac pos stool, iron def, hx of gastritis and gastric ulcerated submucosal lesion on prior egds as per gi, had egd/colon--colon mass, s/p surgery  s/p prbc on this admisssion 6/5/18  iron deficiency on iron profile, s/p iv iron 100 mg a day --6/6/18---6/8/2018, now on po iron  monitor serial h/h--transfuse prbc as needed for symptomatic anemia.  hb today 8.8  gi/dvt prophylaxis--heparin s/c  d/w patient at bedside at length

## 2018-06-16 NOTE — PROGRESS NOTE ADULT - SUBJECTIVE AND OBJECTIVE BOX
INTERVAL HPI/OVERNIGHT EVENTS:  No new overnight event.  No N/V/D.  Tolerating diet.      MEDICATIONS  (STANDING):  ascorbic acid 500 milliGRAM(s) Oral daily  aspirin enteric coated 81 milliGRAM(s) Oral daily  digoxin     Tablet 0.125 milliGRAM(s) Oral daily  diltiazem    milliGRAM(s) Oral daily  folic acid 1 milliGRAM(s) Oral daily  levothyroxine 100 MICROGram(s) Oral daily  metoprolol tartrate 25 milliGRAM(s) Oral two times a day  multivitamin 1 Tablet(s) Oral daily  pantoprazole    Tablet 40 milliGRAM(s) Oral before breakfast  rivaroxaban 15 milliGRAM(s) Oral every 24 hours  simvastatin 20 milliGRAM(s) Oral at bedtime    MEDICATIONS  (PRN):  acetaminophen   Tablet. 650 milliGRAM(s) Oral every 6 hours PRN Mild Pain (1 - 3)  oxyCODONE    IR 5 milliGRAM(s) Oral four times a day PRN Moderate Pain (4 - 6)  traMADol 25 milliGRAM(s) Oral four times a day PRN Mild Pain (1 - 3)      Allergies    amoxicillin (Unknown)    Intolerances          General:  No wt loss, fevers, chills, night sweats, fatigue,   Eyes:  Good vision, no reported pain  ENT:  No sore throat, pain, runny nose, dysphagia  CV:  No pain, palpitations, hypo/hypertension  Resp:  No dyspnea, cough, tachypnea, wheezing  GI:  No pain, No nausea, No vomiting, No diarrhea, No constipation, No weight loss, No fever, No pruritis, No rectal bleeding, No tarry stools, No dysphagia,  :  No pain, bleeding, incontinence, nocturia  Muscle:  No pain, weakness  Neuro:  No weakness, tingling, memory problems  Psych:  No fatigue, insomnia, mood problems, depression  Endocrine:  No polyuria, polydipsia, cold/heat intolerance  Heme:  No petechiae, ecchymosis, easy bruisability  Skin:  No rash, tattoos, scars, edema      PHYSICAL EXAM:   Vital Signs:  Vital Signs Last 24 Hrs  T(C): 36.7 (2018 15:27), Max: 36.9 (15 Carmelo 2018 19:58)  T(F): 98 (2018 15:27), Max: 98.5 (15 Carmelo 2018 23:15)  HR: 54 (2018 15:27) (51 - 61)  BP: 131/55 (2018 15:27) (120/64 - 155/71)  BP(mean): --  RR: 16 (2018 15:27) (16 - 16)  SpO2: 96% (2018 15:27) (92% - 97%)  Daily     Daily Weight in k.7 (2018 04:59)I&O's Summary    15 Carmelo 2018 07:01  -  2018 07:00  --------------------------------------------------------  IN: 150 mL / OUT: 0 mL / NET: 150 mL        GENERAL:  Appears stated age, well-groomed, well-nourished, no distress  HEENT:  NC/AT,  conjunctivae clear and pink, no thyromegaly, nodules, adenopathy, no JVD, sclera -anicteric  CHEST:  Full & symmetric excursion, no increased effort, breath sounds clear  HEART:  Regular rhythm, S1, S2, no murmur/rub/S3/S4, no abdominal bruit, no edema  ABDOMEN:  Soft, non-tender, non-distended, normoactive bowel sounds,  no masses ,no hepato-splenomegaly, no signs of chronic liver disease  EXTEREMITIES:  no cyanosis,clubbing or edema  SKIN:  No rash/erythema/ecchymoses/petechiae/wounds/abscess/warm/dry  NEURO:  Alert, oriented, no asterixis, no tremor, no encephalopathy      LABS:                        8.8    9.06  )-----------( 217      ( 2018 11:47 )             29.7     06-15    141  |  105  |  8   ----------------------------<  140<H>  3.9   |  29  |  0.90    Ca    8.2<L>      15 Carmelo 2018 10:58          amylase   lipase  RADIOLOGY & ADDITIONAL TESTS:

## 2018-06-16 NOTE — PROGRESS NOTE ADULT - SUBJECTIVE AND OBJECTIVE BOX
[facility] -- Kam Craig, Magali Worrell, Jason Dempsey Savella  Office # [phone]      Follow Up:   AF, CAD s/p Colon resection post op check    Subjective/Observations: Seen and examined.  Pt sitting in chair advanced her diet tolerating regular.  No reports of CP, sob or palpitations with incisional pain that is controlled with her pain medications.  Slept fairly well.  No signs of orthopnea or PND.        REVIEW OF SYSTEMS: All other review of systems is negative unless indicated above    PAST MEDICAL & SURGICAL HISTORY:  Xerophthalmia  Anemia  Constipation  Gastrointestinal hemorrhage, unspecified gastritis, unspecified gastrointestinal hemorrhage type  Gastritis  Atrial fibrillation, unspecified  Hyperlipidemia  Gastroesophageal reflux disease without esophagitis  Hypothyroidism  Obesity  Dyslipidemia  Hypertension  CAD (coronary artery disease): S/P CABG 1999  History of colon polyps: removal 2014  H/O abdominal hysterectomy  Rectal mass: removal in 6/2015  S/P CABG (coronary artery bypass graft): in 1999      MEDICATIONS  (STANDING):  ascorbic acid 500 milliGRAM(s) Oral daily  aspirin enteric coated 81 milliGRAM(s) Oral daily  digoxin     Tablet 0.125 milliGRAM(s) Oral daily  diltiazem    milliGRAM(s) Oral daily  folic acid 1 milliGRAM(s) Oral daily  levothyroxine 100 MICROGram(s) Oral daily  metoprolol tartrate 25 milliGRAM(s) Oral two times a day  multivitamin 1 Tablet(s) Oral daily  pantoprazole    Tablet 40 milliGRAM(s) Oral before breakfast  rivaroxaban 20 milliGRAM(s) Oral every 24 hours  simvastatin 20 milliGRAM(s) Oral at bedtime    MEDICATIONS  (PRN):  acetaminophen   Tablet. 650 milliGRAM(s) Oral every 6 hours PRN Mild Pain (1 - 3)  oxyCODONE    IR 5 milliGRAM(s) Oral four times a day PRN Moderate Pain (4 - 6)  traMADol 25 milliGRAM(s) Oral four times a day PRN Mild Pain (1 - 3)      Allergies    amoxicillin (Unknown)    Intolerances            Vital Signs Last 24 Hrs  T(C): 36.8 (16 Jun 2018 11:21), Max: 37.1 (15 Carmelo 2018 15:58)  T(F): 98.2 (16 Jun 2018 11:21), Max: 98.7 (15 Carmelo 2018 15:58)  HR: 56 (16 Jun 2018 11:21) (51 - 64)  BP: 145/69 (16 Jun 2018 11:21) (120/64 - 155/71)  BP(mean): --  RR: 16 (16 Jun 2018 11:21) (16 - 16)  SpO2: 93% (16 Jun 2018 11:21) (92% - 97%)    I&O's Summary    15 Carmelo 2018 07:01  -  16 Jun 2018 07:00  --------------------------------------------------------  IN: 150 mL / OUT: 0 mL / NET: 150 mL          PHYSICAL EXAM:  TELE: AF 55  Constitutional: NAD, awake and alert, well-developed, obese  HEENT: Moist Mucous Membranes, Anicteric  Pulmonary: Non-labored, breath sounds diminished in bases with basal rales bilaterally  Cardiovascular: Irregular, S1 and S2, No murmurs, rubs, gallops or clicks  Gastrointestinal: Bowel Sounds present, soft, nontender.   Lymph: Rt hand and lower forearm swelling from old IV site.  No lymphadenopathy.  Skin: slight bruising to right hand  Psych:  Mood & affect appropriate    LABS: All Labs Reviewed:                        8.8    9.06  )-----------( 217      ( 16 Jun 2018 11:47 )             29.7                         8.8    10.92 )-----------( 189      ( 15 Carmelo 2018 10:58 )             28.8                         8.8    x     )-----------( x        ( 15 Carmelo 2018 10:30 )             29.9     15 Carmelo 2018 10:58    141    |  105    |  8      ----------------------------<  140    3.9     |  29     |  0.90   14 Jun 2018 07:29    147    |  110    |  8      ----------------------------<  117    3.9     |  27     |  1.10   13 Jun 2018 19:58    144    |  108    |  7      ----------------------------<  157    3.7     |  26     |  1.10     Ca    8.2        15 Jun 2018 10:58  Ca    8.3        14 Jun 2018 07:29  Ca    8.2        13 Jun 2018 19:58  Phos  4.3       14 Jun 2018 07:29  Mg     1.9       14 Jun 2018 07:29    TPro  5.5    /  Alb  2.5    /  TBili  0.4    /  DBili  x      /  AST  19     /  ALT  14     /  AlkPhos  63     14 Jun 2018 07:29  TPro  6.2    /  Alb  2.9    /  TBili  0.5    /  DBili  x      /  AST  34     /  ALT  14     /  AlkPhos  75     13 Jun 2018 19:58    < from: 12 Lead ECG (06.05.18 @ 12:32) >  Ventricular Rate 64 BPM    Atrial Rate 300 BPM    QRS Duration 130 ms    Q-T Interval 440 ms    QTC Calculation(Bezet) 453 ms    R Axis 7 degrees    T Axis 208 degrees    Diagnosis Line Atrial fibrillation  Left bundle branch block  Abnormal ECG  When compared with ECG of 05-MAR-2016 00:38,  Left bundle branch block is now present  Confirmed by Dawn Wilson ([id number]) on 6/6/2018 10:46:42 AM    < end of copied text >  < from: Transthoracic Echocardiogram (04.03.18 @ 12:57) >  Patient name: RON ABREU  YOB: 1926   Age: 89 (F)   MR#: [id number]  Study Date: 4/3/2018  Location: O/PSonographer: Sangita Ramírez SOL  Study quality: Technically difficult  Referring Physician: DARIA MINAYA MD  Blood Pressure: 183/77 mmHg  Height: 140 cm  Weight: 58 kg  BSA: 1.5 m2  ------------------------------------------------------------------------  PROCEDURE: Transthoracic echocardiogram with 2-D, M-Mode  and complete spectral and color flow Doppler.  INDICATION:Presence of prosthetic heart valve (Z95.2)  ------------------------------------------------------------------------  Dimensions:    Normal Values:  LA:     5.0    2.0 - 4.0 cm  Ao:     2.5    2.0 - 3.8 cm  SEPTUM: 0.6    0.6 - 1.2 cm  PWT:    0.80.6 - 1.1 cm  LVIDd:  4.4    3.0 - 5.6 cm  LVIDs:  2.7    1.8 - 4.0 cm  Derived variables:  LVMI: 63 g/m2  RWT: 0.36  Fractional short: 39 %  EF (Visual Estimate): 70 %  Doppler Peak Velocity (m/sec): AoV=1.5  ------------------------------------------------------------------------  Observations:  Mitral Valve: Mitral annular calcification. Thickened  mitral leaflets. Mild mitral regurgitation.  Aortic Valve/Aorta: Transcatheter aortic valve replacement.  Peak transaortic valve gradient equals 9 mm Hg, mean  transaortic valve gradient equals 4 mm Hg, which is  probably normal in the presence of a transcatheter aortic  valve replacement. No aortic valve regurgitation seen. Peak  left ventricular outflow tract gradient equals 1 mm Hg,  mean gradient is equal to 1 mm Hg, LVOT velocity time  integral equals 14 cm.  Aortic Root: 2.5 cm.  Left Atrium: Severely dilated left atrium.  LA volume index  = 69 cc/m2.  Left Ventricle: Normal left ventricular systolic function.  Septal motion consistentwith paced rhythm/conduction  defect. Normal left ventricular internal dimensions and  wall thicknesses.  Right Heart: Right atrium not well visualized, appears  mildly dilated. Mild right ventricular enlargement with  mildly decreased right ventricular systolic function. A  device wire is noted in the right heart. Normal tricuspid  valve. Mild-moderate tricuspid regurgitation. Normal  pulmonic valve. Mild pulmonic regurgitation.  Pericardium/Pleura: Normal pericardium with no pericardial  effusion.  Hemodynamic: Estimated right atrial pressure is 8 mm Hg.  Estimated right ventricular systolic pressure equals 57 mm  Hg, assuming right atrial pressure equals 8 mm Hg,  consistent with moderate pulmonary hypertension.  ------------------------------------------------------------------------  Conclusions:  1. Mitral annular calcification. Thickened mitral leaflets.  Mild mitral regurgitation.  2. Transcatheter aortic valve replacement. Peak transaortic  valve gradient equals 9 mm Hg, mean transaortic valve  gradient equals 4 mm Hg, which is probably normal in the  presence of a transcatheter aortic valve replacement. No  aortic valve regurgitation seen.  3. Normal left ventricular systolic function. Septal motion  consistent with paced rhythm/conduction defect.  4. Mild right ventricular enlargement with mildly decreased  right ventricular systolic function. A device wire is noted  in the right heart.  5. Estimated pulmonary artery systolic pressure equals 57  mm Hg, assuming right atrial pressure equals 8 mm Hg,  consistent with moderate pulmonary pressures.  *** Compared with echocardiogram of 2/15/2018, results are  similar on today's study.  ------------------------------------------------------------------------  Confirmed on 4/3/2018 - 15:19:53 by Meagan Carter M.D.  ------------------------------------------------------------------------    < end of copied text >    < from: CT Chest w/ IV Cont (06.11.18 @ 08:34) >  EXAM:  CT CHEST IC                            PROCEDURE DATE:  06/11/2018          INTERPRETATION:  CLINICAL INFORMATION:  Anemia and shortness of breath,   recently diagnosed mass on colonoscopy.    PROCEDURE:  Using multislice helical CT, following the intravenous   administration of 95 cc of Omnipaque 350, 2.5 mm sections were obtained   from the thoracic inlet through the lung bases.  Multiplanar reformatted images    COMPARISON: None available.    FINDINGS:      There is a small left-sidedpleural effusion and underlying compressive   atelectasis.  There is a trace right-sided pleural effusion.    There is bilateral groundglass mosaic attenuation, finding which may   reflect air trapping.  The central airways remain patent; no central endobronchial lesion is   noted.    Patient is status post sternotomy with aortic valve repair and left-sided   cardiac device.  There is aneurysmal dilatation of the ascending aorta measuring up to 4.2   cm. There is arteriosclerotic calcification ofthoracic aorta with   coronary artery calcifications.  No pericardial effusion is noted.    There are prevascular and right paratracheal mediastinal lymph nodes,   measuring up to 12 mm in short axis.  No enlarged hilar lymphadenopathy is noted.    There are multilevel degenerative changes of the thoracic spine.    Impression:    Mosaic attenuation which may reflect air trapping.    Small left-sided pleural effusion and trace right-sided pleural effusion.    Other findings as discussed.                          ALEX DRISCOLL M.D., ATTENDING RADIOLOGIST  This document has been electronically signed. Jun 11 2018 11:11AM    < end of copied text >

## 2018-06-16 NOTE — PROGRESS NOTE ADULT - ASSESSMENT
89 year old woman with a history of CAD s/p remote CABG, more recent CHARY to the RCA and LCx, severe AS s/p TAVR, chronic AF with TBS s/p PPM, on Xarelto for stroke risk reduction, normal LV function, hypertension, hyperlipidemia who has been feeling weak and fatigued lately, along with having HERNADEZ, admitted with symptomatic anemia.  AF is rate controlled.    - s/p right hemicolectomy with anastomosis of ileum to colon POD#2.  Tolerated procedure without obvious cardiac complications  - there is no evidence of acute ischemia.  - there is no evidence of significant arrhythmia.  Patient with known Afib, rate-controlled  - there is no evidence for meaningful  volume overload.  - Resumed Xarelto today.  Watch for bleeding Hgb stable = 8.8  - Continue aspirin  - Continue digoxin 125 QD.  Level 1.1  - Continue diltiazem 240 QD  - continue metoprolol 25 BID with hold parameters  - Continue simvastatin   - Monitor and replete lytes keep K >4 and Mg >2  - PPM interrogated in 5/21/2018. Remaining life >5 years. No events noted.  - CT of chest showed small Lt and trace right pleural effusion.    - Encourage I/S   - Pain control  - No events on tele will d/c  - Will continue to follow    Ksenia Hutson NP-C  Cardiology

## 2018-06-16 NOTE — PROGRESS NOTE ADULT - SUBJECTIVE AND OBJECTIVE BOX
Patient has no complaints tolerating diet and passing flatus and Bm.  She has no nausea or vomiting.  Patient has been ambulating yesterday.    ROS  GI abdomional pain  All other ROS are negative    PE  ABdomen soft NT ND

## 2018-06-17 ENCOUNTER — TRANSCRIPTION ENCOUNTER (OUTPATIENT)
Age: 83
End: 2018-06-17

## 2018-06-17 LAB
HCT VFR BLD CALC: 27.7 % — LOW (ref 34.5–45)
HGB BLD-MCNC: 8.5 G/DL — LOW (ref 11.5–15.5)

## 2018-06-17 PROCEDURE — 99232 SBSQ HOSP IP/OBS MODERATE 35: CPT

## 2018-06-17 RX ORDER — IRON SUCROSE 20 MG/ML
100 INJECTION, SOLUTION INTRAVENOUS ONCE
Qty: 0 | Refills: 0 | Status: COMPLETED | OUTPATIENT
Start: 2018-06-17 | End: 2018-06-17

## 2018-06-17 RX ADMIN — Medication 25 MILLIGRAM(S): at 17:44

## 2018-06-17 RX ADMIN — Medication 1 TABLET(S): at 11:44

## 2018-06-17 RX ADMIN — Medication 500 MILLIGRAM(S): at 11:44

## 2018-06-17 RX ADMIN — PANTOPRAZOLE SODIUM 40 MILLIGRAM(S): 20 TABLET, DELAYED RELEASE ORAL at 05:40

## 2018-06-17 RX ADMIN — Medication 25 MILLIGRAM(S): at 05:40

## 2018-06-17 RX ADMIN — Medication 1 MILLIGRAM(S): at 11:45

## 2018-06-17 RX ADMIN — Medication 100 MICROGRAM(S): at 05:40

## 2018-06-17 RX ADMIN — SIMVASTATIN 20 MILLIGRAM(S): 20 TABLET, FILM COATED ORAL at 21:20

## 2018-06-17 RX ADMIN — IRON SUCROSE 100 MILLIGRAM(S): 20 INJECTION, SOLUTION INTRAVENOUS at 18:29

## 2018-06-17 RX ADMIN — Medication 81 MILLIGRAM(S): at 11:45

## 2018-06-17 RX ADMIN — RIVAROXABAN 15 MILLIGRAM(S): KIT at 17:44

## 2018-06-17 RX ADMIN — Medication 240 MILLIGRAM(S): at 05:40

## 2018-06-17 RX ADMIN — Medication 0.12 MILLIGRAM(S): at 05:40

## 2018-06-17 NOTE — PROGRESS NOTE ADULT - SUBJECTIVE AND OBJECTIVE BOX
Patient is a 89y old  Female who presents with a chief complaint of low blood counts (17 Jun 2018 09:09)      INTERVAL /OVERNIGHT EVENTS: tolerating diet fine, refusing discharge    MEDICATIONS  (STANDING):  ascorbic acid 500 milliGRAM(s) Oral daily  aspirin enteric coated 81 milliGRAM(s) Oral daily  digoxin     Tablet 0.125 milliGRAM(s) Oral daily  diltiazem    milliGRAM(s) Oral daily  folic acid 1 milliGRAM(s) Oral daily  iron sucrose Injectable 100 milliGRAM(s) IV Push once  levothyroxine 100 MICROGram(s) Oral daily  metoprolol tartrate 25 milliGRAM(s) Oral two times a day  multivitamin 1 Tablet(s) Oral daily  pantoprazole    Tablet 40 milliGRAM(s) Oral before breakfast  rivaroxaban 15 milliGRAM(s) Oral every 24 hours  simvastatin 20 milliGRAM(s) Oral at bedtime    MEDICATIONS  (PRN):  acetaminophen   Tablet. 650 milliGRAM(s) Oral every 6 hours PRN Mild Pain (1 - 3)  oxyCODONE    IR 5 milliGRAM(s) Oral four times a day PRN Moderate Pain (4 - 6)  traMADol 25 milliGRAM(s) Oral four times a day PRN Mild Pain (1 - 3)      Allergies    amoxicillin (Unknown)    Intolerances        REVIEW OF SYSTEMS:  CONSTITUTIONAL: No fever, weight loss, or fatigue  EYES: No eye pain, visual disturbances, or discharge  ENMT:  No difficulty hearing, tinnitus, vertigo; No sinus or throat pain  NECK: No pain or stiffness  RESPIRATORY: No cough, wheezing, chills or hemoptysis; No shortness of breath  CARDIOVASCULAR: No chest pain, palpitations, dizziness, or leg swelling  GASTROINTESTINAL: No abdominal or epigastric pain. No nausea, vomiting, or hematemesis; No diarrhea or constipation. No melena or hematochezia.  GENITOURINARY: No dysuria, frequency, hematuria, or incontinence  NEUROLOGICAL: No headaches, memory loss, loss of strength, numbness, or tremors  SKIN: No itching, burning, rashes, or lesions   LYMPH NODES: No enlarged glands  ENDOCRINE: No heat or cold intolerance; No hair loss; No polydipsia or polyuria  MUSCULOSKELETAL: No joint pain or swelling; No muscle, back, or extremity pain  PSYCHIATRIC: No depression, anxiety, mood swings, or difficulty sleeping  HEME/LYMPH: No easy bruising, or bleeding gums  ALLERGY AND IMMUNOLOGIC: No hives or eczema    Vital Signs Last 24 Hrs  T(C): 37 (17 Jun 2018 16:25), Max: 37 (17 Jun 2018 16:25)  T(F): 98.6 (17 Jun 2018 16:25), Max: 98.6 (17 Jun 2018 16:25)  HR: 62 (17 Jun 2018 16:25) (55 - 70)  BP: 149/71 (17 Jun 2018 16:25) (105/69 - 155/53)  BP(mean): --  RR: 17 (17 Jun 2018 16:25) (15 - 18)  SpO2: 95% (17 Jun 2018 16:25) (94% - 100%)    PHYSICAL EXAM:  GENERAL: NAD, well-groomed, well-developed  HEAD:  Atraumatic, Normocephalic  EYES: EOMI, PERRLA, conjunctiva and sclera clear  ENMT: No tonsillar erythema, exudates, or enlargement; Moist mucous membranes, Good dentition, No lesions  NECK: Supple, No JVD, Normal thyroid  NERVOUS SYSTEM:  Alert & Oriented X3, Good concentration; Motor Strength 5/5 B/L upper and lower extremities; DTRs 2+ intact and symmetric  CHEST/LUNG: Clear to auscultation bilaterally; No rales, rhonchi, wheezing, or rubs  HEART: Regular rate and rhythm; No murmurs, rubs, or gallops  ABDOMEN: Soft, Nontender, Nondistended; Bowel sounds present  EXTREMITIES:  2+ Peripheral Pulses, No clubbing, cyanosis, or edema  LYMPH: No lymphadenopathy noted  SKIN: No rashes or lesions    LABS:                        8.5    x     )-----------( x        ( 17 Jun 2018 10:13 )             27.7               CAPILLARY BLOOD GLUCOSE          RADIOLOGY & ADDITIONAL TESTS:    Notes Reviewed:  [x ] YES  [ ] NO    Care Discussed with Consultants/Other Providers [x ] YES  [ ] NO

## 2018-06-17 NOTE — PROGRESS NOTE ADULT - PROBLEM SELECTOR PLAN 3
etiology multifactorial, guaiac pos stool, iron def, hx of gastritis and gastric ulcerated submucosal lesion on prior egds as per gi, had egd/colon--colon mass, s/p surgery  s/p prbc on this admisssion 6/5/18  iron deficiency on iron profile, s/p iv iron 100 mg a day --6/6/18---6/8/2018, hb 8.5 today--will give additional dose venofer 100 mg today  po iron as out patient  monitor serial h/h--transfuse prbc as needed for symptomatic anemia  gi/dvt prophylaxis--heparin s/c  d/w patient at bedside at length. etiology multifactorial, guaiac pos stool, iron def, hx of gastritis and gastric ulcerated submucosal lesion on prior egds as per gi, had egd/colon--colon mass, s/p surgery  s/p prbc on this admisssion 6/5/18  iron deficiency on iron profile, s/p iv iron 100 mg a day --6/6/18---6/8/2018, hb 8.5 today--will give additional dose venofer 100 mg today  po iron as out patient  monitor serial h/h--transfuse prbc as needed for symptomatic anemia  gi/dvt prophylaxis--on xarelto per cardiology, seen by cardiology--for possible discharge on 6/18/18--tomorrow per cardiology  d/w patient at bedside at length.

## 2018-06-17 NOTE — PROGRESS NOTE ADULT - ASSESSMENT
89 year old woman with a history of CAD s/p remote CABG, more recent CHARY to the RCA and LCx, severe AS s/p TAVR, chronic AF with TBS s/p PPM, on Xarelto for stroke risk reduction, normal LV function, hypertension, hyperlipidemia who has been feeling weak and fatigued lately, along with having HERNADEZ, admitted with symptomatic anemia.  AF is rate controlled.    - s/p right hemicolectomy with anastomosis of ileum to colon.  Tolerated procedure without obvious cardiac complications  - there is no evidence of acute ischemia.  - there is no evidence of significant arrhythmia.  Patient with known Afib, rate-controlled  - there is no evidence for meaningful  volume overload.  - Resumed Xarelto 20mg decreased dose to 15mg daily for CRCL of 37.  Watch for bleeding Hgb stable = 8.5  - Continue aspirin  - Continue digoxin 125 QD.  Level 1.1  - Continue diltiazem 240 QD  - continue metoprolol 25 BID with hold parameters  - Continue simvastatin   - Monitor and replete lytes keep K >4 and Mg >2  - PPM interrogated in 5/21/2018. Remaining life >5 years. No events noted.  - CT of chest showed small Lt and trace right pleural effusion.    - Encourage I/S   - Pain control  - Tele dc'd Hr controlled as per flowsheet  - Will continue to follow  - D/C planning possibly tomorrow as per primary team    Ksenia Hutson NP-C  Cardiology

## 2018-06-17 NOTE — PROGRESS NOTE ADULT - PROBLEM SELECTOR PLAN 5
colorectal surgical eval noted, plan for or on wed for hemicolectomy
colorectal surgical eval
colorectal surgical eval noted,   S/P hemicolectomy
colorectal surgical eval noted, plan for or on wed for hemicolectomy
colorectal surgical eval noted, plan for or on wed for hemicolectomy
colorectal surgical eval with Dr. PHELPS noted,   S/P hemicolectomy POD - 2
colorectal surgical eval with Dr. PHELPS noted,   S/P hemicolectomy POD - 3
colorectal surgical eval with Dr. PHELPS noted,   S/P hemicolectomy POD - 4

## 2018-06-17 NOTE — DISCHARGE NOTE ADULT - CARE PROVIDERS DIRECT ADDRESSES
,DirectAddress_Unknown,[email or web address],DirectAddress_Unknown,DirectAddress_Unknown

## 2018-06-17 NOTE — PROGRESS NOTE ADULT - SUBJECTIVE AND OBJECTIVE BOX
Patient is a 89y old  Female who presents with a chief complaint of low blood counts (17 Jun 2018 09:09)       Pt is seen and examined  pt is awake and lying in bed/out of bed to chair  pt seems comfortable and denies any complaints at this time    HPI:  · HPI Objective Statement: 89 female sent to ER by her cardiologist for a low hg level 7.1. Patient states for the past 2 weeks she has not been feeling well, generalized fatigue, shortness of breath with exertion, sleeping more.	  In ER patient was found to be anemic.  Patient is being admitted for further work up and treatment (05 Jun 2018 15:51)         ROS:  Negative except for:    MEDICATIONS  (STANDING):  ascorbic acid 500 milliGRAM(s) Oral daily  aspirin enteric coated 81 milliGRAM(s) Oral daily  digoxin     Tablet 0.125 milliGRAM(s) Oral daily  diltiazem    milliGRAM(s) Oral daily  folic acid 1 milliGRAM(s) Oral daily  levothyroxine 100 MICROGram(s) Oral daily  metoprolol tartrate 25 milliGRAM(s) Oral two times a day  multivitamin 1 Tablet(s) Oral daily  pantoprazole    Tablet 40 milliGRAM(s) Oral before breakfast  rivaroxaban 15 milliGRAM(s) Oral every 24 hours  simvastatin 20 milliGRAM(s) Oral at bedtime    MEDICATIONS  (PRN):  acetaminophen   Tablet. 650 milliGRAM(s) Oral every 6 hours PRN Mild Pain (1 - 3)  oxyCODONE    IR 5 milliGRAM(s) Oral four times a day PRN Moderate Pain (4 - 6)  traMADol 25 milliGRAM(s) Oral four times a day PRN Mild Pain (1 - 3)      Allergies    amoxicillin (Unknown)    Intolerances        Vital Signs Last 24 Hrs  T(C): 36.6 (17 Jun 2018 11:51), Max: 36.9 (16 Jun 2018 23:07)  T(F): 97.9 (17 Jun 2018 11:51), Max: 98.4 (16 Jun 2018 23:07)  HR: 56 (17 Jun 2018 11:51) (55 - 72)  BP: 155/53 (17 Jun 2018 11:51) (105/69 - 176/68)  BP(mean): --  RR: 15 (17 Jun 2018 11:51) (15 - 18)  SpO2: 98% (17 Jun 2018 11:51) (94% - 100%)    PHYSICAL EXAM  General: adult in NAD  HEENT: clear oropharynx, anicteric sclera, pink conjunctiva  Neck: supple  CV: normal S1/S2 with no murmur rubs or gallops  Lungs: positive air movement b/l ant lungs,clear to auscultation, no wheezes, no rales  Abdomen: soft non-tender non-distended, no hepatosplenomegaly  Ext: no clubbing cyanosis or edema  Skin: no rashes and no petechiae  Neuro: alert and oriented X 4, no focal deficits  LABS:                          8.5    x     )-----------( x        ( 17 Jun 2018 10:13 )             27.7         Mean Cell Volume : 83.9 fl  Mean Cell Hemoglobin : 24.9 pg  Mean Cell Hemoglobin Concentration : 29.6 gm/dL  Auto Neutrophil # : x  Auto Lymphocyte # : x  Auto Monocyte # : x  Auto Eosinophil # : x  Auto Basophil # : x  Auto Neutrophil % : x  Auto Lymphocyte % : x  Auto Monocyte % : x  Auto Eosinophil % : x  Auto Basophil % : x    Serial CBC's  06-17 @ 10:13  Hct-27.7 / Hgb-8.5 / Plat--- / RBC--- / WBC---          Serial CBC's  06-16 @ 11:47  Hct-29.7 / Hgb-8.8 / Plat-217 / RBC-3.54 / WBC-9.06          Serial CBC's  06-15 @ 10:58  Hct-28.8 / Hgb-8.8 / Plat-189 / RBC-3.41 / WBC-10.92          Serial CBC's  06-15 @ 10:30  Hct-29.9 / Hgb-8.8 / Plat--- / RBC--- / WBC---          Serial CBC's  06-14 @ 07:29  Hct-29.0 / Hgb-8.7 / Plat-209 / RBC-3.47 / WBC-10.75                                      BLOOD SMEAR INTERPRETATION:       RADIOLOGY & ADDITIONAL STUDIES: Patient is a 89y old  Female who presents with a chief complaint of low blood counts (17 Jun 2018 09:09)       Pt is seen and examined, pt is awake and is out of bed to chair.  pt seems comfortable and denies any complaints at this time    HPI:  · HPI Objective Statement: 89 female sent to ER by her cardiologist for a low hg level 7.1. Patient states for the past 2 weeks she has not been feeling well, generalized fatigue, shortness of breath with exertion, sleeping more.	  In ER patient was found to be anemic.  Patient is being admitted for further work up and treatment (05 Jun 2018 15:51)       MEDICATIONS  (STANDING):  ascorbic acid 500 milliGRAM(s) Oral daily  aspirin enteric coated 81 milliGRAM(s) Oral daily  digoxin     Tablet 0.125 milliGRAM(s) Oral daily  diltiazem    milliGRAM(s) Oral daily  folic acid 1 milliGRAM(s) Oral daily  levothyroxine 100 MICROGram(s) Oral daily  metoprolol tartrate 25 milliGRAM(s) Oral two times a day  multivitamin 1 Tablet(s) Oral daily  pantoprazole    Tablet 40 milliGRAM(s) Oral before breakfast  rivaroxaban 15 milliGRAM(s) Oral every 24 hours  simvastatin 20 milliGRAM(s) Oral at bedtime    MEDICATIONS  (PRN):  acetaminophen   Tablet. 650 milliGRAM(s) Oral every 6 hours PRN Mild Pain (1 - 3)  oxyCODONE    IR 5 milliGRAM(s) Oral four times a day PRN Moderate Pain (4 - 6)  traMADol 25 milliGRAM(s) Oral four times a day PRN Mild Pain (1 - 3)      Allergies    amoxicillin (Unknown)    Intolerances        Vital Signs Last 24 Hrs  T(C): 36.6 (17 Jun 2018 11:51), Max: 36.9 (16 Jun 2018 23:07)  T(F): 97.9 (17 Jun 2018 11:51), Max: 98.4 (16 Jun 2018 23:07)  HR: 56 (17 Jun 2018 11:51) (55 - 72)  BP: 155/53 (17 Jun 2018 11:51) (105/69 - 176/68)  BP(mean): --  RR: 15 (17 Jun 2018 11:51) (15 - 18)  SpO2: 98% (17 Jun 2018 11:51) (94% - 100%)    PHYSICAL EXAM  General: adult in NAD  HEENT: clear oropharynx, anicteric sclera, pink conjunctiva  Neck: supple  CV: normal S1/S2 with no murmur rubs or gallops  Lungs: positive air movement b/l ant lungs,clear to auscultation, no wheezes, no rales  Abdomen: soft non-tender non-distended, no hepatosplenomegaly  Ext: no clubbing cyanosis or edema  Skin: no rashes and no petechiae  Neuro: alert and oriented X 4, no focal deficits  LABS:                          8.5    x     )-----------( x        ( 17 Jun 2018 10:13 )             27.7         Mean Cell Volume : 83.9 fl  Mean Cell Hemoglobin : 24.9 pg  Mean Cell Hemoglobin Concentration : 29.6 gm/dL  Auto Neutrophil # : x  Auto Lymphocyte # : x  Auto Monocyte # : x  Auto Eosinophil # : x  Auto Basophil # : x  Auto Neutrophil % : x  Auto Lymphocyte % : x  Auto Monocyte % : x  Auto Eosinophil % : x  Auto Basophil % : x    Serial CBC's  06-17 @ 10:13  Hct-27.7 / Hgb-8.5 / Plat--- / RBC--- / WBC---          Serial CBC's  06-16 @ 11:47  Hct-29.7 / Hgb-8.8 / Plat-217 / RBC-3.54 / WBC-9.06          Serial CBC's  06-15 @ 10:58  Hct-28.8 / Hgb-8.8 / Plat-189 / RBC-3.41 / WBC-10.92          Serial CBC's  06-15 @ 10:30  Hct-29.9 / Hgb-8.8 / Plat--- / RBC--- / WBC---          Serial CBC's  06-14 @ 07:29  Hct-29.0 / Hgb-8.7 / Plat-209 / RBC-3.47 / WBC-10.75      Surgical Pathology Final Report -  (06.08.18 @ 08:06)    Surgical Pathology Final Report - :   ACCESSION No:  [id number]    RON ABREU                    2        Surgical Final Report          Final Diagnosis    1. Gastric antrum, biopsy:  Diffquick stain examined.  Gastric antral mucosa showing minimal active and mild chronic  inflammation with changes of reactive gastropathy.  Special stain for Helicobacter pylori organisms is negative.    2. Gastric nodule, biopsy, exact site not specified:  Diffquick stain examined.  Gastric mucosa showing minimal active and mild chronic  inflammation with changes of reactive gastropathy.  Special stain for Helicobacter pylori organisms is negative.    3. Hepatic flexure mass, biopsy:  Villotubular adenoma, fragments of.  See note.    Note: The specimen is superficial and fragmented.  No evidence of  malignancy is seen in this sample.  Possibility of  a more significant underlying lesion cannot be ruled out.  Recommend clinical pathological correlation.    Hellen Osborne MD  (Electronic Signature)  Reported on: 06/12/18    Comment  Case findings were discussed with Dr. Emerson on 6/12/18 at  12:10 PM.  The    Clinical Information  Colonic mass/gastric nodule  Procedure: EGD with biopsies, colonoscopy with biopsy and  bleeding gastritis    Specimen Description  1-Gastric antral biopsy, rule out H. pylori  2-Gastric nodule, rule out carcinoma  3-Colonic mass, hepatic flexure, rule out carcinoma,    Gross Description  1.   The specimen is received in formalin and the specimen  container is labeled: Gastric antrum biopsy.  It consists of a  single fragment of tan soft tissue measuring 0.5 cm in greatest  dimension.  Special stains are requested.  Entirely submitted.  One cassette.                RON ABREU A                    2        Surgical Final Report          2.   The specimen is received in formalin and the specimen  container is labeled: Gastric nodule.  It consists of a single  fragment of tan soft tissue measuring 0.2 cm in greatest  dimension.  Special stains are requested.  Entirely submitted.  Onecassette.    3.   The specimen is received in formalin and the specimen  container is labeled: hepatic flexure mass.  It consists of three  fragments of tan lobulated soft tissue ranging from 0.2-0.3 cm in  greatest dimension.  Entirely submitted.  One cassette.    In addition to other data that may appear on the specimen  containers, all labels have been inspected to confirm the  presence of the patient's name and date of birth.  MUN06/11/18 09:52

## 2018-06-17 NOTE — DISCHARGE NOTE ADULT - MEDICATION SUMMARY - MEDICATIONS TO TAKE
I will START or STAY ON the medications listed below when I get home from the hospital:    aspirin 81 mg oral delayed release tablet  -- 1 tab(s) by mouth once a day  -- Indication: For Prevent heart disease    diltiazem 240 mg/24 hours oral capsule, extended release  -- 1 cap(s) by mouth once a day  -- Indication: For Atrial fibrillation, unspecified    digoxin 125 mcg (0.125 mg) oral tablet  -- 1 tab(s) by mouth once a day  -- Indication: For Atrial fibrillation, unspecified    rivaroxaban 15 mg oral tablet  -- 1 tab(s) by mouth every 24 hours  -- Indication: For Atrial fibrillation, unspecified    simvastatin 20 mg oral tablet  -- 1 tab(s) by mouth once a day (at bedtime)    home  -- Indication: For High lipids    Metoprolol Tartrate 25 mg oral tablet  -- 1 tab(s) by mouth 2 times a day  -- Indication: For Atrial fibrillation, unspecified    pantoprazole 20 mg oral delayed release tablet  -- 1 tab(s) by mouth once a day  -- Indication: For Gerd    levothyroxine 100 mcg (0.1 mg) oral tablet  -- 1 tab(s) by mouth once a day  -- Indication: For Hypothyroid    Calcium 500+D oral tablet, chewable  -- 1 tab(s) by mouth once a day  -- Indication: For suplement    Ocuvite oral tablet  -- 1 tab(s) by mouth once a day  -- Indication: For suplement    Vitamin D3 1000 intl units oral tablet  -- 1 tab(s) by mouth once a day  -- Indication: For suplement    Vitamin B-12 100 mcg oral tablet  -- 1 tab(s) by mouth once a day  -- Indication: For suplement    ascorbic acid 500 mg oral tablet  -- 1 tab(s) by mouth once a day  -- Indication: For suplement    folic acid 1 mg oral tablet  -- 1 tab(s) by mouth once a day  -- Indication: For suplement

## 2018-06-17 NOTE — PROGRESS NOTE ADULT - PROBLEM SELECTOR PROBLEM 4
Rectal bleeding

## 2018-06-17 NOTE — PROGRESS NOTE ADULT - PROBLEM SELECTOR PROBLEM 5
Hepatic flexure mass

## 2018-06-17 NOTE — DISCHARGE NOTE ADULT - CARE PROVIDER_API CALL
Tam Emerson (), Gastroenterology; Internal Medicine  [address] [place] [address]  Phone: [phone]  Fax: [phone]    Matias Peters), ColonRectal Surgery; Surgery  [address] [facility] [place] [address]  [place] [address]  Phone: [phone]  Fax: [phone]    sloan johnson  heme / onc  [place]  Phone: (   )    -  Fax: (   )    -    Willy Bess), Medicine  [address] [place] [address]  Phone: [phone]  Fax: [phone]

## 2018-06-17 NOTE — DISCHARGE NOTE ADULT - CARE PLAN
Principal Discharge DX:	Anemia, unspecified type  Goal:	better blood counts  Assessment and plan of treatment:	follow up with PMD Dr. NAVARRO  Secondary Diagnosis:	Atrial fibrillation, unspecified  Secondary Diagnosis:	CAD (coronary artery disease)  Secondary Diagnosis:	Dyslipidemia  Secondary Diagnosis:	Gastric nodule  Secondary Diagnosis:	Gastroesophageal reflux disease without esophagitis  Secondary Diagnosis:	Hepatic flexure mass

## 2018-06-17 NOTE — DISCHARGE NOTE ADULT - PATIENT PORTAL LINK FT
You can access the [facility] Patient Portal, offered by [facility], by registering with the following website: [email or web address]

## 2018-06-17 NOTE — DISCHARGE NOTE ADULT - HOSPITAL COURSE
admitted for symptomatic anemia  found to be iron deficient  underwent EGD + colonoscopy  found to have gastric nodule  also has hepatic flexure mass  underwent right jenny colectomy  AC restarted post op  Hand H remained stable  DC after heme and surgical clearance

## 2018-06-17 NOTE — PROGRESS NOTE ADULT - SUBJECTIVE AND OBJECTIVE BOX
LORY  Pt seen, chart reviewed. No acute events, no complaints. Tolerating diet, having GI function. Pain controlled. No blood per rectum since restarting anticoagulation  AVSS  Gen NAD, comfortable in chair  Abd soft ND NT  - continue current care  - stable for discharge from surgical standpoint once medically optimized  - discussed with Dr. Peters

## 2018-06-17 NOTE — DISCHARGE NOTE ADULT - SECONDARY DIAGNOSIS.
Atrial fibrillation, unspecified CAD (coronary artery disease) Dyslipidemia Gastric nodule Gastroesophageal reflux disease without esophagitis Hepatic flexure mass

## 2018-06-17 NOTE — PROGRESS NOTE ADULT - NSHPATTENDINGPLANDISCUSS_GEN_ALL_CORE
message left for med attg to make aware, pending call  back
PMD
patient and RN
patient and son at bedside
patient and RN
patient and RN
team
patient and RN
patient and family at bedside
patient and RN
patient and RN and sx
pt

## 2018-06-17 NOTE — DISCHARGE NOTE ADULT - MEDICATION SUMMARY - MEDICATIONS TO CHANGE
I will SWITCH the dose or number of times a day I take the medications listed below when I get home from the hospital:    Xarelto 20 mg oral tablet  -- 1 tab(s) by mouth once a day (in the evening) last dose will be feb 11 at 10 am

## 2018-06-17 NOTE — PROGRESS NOTE ADULT - PROBLEM SELECTOR PLAN 4
serial H and H  PRBC PRN  due to colon mass with mets
serial H and H  PRBC PRN  GI follow up
serial H and H  PRBC PRN  GI follow up with Dr. PAVON / Dr. DEAN
serial H and H  PRBC PRN  due to colon mass with mets

## 2018-06-17 NOTE — PROGRESS NOTE ADULT - SUBJECTIVE AND OBJECTIVE BOX
INTERVAL HPI/OVERNIGHT EVENTS:  HP  No new overnight event.  No N/V/D.  Tolerating diet.        MEDICATIONS  (STANDING):  ascorbic acid 500 milliGRAM(s) Oral daily  aspirin enteric coated 81 milliGRAM(s) Oral daily  digoxin     Tablet 0.125 milliGRAM(s) Oral daily  diltiazem    milliGRAM(s) Oral daily  folic acid 1 milliGRAM(s) Oral daily  levothyroxine 100 MICROGram(s) Oral daily  metoprolol tartrate 25 milliGRAM(s) Oral two times a day  multivitamin 1 Tablet(s) Oral daily  pantoprazole    Tablet 40 milliGRAM(s) Oral before breakfast  rivaroxaban 15 milliGRAM(s) Oral every 24 hours  simvastatin 20 milliGRAM(s) Oral at bedtime    MEDICATIONS  (PRN):  acetaminophen   Tablet. 650 milliGRAM(s) Oral every 6 hours PRN Mild Pain (1 - 3)  oxyCODONE    IR 5 milliGRAM(s) Oral four times a day PRN Moderate Pain (4 - 6)  traMADol 25 milliGRAM(s) Oral four times a day PRN Mild Pain (1 - 3)      Allergies    amoxicillin (Unknown)    Intolerances          General:  No wt loss, fevers, chills, night sweats, fatigue,   Eyes:  Good vision, no reported pain  ENT:  No sore throat, pain, runny nose, dysphagia  CV:  No pain, palpitations, hypo/hypertension  Resp:  No dyspnea, cough, tachypnea, wheezing  GI:  No pain, No nausea, No vomiting, No diarrhea, No constipation, No weight loss, No fever, No pruritis, No rectal bleeding, No tarry stools, No dysphagia,  :  No pain, bleeding, incontinence, nocturia  Muscle:  No pain, weakness  Neuro:  No weakness, tingling, memory problems  Psych:  No fatigue, insomnia, mood problems, depression  Endocrine:  No polyuria, polydipsia, cold/heat intolerance  Heme:  No petechiae, ecchymosis, easy bruisability  Skin:  No rash, tattoos, scars, edema      PHYSICAL EXAM:   Vital Signs:  Vital Signs Last 24 Hrs  T(C): 36.6 (2018 11:51), Max: 36.9 (2018 23:07)  T(F): 97.9 (2018 11:51), Max: 98.4 (2018 23:07)  HR: 56 (2018 11:51) (54 - 72)  BP: 155/53 (2018 11:51) (105/69 - 176/68)  BP(mean): --  RR: 15 (2018 11:51) (15 - 18)  SpO2: 98% (2018 11:51) (94% - 100%)  Daily     Daily Weight in k (2018 04:10)I&O's Summary      GENERAL:  Appears stated age, well-groomed, well-nourished, no distress  HEENT:  NC/AT,  conjunctivae clear and pink, no thyromegaly, nodules, adenopathy, no JVD, sclera -anicteric  CHEST:  Full & symmetric excursion, no increased effort, breath sounds clear  HEART:  Regular rhythm, S1, S2, no murmur/rub/S3/S4, no abdominal bruit, no edema  ABDOMEN:  Soft, non-tender, non-distended, normoactive bowel sounds,  no masses ,no hepato-splenomegaly, no signs of chronic liver disease  EXTEREMITIES:  no cyanosis,clubbing or edema  SKIN:  No rash/erythema/ecchymoses/petechiae/wounds/abscess/warm/dry  NEURO:  Alert, oriented, no asterixis, no tremor, no encephalopathy      LABS:                        8.5    x     )-----------( x        ( 2018 10:13 )             27.7               amylase   lipase  RADIOLOGY & ADDITIONAL TESTS:

## 2018-06-18 ENCOUNTER — APPOINTMENT (OUTPATIENT)
Dept: CARDIOLOGY | Facility: CLINIC | Age: 83
End: 2018-06-18

## 2018-06-18 VITALS
RESPIRATION RATE: 17 BRPM | TEMPERATURE: 98 F | DIASTOLIC BLOOD PRESSURE: 74 MMHG | SYSTOLIC BLOOD PRESSURE: 148 MMHG | HEART RATE: 83 BPM | OXYGEN SATURATION: 99 %

## 2018-06-18 LAB
HCT VFR BLD CALC: 29.1 % — LOW (ref 34.5–45)
HGB BLD-MCNC: 8.7 G/DL — LOW (ref 11.5–15.5)
MCHC RBC-ENTMCNC: 24.9 PG — LOW (ref 27–34)
MCHC RBC-ENTMCNC: 29.9 GM/DL — LOW (ref 32–36)
MCV RBC AUTO: 83.1 FL — SIGNIFICANT CHANGE UP (ref 80–100)
NRBC # BLD: 0 /100 WBCS — SIGNIFICANT CHANGE UP (ref 0–0)
PLATELET # BLD AUTO: 258 K/UL — SIGNIFICANT CHANGE UP (ref 150–400)
RBC # BLD: 3.5 M/UL — LOW (ref 3.8–5.2)
RBC # FLD: 23 % — HIGH (ref 10.3–14.5)
WBC # BLD: 7.56 K/UL — SIGNIFICANT CHANGE UP (ref 3.8–10.5)
WBC # FLD AUTO: 7.56 K/UL — SIGNIFICANT CHANGE UP (ref 3.8–10.5)

## 2018-06-18 PROCEDURE — 82378 CARCINOEMBRYONIC ANTIGEN: CPT

## 2018-06-18 PROCEDURE — 88305 TISSUE EXAM BY PATHOLOGIST: CPT

## 2018-06-18 PROCEDURE — 85610 PROTHROMBIN TIME: CPT

## 2018-06-18 PROCEDURE — 82728 ASSAY OF FERRITIN: CPT

## 2018-06-18 PROCEDURE — 80053 COMPREHEN METABOLIC PANEL: CPT

## 2018-06-18 PROCEDURE — 85014 HEMATOCRIT: CPT

## 2018-06-18 PROCEDURE — 82962 GLUCOSE BLOOD TEST: CPT

## 2018-06-18 PROCEDURE — 82272 OCCULT BLD FECES 1-3 TESTS: CPT

## 2018-06-18 PROCEDURE — 88312 SPECIAL STAINS GROUP 1: CPT

## 2018-06-18 PROCEDURE — 71045 X-RAY EXAM CHEST 1 VIEW: CPT

## 2018-06-18 PROCEDURE — 83735 ASSAY OF MAGNESIUM: CPT

## 2018-06-18 PROCEDURE — 80162 ASSAY OF DIGOXIN TOTAL: CPT

## 2018-06-18 PROCEDURE — 74177 CT ABD & PELVIS W/CONTRAST: CPT

## 2018-06-18 PROCEDURE — 88307 TISSUE EXAM BY PATHOLOGIST: CPT

## 2018-06-18 PROCEDURE — 85027 COMPLETE CBC AUTOMATED: CPT

## 2018-06-18 PROCEDURE — 84484 ASSAY OF TROPONIN QUANT: CPT

## 2018-06-18 PROCEDURE — 82746 ASSAY OF FOLIC ACID SERUM: CPT

## 2018-06-18 PROCEDURE — 99232 SBSQ HOSP IP/OBS MODERATE 35: CPT

## 2018-06-18 PROCEDURE — 83550 IRON BINDING TEST: CPT

## 2018-06-18 PROCEDURE — 84100 ASSAY OF PHOSPHORUS: CPT

## 2018-06-18 PROCEDURE — 85730 THROMBOPLASTIN TIME PARTIAL: CPT

## 2018-06-18 PROCEDURE — 97164 PT RE-EVAL EST PLAN CARE: CPT

## 2018-06-18 PROCEDURE — 85018 HEMOGLOBIN: CPT

## 2018-06-18 PROCEDURE — P9016: CPT

## 2018-06-18 PROCEDURE — 86923 COMPATIBILITY TEST ELECTRIC: CPT

## 2018-06-18 PROCEDURE — 71260 CT THORAX DX C+: CPT

## 2018-06-18 PROCEDURE — 85045 AUTOMATED RETICULOCYTE COUNT: CPT

## 2018-06-18 PROCEDURE — 36415 COLL VENOUS BLD VENIPUNCTURE: CPT

## 2018-06-18 PROCEDURE — 82607 VITAMIN B-12: CPT

## 2018-06-18 PROCEDURE — 36430 TRANSFUSION BLD/BLD COMPNT: CPT

## 2018-06-18 PROCEDURE — 86900 BLOOD TYPING SEROLOGIC ABO: CPT

## 2018-06-18 PROCEDURE — 86850 RBC ANTIBODY SCREEN: CPT

## 2018-06-18 PROCEDURE — 83880 ASSAY OF NATRIURETIC PEPTIDE: CPT

## 2018-06-18 PROCEDURE — 97530 THERAPEUTIC ACTIVITIES: CPT

## 2018-06-18 PROCEDURE — 99285 EMERGENCY DEPT VISIT HI MDM: CPT | Mod: 25

## 2018-06-18 PROCEDURE — 97116 GAIT TRAINING THERAPY: CPT

## 2018-06-18 PROCEDURE — 82550 ASSAY OF CK (CPK): CPT

## 2018-06-18 PROCEDURE — 82270 OCCULT BLOOD FECES: CPT

## 2018-06-18 PROCEDURE — 82553 CREATINE MB FRACTION: CPT

## 2018-06-18 PROCEDURE — 93005 ELECTROCARDIOGRAM TRACING: CPT

## 2018-06-18 PROCEDURE — 86901 BLOOD TYPING SEROLOGIC RH(D): CPT

## 2018-06-18 PROCEDURE — 97162 PT EVAL MOD COMPLEX 30 MIN: CPT

## 2018-06-18 PROCEDURE — 80048 BASIC METABOLIC PNL TOTAL CA: CPT

## 2018-06-18 RX ADMIN — Medication 81 MILLIGRAM(S): at 11:19

## 2018-06-18 RX ADMIN — Medication 100 MICROGRAM(S): at 06:26

## 2018-06-18 RX ADMIN — Medication 1 MILLIGRAM(S): at 11:19

## 2018-06-18 RX ADMIN — PANTOPRAZOLE SODIUM 40 MILLIGRAM(S): 20 TABLET, DELAYED RELEASE ORAL at 06:26

## 2018-06-18 RX ADMIN — Medication 500 MILLIGRAM(S): at 11:19

## 2018-06-18 RX ADMIN — Medication 240 MILLIGRAM(S): at 06:26

## 2018-06-18 RX ADMIN — Medication 25 MILLIGRAM(S): at 06:26

## 2018-06-18 RX ADMIN — Medication 0.12 MILLIGRAM(S): at 06:27

## 2018-06-18 RX ADMIN — Medication 1 TABLET(S): at 11:19

## 2018-06-18 NOTE — CHART NOTE - NSCHARTNOTEFT_GEN_A_CORE
Do you have Advance Directives (HCP / LV / Organ donation / Documentation of oral advance Directive):   (  x  )  yes    (      )    NO                                                                            Do you have LV - Living will :                                                                                                                                             (    )  yes    (   x   )   No    Do you have HCP - Health Care Proxy:                                                                                                                            (   x  )  yes   (       ) N0    Do you have DNR- Do Not Resuscitate :                                                                                                                           (      )  yes  (     x   )  No    Do you have DNI- Do Not intubate  :                                                                                                                               (      )  yes   (    x   ) No    Do you have MOLST - Medical orders for Life sustaining treatment  :                                                                    (      ) yes    (    x   ) No    Decision Maker :  (  x   ) Patient     (      )  HCA   (     ) Public Health Law Surrogate     (      ) Surrogate  (       ) Guardian    Goals of Care :  (  x    )   Complete Care     (       ) No Limitations                              (       )   Comfort Care       (       )  Hospice                               (      )   Limited medical Intervention / s    Medical Interventions :   (    x    )   CPR       (        )  DNR                                               (     x   )  Intubation with MV - Mechanical Ventilation  (   x   ) BIPAP/CPAP    (         )   DNI                                               (     x    )  Artificial Nutrition -  IVF, TPN / PPN, Tube Feeds             (         )   No Feeding Tube                                                (    x    ) Use Antibiotics                         (          ) No Antibiotics                                                (    x     ) Blood and Blood Products     (         )   No Blood or Blood products                                                (    x      )  Dialysis                                    (         )  No Dialysis                                                (          )  Medical Management only  (         )  No Invasive Interventions or Surgery  Time spent :                        (     x  ) up to 30 minutes                       (           )   more than 30 minutes  Goals of care by palliative RN reviewed and discussed with patient

## 2018-06-18 NOTE — PROGRESS NOTE ADULT - SUBJECTIVE AND OBJECTIVE BOX
Patient has no complaints of pain or nausea vomiting.  She is tolerating a LR diet.  Patient had a bm yesterday.    ROS  Gi abdominla pain  All other ROS are negative    PE  Female in NAD  VSS Afebrile  Abdomen soft NT nd

## 2018-06-18 NOTE — PROGRESS NOTE ADULT - PROBLEM SELECTOR PLAN 1
hgb stable, no overt s/s gib  s/p colonoscopy with hepatic flexure mass   anemia likely 2/2 mass  path showed TVA, no evidence of malignancy but possibility of underlying lesion not excluded  s/p OR 6/13 for RHC and AILEEN  f/u path  f/u surgery recs, cleared for dc from sx perspective  diet as per surgery  trend h/h, transfuse prn  heme following

## 2018-06-18 NOTE — PROGRESS NOTE ADULT - PROBLEM SELECTOR PROBLEM 1
Anemia, unspecified type
Fecal occult blood test positive
Anemia
Fecal occult blood test positive
Hepatic flexure mass
Anemia, unspecified type

## 2018-06-18 NOTE — PROGRESS NOTE ADULT - SUBJECTIVE AND OBJECTIVE BOX
[facility] - Kam Craig, Anaid, Magali, Roselyn, Jaylene Gomez  Office Number:  [phone]    Patient resting comfortably in bed in NAD.  Laying flat with no respiratory distress.  No complaints of chest pain, dyspnea, palpitations, PND, or orthopnea.    ROS: negative unless otherwise mentioned.    Telemetry:  not on tele    MEDICATIONS  (STANDING):  ascorbic acid 500 milliGRAM(s) Oral daily  aspirin enteric coated 81 milliGRAM(s) Oral daily  digoxin     Tablet 0.125 milliGRAM(s) Oral daily  diltiazem    milliGRAM(s) Oral daily  folic acid 1 milliGRAM(s) Oral daily  levothyroxine 100 MICROGram(s) Oral daily  metoprolol tartrate 25 milliGRAM(s) Oral two times a day  multivitamin 1 Tablet(s) Oral daily  pantoprazole    Tablet 40 milliGRAM(s) Oral before breakfast  rivaroxaban 15 milliGRAM(s) Oral every 24 hours  simvastatin 20 milliGRAM(s) Oral at bedtime    MEDICATIONS  (PRN):  acetaminophen   Tablet. 650 milliGRAM(s) Oral every 6 hours PRN Mild Pain (1 - 3)  oxyCODONE    IR 5 milliGRAM(s) Oral four times a day PRN Moderate Pain (4 - 6)  traMADol 25 milliGRAM(s) Oral four times a day PRN Mild Pain (1 - 3)      Allergies    amoxicillin (Unknown)    Intolerances        Vital Signs Last 24 Hrs  T(C): 36.8 (18 Jun 2018 08:00), Max: 37 (17 Jun 2018 16:25)  T(F): 98.2 (18 Jun 2018 08:00), Max: 98.6 (17 Jun 2018 16:25)  HR: 68 (18 Jun 2018 08:00) (56 - 82)  BP: 154/69 (18 Jun 2018 08:00) (148/74 - 178/78)  BP(mean): --  RR: 16 (18 Jun 2018 08:00) (15 - 17)  SpO2: 95% (18 Jun 2018 08:00) (95% - 98%)    I&O's Summary      ON EXAM:    Constitutional: NAD, awake and alert, well-developed  HEENT: Moist Mucous Membranes, Anicteric  Pulmonary: Non-labored, breath sounds are diminished in bases with rt side basal rales  Cardiovascular: Irregular, S1 and S2, No murmurs, rubs, gallops or clicks  Gastrointestinal: Bowel Sounds present, soft, nontender.  Obese abdomen  Lymph: No peripheral edema. No lymphadenopathy.  Skin: No visible rashes or ulcers.  Psych:  Mood & affect appropriate    LABS: All Labs Reviewed:                        8.7    7.56  )-----------( 258      ( 18 Jun 2018 06:51 )             29.1                         8.5    x     )-----------( x        ( 17 Jun 2018 10:13 )             27.7                         8.8    9.06  )-----------( 217      ( 16 Jun 2018 11:47 )             29.7     15 Carmelo 2018 10:58    141    |  105    |  8      ----------------------------<  140    3.9     |  29     |  0.90     Ca    8.2        15 Carmelo 2018 10:58            Blood Culture:

## 2018-06-18 NOTE — PROGRESS NOTE ADULT - ASSESSMENT
89 year old woman with a history of CAD s/p remote CABG, more recent CHARY to the RCA and LCx, severe AS s/p TAVR, chronic AF with TBS s/p PPM, on Xarelto for stroke risk reduction, normal LV function, hypertension, hyperlipidemia who has been feeling weak and fatigued lately, along with having HERNADEZ, admitted with symptomatic anemia.  AF is rate controlled.    - s/p right hemicolectomy with anastomosis of ileum to colon.  Tolerated procedure without obvious cardiac complications  - there is no evidence of acute ischemia.  - there is no evidence of significant arrhythmia.  Patient with known Afib, rate-controlled  - there is no evidence for meaningful  volume overload.  - Resumed Xarelto 20mg decreased dose to 15mg daily for CRCL of 37.  Watch for bleeding Hgb stable = 8.5  - Continue aspirin  - Continue digoxin 125 QD  - Continue diltiazem 240 QD  - continue metoprolol 25 BID with hold parameters  - Continue simvastatin   - Monitor and replete lytes keep K >4 and Mg >2  - PPM interrogated in 5/21/2018. Remaining life >5 years. No events noted.  - CT of chest showed small Lt and trace right pleural effusion.    - Pain control  - Will continue to follow

## 2018-06-18 NOTE — PROGRESS NOTE ADULT - PROBLEM SELECTOR PLAN 3
etiology multifactorial, guaiac pos stool, iron def, hx of gastritis and gastric ulcerated submucosal lesion on prior egds as per gi, had egd/colon--colon mass, s/p surgery  s/p prbc on this admisssion 6/5/18  iron deficiency on iron profile, s/p iv iron 100 mg a day --6/6/18---6/8/2018, and on 6/17, hb 8.7 today--stable, was 8.5 yesterday  monitor serial h/h--transfuse prbc as needed for symptomatic anemia  gi/dvt prophylaxis--on xarelto per cardiology  d/w patient at bedside at length  hem/onc stable, to follow with hem-onc--ph--[phone]/surgery as out patient

## 2018-06-18 NOTE — PROGRESS NOTE ADULT - ASSESSMENT
Dx s/p right colectomy    Anticoagulation restarted  Cotninue out of bed  MAy be discharged today from surgical standpoint, follow up in my offfice in two weeks.

## 2018-06-18 NOTE — PROGRESS NOTE ADULT - SUBJECTIVE AND OBJECTIVE BOX
INTERVAL HPI/OVERNIGHT EVENTS:  pt seen and examined  refused dc yesterday  denies n/v/d/abd pain/melena/brbpr  +bm yesterday  cbc noted afebrile overnight    MEDICATIONS  (STANDING):  ascorbic acid 500 milliGRAM(s) Oral daily  aspirin enteric coated 81 milliGRAM(s) Oral daily  digoxin     Tablet 0.125 milliGRAM(s) Oral daily  diltiazem    milliGRAM(s) Oral daily  folic acid 1 milliGRAM(s) Oral daily  levothyroxine 100 MICROGram(s) Oral daily  metoprolol tartrate 25 milliGRAM(s) Oral two times a day  multivitamin 1 Tablet(s) Oral daily  pantoprazole    Tablet 40 milliGRAM(s) Oral before breakfast  rivaroxaban 15 milliGRAM(s) Oral every 24 hours  simvastatin 20 milliGRAM(s) Oral at bedtime    MEDICATIONS  (PRN):  acetaminophen   Tablet. 650 milliGRAM(s) Oral every 6 hours PRN Mild Pain (1 - 3)  oxyCODONE    IR 5 milliGRAM(s) Oral four times a day PRN Moderate Pain (4 - 6)  traMADol 25 milliGRAM(s) Oral four times a day PRN Mild Pain (1 - 3)      Allergies    amoxicillin (Unknown)    Intolerances        Review of Systems:    General:  No wt loss, fevers, chills, night sweats, fatigue   Eyes:  Good vision, no reported pain  ENT:  No sore throat, pain, runny nose, dysphagia  CV:  No pain, palpitations, hypo/hypertension  Resp:  No dyspnea, cough, tachypnea, wheezing  GI:  No pain, No nausea, No vomiting, No diarrhea, No constipation, No weight loss, No fever, No pruritis, No rectal bleeding, No melena, No dysphagia  :  No pain, bleeding, incontinence, nocturia  Muscle:  No pain, weakness  Neuro:  No weakness, tingling, memory problems  Psych:  No fatigue, insomnia, mood problems, depression  Endocrine:  No polyuria, polydypsia, cold/heat intolerance  Heme:  No petechiae, ecchymosis, easy bruisability  Skin:  No rash, tattoos, scars, edema      Vital Signs Last 24 Hrs  T(C): 36.8 (18 Jun 2018 08:00), Max: 37 (17 Jun 2018 16:25)  T(F): 98.2 (18 Jun 2018 08:00), Max: 98.6 (17 Jun 2018 16:25)  HR: 68 (18 Jun 2018 08:00) (56 - 82)  BP: 154/69 (18 Jun 2018 08:00) (148/74 - 178/78)  BP(mean): --  RR: 16 (18 Jun 2018 08:00) (15 - 17)  SpO2: 95% (18 Jun 2018 08:00) (95% - 98%)    PHYSICAL EXAM:  General:  lying in bed in nad  HEENT:  NC/AT,  conjunctivae clear and pink, anicteric  Chest:  cta  CV: S1S2 irreg  Abdomen:  Soft, nd, +mild incisional tenderness, steristrips c/d/i  Extremities:  no edema  Skin:  No rash  Neuro/Psych:  A&Ox3      LABS:                        8.7    7.56  )-----------( 258      ( 18 Jun 2018 06:51 )             29.1                 RADIOLOGY & ADDITIONAL TESTS:

## 2018-06-18 NOTE — PROGRESS NOTE ADULT - PROBLEM SELECTOR PLAN 1
s/p colonoscopy with hepatic flexure mass --s/p surgery--path--pending  anemia likely multifactorial  s/p colon bx---path showed TVA, no evidence of malignancy but possibility of underlying lesion not excluded  s/p OR 6/13 for RHC and AILEEN  f/u cytopathology when available  post op care as per surgery.

## 2018-06-18 NOTE — PROGRESS NOTE ADULT - PROBLEM SELECTOR PLAN 3
path showed reactive gastropathy, h pylori neg  will need close gi f/u upon dc and likely outpatient EUS for further eval

## 2018-06-18 NOTE — PROGRESS NOTE ADULT - PROBLEM SELECTOR PLAN 2
SCD for DVT prevention  avoid chemical prophylaxis
CT scan suggestive of possible metastatic disease. Patient likely would still need to have hemicolectomy for palliative reasons. Surgery planned for next week.
SCD for DVT prevention
SCD for DVT prevention  avoid chemical prophylaxis
SCD for DVT prevention  DVT prevention with heparin
SCD for DVT prevention  avoid chemical prophylaxis
patient is seen by  GI , for possible egd/colon per gi this afternoon, final decision gi w/u as per pt/gi to r/o gi pathology
see above
appreciate colorectal input   for surgery this week once clearance obtained
appreciate colorectal input   for surgery this week once clearance obtained
earlier path showed reactive gastropathy, h pylori neg  will likely need outpatient EUS for further eval-final decision as per gi.
path showed reactive gastropathy, h pylori neg  will likely need outpatient EUS for further eval
s/p colonoscopy with hepatic flexure mass   anemia likely 2/2 mass, gi/colorectal surgery are following  ct scan---hepatic abnormality--cannot exclude mets disease, nl cea at 3.8  anticoagulation is on hold  for possible surgery per cr surgery, can have a look at liver/liver bx if undergoes surgery to r/o mets disease--per surgery
see above
earlier path showed reactive gastropathy, h pylori neg  will likely need outpatient EUS for further eval-final decision as per gi
earlier path showed reactive gastropathy, h pylori neg  will likely need outpatient EUS for further eval-final decision as per gi.
earlier path showed reactive gastropathy, h pylori neg  will likely need outpatient EUS for further eval-final decision as per gi.
s/p egd, gi is following  s/p egd--f/u cytopathology  EUS/further w/u is as per gi/pt

## 2018-06-18 NOTE — PROGRESS NOTE ADULT - PROBLEM SELECTOR PROBLEM 2
Preventive measure
Hepatic flexure mass
Preventive measure
Preventive measure
Fecal occult blood test positive
Hepatic flexure mass
Preventive measure
Gastric nodule
Gastric nodule
Hepatic flexure mass
Gastric nodule

## 2018-06-18 NOTE — PROGRESS NOTE ADULT - PROBLEM SELECTOR PROBLEM 3
Atrial fibrillation, unspecified
Gastric nodule
Anemia
Anemia
Gastric nodule
Anemia

## 2018-06-18 NOTE — PROGRESS NOTE ADULT - SUBJECTIVE AND OBJECTIVE BOX
Patient is a 89y old  Female who presents with a chief complaint of low blood counts (17 Jun 2018 09:09)       Pt is seen and examined  pt is awake and lying in bed/out of bed to chair  pt seems comfortable and denies any complaints at this time    HPI:  · HPI Objective Statement: 89 female sent to ER by her cardiologist for a low hg level 7.1. Patient states for the past 2 weeks she has not been feeling well, generalized fatigue, shortness of breath with exertion, sleeping more.	  In ER patient was found to be anemic.  Patient is being admitted for further work up and treatment (05 Jun 2018 15:51)         ROS:  Negative except for:    MEDICATIONS  (STANDING):  ascorbic acid 500 milliGRAM(s) Oral daily  aspirin enteric coated 81 milliGRAM(s) Oral daily  digoxin     Tablet 0.125 milliGRAM(s) Oral daily  diltiazem    milliGRAM(s) Oral daily  folic acid 1 milliGRAM(s) Oral daily  levothyroxine 100 MICROGram(s) Oral daily  metoprolol tartrate 25 milliGRAM(s) Oral two times a day  multivitamin 1 Tablet(s) Oral daily  pantoprazole    Tablet 40 milliGRAM(s) Oral before breakfast  rivaroxaban 15 milliGRAM(s) Oral every 24 hours  simvastatin 20 milliGRAM(s) Oral at bedtime    MEDICATIONS  (PRN):  acetaminophen   Tablet. 650 milliGRAM(s) Oral every 6 hours PRN Mild Pain (1 - 3)  oxyCODONE    IR 5 milliGRAM(s) Oral four times a day PRN Moderate Pain (4 - 6)  traMADol 25 milliGRAM(s) Oral four times a day PRN Mild Pain (1 - 3)      Allergies    amoxicillin (Unknown)    Intolerances        Vital Signs Last 24 Hrs  T(C): 36.8 (18 Jun 2018 08:00), Max: 37 (17 Jun 2018 16:25)  T(F): 98.2 (18 Jun 2018 08:00), Max: 98.6 (17 Jun 2018 16:25)  HR: 68 (18 Jun 2018 08:00) (56 - 82)  BP: 154/69 (18 Jun 2018 08:00) (148/74 - 178/78)  BP(mean): --  RR: 16 (18 Jun 2018 08:00) (15 - 17)  SpO2: 95% (18 Jun 2018 08:00) (95% - 98%)    PHYSICAL EXAM  General: adult in NAD  HEENT: clear oropharynx, anicteric sclera, pink conjunctiva  Neck: supple  CV: normal S1/S2 with no murmur rubs or gallops  Lungs: positive air movement b/l ant lungs,clear to auscultation, no wheezes, no rales  Abdomen: soft non-tender non-distended, no hepatosplenomegaly  Ext: no clubbing cyanosis or edema  Skin: no rashes and no petechiae  Neuro: alert and oriented X 4, no focal deficits  LABS:                          8.7    7.56  )-----------( 258      ( 18 Jun 2018 06:51 )             29.1         Mean Cell Volume : 83.1 fl  Mean Cell Hemoglobin : 24.9 pg  Mean Cell Hemoglobin Concentration : 29.9 gm/dL  Auto Neutrophil # : x  Auto Lymphocyte # : x  Auto Monocyte # : x  Auto Eosinophil # : x  Auto Basophil # : x  Auto Neutrophil % : x  Auto Lymphocyte % : x  Auto Monocyte % : x  Auto Eosinophil % : x  Auto Basophil % : x    Serial CBC's  06-18 @ 06:51  Hct-29.1 / Hgb-8.7 / Plat-258 / RBC-3.50 / WBC-7.56          Serial CBC's  06-17 @ 10:13  Hct-27.7 / Hgb-8.5 / Plat--- / RBC--- / WBC---          Serial CBC's  06-16 @ 11:47  Hct-29.7 / Hgb-8.8 / Plat-217 / RBC-3.54 / WBC-9.06          Serial CBC's  06-15 @ 10:58  Hct-28.8 / Hgb-8.8 / Plat-189 / RBC-3.41 / WBC-10.92          Serial CBC's  06-15 @ 10:30  Hct-29.9 / Hgb-8.8 / Plat--- / RBC--- / WBC---                                      BLOOD SMEAR INTERPRETATION:       RADIOLOGY & ADDITIONAL STUDIES: Patient is a 89y old  Female who presents with a chief complaint of low blood counts (17 Jun 2018 09:09)    Pt is seen and examined, pt is awake and is out of bed to chair  pt seems comfortable and denies any complaints at this time    HPI:  · HPI Objective Statement: 89 female sent to ER by her cardiologist for a low hg level 7.1. Patient states for the past 2 weeks she has not been feeling well, generalized fatigue, shortness of breath with exertion, sleeping more.	  In ER patient was found to be anemic.  Patient is being admitted for further work up and treatment (05 Jun 2018 15:51)         MEDICATIONS  (STANDING):  ascorbic acid 500 milliGRAM(s) Oral daily  aspirin enteric coated 81 milliGRAM(s) Oral daily  digoxin     Tablet 0.125 milliGRAM(s) Oral daily  diltiazem    milliGRAM(s) Oral daily  folic acid 1 milliGRAM(s) Oral daily  levothyroxine 100 MICROGram(s) Oral daily  metoprolol tartrate 25 milliGRAM(s) Oral two times a day  multivitamin 1 Tablet(s) Oral daily  pantoprazole    Tablet 40 milliGRAM(s) Oral before breakfast  rivaroxaban 15 milliGRAM(s) Oral every 24 hours  simvastatin 20 milliGRAM(s) Oral at bedtime    MEDICATIONS  (PRN):  acetaminophen   Tablet. 650 milliGRAM(s) Oral every 6 hours PRN Mild Pain (1 - 3)  oxyCODONE    IR 5 milliGRAM(s) Oral four times a day PRN Moderate Pain (4 - 6)  traMADol 25 milliGRAM(s) Oral four times a day PRN Mild Pain (1 - 3)      Allergies    amoxicillin (Unknown)    Intolerances        Vital Signs Last 24 Hrs  T(C): 36.8 (18 Jun 2018 08:00), Max: 37 (17 Jun 2018 16:25)  T(F): 98.2 (18 Jun 2018 08:00), Max: 98.6 (17 Jun 2018 16:25)  HR: 68 (18 Jun 2018 08:00) (56 - 82)  BP: 154/69 (18 Jun 2018 08:00) (148/74 - 178/78)  BP(mean): --  RR: 16 (18 Jun 2018 08:00) (15 - 17)  SpO2: 95% (18 Jun 2018 08:00) (95% - 98%)    PHYSICAL EXAM  General: adult in NAD  HEENT: clear oropharynx, anicteric sclera, pink conjunctiva  Neck: supple  CV: normal S1/S2 with no murmur rubs or gallops  Lungs: positive air movement b/l ant lungs,clear to auscultation, no wheezes, no rales  Abdomen: soft non-tender non-distended, no hepatosplenomegaly  Ext: no clubbing cyanosis or edema  Skin: no rashes and no petechiae  Neuro: alert and oriented X 4, no focal deficits  LABS:                          8.7    7.56  )-----------( 258      ( 18 Jun 2018 06:51 )             29.1         Mean Cell Volume : 83.1 fl  Mean Cell Hemoglobin : 24.9 pg  Mean Cell Hemoglobin Concentration : 29.9 gm/dL  Auto Neutrophil # : x  Auto Lymphocyte # : x  Auto Monocyte # : x  Auto Eosinophil # : x  Auto Basophil # : x  Auto Neutrophil % : x  Auto Lymphocyte % : x  Auto Monocyte % : x  Auto Eosinophil % : x  Auto Basophil % : x    Serial CBC's  06-18 @ 06:51  Hct-29.1 / Hgb-8.7 / Plat-258 / RBC-3.50 / WBC-7.56          Serial CBC's  06-17 @ 10:13  Hct-27.7 / Hgb-8.5 / Plat--- / RBC--- / WBC---          Serial CBC's  06-16 @ 11:47  Hct-29.7 / Hgb-8.8 / Plat-217 / RBC-3.54 / WBC-9.06          Serial CBC's  06-15 @ 10:58  Hct-28.8 / Hgb-8.8 / Plat-189 / RBC-3.41 / WBC-10.92          Serial CBC's  06-15 @ 10:30  Hct-29.9 / Hgb-8.8 / Plat--- / RBC--- / WBC---                                      BLOOD SMEAR INTERPRETATION:       RADIOLOGY & ADDITIONAL STUDIES:

## 2018-06-19 LAB — SURGICAL PATHOLOGY FINAL REPORT - CH: SIGNIFICANT CHANGE UP

## 2018-06-25 ENCOUNTER — MEDICATION RENEWAL (OUTPATIENT)
Age: 83
End: 2018-06-25

## 2018-06-26 ENCOUNTER — MEDICATION RENEWAL (OUTPATIENT)
Age: 83
End: 2018-06-26

## 2018-06-28 ENCOUNTER — MEDICATION RENEWAL (OUTPATIENT)
Age: 83
End: 2018-06-28

## 2018-07-11 ENCOUNTER — APPOINTMENT (OUTPATIENT)
Dept: CARDIOLOGY | Facility: CLINIC | Age: 83
End: 2018-07-11
Payer: MEDICARE

## 2018-07-11 VITALS
BODY MASS INDEX: 28.23 KG/M2 | WEIGHT: 122 LBS | DIASTOLIC BLOOD PRESSURE: 70 MMHG | SYSTOLIC BLOOD PRESSURE: 154 MMHG | OXYGEN SATURATION: 94 % | HEART RATE: 66 BPM | HEIGHT: 55 IN

## 2018-07-11 PROCEDURE — 99214 OFFICE O/P EST MOD 30 MIN: CPT

## 2018-07-24 ENCOUNTER — APPOINTMENT (OUTPATIENT)
Dept: CARDIOLOGY | Facility: CLINIC | Age: 83
End: 2018-07-24

## 2018-08-08 ENCOUNTER — APPOINTMENT (OUTPATIENT)
Dept: CARDIOLOGY | Facility: CLINIC | Age: 83
End: 2018-08-08
Payer: MEDICARE

## 2018-08-08 VITALS
SYSTOLIC BLOOD PRESSURE: 159 MMHG | OXYGEN SATURATION: 93 % | DIASTOLIC BLOOD PRESSURE: 76 MMHG | WEIGHT: 123 LBS | BODY MASS INDEX: 28.46 KG/M2 | HEIGHT: 55 IN | HEART RATE: 64 BPM

## 2018-08-08 PROBLEM — K59.00 CONSTIPATION, UNSPECIFIED: Chronic | Status: ACTIVE | Noted: 2018-06-05

## 2018-08-08 PROBLEM — D64.9 ANEMIA, UNSPECIFIED: Chronic | Status: ACTIVE | Noted: 2018-06-05

## 2018-08-08 PROBLEM — E50.7 OTHER OCULAR MANIFESTATIONS OF VITAMIN A DEFICIENCY: Chronic | Status: ACTIVE | Noted: 2018-06-05

## 2018-08-08 PROCEDURE — 99214 OFFICE O/P EST MOD 30 MIN: CPT

## 2018-08-20 ENCOUNTER — APPOINTMENT (OUTPATIENT)
Dept: ELECTROPHYSIOLOGY | Facility: CLINIC | Age: 83
End: 2018-08-20
Payer: MEDICARE

## 2018-08-20 DIAGNOSIS — Z95.0 PRESENCE OF CARDIAC PACEMAKER: ICD-10-CM

## 2018-08-20 PROCEDURE — 93294 REM INTERROG EVL PM/LDLS PM: CPT

## 2018-08-20 PROCEDURE — 93296 REM INTERROG EVL PM/IDS: CPT

## 2018-08-29 ENCOUNTER — MEDICATION RENEWAL (OUTPATIENT)
Age: 83
End: 2018-08-29

## 2018-09-04 ENCOUNTER — MEDICATION RENEWAL (OUTPATIENT)
Age: 83
End: 2018-09-04

## 2018-10-08 ENCOUNTER — APPOINTMENT (OUTPATIENT)
Dept: CARDIOLOGY | Facility: CLINIC | Age: 83
End: 2018-10-08
Payer: MEDICARE

## 2018-10-08 VITALS
HEIGHT: 55 IN | SYSTOLIC BLOOD PRESSURE: 178 MMHG | OXYGEN SATURATION: 93 % | DIASTOLIC BLOOD PRESSURE: 71 MMHG | WEIGHT: 122 LBS | HEART RATE: 51 BPM | BODY MASS INDEX: 28.23 KG/M2

## 2018-10-08 PROCEDURE — 99215 OFFICE O/P EST HI 40 MIN: CPT

## 2018-11-19 ENCOUNTER — APPOINTMENT (OUTPATIENT)
Dept: ELECTROPHYSIOLOGY | Facility: CLINIC | Age: 83
End: 2018-11-19
Payer: MEDICARE

## 2018-11-19 VITALS
BODY MASS INDEX: 26.73 KG/M2 | SYSTOLIC BLOOD PRESSURE: 165 MMHG | WEIGHT: 115 LBS | HEART RATE: 70 BPM | DIASTOLIC BLOOD PRESSURE: 82 MMHG | OXYGEN SATURATION: 97 %

## 2018-11-19 PROCEDURE — 93279 PRGRMG DEV EVAL PM/LDLS PM: CPT

## 2018-12-18 ENCOUNTER — APPOINTMENT (OUTPATIENT)
Dept: CARDIOLOGY | Facility: CLINIC | Age: 83
End: 2018-12-18
Payer: MEDICARE

## 2018-12-18 VITALS
DIASTOLIC BLOOD PRESSURE: 76 MMHG | HEART RATE: 59 BPM | SYSTOLIC BLOOD PRESSURE: 145 MMHG | BODY MASS INDEX: 28.46 KG/M2 | HEIGHT: 55 IN | OXYGEN SATURATION: 96 % | WEIGHT: 123 LBS

## 2018-12-18 DIAGNOSIS — K92.2 GASTROINTESTINAL HEMORRHAGE, UNSPECIFIED: ICD-10-CM

## 2018-12-18 PROCEDURE — 99214 OFFICE O/P EST MOD 30 MIN: CPT

## 2018-12-18 NOTE — REASON FOR VISIT
[Aortic Stenosis] : aortic stenosis [Coronary Artery Disease] : coronary artery disease [Hyperlipidemia] : hyperlipidemia [Hypertension] : hypertension [Sick Sinus Syndrome] : sick sinus syndrome

## 2018-12-18 NOTE — HISTORY OF PRESENT ILLNESS
[FreeTextEntry1] : I saw Mrs. Hoyt in the office today for a follow up visit. She is a 89-year-old white female who is status post coronary bypass graft surgery in 1989 with a LIMA graft to LAD and vein grafts to the other vessels. She had a stress test in June of 2011 that was normal. She also is being treated for hypertension, hyperlipidemia, and hypothyroidism. She has chronic atrial fibrillation and moderate aortic stenosis on echocardiogram from a year and half ago.\par \par 5/15 she was admitted to [facility]  with rapid atrial fibrillation, complaining of SOB, and had elevated troponins. She was rate controlled and sent to [facility]. Cardiac catheterization showed normal ejection fraction. She had a 90% distal left main, 100% ostial LAD, 90% circumflex 85% RCA. The LIMA graft to the LAD was patent and the vein grafts to the OM and RCA were closed. She underwent drug-eluting stents to the RCA and circumflex arteries. By echocardiogram 7/14 she has moderate aortic stenosis. A repeat echocardiogram performed 3/16 demonstrated ejection fraction of 66%. There was moderate aortic stenosis with a peak gradient of 32 and valve area 1.1 cmÂ². Showed mild MR and moderate TR with a PA pressure of 40. Most recent echo performed 5/17 demonstrates an ejection fraction of 57%. Distal moderate aortic stenosis. It is now moderate to severe TR with estimated PA pressure 62.\par \par She underwent colonoscopy with removal of a rectal mass 6/30/15 by Dr. Little. Apparently her Plavix and Xarelto stopped, even though she drug-eluting stents placed 5/15. She underwent repeat colonoscopy that according to the patient turned out okay. She was admitted to [facility] at Thanksgiving time with black stool. Hemoglobin was down to 8.7 and she had 2 units of packed cells. Endoscopy showed that the scab over the lesion that had been removed was leaving. Patient was treated with protonix and Carafate and anticoagulation was held. She is now off of those medications doing well on Xarelto and aspirin.\par \par The patient underwent an endoscopy for a bleeding lesion in the stomach. She's now on protonix. She underwent her third colonoscopy and the polyp was finally removed from the colon. She's had no signs of active bleeding clinically on Xarelto 15 mg once a day and off the aspirin.\par \par Her creatinine clearance was back to normal and her Xarelto was increased back to 20 mg a day.\par \par The patient underwent TAVR 2/18for severe symptomatic aortic stenosis. Cardiac catheterization showed 100% mid LAD, 60% diagonal, 20% circumflex and 40% RCA. LIMA graft to LAD was patent. Hospital course was complicated by 3-5 sec pauses requiring a permanent pacemaker.Pacemaker was complicated by hematoma.  Echocardiogram showed ejection fraction of 75% with mild MR, and mild to moderate TR. PA pressure 49, stage II diastolic dysfunction with LVH. Severe left atrial enlargement. Blood work on discharge showed a hemoglobin of 7.9 with hematocrit 24.2. Normal BUN creatinine and potassium..She is now on on replacement therapy\par \par The patient had been complaining of increasing fatigue, and increasing heart rate with increasing shortness of breath. Blood work from your office demonstrated an abrupt decrease in her hemoglobin down to 7.5 and she was admitted to the hospital in [place]. Workup showed lower GI bleeding and she underwent right hemicolectomy with anastomosis of the ileum to the colon. This was felt to be the source of bleeding. Upon discharge from the hospital hemoglobin 8.7 with hematocrit 29.1. Creatinine 0.9, BUN of 8, potassium 3.9.\par \par At home she's been gradually improving. Her appetite is better and she is feeling stronger. She's getting back to her normal activities any major symptoms.Occasionally if she feels dizzy, especially sitting and watching TV. She checks her blood pressure at home which has been excellent but sometimes a resting heart rate is 50. The pacemaker set at VVI at 50. PM set to 60.\par \par Recent blood work demonstrates a cholesterol 139, glyceride 143. HDL 44, and LDL 66. Dig level is 1.3 with a TSH of 6.13.

## 2018-12-18 NOTE — REVIEW OF SYSTEMS
[Palpitations] : palpitations [Joint Pain] : joint pain [see HPI] : see HPI [Negative] : Musculoskeletal [Fever] : no fever [Headache] : no headache [Chills] : no chills [Feeling Fatigued] : not feeling fatigued [Blurry Vision] : no blurred vision [Shortness Of Breath] : no shortness of breath [Dyspnea on exertion] : not dyspnea during exertion [Chest  Pressure] : no chest pressure [Chest Pain] : no chest pain [Lower Ext Edema] : no extremity edema [Leg Claudication] : no intermittent leg claudication [Abdominal Pain] : no abdominal pain [Heartburn] : no heartburn [Dysphagia] : no dysphagia [Dysuria] : no dysuria [Skin: A Rash] : no rash: [Skin Lesions] : no skin lesions [Tremor] : no tremor was seen [Convulsions] : no convulsions [Confusion] : no confusion was observed [Anxiety] : no anxiety [Easy Bleeding] : no tendency for easy bleeding [Easy Bruising] : no tendency for easy bruising

## 2019-01-10 PROBLEM — Z95.0 CARDIAC PACEMAKER IN SITU: Status: ACTIVE | Noted: 2019-01-10

## 2019-02-01 ENCOUNTER — RX RENEWAL (OUTPATIENT)
Age: 84
End: 2019-02-01

## 2019-02-07 ENCOUNTER — MEDICATION RENEWAL (OUTPATIENT)
Age: 84
End: 2019-02-07

## 2019-03-08 ENCOUNTER — MEDICATION RENEWAL (OUTPATIENT)
Age: 84
End: 2019-03-08

## 2019-03-12 ENCOUNTER — MEDICATION RENEWAL (OUTPATIENT)
Age: 84
End: 2019-03-12

## 2019-04-16 ENCOUNTER — APPOINTMENT (OUTPATIENT)
Dept: CARDIOLOGY | Facility: CLINIC | Age: 84
End: 2019-04-16
Payer: MEDICARE

## 2019-04-16 VITALS
HEART RATE: 59 BPM | WEIGHT: 125 LBS | SYSTOLIC BLOOD PRESSURE: 172 MMHG | OXYGEN SATURATION: 93 % | DIASTOLIC BLOOD PRESSURE: 80 MMHG | BODY MASS INDEX: 28.93 KG/M2 | HEIGHT: 55 IN

## 2019-04-16 DIAGNOSIS — I27.20 PULMONARY HYPERTENSION, UNSPECIFIED: ICD-10-CM

## 2019-04-16 PROCEDURE — 99214 OFFICE O/P EST MOD 30 MIN: CPT

## 2019-04-16 NOTE — DISCUSSION/SUMMARY
[FreeTextEntry1] : The patient is doing well without any signs or symptoms of active heart disease. On her medication her blood pressure heart rate well controlled. Going to do the dose of digoxin from every other day to every third day. Need to monitor her digoxin level as well as her creatinine.\par \par She will schedule a followup echocardiogram and I will see her in 4 months. Otherwise she'll stay on her regular medication. If she has any problems she will call me.

## 2019-04-16 NOTE — HISTORY OF PRESENT ILLNESS
[FreeTextEntry1] : I saw Mrs. Hoyt in the office today for a follow up visit. She is a 89-year-old white female who is status post coronary bypass graft surgery in 1989 with a LIMA graft to LAD and vein grafts to the other vessels. She had a stress test in June of 2011 that was normal. She also is being treated for hypertension, hyperlipidemia, and hypothyroidism. She has chronic atrial fibrillation and moderate aortic stenosis on echocardiogram from a year and half ago.\par \par 5/15 she was admitted to [facility]  with rapid atrial fibrillation, complaining of SOB, and had elevated troponins. She was rate controlled and sent to [facility]. Cardiac catheterization showed normal ejection fraction. She had a 90% distal left main, 100% ostial LAD, 90% circumflex 85% RCA. The LIMA graft to the LAD was patent and the vein grafts to the OM and RCA were closed. She underwent drug-eluting stents to the RCA and circumflex arteries. By echocardiogram 7/14 she has moderate aortic stenosis. A repeat echocardiogram performed 3/16 demonstrated ejection fraction of 66%. There was moderate aortic stenosis with a peak gradient of 32 and valve area 1.1 cmÂ². Showed mild MR and moderate TR with a PA pressure of 40. Most recent echo performed 5/17 demonstrates an ejection fraction of 57%. Distal moderate aortic stenosis. It is now moderate to severe TR with estimated PA pressure 62.\par \par She underwent colonoscopy with removal of a rectal mass 6/30/15 by Dr. Little. Apparently her Plavix and Xarelto stopped, even though she drug-eluting stents placed 5/15. She underwent repeat colonoscopy that according to the patient turned out okay. She was admitted to [facility] at Thanksgiving time with black stool. Hemoglobin was down to 8.7 and she had 2 units of packed cells. Endoscopy showed that the scab over the lesion that had been removed was leaving. Patient was treated with protonix and Carafate and anticoagulation was held. She is now off of those medications doing well on Xarelto and aspirin.\par \par The patient underwent an endoscopy for a bleeding lesion in the stomach. She's now on protonix. She underwent her third colonoscopy and the polyp was finally removed from the colon. She's had no signs of active bleeding clinically on Xarelto 15 mg once a day and off the aspirin.\par \par Her creatinine clearance was back to normal and her Xarelto was increased back to 20 mg a day.\par \par The patient underwent TAVR 2/18for severe symptomatic aortic stenosis. Cardiac catheterization showed 100% mid LAD, 60% diagonal, 20% circumflex and 40% RCA. LIMA graft to LAD was patent. Hospital course was complicated by 3-5 sec pauses requiring a permanent pacemaker.Pacemaker was complicated by hematoma.  Echocardiogram showed ejection fraction of 75% with mild MR, and mild to moderate TR. PA pressure 49, stage II diastolic dysfunction with LVH. Severe left atrial enlargement. Blood work on discharge showed a hemoglobin of 7.9 with hematocrit 24.2. Normal BUN creatinine and potassium..She is now on on replacement therapy\par \par The patient had been complaining of increasing fatigue, and increasing heart rate with increasing shortness of breath. Blood work from your office demonstrated an abrupt decrease in her hemoglobin down to 7.5 and she was admitted to the hospital in [place]. Workup showed lower GI bleeding and she underwent right hemicolectomy with anastomosis of the ileum to the colon. This was felt to be the source of bleeding. Upon discharge from the hospital hemoglobin 8.7 with hematocrit 29.1. Creatinine 0.9, BUN of 8, potassium 3.9.\par \par At home she's been gradually improving. Her appetite is better and she is feeling stronger. She's getting back to her normal activities any major symptoms.Occasionally if she feels dizzy, especially sitting and watching TV. She checks her blood pressure at home which has been excellent but sometimes a resting heart rate is 50. The pacemaker set at VVI at 50. PM set to 60.\par \par The patient brought a copy of blood work dated 1/19. Creatinine was minimally elevated at 1.09 with a digoxin level of 1.4. All other tests are within normal range. Chest pain, unspecified type

## 2019-04-16 NOTE — PHYSICAL EXAM
[General Appearance - Well Developed] : well developed [Normal Appearance] : normal appearance [Well Groomed] : well groomed [General Appearance - Well Nourished] : well nourished [No Deformities] : no deformities [General Appearance - In No Acute Distress] : no acute distress [Eyelids - No Xanthelasma] : the eyelids demonstrated no xanthelasmas [Normal Conjunctiva] : the conjunctiva exhibited no abnormalities [Normal Jugular Venous A Waves Present] : normal jugular venous A waves present [Normal Oral Mucosa] : normal oral mucosa [Normal Jugular Venous V Waves Present] : normal jugular venous V waves present [No Jugular Venous Beckham A Waves] : no jugular venous beckham A waves [Exaggerated Use Of Accessory Muscles For Inspiration] : no accessory muscle use [Respiration, Rhythm And Depth] : normal respiratory rhythm and effort [Auscultation Breath Sounds / Voice Sounds] : lungs were clear to auscultation bilaterally [Bowel Sounds] : normal bowel sounds [Abdomen Soft] : soft [Abdomen Tenderness] : non-tender [Abnormal Walk] : normal gait [Gait - Sufficient For Exercise Testing] : the gait was sufficient for exercise testing [Nail Clubbing] : no clubbing of the fingernails [Skin Color & Pigmentation] : normal skin color and pigmentation [Cyanosis, Localized] : no localized cyanosis [] : no rash [Skin Turgor] : normal skin turgor [Oriented To Time, Place, And Person] : oriented to person, place, and time [Impaired Insight] : insight and judgment were intact [No Anxiety] : not feeling anxious [Not Palpable] : not palpable [No Precordial Heave] : no precordial heave was noted [Normal Rate] : normal [Irregularly Irregular] : irregularly irregular [Normal S1] : normal S1 [Normal S2] : normal S2 [No Gallop] : no gallop heard [Distant] : the heart sounds were distant [III] : a grade 3 [Crescendo-Decrescendo] : crescendo-decrescendo [Carotids] : the murmur was transmitted to the carotid arteries [2+] : left 2+ [1+] : right 1+ [No Abnormalities] : the abdominal aorta was not enlarged and no bruit was heard [___ +] : bilateral [unfilled]U+ pitting edema to the ankles [Rt] : varicose veins of the right leg noted [Lt] : varicose veins of the left leg noted [Apical Thrill] : no thrill palpable at the apex [Click] : no click [Pericardial Rub] : no pericardial rub

## 2019-04-16 NOTE — REASON FOR VISIT
Exercising to Lose Weight  Exercising can help you to lose weight. In order to lose weight through exercise, you need to do vigorous-intensity exercise. You can tell that you are exercising with vigorous intensity if you are breathing very hard and fast and cannot hold a conversation while exercising.  Moderate-intensity exercise helps to maintain your current weight. You can tell that you are exercising at a moderate level if you have a higher heart rate and faster breathing, but you are still able to hold a conversation.  How often should I exercise?  Choose an activity that you enjoy and set realistic goals. Your health care provider can help you to make an activity plan that works for you. Exercise regularly as directed by your health care provider. This may include:  · Doing resistance training twice each week, such as:  ¨ Push-ups.  ¨ Sit-ups.  ¨ Lifting weights.  ¨ Using resistance bands.  · Doing a given intensity of exercise for a given amount of time. Choose from these options:  ¨ 150 minutes of moderate-intensity exercise every week.  ¨ 75 minutes of vigorous-intensity exercise every week.  ¨ A mix of moderate-intensity and vigorous-intensity exercise every week.  Children, pregnant women, people who are out of shape, people who are overweight, and older adults may need to consult a health care provider for individual recommendations. If you have any sort of medical condition, be sure to consult your health care provider before starting a new exercise program.  What are some activities that can help me to lose weight?  · Walking at a rate of at least 4.5 miles an hour.  · Jogging or running at a rate of 5 miles per hour.  · Biking at a rate of at least 10 miles per hour.  · Lap swimming.  · Roller-skating or in-line skating.  · Cross-country skiing.  · Vigorous competitive sports, such as football, basketball, and soccer.  · Jumping rope.  · Aerobic dancing.  How can I be more active in my day-to-day  activities?  · Use the stairs instead of the elevator.  · Take a walk during your lunch break.  · If you drive, park your car farther away from work or school.  · If you take public transportation, get off one stop early and walk the rest of the way.  · Make all of your phone calls while standing up and walking around.  · Get up, stretch, and walk around every 30 minutes throughout the day.  What guidelines should I follow while exercising?  · Do not exercise so much that you hurt yourself, feel dizzy, or get very short of breath.  · Consult your health care provider prior to starting a new exercise program.  · Wear comfortable clothes and shoes with good support.  · Drink plenty of water while you exercise to prevent dehydration or heat stroke. Body water is lost during exercise and must be replaced.  · Work out until you breathe faster and your heart beats faster.  This information is not intended to replace advice given to you by your health care provider. Make sure you discuss any questions you have with your health care provider.  Document Released: 2012 Document Revised: 2017 Document Reviewed: 2015  [employer] Interactive Patient Education © 2017 [employer]    Medicare Wellness  Personal Prevention Plan of Service     Date of Office Visit:  03/15/2018  Encounter Provider:  YANIRA Roe  Place of Service:  [facility]  Patient Name: James Loaiza  :  1947    As part of the Medicare Wellness portion of your visit today, we are providing you with this personalized preventive plan of services (PPPS). This plan is based upon recommendations of the [place] Preventive Services Task Force (USPSTF) and the Advisory Committee on Immunization Practices (ACIP).    This lists the preventive care services that should be considered, and provides dates of when you are due. Items listed as completed are up-to-date and do not require any further  intervention.    Health Maintenance   Topic Date Due   • PNEUMOCOCCAL VACCINES (65+ LOW/MEDIUM RISK) (1 of 2 - PCV13) 09/26/2013   • HEPATITIS C SCREENING  06/13/2016   • LIPID PANEL  06/14/2018   • TDAP/TD VACCINES (2 - Td) 12/04/2018   • MEDICARE ANNUAL WELLNESS  03/15/2019   • COLONOSCOPY  09/21/2027   • INFLUENZA VACCINE  Completed   • ZOSTER VACCINE  Addressed       Orders Placed This Encounter   Procedures   • Hemoglobin A1c   • Hepatitis C Antibody   • Lipid Panel   • Comprehensive metabolic panel   • Ambulatory Referral to Ophthalmology     Referral Priority:   Routine     Referral Type:   Consultation     Referral Reason:   Specialty Services Required     Requested Specialty:   Ophthalmology     Number of Visits Requested:   1   • CBC & Differential       Return in 6 months (on 9/15/2018).         [Aortic Stenosis] : aortic stenosis [Coronary Artery Disease] : coronary artery disease [Hyperlipidemia] : hyperlipidemia [Hypertension] : hypertension [Sick Sinus Syndrome] : sick sinus syndrome

## 2019-07-09 ENCOUNTER — RX RENEWAL (OUTPATIENT)
Age: 84
End: 2019-07-09

## 2019-08-13 ENCOUNTER — APPOINTMENT (OUTPATIENT)
Dept: CARDIOLOGY | Facility: CLINIC | Age: 84
End: 2019-08-13
Payer: MEDICARE

## 2019-08-13 ENCOUNTER — NON-APPOINTMENT (OUTPATIENT)
Age: 84
End: 2019-08-13

## 2019-08-13 VITALS
WEIGHT: 125 LBS | HEART RATE: 51 BPM | DIASTOLIC BLOOD PRESSURE: 80 MMHG | OXYGEN SATURATION: 92 % | BODY MASS INDEX: 28.93 KG/M2 | SYSTOLIC BLOOD PRESSURE: 184 MMHG | HEIGHT: 55 IN

## 2019-08-13 DIAGNOSIS — N18.1 CHRONIC KIDNEY DISEASE, STAGE 1: ICD-10-CM

## 2019-08-13 PROCEDURE — 99214 OFFICE O/P EST MOD 30 MIN: CPT

## 2019-08-13 PROCEDURE — 93000 ELECTROCARDIOGRAM COMPLETE: CPT

## 2019-08-13 NOTE — PHYSICAL EXAM
[General Appearance - Well Developed] : well developed [Normal Appearance] : normal appearance [Well Groomed] : well groomed [General Appearance - Well Nourished] : well nourished [No Deformities] : no deformities [General Appearance - In No Acute Distress] : no acute distress [Normal Conjunctiva] : the conjunctiva exhibited no abnormalities [Eyelids - No Xanthelasma] : the eyelids demonstrated no xanthelasmas [Normal Oral Mucosa] : normal oral mucosa [Normal Jugular Venous A Waves Present] : normal jugular venous A waves present [Normal Jugular Venous V Waves Present] : normal jugular venous V waves present [No Jugular Venous Beckham A Waves] : no jugular venous beckham A waves [Respiration, Rhythm And Depth] : normal respiratory rhythm and effort [Auscultation Breath Sounds / Voice Sounds] : lungs were clear to auscultation bilaterally [Exaggerated Use Of Accessory Muscles For Inspiration] : no accessory muscle use [Bowel Sounds] : normal bowel sounds [Abdomen Tenderness] : non-tender [Abdomen Soft] : soft [Abnormal Walk] : normal gait [Gait - Sufficient For Exercise Testing] : the gait was sufficient for exercise testing [Nail Clubbing] : no clubbing of the fingernails [Cyanosis, Localized] : no localized cyanosis [Skin Color & Pigmentation] : normal skin color and pigmentation [Skin Turgor] : normal skin turgor [] : no rash [Oriented To Time, Place, And Person] : oriented to person, place, and time [No Anxiety] : not feeling anxious [Impaired Insight] : insight and judgment were intact [Not Palpable] : not palpable [No Precordial Heave] : no precordial heave was noted [Normal Rate] : normal [Irregularly Irregular] : irregularly irregular [Normal S1] : normal S1 [Normal S2] : normal S2 [No Gallop] : no gallop heard [Distant] : the heart sounds were distant [III] : a grade 3 [Crescendo-Decrescendo] : crescendo-decrescendo [Carotids] : the murmur was transmitted to the carotid arteries [2+] : left 2+ [No Abnormalities] : the abdominal aorta was not enlarged and no bruit was heard [1+] : left 1+ [___ +] : bilateral [unfilled]U+ pitting edema to the ankles [Rt] : varicose veins of the right leg noted [Lt] : varicose veins of the left leg noted [Apical Thrill] : no thrill palpable at the apex [Click] : no click [Pericardial Rub] : no pericardial rub

## 2019-08-13 NOTE — HISTORY OF PRESENT ILLNESS
[FreeTextEntry1] : I saw Mrs. Hoyt in the office today for a follow up visit. She is a 89-year-old white female who is status post coronary bypass graft surgery in 1989 with a LIMA graft to LAD and vein grafts to the other vessels. She had a stress test in June of 2011 that was normal. She also is being treated for hypertension, hyperlipidemia, and hypothyroidism. She has chronic atrial fibrillation and moderate aortic stenosis on echocardiogram from a year and half ago.\par \par 5/15 she was admitted to [facility]  with rapid atrial fibrillation, complaining of SOB, and had elevated troponins. She was rate controlled and sent to [facility]. Cardiac catheterization showed normal ejection fraction. She had a 90% distal left main, 100% ostial LAD, 90% circumflex 85% RCA. The LIMA graft to the LAD was patent and the vein grafts to the OM and RCA were closed. She underwent drug-eluting stents to the RCA and circumflex arteries. By echocardiogram 7/14 she has moderate aortic stenosis. A repeat echocardiogram performed 3/16 demonstrated ejection fraction of 66%. There was moderate aortic stenosis with a peak gradient of 32 and valve area 1.1 cmÂ². Showed mild MR and moderate TR with a PA pressure of 40. Most recent echo performed 5/17 demonstrates an ejection fraction of 57%. Distal moderate aortic stenosis. It is now moderate to severe TR with estimated PA pressure 62.\par \par She underwent colonoscopy with removal of a rectal mass 6/30/15 by Dr. Little. Apparently her Plavix and Xarelto stopped, even though she drug-eluting stents placed 5/15. She underwent repeat colonoscopy that according to the patient turned out okay. She was admitted to [facility] at Thanksgiving time with black stool. Hemoglobin was down to 8.7 and she had 2 units of packed cells. Endoscopy showed that the scab over the lesion that had been removed was leaving. Patient was treated with protonix and Carafate and anticoagulation was held. She is now off of those medications doing well on Xarelto and aspirin.\par \par The patient underwent an endoscopy for a bleeding lesion in the stomach. She's now on protonix. She underwent her third colonoscopy and the polyp was finally removed from the colon. She's had no signs of active bleeding clinically on Xarelto 15 mg once a day and off the aspirin.\par \par Her creatinine clearance was back to normal and her Xarelto was increased back to 20 mg a day.\par \par The patient underwent TAVR 2/18for severe symptomatic aortic stenosis. Cardiac catheterization showed 100% mid LAD, 60% diagonal, 20% circumflex and 40% RCA. LIMA graft to LAD was patent. Hospital course was complicated by 3-5 sec pauses requiring a permanent pacemaker.Pacemaker was complicated by hematoma.  Echocardiogram showed ejection fraction of 75% with mild MR, and mild to moderate TR. PA pressure 49, stage II diastolic dysfunction with LVH. Severe left atrial enlargement. Blood work on discharge showed a hemoglobin of 7.9 with hematocrit 24.2. Normal BUN creatinine and potassium..She is now on on replacement therapy\par \par The patient had been complaining of increasing fatigue, and increasing heart rate with increasing shortness of breath. Blood work from your office demonstrated an abrupt decrease in her hemoglobin down to 7.5 and she was admitted to the hospital in [place]. Workup showed lower GI bleeding and she underwent right hemicolectomy with anastomosis of the ileum to the colon. This was felt to be the source of bleeding. Upon discharge from the hospital hemoglobin 8.7 with hematocrit 29.1. Creatinine 0.9, BUN of 8, potassium 3.9.\par \par At home she's been gradually improving. Her appetite is better and she is feeling stronger. She's getting back to her normal activities any major symptoms.Occasionally if she feels dizzy, especially sitting and watching TV. She checks her blood pressure at home which has been excellent but sometimes a resting heart rate is 50. The pacemaker set at VVI at 50. PM set to 60.\par \par The patient brought a copy of blood work dated 5/19. Creatinine is 1.17 with a BUN of 21. Glucose 120. A1c was 6.3. Cholesterol 131, triglycerides 84, HDL 49, and LDL 65.\par \par ECG shows underlying A. fib with what appears to be VVI pacing at 70.

## 2019-08-13 NOTE — REASON FOR VISIT
[Aortic Stenosis] : aortic stenosis [Coronary Artery Disease] : coronary artery disease [Hypertension] : hypertension [Hyperlipidemia] : hyperlipidemia [Sick Sinus Syndrome] : sick sinus syndrome

## 2019-08-13 NOTE — REVIEW OF SYSTEMS
[Palpitations] : palpitations [Joint Pain] : joint pain [see HPI] : see HPI [Negative] : Musculoskeletal [Fever] : no fever [Chills] : no chills [Headache] : no headache [Feeling Fatigued] : not feeling fatigued [Blurry Vision] : no blurred vision [Dyspnea on exertion] : not dyspnea during exertion [Shortness Of Breath] : no shortness of breath [Chest  Pressure] : no chest pressure [Chest Pain] : no chest pain [Lower Ext Edema] : no extremity edema [Leg Claudication] : no intermittent leg claudication [Abdominal Pain] : no abdominal pain [Heartburn] : no heartburn [Dysuria] : no dysuria [Dysphagia] : no dysphagia [Skin Lesions] : no skin lesions [Skin: A Rash] : no rash: [Tremor] : no tremor was seen [Convulsions] : no convulsions [Confusion] : no confusion was observed [Anxiety] : no anxiety [Easy Bleeding] : no tendency for easy bleeding [Easy Bruising] : no tendency for easy bruising

## 2019-08-13 NOTE — DISCUSSION/SUMMARY
[FreeTextEntry1] : The patient is doing well without any signs or symptoms of active heart disease. On her medications her blood pressure is well-controlled and she clinically has had no GI bleeding.\par \par She'll stay on her present medication. She'll schedule a followup echocardiogram. If all is well I will see her in 4 months\par \par She is taking the digoxin one tablet every third day and will stop this medicine completely. If she has any problems she will call me. When although her on her medication list and I answered all her questions.

## 2019-10-09 ENCOUNTER — RX RENEWAL (OUTPATIENT)
Age: 84
End: 2019-10-09

## 2019-11-21 NOTE — PROGRESS NOTE ADULT - SUBJECTIVE AND OBJECTIVE BOX
Patient did not return calls but has appt for Saturday.    [facility] - Kam Craig, Anaid, Magali, Roselyn, Jaylene Gomez  Office Number:  [phone]    Patient resting comfortably in bed in NAD.  Laying flat with no respiratory distress.  No complaints of chest pain, dyspnea, palpitations, PND, or orthopnea.  Denies additional bleeding    ROS: negative unless otherwise mentioned.    Telemetry:  not on tele    MEDICATIONS  (STANDING):  aspirin enteric coated 81 milliGRAM(s) Oral daily  calcium carbonate 1250 mG + Vitamin D (OsCal 500 + D) 1 Tablet(s) Oral daily  cholecalciferol 1000 Unit(s) Oral daily  cyanocobalamin 100 MICROGram(s) Oral daily  digoxin     Tablet 0.125 milliGRAM(s) Oral daily  diltiazem    milliGRAM(s) Oral daily  docusate sodium 100 milliGRAM(s) Oral three times a day  ferrous    sulfate 325 milliGRAM(s) Oral daily  folic acid 1 milliGRAM(s) Oral daily  iron sucrose Injectable 100 milliGRAM(s) IV Push every 24 hours  levothyroxine 100 MICROGram(s) Oral daily  metoprolol tartrate 25 milliGRAM(s) Oral two times a day  multivitamin/minerals 1 Tablet(s) Oral daily  pantoprazole    Tablet 40 milliGRAM(s) Oral before breakfast  simvastatin 20 milliGRAM(s) Oral at bedtime    MEDICATIONS  (PRN):      Allergies    amoxicillin (Unknown)    Intolerances        Vital Signs Last 24 Hrs  T(C): 36.4 (07 Jun 2018 08:00), Max: 36.8 (06 Jun 2018 12:14)  T(F): 97.5 (07 Jun 2018 08:00), Max: 98.2 (06 Jun 2018 12:14)  HR: 60 (07 Jun 2018 08:00) (57 - 74)  BP: 123/68 (07 Jun 2018 08:00) (106/59 - 151/78)  BP(mean): --  RR: 15 (07 Jun 2018 08:00) (15 - 18)  SpO2: 98% (07 Jun 2018 08:00) (95% - 98%)    I&O's Summary    06 Jun 2018 07:01  -  07 Jun 2018 07:00  --------------------------------------------------------  IN: 400 mL / OUT: 0 mL / NET: 400 mL        ON EXAM:    Constitutional: NAD, frail  Eyes:   Pupils round, no lesions  ENMT: no exudate or erythema  Pulmonary: Non-labored, breath sounds are clear bilaterally, No wheezing, rales or rhonchi  Cardiovascular: PMI not palpable irreg normal S1 and S2, no murmurs, rubs, gallops or clicks  Gastrointestinal: Bowel Sounds present, soft, nontender.   Lymph: No cervical lymphadenopathy.  Neurological: Alert, no focal deficits  Skin: No rashes.  No cyanosis.  Psych:  Mood & affect appropriate   Ext: No lower ext edema    LABS: All Labs Reviewed:                        9.6    9.86  )-----------( x        ( 07 Jun 2018 06:54 )             30.8                         9.5    x     )-----------( x        ( 06 Jun 2018 01:11 )             30.2                         6.9    9.42  )-----------( 310      ( 05 Jun 2018 12:33 )             23.5     06 Jun 2018 16:07    140    |  102    |  16     ----------------------------<  88     3.7     |  31     |  0.95   05 Jun 2018 12:33    139    |  102    |  15     ----------------------------<  133    4.3     |  25     |  0.99     Ca    8.4        06 Jun 2018 16:07  Ca    8.8        05 Jun 2018 12:33    TPro  7.6    /  Alb  3.7    /  TBili  1.2    /  DBili  x      /  AST  15     /  ALT  13     /  AlkPhos  84     05 Jun 2018 12:33    PT/INR - ( 07 Jun 2018 06:54 )   PT: 13.7 sec;   INR: 1.25 ratio         PTT - ( 07 Jun 2018 06:54 )  PTT:26.9 sec  CARDIAC MARKERS ( 05 Jun 2018 12:33 )  <.015 ng/mL / x     / 31 U/L / x     / 0.9 ng/mL      Blood Culture:   06-05 @ 12:33  Pro Bnp [id number]

## 2019-12-02 ENCOUNTER — APPOINTMENT (OUTPATIENT)
Dept: CARDIOLOGY | Facility: CLINIC | Age: 84
End: 2019-12-02
Payer: MEDICARE

## 2019-12-02 PROCEDURE — 93279 PRGRMG DEV EVAL PM/LDLS PM: CPT

## 2019-12-11 ENCOUNTER — APPOINTMENT (OUTPATIENT)
Dept: CARDIOLOGY | Facility: CLINIC | Age: 84
End: 2019-12-11
Payer: MEDICARE

## 2019-12-11 VITALS
BODY MASS INDEX: 28.23 KG/M2 | SYSTOLIC BLOOD PRESSURE: 155 MMHG | HEIGHT: 55 IN | DIASTOLIC BLOOD PRESSURE: 80 MMHG | OXYGEN SATURATION: 96 % | HEART RATE: 64 BPM | WEIGHT: 122 LBS

## 2019-12-11 PROCEDURE — 99214 OFFICE O/P EST MOD 30 MIN: CPT

## 2019-12-11 NOTE — DISCUSSION/SUMMARY
[FreeTextEntry1] : The patient is doing well without any signs or symptoms of active heart disease. On her medications her blood pressure, heart rate, and cholesterol are well controlled. Volume status is stable.\par \par She'll stay on her present medication. If she does have any symptoms or problems she will call me. We did go over medication and blood work.Discussed her at all from 2018 She'll schedule an echo in March and I would see her in approximate 4 months

## 2019-12-11 NOTE — PHYSICAL EXAM
[Well Groomed] : well groomed [General Appearance - Well Developed] : well developed [Normal Appearance] : normal appearance [General Appearance - In No Acute Distress] : no acute distress [General Appearance - Well Nourished] : well nourished [No Deformities] : no deformities [Eyelids - No Xanthelasma] : the eyelids demonstrated no xanthelasmas [Normal Oral Mucosa] : normal oral mucosa [Normal Conjunctiva] : the conjunctiva exhibited no abnormalities [Normal Jugular Venous V Waves Present] : normal jugular venous V waves present [No Jugular Venous Beckham A Waves] : no jugular venous beckham A waves [Normal Jugular Venous A Waves Present] : normal jugular venous A waves present [Exaggerated Use Of Accessory Muscles For Inspiration] : no accessory muscle use [Respiration, Rhythm And Depth] : normal respiratory rhythm and effort [Abdomen Soft] : soft [Bowel Sounds] : normal bowel sounds [Auscultation Breath Sounds / Voice Sounds] : lungs were clear to auscultation bilaterally [Abdomen Tenderness] : non-tender [Abnormal Walk] : normal gait [Cyanosis, Localized] : no localized cyanosis [Nail Clubbing] : no clubbing of the fingernails [Gait - Sufficient For Exercise Testing] : the gait was sufficient for exercise testing [Skin Color & Pigmentation] : normal skin color and pigmentation [Skin Turgor] : normal skin turgor [Impaired Insight] : insight and judgment were intact [Oriented To Time, Place, And Person] : oriented to person, place, and time [] : no rash [No Anxiety] : not feeling anxious [Not Palpable] : not palpable [No Precordial Heave] : no precordial heave was noted [Irregularly Irregular] : irregularly irregular [Normal Rate] : normal [Normal S1] : normal S1 [Normal S2] : normal S2 [No Gallop] : no gallop heard [Distant] : the heart sounds were distant [III] : a grade 3 [Crescendo-Decrescendo] : crescendo-decrescendo [Carotids] : the murmur was transmitted to the carotid arteries [2+] : left 2+ [No Abnormalities] : the abdominal aorta was not enlarged and no bruit was heard [1+] : left 1+ [___ +] : bilateral [unfilled]U+ pitting edema to the ankles [Rt] : varicose veins of the right leg noted [Lt] : varicose veins of the left leg noted [Apical Thrill] : no thrill palpable at the apex [Click] : no click [Pericardial Rub] : no pericardial rub

## 2019-12-11 NOTE — HISTORY OF PRESENT ILLNESS
[FreeTextEntry1] : I saw Mrs. Hoyt in the office today for a follow up visit. She is a 89-year-old white female who is status post coronary bypass graft surgery in 1989 with a LIMA graft to LAD and vein grafts to the other vessels. She had a stress test in June of 2011 that was normal. She also is being treated for hypertension, hyperlipidemia, and hypothyroidism. She has chronic atrial fibrillation and moderate aortic stenosis on echocardiogram from a year and half ago.\par \par 5/15 she was admitted to [facility]  with rapid atrial fibrillation, complaining of SOB, and had elevated troponins. She was rate controlled and sent to [facility]. Cardiac catheterization showed normal ejection fraction. She had a 90% distal left main, 100% ostial LAD, 90% circumflex 85% RCA. The LIMA graft to the LAD was patent and the vein grafts to the OM and RCA were closed. She underwent drug-eluting stents to the RCA and circumflex arteries. By echocardiogram 7/14 she has moderate aortic stenosis. A repeat echocardiogram performed 3/16 demonstrated ejection fraction of 66%. There was moderate aortic stenosis with a peak gradient of 32 and valve area 1.1 cmÂ². Showed mild MR and moderate TR with a PA pressure of 40. Most recent echo performed 5/17 demonstrates an ejection fraction of 57%. Distal moderate aortic stenosis. It is now moderate to severe TR with estimated PA pressure 62.\par \par She underwent colonoscopy with removal of a rectal mass 6/30/15 by Dr. Little. Apparently her Plavix and Xarelto stopped, even though she drug-eluting stents placed 5/15. She underwent repeat colonoscopy that according to the patient turned out okay. She was admitted to [facility] at Thanksgiving time with black stool. Hemoglobin was down to 8.7 and she had 2 units of packed cells. Endoscopy showed that the scab over the lesion that had been removed was leaving. Patient was treated with protonix and Carafate and anticoagulation was held. She is now off of those medications doing well on Xarelto and aspirin.\par \par The patient underwent an endoscopy for a bleeding lesion in the stomach. She's now on protonix. She underwent her third colonoscopy and the polyp was finally removed from the colon. She's had no signs of active bleeding clinically on Xarelto 15 mg once a day and off the aspirin.\par \par Her creatinine clearance was back to normal and her Xarelto was increased back to 20 mg a day.\par \par The patient underwent TAVR 2/18for severe symptomatic aortic stenosis. Cardiac catheterization showed 100% mid LAD, 60% diagonal, 20% circumflex and 40% RCA. LIMA graft to LAD was patent. Hospital course was complicated by 3-5 sec pauses requiring a permanent pacemaker.Pacemaker was complicated by hematoma.  Echocardiogram showed ejection fraction of 75% with mild MR, and mild to moderate TR. PA pressure 49, stage II diastolic dysfunction with LVH. Severe left atrial enlargement. Blood work on discharge showed a hemoglobin of 7.9 with hematocrit 24.2. Normal BUN creatinine and potassium..She is now on on replacement therapy\par \par The patient had been complaining of increasing fatigue, and increasing heart rate with increasing shortness of breath. Blood work from your office demonstrated an abrupt decrease in her hemoglobin down to 7.5 and she was admitted to the hospital in [place]. Workup showed lower GI bleeding and she underwent right hemicolectomy with anastomosis of the ileum to the colon. This was felt to be the source of bleeding. Upon discharge from the hospital hemoglobin 8.7 with hematocrit 29.1. Creatinine 0.9, BUN of 8, potassium 3.9.\par \par At home she's been gradually improving. Her appetite is better and she is feeling stronger. She's getting back to her normal activities any major symptoms. Pacemaker check 12/19 showed normal function.\par \par The patient brought a copy of blood work dated 12/19. Chemistries were normal. A1c 6.1. Cholesterol 136, triglycerides 78, HDL 50, LDL 70. TSH was 0.189.\par ECG shows underlying A. fib with what appears to be VVI pacing at 70.

## 2019-12-11 NOTE — REVIEW OF SYSTEMS
[Palpitations] : palpitations [Joint Pain] : joint pain [see HPI] : see HPI [Negative] : Musculoskeletal [Fever] : no fever [Headache] : no headache [Chills] : no chills [Feeling Fatigued] : not feeling fatigued [Shortness Of Breath] : no shortness of breath [Blurry Vision] : no blurred vision [Chest  Pressure] : no chest pressure [Dyspnea on exertion] : not dyspnea during exertion [Lower Ext Edema] : no extremity edema [Leg Claudication] : no intermittent leg claudication [Chest Pain] : no chest pain [Abdominal Pain] : no abdominal pain [Heartburn] : no heartburn [Dysphagia] : no dysphagia [Dysuria] : no dysuria [Skin: A Rash] : no rash: [Skin Lesions] : no skin lesions [Tremor] : no tremor was seen [Confusion] : no confusion was observed [Convulsions] : no convulsions [Anxiety] : no anxiety [Easy Bruising] : no tendency for easy bruising [Easy Bleeding] : no tendency for easy bleeding

## 2019-12-11 NOTE — REASON FOR VISIT
[Aortic Stenosis] : aortic stenosis [Coronary Artery Disease] : coronary artery disease [Hypertension] : hypertension [Sick Sinus Syndrome] : sick sinus syndrome [Hyperlipidemia] : hyperlipidemia

## 2020-01-06 ENCOUNTER — RX RENEWAL (OUTPATIENT)
Age: 85
End: 2020-01-06

## 2020-03-09 ENCOUNTER — APPOINTMENT (OUTPATIENT)
Dept: CARDIOLOGY | Facility: CLINIC | Age: 85
End: 2020-03-09
Payer: MEDICARE

## 2020-03-09 PROCEDURE — 93306 TTE W/DOPPLER COMPLETE: CPT

## 2020-04-06 DIAGNOSIS — F03.90 UNSPECIFIED DEMENTIA W/OUT BEHAVIORAL DISTURBANCE: ICD-10-CM

## 2020-05-14 PROBLEM — I35.0 SEVERE AORTIC STENOSIS: Status: ACTIVE | Noted: 2018-01-16

## 2020-05-19 ENCOUNTER — APPOINTMENT (OUTPATIENT)
Dept: CARDIOLOGY | Facility: CLINIC | Age: 85
End: 2020-05-19
Payer: MEDICARE

## 2020-05-19 PROCEDURE — 93296 REM INTERROG EVL PM/IDS: CPT

## 2020-05-19 PROCEDURE — 93294 REM INTERROG EVL PM/LDLS PM: CPT

## 2020-06-25 ENCOUNTER — RX RENEWAL (OUTPATIENT)
Age: 85
End: 2020-06-25

## 2020-07-02 ENCOUNTER — RX RENEWAL (OUTPATIENT)
Age: 85
End: 2020-07-02

## 2020-07-02 RX ORDER — FOLIC ACID 1 MG/1
1 TABLET ORAL DAILY
Qty: 90 | Refills: 3 | Status: ACTIVE | COMMUNITY
Start: 2020-01-06 | End: 1900-01-01

## 2020-08-05 ENCOUNTER — APPOINTMENT (OUTPATIENT)
Dept: CARDIOLOGY | Facility: CLINIC | Age: 85
End: 2020-08-05
Payer: MEDICARE

## 2020-08-05 ENCOUNTER — NON-APPOINTMENT (OUTPATIENT)
Age: 85
End: 2020-08-05

## 2020-08-05 VITALS
HEART RATE: 59 BPM | SYSTOLIC BLOOD PRESSURE: 182 MMHG | BODY MASS INDEX: 29.85 KG/M2 | OXYGEN SATURATION: 95 % | WEIGHT: 129 LBS | DIASTOLIC BLOOD PRESSURE: 80 MMHG | HEIGHT: 55 IN

## 2020-08-05 DIAGNOSIS — E03.9 HYPOTHYROIDISM, UNSPECIFIED: ICD-10-CM

## 2020-08-05 DIAGNOSIS — R73.9 HYPERGLYCEMIA, UNSPECIFIED: ICD-10-CM

## 2020-08-05 PROCEDURE — 99214 OFFICE O/P EST MOD 30 MIN: CPT

## 2020-08-05 PROCEDURE — 93000 ELECTROCARDIOGRAM COMPLETE: CPT

## 2020-08-05 NOTE — PHYSICAL EXAM
[Normal Appearance] : normal appearance [General Appearance - Well Developed] : well developed [Well Groomed] : well groomed [General Appearance - Well Nourished] : well nourished [No Deformities] : no deformities [General Appearance - In No Acute Distress] : no acute distress [Normal Conjunctiva] : the conjunctiva exhibited no abnormalities [Eyelids - No Xanthelasma] : the eyelids demonstrated no xanthelasmas [Normal Jugular Venous A Waves Present] : normal jugular venous A waves present [Normal Oral Mucosa] : normal oral mucosa [Normal Jugular Venous V Waves Present] : normal jugular venous V waves present [No Jugular Venous Beckham A Waves] : no jugular venous beckham A waves [Respiration, Rhythm And Depth] : normal respiratory rhythm and effort [Exaggerated Use Of Accessory Muscles For Inspiration] : no accessory muscle use [Auscultation Breath Sounds / Voice Sounds] : lungs were clear to auscultation bilaterally [Abdomen Soft] : soft [Bowel Sounds] : normal bowel sounds [Abdomen Tenderness] : non-tender [Abnormal Walk] : normal gait [Nail Clubbing] : no clubbing of the fingernails [Gait - Sufficient For Exercise Testing] : the gait was sufficient for exercise testing [Skin Turgor] : normal skin turgor [Skin Color & Pigmentation] : normal skin color and pigmentation [Cyanosis, Localized] : no localized cyanosis [Impaired Insight] : insight and judgment were intact [] : no rash [Oriented To Time, Place, And Person] : oriented to person, place, and time [No Anxiety] : not feeling anxious [Not Palpable] : not palpable [No Precordial Heave] : no precordial heave was noted [Normal Rate] : normal [Irregularly Irregular] : irregularly irregular [Normal S2] : normal S2 [Normal S1] : normal S1 [No Gallop] : no gallop heard [Distant] : the heart sounds were distant [Crescendo-Decrescendo] : crescendo-decrescendo [III] : a grade 3 [Carotids] : the murmur was transmitted to the carotid arteries [2+] : left 2+ [1+] : left 1+ [No Abnormalities] : the abdominal aorta was not enlarged and no bruit was heard [___ +] : bilateral [unfilled]U+ pitting edema to the ankles [Rt] : varicose veins of the right leg noted [Lt] : varicose veins of the left leg noted [Apical Thrill] : no thrill palpable at the apex [Pericardial Rub] : no pericardial rub [Click] : no click

## 2020-08-05 NOTE — DISCUSSION/SUMMARY
[FreeTextEntry1] : The patient's cardiac status is stable. With the dementia she does very little activity. She walks from the couch to the bathroom. She denies any symptoms of chest pain, shortness of breath, palpitation.\par \par She go for general blood work. She'll stay on her present medication. I will try to records from the neurologist.\par \par If there is any problems they will call me. If all is well I will see her in 6 months. We did go over the results of her recent echo and did discuss this with the son and answered his questions.

## 2020-08-05 NOTE — REVIEW OF SYSTEMS
[Palpitations] : palpitations [Joint Pain] : joint pain [see HPI] : see HPI [Negative] : Musculoskeletal [Fever] : no fever [Headache] : no headache [Feeling Fatigued] : not feeling fatigued [Chills] : no chills [Blurry Vision] : no blurred vision [Shortness Of Breath] : no shortness of breath [Dyspnea on exertion] : not dyspnea during exertion [Lower Ext Edema] : no extremity edema [Chest Pain] : no chest pain [Chest  Pressure] : no chest pressure [Heartburn] : no heartburn [Abdominal Pain] : no abdominal pain [Leg Claudication] : no intermittent leg claudication [Dysuria] : no dysuria [Skin: A Rash] : no rash: [Dysphagia] : no dysphagia [Skin Lesions] : no skin lesions [Convulsions] : no convulsions [Tremor] : no tremor was seen [Confusion] : no confusion was observed [Easy Bleeding] : no tendency for easy bleeding [Anxiety] : no anxiety [Easy Bruising] : no tendency for easy bruising

## 2020-08-05 NOTE — HISTORY OF PRESENT ILLNESS
[FreeTextEntry1] : I saw Mrs. Hoyt in the office today for a follow up visit. She is a 89-year-old white female who is status post coronary bypass graft surgery in 1989 with a LIMA graft to LAD and vein grafts to the other vessels. She had a stress test in June of 2011 that was normal. She also is being treated for hypertension, hyperlipidemia, and hypothyroidism. She has chronic atrial fibrillation and moderate aortic stenosis on echocardiogram from a year and half ago.\par \par 5/15 she was admitted to [facility]  with rapid atrial fibrillation, complaining of SOB, and had elevated troponins. She was rate controlled and sent to [facility]. Cardiac catheterization showed normal ejection fraction. She had a 90% distal left main, 100% ostial LAD, 90% circumflex 85% RCA. The LIMA graft to the LAD was patent and the vein grafts to the OM and RCA were closed. She underwent drug-eluting stents to the RCA and circumflex arteries. By echocardiogram 7/14 she has moderate aortic stenosis. A repeat echocardiogram performed 3/16 demonstrated ejection fraction of 66%. There was moderate aortic stenosis with a peak gradient of 32 and valve area 1.1 cmÂ². Showed mild MR and moderate TR with a PA pressure of 40. Most recent echo performed 5/17 demonstrates an ejection fraction of 57%. Distal moderate aortic stenosis. It is now moderate to severe TR with estimated PA pressure 62.\par \par She underwent colonoscopy with removal of a rectal mass 6/30/15 by Dr. Little. Apparently her Plavix and Xarelto stopped, even though she drug-eluting stents placed 5/15. She underwent repeat colonoscopy that according to the patient turned out okay. She was admitted to [facility] at Thanksgiving time with black stool. Hemoglobin was down to 8.7 and she had 2 units of packed cells. Endoscopy showed that the scab over the lesion that had been removed was leaving. Patient was treated with protonix and Carafate and anticoagulation was held. She is now off of those medications doing well on Xarelto and aspirin.\par \par The patient underwent an endoscopy for a bleeding lesion in the stomach. She's now on protonix. She underwent her third colonoscopy and the polyp was finally removed from the colon. She's had no signs of active bleeding clinically on Xarelto 15 mg once a day and off the aspirin.\par \par The patient underwent TAVR 2/18for severe symptomatic aortic stenosis. Cardiac catheterization showed 100% mid LAD, 60% diagonal, 20% circumflex and 40% RCA. LIMA graft to LAD was patent. Hospital course was complicated by 3-5 sec pauses requiring a permanent pacemaker.Pacemaker was complicated by hematoma.  Echocardiogram showed ejection fraction of 75% with mild MR, and mild to moderate TR. PA pressure 49, stage II diastolic dysfunction with LVH. Severe left atrial enlargement. Blood work on discharge showed a hemoglobin of 7.9 with hematocrit 24.2. Normal BUN creatinine and potassium..She is now on on replacement therapy\par \par The patient had been complaining of increasing fatigue, and increasing heart rate with increasing shortness of breath. Blood work from your office demonstrated an abrupt decrease in her hemoglobin down to 7.5 and she was admitted to the hospital in [place]. Workup showed lower GI bleeding and she underwent right hemicolectomy with anastomosis of the ileum to the colon. This was felt to be the source of bleeding. Upon discharge from the hospital hemoglobin 8.7 with hematocrit 29.1. Creatinine 0.9, BUN of 8, potassium 3.9.\par \par At home she's been gradually improving. Her appetite is better and she is feeling stronger. She's getting back to her normal activities any major symptoms. Pacemaker check 12/19 showed normal function.\par \par Blood work dated 12/19. Chemistries were normal. A1c 6.1. Cholesterol 136, triglycerides 78, HDL 50, LDL 70. TSH was 0.189.\par ECG shows underlying A. fib with LBBB.\par \par The patient now is having signs of significant and progressive dementia. They saw the neurologist and she is now on Aricept.

## 2020-08-18 ENCOUNTER — APPOINTMENT (OUTPATIENT)
Dept: CARDIOLOGY | Facility: CLINIC | Age: 85
End: 2020-08-18
Payer: MEDICARE

## 2020-08-18 PROCEDURE — 93296 REM INTERROG EVL PM/IDS: CPT

## 2020-08-18 PROCEDURE — 93294 REM INTERROG EVL PM/LDLS PM: CPT

## 2020-09-24 NOTE — DISCHARGE NOTE ADULT - PROVIDER TOKENS
Continue: Refresh Plus (carboxymethylcellulose sodium): dropperette: 0.5% 1 drop three times a day as needed into both eyes TOKEN:'[id number]:'[id number]',FREE:[LAST:[alex],FIRST:[sloan],PHONE:[(   )    -],FAX:[(   )    -],ADDRESS:[[place] / [facility]]],TOKEN:'[id number]'

## 2020-11-09 ENCOUNTER — RX RENEWAL (OUTPATIENT)
Age: 85
End: 2020-11-09

## 2020-11-17 ENCOUNTER — APPOINTMENT (OUTPATIENT)
Dept: CARDIOLOGY | Facility: CLINIC | Age: 85
End: 2020-11-17
Payer: MEDICARE

## 2020-11-17 PROCEDURE — 93294 REM INTERROG EVL PM/LDLS PM: CPT

## 2020-11-17 PROCEDURE — 93296 REM INTERROG EVL PM/IDS: CPT

## 2021-01-14 RX ORDER — METOPROLOL TARTRATE 50 MG/1
50 TABLET, FILM COATED ORAL
Qty: 7 | Refills: 3 | Status: ACTIVE | COMMUNITY
Start: 2018-02-26 | End: 1900-01-01

## 2021-01-23 ENCOUNTER — INPATIENT (INPATIENT)
Facility: HOSPITAL | Age: 86
LOS: 4 days | Discharge: HOME | DRG: 872 | End: 2021-01-28
Attending: INTERNAL MEDICINE | Admitting: INTERNAL MEDICINE
Payer: MEDICARE

## 2021-01-23 VITALS
DIASTOLIC BLOOD PRESSURE: 75 MMHG | OXYGEN SATURATION: 95 % | RESPIRATION RATE: 14 BRPM | SYSTOLIC BLOOD PRESSURE: 167 MMHG | TEMPERATURE: 99 F | HEART RATE: 64 BPM | HEIGHT: 55 IN | WEIGHT: 119.93 LBS

## 2021-01-23 DIAGNOSIS — Z90.710 ACQUIRED ABSENCE OF BOTH CERVIX AND UTERUS: Chronic | ICD-10-CM

## 2021-01-23 DIAGNOSIS — Z86.010 PERSONAL HISTORY OF COLONIC POLYPS: Chronic | ICD-10-CM

## 2021-01-23 DIAGNOSIS — N39.0 URINARY TRACT INFECTION, SITE NOT SPECIFIED: ICD-10-CM

## 2021-01-23 DIAGNOSIS — Z95.1 PRESENCE OF AORTOCORONARY BYPASS GRAFT: Chronic | ICD-10-CM

## 2021-01-23 DIAGNOSIS — K62.9 DISEASE OF ANUS AND RECTUM, UNSPECIFIED: Chronic | ICD-10-CM

## 2021-01-23 LAB
ALBUMIN SERPL ELPH-MCNC: 3.2 G/DL — LOW (ref 3.3–5)
ALP SERPL-CCNC: 70 U/L — SIGNIFICANT CHANGE UP (ref 30–120)
ALT FLD-CCNC: 14 U/L DA — SIGNIFICANT CHANGE UP (ref 10–60)
ANION GAP SERPL CALC-SCNC: 9 MMOL/L — SIGNIFICANT CHANGE UP (ref 5–17)
ANISOCYTOSIS BLD QL: SLIGHT — SIGNIFICANT CHANGE UP
APPEARANCE UR: ABNORMAL
APTT BLD: 34.2 SEC — SIGNIFICANT CHANGE UP (ref 27.5–35.5)
AST SERPL-CCNC: 15 U/L — SIGNIFICANT CHANGE UP (ref 10–40)
BACTERIA # UR AUTO: ABNORMAL
BASOPHILS # BLD AUTO: 0.18 K/UL — SIGNIFICANT CHANGE UP (ref 0–0.2)
BASOPHILS NFR BLD AUTO: 1 % — SIGNIFICANT CHANGE UP (ref 0–2)
BILIRUB SERPL-MCNC: 0.7 MG/DL — SIGNIFICANT CHANGE UP (ref 0.2–1.2)
BILIRUB UR-MCNC: ABNORMAL
BUN SERPL-MCNC: 25 MG/DL — HIGH (ref 7–23)
CALCIUM SERPL-MCNC: 9.4 MG/DL — SIGNIFICANT CHANGE UP (ref 8.4–10.5)
CHLORIDE SERPL-SCNC: 98 MMOL/L — SIGNIFICANT CHANGE UP (ref 96–108)
CO2 SERPL-SCNC: 28 MMOL/L — SIGNIFICANT CHANGE UP (ref 22–31)
COLOR SPEC: ABNORMAL
CREAT SERPL-MCNC: 1.2 MG/DL — SIGNIFICANT CHANGE UP (ref 0.5–1.3)
DIFF PNL FLD: ABNORMAL
ELLIPTOCYTES BLD QL SMEAR: SLIGHT — SIGNIFICANT CHANGE UP
EOSINOPHIL # BLD AUTO: 0 K/UL — SIGNIFICANT CHANGE UP (ref 0–0.5)
EOSINOPHIL NFR BLD AUTO: 0 % — SIGNIFICANT CHANGE UP (ref 0–6)
EPI CELLS # UR: ABNORMAL
FLU A RESULT: SIGNIFICANT CHANGE UP
FLU A RESULT: SIGNIFICANT CHANGE UP
FLUAV AG NPH QL: SIGNIFICANT CHANGE UP
FLUBV AG NPH QL: SIGNIFICANT CHANGE UP
GLUCOSE SERPL-MCNC: 176 MG/DL — HIGH (ref 70–99)
GLUCOSE UR QL: NEGATIVE MG/DL — SIGNIFICANT CHANGE UP
HCT VFR BLD CALC: 30.5 % — LOW (ref 34.5–45)
HGB BLD-MCNC: 8.3 G/DL — LOW (ref 11.5–15.5)
HYPOCHROMIA BLD QL: SLIGHT — SIGNIFICANT CHANGE UP
INR BLD: 1.87 RATIO — HIGH (ref 0.88–1.16)
KETONES UR-MCNC: ABNORMAL
LACTATE SERPL-SCNC: 2.2 MMOL/L — HIGH (ref 0.7–2)
LACTATE SERPL-SCNC: 2.3 MMOL/L — HIGH (ref 0.7–2)
LEUKOCYTE ESTERASE UR-ACNC: ABNORMAL
LYMPHOCYTES # BLD AUTO: 1.08 K/UL — SIGNIFICANT CHANGE UP (ref 1–3.3)
LYMPHOCYTES # BLD AUTO: 6 % — LOW (ref 13–44)
MAGNESIUM SERPL-MCNC: 2.2 MG/DL — SIGNIFICANT CHANGE UP (ref 1.6–2.6)
MANUAL SMEAR VERIFICATION: SIGNIFICANT CHANGE UP
MCHC RBC-ENTMCNC: 20.3 PG — LOW (ref 27–34)
MCHC RBC-ENTMCNC: 27.2 GM/DL — LOW (ref 32–36)
MCV RBC AUTO: 74.6 FL — LOW (ref 80–100)
MICROCYTES BLD QL: SLIGHT — SIGNIFICANT CHANGE UP
MONOCYTES # BLD AUTO: 0.54 K/UL — SIGNIFICANT CHANGE UP (ref 0–0.9)
MONOCYTES NFR BLD AUTO: 3 % — SIGNIFICANT CHANGE UP (ref 2–14)
NEUTROPHILS # BLD AUTO: 16.22 K/UL — HIGH (ref 1.8–7.4)
NEUTROPHILS NFR BLD AUTO: 90 % — HIGH (ref 43–77)
NITRITE UR-MCNC: NEGATIVE — SIGNIFICANT CHANGE UP
NRBC # BLD: 0 — SIGNIFICANT CHANGE UP
NRBC # BLD: SIGNIFICANT CHANGE UP /100 WBCS (ref 0–0)
PH UR: 7 — SIGNIFICANT CHANGE UP (ref 5–8)
PHOSPHATE SERPL-MCNC: 4.4 MG/DL — SIGNIFICANT CHANGE UP (ref 2.5–4.5)
PLAT MORPH BLD: NORMAL — SIGNIFICANT CHANGE UP
PLATELET # BLD AUTO: 306 K/UL — SIGNIFICANT CHANGE UP (ref 150–400)
POIKILOCYTOSIS BLD QL AUTO: SLIGHT — SIGNIFICANT CHANGE UP
POTASSIUM SERPL-MCNC: 4.1 MMOL/L — SIGNIFICANT CHANGE UP (ref 3.5–5.3)
POTASSIUM SERPL-SCNC: 4.1 MMOL/L — SIGNIFICANT CHANGE UP (ref 3.5–5.3)
PROT SERPL-MCNC: 8.2 G/DL — SIGNIFICANT CHANGE UP (ref 6–8.3)
PROT UR-MCNC: 100 MG/DL
PROTHROM AB SERPL-ACNC: 22 SEC — HIGH (ref 10.6–13.6)
RBC # BLD: 4.09 M/UL — SIGNIFICANT CHANGE UP (ref 3.8–5.2)
RBC # FLD: 17.4 % — HIGH (ref 10.3–14.5)
RBC BLD AUTO: ABNORMAL
RBC CASTS # UR COMP ASSIST: >50 /HPF (ref 0–4)
RSV RESULT: SIGNIFICANT CHANGE UP
RSV RNA RESP QL NAA+PROBE: SIGNIFICANT CHANGE UP
SARS-COV-2 RNA SPEC QL NAA+PROBE: SIGNIFICANT CHANGE UP
SODIUM SERPL-SCNC: 135 MMOL/L — SIGNIFICANT CHANGE UP (ref 135–145)
SP GR SPEC: 1.01 — SIGNIFICANT CHANGE UP (ref 1.01–1.02)
UROBILINOGEN FLD QL: 1 MG/DL
WBC # BLD: 18.02 K/UL — HIGH (ref 3.8–10.5)
WBC # FLD AUTO: 18.02 K/UL — HIGH (ref 3.8–10.5)
WBC UR QL: >50

## 2021-01-23 PROCEDURE — 74176 CT ABD & PELVIS W/O CONTRAST: CPT | Mod: 26

## 2021-01-23 PROCEDURE — 71250 CT THORAX DX C-: CPT | Mod: 26

## 2021-01-23 PROCEDURE — 73562 X-RAY EXAM OF KNEE 3: CPT | Mod: 26,RT

## 2021-01-23 PROCEDURE — 99285 EMERGENCY DEPT VISIT HI MDM: CPT

## 2021-01-23 PROCEDURE — 71045 X-RAY EXAM CHEST 1 VIEW: CPT | Mod: 26

## 2021-01-23 RX ORDER — PANTOPRAZOLE SODIUM 20 MG/1
40 TABLET, DELAYED RELEASE ORAL
Refills: 0 | Status: DISCONTINUED | OUTPATIENT
Start: 2021-01-23 | End: 2021-01-28

## 2021-01-23 RX ORDER — CEFTRIAXONE 500 MG/1
1000 INJECTION, POWDER, FOR SOLUTION INTRAMUSCULAR; INTRAVENOUS EVERY 24 HOURS
Refills: 0 | Status: COMPLETED | OUTPATIENT
Start: 2021-01-23 | End: 2021-01-26

## 2021-01-23 RX ORDER — SODIUM CHLORIDE 9 MG/ML
1000 INJECTION INTRAMUSCULAR; INTRAVENOUS; SUBCUTANEOUS
Refills: 0 | Status: DISCONTINUED | OUTPATIENT
Start: 2021-01-23 | End: 2021-01-26

## 2021-01-23 RX ORDER — PREGABALIN 225 MG/1
1 CAPSULE ORAL
Qty: 0 | Refills: 0 | DISCHARGE

## 2021-01-23 RX ORDER — RIVAROXABAN 15 MG-20MG
15 KIT ORAL
Refills: 0 | Status: DISCONTINUED | OUTPATIENT
Start: 2021-01-23 | End: 2021-01-28

## 2021-01-23 RX ORDER — SIMVASTATIN 20 MG/1
20 TABLET, FILM COATED ORAL AT BEDTIME
Refills: 0 | Status: DISCONTINUED | OUTPATIENT
Start: 2021-01-23 | End: 2021-01-28

## 2021-01-23 RX ORDER — FOLIC ACID 0.8 MG
1 TABLET ORAL DAILY
Refills: 0 | Status: DISCONTINUED | OUTPATIENT
Start: 2021-01-23 | End: 2021-01-28

## 2021-01-23 RX ORDER — SODIUM CHLORIDE 9 MG/ML
1700 INJECTION INTRAMUSCULAR; INTRAVENOUS; SUBCUTANEOUS ONCE
Refills: 0 | Status: COMPLETED | OUTPATIENT
Start: 2021-01-23 | End: 2021-01-23

## 2021-01-23 RX ORDER — ACETAMINOPHEN 500 MG
650 TABLET ORAL EVERY 4 HOURS
Refills: 0 | Status: DISCONTINUED | OUTPATIENT
Start: 2021-01-23 | End: 2021-01-28

## 2021-01-23 RX ORDER — CEFTRIAXONE 500 MG/1
1000 INJECTION, POWDER, FOR SOLUTION INTRAMUSCULAR; INTRAVENOUS ONCE
Refills: 0 | Status: COMPLETED | OUTPATIENT
Start: 2021-01-23 | End: 2021-01-23

## 2021-01-23 RX ORDER — METOPROLOL TARTRATE 50 MG
25 TABLET ORAL
Refills: 0 | Status: DISCONTINUED | OUTPATIENT
Start: 2021-01-23 | End: 2021-01-28

## 2021-01-23 RX ORDER — LACTOBACILLUS ACIDOPHILUS 100MM CELL
1 CAPSULE ORAL
Refills: 0 | Status: DISCONTINUED | OUTPATIENT
Start: 2021-01-23 | End: 2021-01-28

## 2021-01-23 RX ORDER — CHOLECALCIFEROL (VITAMIN D3) 125 MCG
1 CAPSULE ORAL
Qty: 0 | Refills: 0 | DISCHARGE

## 2021-01-23 RX ORDER — ACETAMINOPHEN 500 MG
650 TABLET ORAL ONCE
Refills: 0 | Status: COMPLETED | OUTPATIENT
Start: 2021-01-23 | End: 2021-01-23

## 2021-01-23 RX ORDER — LEVOTHYROXINE SODIUM 125 MCG
100 TABLET ORAL DAILY
Refills: 0 | Status: DISCONTINUED | OUTPATIENT
Start: 2021-01-23 | End: 2021-01-28

## 2021-01-23 RX ORDER — DILTIAZEM HCL 120 MG
240 CAPSULE, EXT RELEASE 24 HR ORAL DAILY
Refills: 0 | Status: DISCONTINUED | OUTPATIENT
Start: 2021-01-23 | End: 2021-01-28

## 2021-01-23 RX ORDER — SODIUM CHLORIDE 9 MG/ML
1000 INJECTION INTRAMUSCULAR; INTRAVENOUS; SUBCUTANEOUS ONCE
Refills: 0 | Status: COMPLETED | OUTPATIENT
Start: 2021-01-23 | End: 2021-01-23

## 2021-01-23 RX ADMIN — CEFTRIAXONE 100 MILLIGRAM(S): 500 INJECTION, POWDER, FOR SOLUTION INTRAMUSCULAR; INTRAVENOUS at 18:13

## 2021-01-23 RX ADMIN — SIMVASTATIN 20 MILLIGRAM(S): 20 TABLET, FILM COATED ORAL at 21:41

## 2021-01-23 RX ADMIN — CEFTRIAXONE 1000 MILLIGRAM(S): 500 INJECTION, POWDER, FOR SOLUTION INTRAMUSCULAR; INTRAVENOUS at 12:30

## 2021-01-23 RX ADMIN — SODIUM CHLORIDE 75 MILLILITER(S): 9 INJECTION INTRAMUSCULAR; INTRAVENOUS; SUBCUTANEOUS at 17:01

## 2021-01-23 RX ADMIN — RIVAROXABAN 15 MILLIGRAM(S): KIT at 19:03

## 2021-01-23 RX ADMIN — SODIUM CHLORIDE 75 MILLILITER(S): 9 INJECTION INTRAMUSCULAR; INTRAVENOUS; SUBCUTANEOUS at 21:40

## 2021-01-23 RX ADMIN — CEFTRIAXONE 100 MILLIGRAM(S): 500 INJECTION, POWDER, FOR SOLUTION INTRAMUSCULAR; INTRAVENOUS at 12:00

## 2021-01-23 RX ADMIN — Medication 1 TABLET(S): at 19:03

## 2021-01-23 RX ADMIN — SODIUM CHLORIDE 1000 MILLILITER(S): 9 INJECTION INTRAMUSCULAR; INTRAVENOUS; SUBCUTANEOUS at 19:41

## 2021-01-23 RX ADMIN — SODIUM CHLORIDE 1700 MILLILITER(S): 9 INJECTION INTRAMUSCULAR; INTRAVENOUS; SUBCUTANEOUS at 11:00

## 2021-01-23 RX ADMIN — Medication 25 MILLIGRAM(S): at 18:13

## 2021-01-23 RX ADMIN — Medication 650 MILLIGRAM(S): at 11:02

## 2021-01-23 RX ADMIN — SODIUM CHLORIDE 1700 MILLILITER(S): 9 INJECTION INTRAMUSCULAR; INTRAVENOUS; SUBCUTANEOUS at 10:02

## 2021-01-23 NOTE — H&P ADULT - PROBLEM SELECTOR PLAN 3
Patient with history of anemia due to GI bleed in past.   Will obtain anemia work up.   Monitor CBC and transfuse if needed.

## 2021-01-23 NOTE — H&P ADULT - NSICDXPASTMEDICALHX_GEN_ALL_CORE_FT
PAST MEDICAL HISTORY:  Anemia     Atrial fibrillation, unspecified     CAD (coronary artery disease) S/P CABG 1999    Constipation     Dyslipidemia     Gastritis     Gastroesophageal reflux disease without esophagitis     Gastrointestinal hemorrhage, unspecified gastritis, unspecified gastrointestinal hemorrhage type     Hyperlipidemia     Hypertension     Hypothyroidism     Obesity     Xerophthalmia

## 2021-01-23 NOTE — INPATIENT CERTIFICATION FOR MEDICARE PATIENTS - IN ORDER TO MEET MEDICARE REQUIREMENTS.
In order to meet Medicare requirements, the clinical documentation must support the information cited in the admission order.  Please be sure to provide detailed and clear documentation about the following in the admitting note/history and physical:
Routine observation

## 2021-01-23 NOTE — ED PROVIDER NOTE - OBJECTIVE STATEMENT
pt bib ems for few days of generalized weakness, unable to ambulate and urinary frequency. + occ cough. pt denies fever, chills, ha, cp, sob, abd pain, d/n/v, diarrhea, dysuria.  pmd - lotfi pt bib ems for few days of generalized weakness, unable to ambulate and urinary frequency. + occ cough. pt denies fever, chills, ha, cp, sob, abd pain, d/n/v, diarrhea, dysuria. no known sick exposures  pmd - lotfi

## 2021-01-23 NOTE — ED PROVIDER NOTE - PMH
Anemia    Atrial fibrillation, unspecified    CAD (coronary artery disease)  S/P CABG 1999  Constipation    Dyslipidemia    Gastritis    Gastroesophageal reflux disease without esophagitis    Gastrointestinal hemorrhage, unspecified gastritis, unspecified gastrointestinal hemorrhage type    Hyperlipidemia    Hypertension    Hypothyroidism    Obesity    Xerophthalmia

## 2021-01-23 NOTE — H&P ADULT - PROBLEM SELECTOR PLAN 2
Patient with abnormal UA and elevated WBC.   Will obtain CT renal stone hunt to r/o obstructing stone.   IV antibiotics.   ID consult.

## 2021-01-23 NOTE — ED ADULT TRIAGE NOTE - MEANS OF ARRIVAL
Letter of Status Determination:  
Recommend hospitalization status upgraded from OBSERVATION  to INPATIENT  Status Pt Name:  Grace Fields MR#  
MELISSA # [id number] / 
[id number] Payor: Irma Kasper / Plan: [address] / Product Type: Medicare /   
ELENA#  [id number] [facility]  540/02  @ . Formerly [facility] hospital  
Hospitalization date  5/6/2019  3:00 PM  
Current Attending Physician  Thiago Luis MD  
Principal diagnosis  Wound infection Clinicals  Ms. Mary Stone is, with continued concerns of left low back pain with radiation to the hip, groin and occasionally down the leg.  She has had two rounds of epidural injections, which provided her with no significant lasting relief of her pain. She continues to deny weakness in her legs or changes in bowel/bladder function. She feels unsteady in her gait, but denies any falls. She ambulates with a cane. She has a history of colon cancer resulting in a complete colectomy. She is s/p recent  lumbar laminectomy L2-4. She has intractable back pain related to a postoperative lumbar hematoma. She is a candidate for a lumbar surgical would I&D. Pt was found to have possible wound infection, started on IV abx, wound is MRSA positive  
 
 
  
 
 
  
[place] ([facility]) criteria STATUS DETERMINATION  INPATIENT The final decision of the patient's hospitalization status depends on the attending physician's judgment Additional comments Payor: Irma Kasper / Plan: [address] / Product Type: Medicare /   
  
 
Eva Frazier MD 
Cell: [phone] Physician Advisor stretcher

## 2021-01-23 NOTE — H&P ADULT - NSHPLABSRESULTS_GEN_ALL_CORE
8.3    18.02 )-----------( 306      ( 23 Jan 2021 10:27 )             30.5     23 Jan 2021 10:27    135    |  98     |  25     ----------------------------<  176    4.1     |  28     |  1.20     Ca    9.4        23 Jan 2021 10:27    TPro  8.2    /  Alb  3.2    /  TBili  0.7    /  DBili  x      /  AST  15     /  ALT  14     /  AlkPhos  70     23 Jan 2021 10:27    CAPILLARY BLOOD GLUCOSE        PT/INR - ( 23 Jan 2021 10:27 )   PT: 22.0 sec;   INR: 1.87 ratio         PTT - ( 23 Jan 2021 10:27 )  PTT:34.2 sec 8.3    18.02 )-----------( 306      ( 23 Jan 2021 10:27 )             30.5     23 Jan 2021 10:27    135    |  98     |  25     ----------------------------<  176    4.1     |  28     |  1.20     Ca    9.4        23 Jan 2021 10:27    TPro  8.2    /  Alb  3.2    /  TBili  0.7    /  DBili  x      /  AST  15     /  ALT  14     /  AlkPhos  70     23 Jan 2021 10:27    PT/INR - ( 23 Jan 2021 10:27 )   PT: 22.0 sec;   INR: 1.87 ratio      PTT - ( 23 Jan 2021 10:27 )  PTT:34.2 sec

## 2021-01-23 NOTE — H&P ADULT - NSICDXPASTSURGICALHX_GEN_ALL_CORE_FT
PAST SURGICAL HISTORY:  H/O abdominal hysterectomy     History of colon polyps removal 2014    Rectal mass removal in 6/2015    S/P CABG (coronary artery bypass graft) in 1999

## 2021-01-23 NOTE — ED ADULT NURSE NOTE - NSIMPLEMENTINTERV_GEN_ALL_ED
Implemented All Fall with Harm Risk Interventions:  [place] to call system. Call bell, personal items and telephone within reach. Instruct patient to call for assistance. Room bathroom lighting operational. Non-slip footwear when patient is off stretcher. Physically safe environment: no spills, clutter or unnecessary equipment. Stretcher in lowest position, wheels locked, appropriate side rails in place. Provide visual cue, wrist band, yellow gown, etc. Monitor gait and stability. Monitor for mental status changes and reorient to person, place, and time. Review medications for side effects contributing to fall risk. Reinforce activity limits and safety measures with patient and family. Provide visual clues: red socks.
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
## 2021-01-23 NOTE — H&P ADULT - ASSESSMENT
89 years old female with past medical history of atrial fibrillation, anemia, CAD, dyslipidemia, GERD, GI bleed in past, s/p hemicolectomy, hypertension, constipation, who  in to ER with c/o being weak and urinating all over. She denies any abdominal pain. No nausea or vomiting.     In ER patient is noted to have fever and high white count. She also has abnormal UA. She is being admitted for possible sepsis due to UTI.

## 2021-01-23 NOTE — ED PROVIDER NOTE - CLINICAL SUMMARY MEDICAL DECISION MAKING FREE TEXT BOX
pt bib ems with generalized weakness, unable to ambulate, urinary freq, cough, fever - ekg/xr/labs/ivf/tylenol

## 2021-01-23 NOTE — H&P ADULT - NSHPPHYSICALEXAM_GEN_ALL_CORE
Vital Signs Last 24 Hrs  T(C): 36.9 (23 Jan 2021 13:38), Max: 37.4 (23 Jan 2021 09:41)  T(F): 98.5 (23 Jan 2021 13:38), Max: 99.4 (23 Jan 2021 09:41)  HR: 64 (23 Jan 2021 13:38) (62 - 64)  BP: 146/78 (23 Jan 2021 13:38) (145/70 - 167/75)  BP(mean): --  RR: 16 (23 Jan 2021 13:38) (14 - 16)  SpO2: 96% (23 Jan 2021 13:38) (95% - 96%) Vital Signs Last 24 Hrs  T(C): 36.9 (23 Jan 2021 13:38), Max: 37.4 (23 Jan 2021 09:41)  T(F): 98.5 (23 Jan 2021 13:38), Max: 99.4 (23 Jan 2021 09:41)  HR: 64 (23 Jan 2021 13:38) (62 - 64)  BP: 146/78 (23 Jan 2021 13:38) (145/70 - 167/75)  BP(mean): --  RR: 16 (23 Jan 2021 13:38) (14 - 16)  SpO2: 96% (23 Jan 2021 13:38) (95% - 96%)    PHYSICAL EXAM:  GENERAL: NAD, well-groomed, well-developed  HEAD:  Atraumatic, Normocephalic  EYES: Pallor +  ENMT: Moist mucous membranes, no lesions  NECK: Supple.  CHEST/LUNG: Decreased breath sounds at bases.   HEART: S1, S2.   ABDOMEN: Soft, Nontender, Nondistended; Bowel sounds present  EXTREMITIES:  2+ Peripheral Pulses, No clubbing, cyanosis, or edema  MS: No joint swelling or deformity.   LYMPH: No lymphadenopathy noted  SKIN: No rashes or lesions  NERVOUS SYSTEM:  No focal deficit.   PSYCH:  Awake and alert.

## 2021-01-23 NOTE — H&P ADULT - PROBLEM SELECTOR PLAN 1
Patient with possible Sepsis, POA.   She is with fever, elevated WBC and elevated lactate.   Most likely due to UTI.   Continue IVF and IV antibiotics.   Monitor culture data.   ID consult called.

## 2021-01-23 NOTE — H&P ADULT - NSHPSOCIALHISTORY_GEN_ALL_CORE
Social History:    Marital Status:   Occupation:   Lives with:     Substance Use :  Tobacco Usage:  (   ) never smoked   (   ) former smoker   (   ) current smoker  (     ) pack year  (        ) last tobacco use date  Alcohol Usage:    (     ) Advanced Directives: (     ) declined   [  ] health care proxy Social History:    Marital Status:   Occupation: retired  Lives with: family    Substance Use : denies  Tobacco Usage:  (X) never smoked   (   ) former smoker   (   ) current smoker  (     ) pack year  (        ) last tobacco use date  Alcohol Usage: denies    (X) Advanced Directives: (     ) declined   [X] health care proxy

## 2021-01-23 NOTE — ED ADULT NURSE NOTE - OBJECTIVE STATEMENT
Patient is awake and oriented times 3, complains of not feeling well, patient is a poor historian, as per patients son, patient has had episodes of urinary incontinence and he can no longer care for her himself.  Patients son denies any falls, denies any COVID 19 exposures.  Patients perineum is excoriated.

## 2021-01-23 NOTE — ED PROVIDER NOTE - LAB INTERPRETATION
COVID-19 PCR . (01.23.21 @ 10:30)   COVID-19 PCR: NotDetec  FLU A B RSV Detection by PCR (01.23.21 @ 10:27)   Flu A Result: NotDetec: The Flu A B RSV assay is a Real-Time PCR test for the qualitative   detection and differentiation of Influenza A, Influenza B, and   Respiratory Syncytial Virus on nasopharyngeal swabs. The results should   be interpreted in the context of all clinical and laboratory findings.   Flu B Result: NotDetec   RSV Result: NotDetec

## 2021-01-23 NOTE — H&P ADULT - NSHPREVIEWOFSYSTEMS_GEN_ALL_CORE
REVIEW OF SYSTEMS:  CONSTITUTIONAL: + weakness.   EYES: No eye pain, or discharge  ENMT: No sinus or throat pain  NECK: No pain or stiffness  BREASTS: No pain, masses, or nipple discharge  RESPIRATORY: No cough, wheezing, chills or hemoptysis; No shortness of breath  CARDIOVASCULAR: No chest pain, palpitations, dizziness, or leg swelling  GASTROINTESTINAL: No abdominal or epigastric pain. No nausea, vomiting.  GENITOURINARY: + incontinence, + increased frequency.   NEUROLOGICAL: No loss of strength, numbness, or tremors  SKIN: No itching, burning, rashes, or lesions   LYMPH NODES: No enlarged glands  ENDOCRINE: No polydipsia or polyuria  MUSCULOSKELETAL: No muscle, back, or extremity pain  PSYCHIATRIC: No depression, anxiety, mood swings.  HEME/LYMPH: No easy bruising, or bleeding gums  ALLERGY AND IMMUNOLOGIC: No hives or eczema

## 2021-01-23 NOTE — H&P ADULT - HISTORY OF PRESENT ILLNESS
89 years old female with past medical history of atrial fibrillation, anemia, CAD, dyslipidemia, GERD, GI bleed in past, s/p hemicolectomy, hypertension, constipation, who  in to ER with c/o being weak and urinating all over. She denies any abdominal pain. No nausea or vomiting.     In ER patient is noted to have fever and high white count. She also has abnormal UA. She is being admitted for possible sepsis due to UTI.     Patient denies any chest pain or pressure.   No nausea or vomiting.   No fever or chills.

## 2021-01-23 NOTE — PATIENT PROFILE ADULT - FALL HARM RISK TYPE OF ASSESSMENT
CT abd/pelvis: Large amount of stool within the rectum with surrounding inflammatory change. Stercoral colitis.  Likely overflow diarrhea   -s/p IV Flagyl in the ED. Continue IV Flagyl   -Start Senna at bedtime  -Bacid TID  -Consider Mesalamine suppository  GI consulted. admission

## 2021-01-24 DIAGNOSIS — E78.5 HYPERLIPIDEMIA, UNSPECIFIED: ICD-10-CM

## 2021-01-24 DIAGNOSIS — I48.91 UNSPECIFIED ATRIAL FIBRILLATION: ICD-10-CM

## 2021-01-24 DIAGNOSIS — A41.9 SEPSIS, UNSPECIFIED ORGANISM: ICD-10-CM

## 2021-01-24 DIAGNOSIS — D64.9 ANEMIA, UNSPECIFIED: ICD-10-CM

## 2021-01-24 DIAGNOSIS — N39.0 URINARY TRACT INFECTION, SITE NOT SPECIFIED: ICD-10-CM

## 2021-01-24 DIAGNOSIS — N12 TUBULO-INTERSTITIAL NEPHRITIS, NOT SPECIFIED AS ACUTE OR CHRONIC: ICD-10-CM

## 2021-01-24 DIAGNOSIS — E03.9 HYPOTHYROIDISM, UNSPECIFIED: ICD-10-CM

## 2021-01-24 DIAGNOSIS — I25.10 ATHEROSCLEROTIC HEART DISEASE OF NATIVE CORONARY ARTERY WITHOUT ANGINA PECTORIS: ICD-10-CM

## 2021-01-24 DIAGNOSIS — N20.0 CALCULUS OF KIDNEY: ICD-10-CM

## 2021-01-24 DIAGNOSIS — Z29.9 ENCOUNTER FOR PROPHYLACTIC MEASURES, UNSPECIFIED: ICD-10-CM

## 2021-01-24 DIAGNOSIS — K21.9 GASTRO-ESOPHAGEAL REFLUX DISEASE WITHOUT ESOPHAGITIS: ICD-10-CM

## 2021-01-24 LAB
A1C WITH ESTIMATED AVERAGE GLUCOSE RESULT: 6 % — HIGH (ref 4–5.6)
ANION GAP SERPL CALC-SCNC: 9 MMOL/L — SIGNIFICANT CHANGE UP (ref 5–17)
BUN SERPL-MCNC: 21 MG/DL — SIGNIFICANT CHANGE UP (ref 7–23)
CALCIUM SERPL-MCNC: 8.6 MG/DL — SIGNIFICANT CHANGE UP (ref 8.4–10.5)
CHLORIDE SERPL-SCNC: 108 MMOL/L — SIGNIFICANT CHANGE UP (ref 96–108)
CHOLEST SERPL-MCNC: 117 MG/DL — SIGNIFICANT CHANGE UP
CO2 SERPL-SCNC: 24 MMOL/L — SIGNIFICANT CHANGE UP (ref 22–31)
CREAT SERPL-MCNC: 0.98 MG/DL — SIGNIFICANT CHANGE UP (ref 0.5–1.3)
ESTIMATED AVERAGE GLUCOSE: 126 MG/DL — HIGH (ref 68–114)
FERRITIN SERPL-MCNC: 55 NG/ML — SIGNIFICANT CHANGE UP (ref 15–150)
FOLATE SERPL-MCNC: >20 NG/ML — SIGNIFICANT CHANGE UP
GLUCOSE SERPL-MCNC: 195 MG/DL — HIGH (ref 70–99)
HCT VFR BLD CALC: 27.7 % — LOW (ref 34.5–45)
HDLC SERPL-MCNC: 44 MG/DL — LOW
HGB BLD-MCNC: 7.7 G/DL — LOW (ref 11.5–15.5)
IRON SATN MFR SERPL: 11 UG/DL — LOW (ref 30–160)
IRON SATN MFR SERPL: 5 % — LOW (ref 14–50)
LACTATE SERPL-SCNC: 1.1 MMOL/L — SIGNIFICANT CHANGE UP (ref 0.7–2)
LIPID PNL WITH DIRECT LDL SERPL: 58 MG/DL — SIGNIFICANT CHANGE UP
MCHC RBC-ENTMCNC: 21.1 PG — LOW (ref 27–34)
MCHC RBC-ENTMCNC: 27.8 GM/DL — LOW (ref 32–36)
MCV RBC AUTO: 75.9 FL — LOW (ref 80–100)
NON HDL CHOLESTEROL: 73 MG/DL — SIGNIFICANT CHANGE UP
NRBC # BLD: 0 /100 WBCS — SIGNIFICANT CHANGE UP (ref 0–0)
PLATELET # BLD AUTO: 227 K/UL — SIGNIFICANT CHANGE UP (ref 150–400)
POTASSIUM SERPL-MCNC: 3.9 MMOL/L — SIGNIFICANT CHANGE UP (ref 3.5–5.3)
POTASSIUM SERPL-SCNC: 3.9 MMOL/L — SIGNIFICANT CHANGE UP (ref 3.5–5.3)
RBC # BLD: 3.63 M/UL — LOW (ref 3.8–5.2)
RBC # BLD: 3.65 M/UL — LOW (ref 3.8–5.2)
RBC # FLD: 17.6 % — HIGH (ref 10.3–14.5)
RETICS #: 67.5 K/UL — SIGNIFICANT CHANGE UP (ref 25–125)
RETICS/RBC NFR: 1.9 % — SIGNIFICANT CHANGE UP (ref 0.5–2.5)
SARS-COV-2 IGG SERPL QL IA: NEGATIVE — SIGNIFICANT CHANGE UP
SARS-COV-2 IGM SERPL IA-ACNC: <0.1 INDEX — SIGNIFICANT CHANGE UP
SODIUM SERPL-SCNC: 141 MMOL/L — SIGNIFICANT CHANGE UP (ref 135–145)
TIBC SERPL-MCNC: 232 UG/DL — SIGNIFICANT CHANGE UP (ref 220–430)
TRIGL SERPL-MCNC: 77 MG/DL — SIGNIFICANT CHANGE UP
TSH SERPL-MCNC: 2.59 UIU/ML — SIGNIFICANT CHANGE UP (ref 0.27–4.2)
UIBC SERPL-MCNC: 220 UG/DL — SIGNIFICANT CHANGE UP (ref 110–370)
VIT B12 SERPL-MCNC: 386 PG/ML — SIGNIFICANT CHANGE UP (ref 232–1245)
WBC # BLD: 12.63 K/UL — HIGH (ref 3.8–10.5)
WBC # FLD AUTO: 12.63 K/UL — HIGH (ref 3.8–10.5)

## 2021-01-24 RX ORDER — FERROUS SULFATE 325(65) MG
325 TABLET ORAL DAILY
Refills: 0 | Status: DISCONTINUED | OUTPATIENT
Start: 2021-01-24 | End: 2021-01-28

## 2021-01-24 RX ADMIN — Medication 25 MILLIGRAM(S): at 17:47

## 2021-01-24 RX ADMIN — Medication 325 MILLIGRAM(S): at 20:58

## 2021-01-24 RX ADMIN — Medication 1 TABLET(S): at 20:58

## 2021-01-24 RX ADMIN — CEFTRIAXONE 100 MILLIGRAM(S): 500 INJECTION, POWDER, FOR SOLUTION INTRAMUSCULAR; INTRAVENOUS at 17:50

## 2021-01-24 RX ADMIN — PANTOPRAZOLE SODIUM 40 MILLIGRAM(S): 20 TABLET, DELAYED RELEASE ORAL at 06:06

## 2021-01-24 RX ADMIN — SIMVASTATIN 20 MILLIGRAM(S): 20 TABLET, FILM COATED ORAL at 20:58

## 2021-01-24 RX ADMIN — Medication 240 MILLIGRAM(S): at 06:06

## 2021-01-24 RX ADMIN — SODIUM CHLORIDE 75 MILLILITER(S): 9 INJECTION INTRAMUSCULAR; INTRAVENOUS; SUBCUTANEOUS at 11:27

## 2021-01-24 RX ADMIN — Medication 100 MICROGRAM(S): at 06:06

## 2021-01-24 RX ADMIN — SODIUM CHLORIDE 75 MILLILITER(S): 9 INJECTION INTRAMUSCULAR; INTRAVENOUS; SUBCUTANEOUS at 22:18

## 2021-01-24 RX ADMIN — Medication 25 MILLIGRAM(S): at 06:06

## 2021-01-24 RX ADMIN — Medication 1 TABLET(S): at 11:28

## 2021-01-24 RX ADMIN — Medication 1 TABLET(S): at 17:48

## 2021-01-24 RX ADMIN — RIVAROXABAN 15 MILLIGRAM(S): KIT at 17:48

## 2021-01-24 RX ADMIN — Medication 1 TABLET(S): at 14:28

## 2021-01-24 RX ADMIN — Medication 1 MILLIGRAM(S): at 11:28

## 2021-01-24 NOTE — PHYSICAL THERAPY INITIAL EVALUATION ADULT - ADDITIONAL COMMENTS
Pt lives in private home with 3 VINCENT +HR and no steps inside. Pt utilized a RW PTA for t/f's and ambulation. Pt owns a SAC as well.

## 2021-01-24 NOTE — PHYSICAL THERAPY INITIAL EVALUATION ADULT - PERTINENT HX OF CURRENT PROBLEM, REHAB EVAL
89 years old female with past medical history of atrial fibrillation, anemia, CAD, dyslipidemia, GERD, GI bleed in past, s/p hemicolectomy, hypertension, constipation, who  in to ER with c/o being weak and urinating all over. She denies any abdominal pain. No nausea or vomiting. Pt admitted for UTI with possible sepsis.

## 2021-01-24 NOTE — CONSULT NOTE ADULT - SUBJECTIVE AND OBJECTIVE BOX
[facility], Division of Infectious Diseases  JOY Malave, VANESSA Fierro  [phone]  after hours and weekends [phone]    RON ABREU  89y, Female  [id number]      HPI: poor historian, history per chart and patient   89f h/o  atrial fibrillation, anemia, CAD, dyslipidemia, GERD, GI bleed in past, s/p hemicolectomy, hypertension, constipation, who  in to ER with c/o being weak and urinating all over. Sh  In ER patient is noted to have high white count. She also has abnormal UA. She is being admitted for possible sepsis due to UTI.     currently denies any gu symptoms, no dysuria, no urgency, no abd pain  no fever, no cough, no diarrhea. per pt  no recent antibx  lives with son who is well  sitting in chair smiling       PMH/PSH--  Xerophthalmia  Anemia  Constipation  Gastrointestinal hemorrhage, unspecified gastritis, unspecified gastrointestinal hemorrhage type  Gastritis  Atrial fibrillation, unspecified  Hyperlipidemia  Gastroesophageal reflux disease without esophagitis  Hypothyroidism  Dyslipidemia  Hypertension  CAD (coronary artery disease)  History of colon polyps  H/O abdominal hysterectomy  Rectal mass  S/P CABG (coronary artery bypass graft)        Allergies--NKDA per pt  amoxicillin documented in chart       Medications--  Antibiotics: cefTRIAXone   IVPB 1000 milliGRAM(s) IV Intermittent every 24 hours    Immunologic:   Other: acetaminophen   Tablet .. PRN  diltiazem   CD  folic acid  lactobacillus acidophilus  levothyroxine  metoprolol tartrate  pantoprazole    Tablet  rivaroxaban  simvastatin  sodium chloride 0.9%.      Social History--  EtOH: denies ***  Tobacco: denies ***  Drug Use: denies ***    Family/Marital History--  No pertinent family history in first degree relatives   lives with son          Travel/Environmental/Occupational History:  No       Review of Systems:  A >=10-point review of systems was obtained.   limited as pt denies everything.    Review of systems otherwise negative except as previously noted.    Physical Exam--  Vital Signs: T(F): 98.5 (21 @ 10:00), Max: 98.8 (21 @ 16:33)  HR: 61 (21 @ 10:00)  BP: 137/68 (21 @ 10:00)  RR: 16 (21 @ 10:00)  SpO2: 96% (21 @ 10:00)  Wt(kg): --  General: Nontoxic-appearing Female in no acute distress.  HEENT: AT/NC. Anicteric..  Neck: Not rigid. No sense of mass.  Nodes: None palpable.  Lungs: Clear bilaterally without rales, wheezing or rhonchi  Heart: Regular rate and rhythm. ill.  Abdomen: Bowel sounds present and normoactive. Soft. Nondistended. Nontender.  Back: No spinal tenderness. No costovertebral angle tenderness.   Extremities: No cyanosis or clubbing. trace edema.   Skin: Warm. Dry. Good turgor. No rash. No vasculitic stigmata.  Psychiatric: Appropriate affect and mood for situation.         Laboratory & Imaging Data--  CBC                        8.3    18.02 )-----------( 306      ( 2021 10:27 )             30.5       Chemistries      135  |  98  |  25<H>  ----------------------------<  176<H>  4.1   |  28  |  1.20    Ca    9.4      2021 10:27  Phos  4.4       Mg     2.2         TPro  8.2  /  Alb  3.2<L>  /  TBili  0.7  /  DBili  x   /  AST  15  /  ALT  14  /  AlkPhos  70        Culture Data    d< from: CT Renal Stone Hunt (21 @ 21:18) >    EXAM:  CT RENAL STONE HUNT                                  PROCEDURE DATE:  2021          INTERPRETATION:  CT ABDOMEN AND PELVIS WITHOUT CONTRAST    INDICATION: Urinary tract infection.    TECHNIQUE: Abdominopelvic CT without intravenous contrast.Images are reformatted in the sagittal and coronal planes.    COMPARISON: CT abdomen pelvis 2018.    FINDINGS:    Absence of intravenous contrast limits evaluation for focal lesions, neoplasm, and vascular pathology.    Lower Thorax: Partially imaged median sternotomy and postoperative changes of TAVR. Partially imaged intracardiac leads. Cardiomegaly. Trace left pleural effusion. Interlobular septal thickening and mild groundglass attenuation, likely in keeping with mild pulmonary edema.    Liver: No suspicious lesions.  Biliary: No significant dilatation, postcholecystectomy.  Spleen: No suspicious lesions.  Pancreas: No inflammatory changes or ductal dilatation.  Adrenals: Normal.  Kidneys: Mild right hydronephrosis secondary to 6mm right ureteropelvic junction stone on image 59 of series 3. Additional 3 mm nonobstructive stone in the upper pole of right kidney. Mild bilateral perinephric stranding, right greater than left.  Vessels: Normal caliber. Atherosclerotic disease ofthe aorta and its branches.    GI tract: No evidence of small bowel obstruction. Bowel postoperative changes. No acute bowel inflammatory changes. Colonic diverticulosis without evidence of acute diverticulitis.    Peritoneum/retroperitoneum and mesentery: No free air. No organized fluid collection. No adenopathy.    Pelvic organs/Bladder: Uterus is atrophied or surgically absent. Mild bladder wall thickening, difficult to assess secondary to inadequate distention.  Abdominal wall: Small left infraumbilical abdominal wall hernia containing nonobstructed bowel loop.  Bones and soft tissues: Multilevel degenerative changes of the spine noted.    IMPRESSION:    Mild right hydronephrosis secondary to 6 mm right ureteropelvic junction stone. Additional 3 mm nonobstructive stone in the upper pole of right kidney. Mild bilateral perinephric stranding, right greater than left.    Mild bladder wall thickening, difficult to assess secondary to inadequate distention. Correlate with urinalysis and laboratory values to assess for cystitis, ascending urinary tract infection or right pyelonephritis.    Additional findings as mentioned above.            < end of copied text >      rUrine Microscopic-Add On (NC) (21 @ 11:26)   Bacteria: Many   Epithelial Cells: Moderate   Red Blood Cell - Urine: >50 /HPF   White Blood Cell - Urine: >50

## 2021-01-24 NOTE — CONSULT NOTE ADULT - PROBLEM SELECTOR RECOMMENDATION 9
cont ceftriaxone  pending urine cx results  f/u blood cx   trend wbc  consider urology consult for stone and hydronephrosis

## 2021-01-24 NOTE — CONSULT NOTE ADULT - SUBJECTIVE AND OBJECTIVE BOX
Patient is a 89y old  Female who presents with a chief complaint of Urinary and bladder complaints. (2021 15:18)      HISTORY OF PRESENT ILLNESS:  89 y/o female admitted last night for UTI/pyelo.  She was brought in by son because her urinary Sx have been worsening.  She usually has frequency and urgency, but worsened over past few months.  She used to see Dr. Bib Kc many years ago for her lower urinary tract symptoms.  CT stonehunt (see below) shows a 6mm right UPJ stone with associated mild right hydronephrosis and a 3mm nonobstructive right renal stone, bilateral perinephric stranding, and mild bladder wall thickening.  Pt denies flank pain, fever, chills at home.  Has not been febrile in the hospital.  Serum lactate, initially elevated, normalized with fluid resuscitation.  Leukocytosis has also improved with hydration and Rocephin IV.  Pt is alert, conversant, and answers all questions appropriately.  She uses the toilet with assistance from aides.    PAST MEDICAL & SURGICAL HISTORY:  Xerophthalmia  Anemia  Constipation  Gastrointestinal hemorrhage, unspecified gastritis, unspecified gastrointestinal hemorrhage type  Gastritis  Atrial fibrillation, unspecified  Hyperlipidemia  Gastroesophageal reflux disease without esophagitis  Hypothyroidism  Obesity  Dyslipidemia  Hypertension  CAD (coronary artery disease)  S/P CABG     History of colon polyps  removal     H/O abdominal hysterectomy    Rectal mass  removal in 2015    S/P CABG (coronary artery bypass graft)  in     REVIEW OF SYSTEMS:    CONSTITUTIONAL: No weakness, fevers or chills  SKIN: No itching, burning, rashes, or lesions   EYES/ENT: No visual changes;  No vertigo or throat pain   NECK: No pain or stiffness  RESPIRATORY: No cough, wheezing, hemoptysis; No shortness of breath  CARDIOVASCULAR: No chest pain or palpitations  GASTROINTESTINAL: No abdominal or epigastric pain. No nausea, vomiting, diarrhea  NEUROLOGICAL: No numbness or weakness  GENITOURINARY:  (+)frequency, urgency, incontinence    All other review of systems is negative unless indicated above.    MEDICATIONS  (STANDING):  cefTRIAXone   IVPB 1000 milliGRAM(s) IV Intermittent every 24 hours  diltiazem    milliGRAM(s) Oral daily  ferrous    sulfate 325 milliGRAM(s) Oral daily  folic acid 1 milliGRAM(s) Oral daily  lactobacillus acidophilus 1 Tablet(s) Oral three times a day with meals  levothyroxine 100 MICROGram(s) Oral daily  metoprolol tartrate 25 milliGRAM(s) Oral two times a day  multivitamin 1 Tablet(s) Oral daily  pantoprazole    Tablet 40 milliGRAM(s) Oral before breakfast  rivaroxaban 15 milliGRAM(s) Oral with dinner  simvastatin 20 milliGRAM(s) Oral at bedtime  sodium chloride 0.9%. 1000 milliLiter(s) (75 mL/Hr) IV Continuous <Continuous>    MEDICATIONS  (PRN):  acetaminophen   Tablet .. 650 milliGRAM(s) Oral every 4 hours PRN Temp greater or equal to 38C (100.4F), Mild Pain (1 - 3)      Allergies    amoxicillin (Unknown)    Intolerances    SOCIAL HISTORY:   Smoking: never smoker   EtOH: denies   Work: former       FAMILY HISTORY:  No pertinent family history in first degree relatives    Vital Signs Last 24 Hrs  T(C): 37.2 (2021 14:00), Max: 37.2 (2021 14:00)  T(F): 99 (2021 14:00), Max: 99 (2021 14:00)  HR: 65 (2021 14:00) (60 - 65)  BP: 144/68 (2021 14:00) (137/68 - 161/74)  BP(mean): --  RR: 17 (2021 14:00) (16 - 17)  SpO2: 96% (2021 14:00) (93% - 96%)    PHYSICAL EXAM:    Constitutional: NAD, sitting up in chair, eating dinner.  HEENT: PERRLA, EOMI  Neck: Supple, full ROM  Back: No CVA tenderness  Respiratory: Normal repiratory effort  Cardiovascular: RRR  Abd: Soft, NT/ND  Neurological: A&O x 3, no focal deficits  Psychiatric: Normal mood, normal affect  Musculoskeletal: no clubbing/cyanosis/edema  Skin: No rashes    LABS:                        7.7    12.63 )-----------( 227      ( 2021 16:08 )             27.7     01-24    141  |  108  |  21  ----------------------------<  195<H>  3.9   |  24  |  0.98    Ca    8.6      2021 16:08  Phos  4.4     01-23  Mg     2.2         TPro  8.2  /  Alb  3.2<L>  /  TBili  0.7  /  DBili  x   /  AST  15  /  ALT  14  /  AlkPhos  70      PT/INR - ( 2021 10:27 )   PT: 22.0 sec;   INR: 1.87 ratio         PTT - ( 2021 10:27 )  PTT:34.2 sec  Urinalysis Basic - ( 2021 11:26 )    Color: Radha / Appearance: Turbid / S.015 / pH: x  Gluc: x / Ketone: Trace  / Bili: Small / Urobili: 1 mg/dL   Blood: x / Protein: 100 mg/dL / Nitrite: Negative   Leuk Esterase: Moderate / RBC: >50 /HPF / WBC >50   Sq Epi: x / Non Sq Epi: Moderate / Bacteria: Many      RADIOLOGY & ADDITIONAL STUDIES:    EXAM:  CT RENAL STONE HUNT                            PROCEDURE DATE:  2021        INTERPRETATION:  CT ABDOMEN AND PELVIS WITHOUT CONTRAST    INDICATION: Urinary tract infection.    TECHNIQUE: Abdominopelvic CT without intravenous contrast.Images are reformatted in the sagittal and coronal planes.    COMPARISON: CT abdomen pelvis 2018.    FINDINGS:    Absence of intravenous contrast limits evaluation for focal lesions, neoplasm, and vascular pathology.    Lower Thorax: Partially imaged median sternotomy and postoperative changes of TAVR. Partially imaged intracardiac leads. Cardiomegaly. Trace left pleural effusion. Interlobular septal thickening and mild groundglass attenuation, likely in keeping with mild pulmonary edema.    Liver: No suspicious lesions.  Biliary: No significant dilatation, postcholecystectomy.  Spleen: No suspicious lesions.  Pancreas: No inflammatory changes or ductal dilatation.  Adrenals: Normal.  Kidneys: Mild right hydronephrosis secondary to 6mm right ureteropelvic junction stone on image 59 of series 3. Additional 3 mm nonobstructive stone in the upper pole of right kidney. Mild bilateral perinephric stranding, right greater than left.  Vessels: Normal caliber. Atherosclerotic disease ofthe aorta and its branches.    GI tract: No evidence of small bowel obstruction. Bowel postoperative changes. No acute bowel inflammatory changes. Colonic diverticulosis without evidence of acute diverticulitis.    Peritoneum/retroperitoneum and mesentery: No free air. No organized fluid collection. No adenopathy.    Pelvic organs/Bladder: Uterus is atrophied or surgically absent. Mild bladder wall thickening, difficult to assess secondary to inadequate distention.  Abdominal wall: Small left infraumbilical abdominal wall hernia containing nonobstructed bowel loop.  Bones and soft tissues: Multilevel degenerative changes of the spine noted.    IMPRESSION:    Mild right hydronephrosis secondary to 6 mm right ureteropelvic junction stone. Additional 3 mm nonobstructive stone in the upper pole of right kidney. Mild bilateral perinephric stranding, right greater than left.    Mild bladder wall thickening, difficult to assess secondary to inadequate distention. Correlate with urinalysis and laboratory values to assess for cystitis, ascending urinary tract infection or right pyelonephritis.

## 2021-01-24 NOTE — PROGRESS NOTE ADULT - SUBJECTIVE AND OBJECTIVE BOX
Patient is a 89y old  Female who presents with a chief complaint of Urinary and bladder complaints. (2021 12:51)    HPI: 89 years old female with past medical history of atrial fibrillation, anemia, CAD, dyslipidemia, GERD, GI bleed in past, s/p hemicolectomy, hypertension, constipation, who  in to ER with c/o being weak and urinating all over. She denies any abdominal pain. No nausea or vomiting.     In ER patient is noted to have fever and high white count. She also has abnormal UA. She is being admitted for possible sepsis due to UTI.     INTERVAL HPI/OVERNIGHT EVENTS:  Chart reviewed, notes reviewed.   Patient seen and examined.  Being followed by following specialists: ID. Urology consult called.     Consultant(s) Notes Reviewed:  [X] Yes    Care Discussed with Consultants/Other Providers: [X] Yes    2021 --> Doing better. Denies any chest pain or pressure. Denies any flank pain. No fever or chills. Seen by PT.     REVIEW OF SYSTEMS:  CONSTITUTIONAL: + fatigue  EYES: No eye pain, or discharge  ENMT: No sinus or throat pain  NECK: No pain or stiffness  BREASTS: No pain, masses, or nipple discharge  RESPIRATORY: No cough, wheezing, chills or hemoptysis; No shortness of breath  CARDIOVASCULAR: No chest pain, palpitations, dizziness, or leg swelling  GASTROINTESTINAL: No abdominal or epigastric pain. No nausea, vomiting.  GENITOURINARY:  + incontinence  NEUROLOGICAL: No loss of strength, numbness, or tremors  SKIN: No itching, burning, rashes, or lesions   LYMPH NODES: No enlarged glands  ENDOCRINE: No polydipsia or polyuria  MUSCULOSKELETAL: No muscle, back, or extremity pain  PSYCHIATRIC: No depression, anxiety, mood swings.  HEME/LYMPH: No easy bruising, or bleeding gums  ALLERGY AND IMMUNOLOGIC: No hives or eczema    Allergies    amoxicillin (Unknown)    Intolerances      Home Medications:  diltiazem 240 mg/24 hours oral capsule, extended release: 1 cap(s) orally once a day (2021 09:48)  folic acid 1 mg oral tablet: 1 tab(s) orally once a day (2021 09:48)  levothyroxine 100 mcg (0.1 mg) oral tablet: 1 tab(s) orally once a day (2021 09:48)  Metoprolol Tartrate 25 mg oral tablet: 1 tab(s) orally 2 times a day (2021 09:48)  Ocuvite oral tablet: 1 tab(s) orally once a day (2021 09:48)  pantoprazole 20 mg oral delayed release tablet: 1 tab(s) orally once a day (2021 09:48)  simvastatin 20 mg oral tablet: 1 tab(s) orally once a day (at bedtime)    home (2021 09:48)    MEDICATIONS  (STANDING):  cefTRIAXone   IVPB 1000 milliGRAM(s) IV Intermittent every 24 hours  diltiazem    milliGRAM(s) Oral daily  folic acid 1 milliGRAM(s) Oral daily  lactobacillus acidophilus 1 Tablet(s) Oral three times a day with meals  levothyroxine 100 MICROGram(s) Oral daily  metoprolol tartrate 25 milliGRAM(s) Oral two times a day  pantoprazole    Tablet 40 milliGRAM(s) Oral before breakfast  rivaroxaban 15 milliGRAM(s) Oral with dinner  simvastatin 20 milliGRAM(s) Oral at bedtime  sodium chloride 0.9%. 1000 milliLiter(s) (75 mL/Hr) IV Continuous <Continuous>    MEDICATIONS  (PRN):  acetaminophen   Tablet .. 650 milliGRAM(s) Oral every 4 hours PRN Temp greater or equal to 38C (100.4F), Mild Pain (1 - 3)    Vital Signs Last 24 Hrs  T(C): 37.2 (2021 14:00), Max: 37.2 (2021 14:00)  T(F): 99 (2021 14:00), Max: 99 (2021 14:00)  HR: 65 (2021 14:00) (59 - 65)  BP: 144/68 (2021 14:00) (137/68 - 163/71)  BP(mean): --  RR: 17 (2021 14:00) (16 - 17)  SpO2: 96% (2021 14:00) (93% - 96%)    PHYSICAL EXAM:  GENERAL: NAD, well-groomed, well-developed  HEAD:  Atraumatic, Normocephalic  EYES: Pallor +  ENMT: Moist mucous membranes, no lesions  NECK: Supple.  CHEST/LUNG: Decreased breath sounds at bases, rest is clear.   HEART: S1, S2.   ABDOMEN: Soft, Nontender, Nondistended; Bowel sounds present  EXTREMITIES:  2+ Peripheral Pulses, No clubbing, cyanosis, or edema  MS: No joint swelling or deformity.   LYMPH: No lymphadenopathy noted  SKIN: No rashes or lesions  NERVOUS SYSTEM:  No focal deficit.   PSYCH:  Awake and alert.   LABS:                         8.3    18.02 )-----------( 306      ( 2021 10:27 )             30.5     2021 10:27    135    |  98     |  25     ----------------------------<  176    4.1     |  28     |  1.20     Ca    9.4        2021 10:27  Phos  4.4       2021 15:56  Mg     2.2       2021 15:56    TPro  8.2    /  Alb  3.2    /  TBili  0.7    /  DBili  x      /  AST  15     /  ALT  14     /  AlkPhos  70     2021 10:27    CAPILLARY BLOOD GLUCOSE        PT/INR - ( 2021 10:27 )   PT: 22.0 sec;   INR: 1.87 ratio         PTT - ( 2021 10:27 )  PTT:34.2 sec     Chol 117 LDL -- HDL 44<L> Trig 77  Iron - Total Binding Capacity.: 232 ug/dL ( @ 11:51)  Iron Total, Serum: 11 ug/dL ( @ 11:51)    Thyroid Stimulating Hormone, Serum: 2.59 uIU/mL ( @ 23:38)    Urinalysis Basic - ( 2021 11:26 )    Color: Radha / Appearance: Turbid / S.015 / pH: x  Gluc: x / Ketone: Trace  / Bili: Small / Urobili: 1 mg/dL   Blood: x / Protein: 100 mg/dL / Nitrite: Negative   Leuk Esterase: Moderate / RBC: >50 /HPF / WBC >50   Sq Epi: x / Non Sq Epi: Moderate / Bacteria: Many    RADIOLOGY TEST: (IMAGES REVIEWED BY ME)  < from: CT Chest No Cont (21 @ 14:34) >  EXAM:  CT CHEST                        PROCEDURE DATE:  2021    INTERPRETATION:  CT CHEST WITHOUT CONTRAST    INDICATION: Possible mass on chest x-ray.    TECHNIQUE: Unenhanced helical images were obtained of the chest. Coronal and sagittal images were reconstructed. Maximum intensity projection images were generated.    COMPARISON: Radiograph chest 2021. CT chest 2018.    FINDINGS:    Tubes/Lines: None.    Lungs, airways, and pleura: The opacity along the recentradiograph was secondary to summation of shadows from the pulmonary vasculature and the heart border. There is mosaic attenuation of the lungs. The airways are unremarkable.    Mediastinum: The chest lymph nodes measure less than 15 mm in the short axis. The visualized thyroid gland is atrophic. The esophagus is unremarkable.    Heart and Vasculature: The heart is enlarged. No pericardial effusion. Coronary artery calcifications. Coronary artery stents. Mitral annular calcification. Status post TAVR .    Left-sided aortic arch and left-sided descending thoracic aorta. The ascending aorta measures 4.5 cm at the main pulmonary artery. Aorta is tortuous and ectatic. Aortic calcifications. The pulmonary artery is enlarged which can suggest pulmonary artery hypertension.    Upper Abdomen: The upper abdomen is unremarkable.    Bones And Soft Tissues: Sternotomy. Degenerative change of the spine.  The soft tissues are unremarkable.      IMPRESSION:    1.  The abnormality on the recent radiographwas secondary to summation of shadows.  2.  Mosaic attenuation of the lungs.      < end of copied text >  < from: CT Renal Stone Hunt (21 @ 21:18) >  EXAM:  CT RENAL STONE HUNT                        PROCEDURE DATE:  2021    INTERPRETATION:  CT ABDOMEN AND PELVIS WITHOUT CONTRAST    INDICATION: Urinary tract infection.    TECHNIQUE: Abdominopelvic CT without intravenous contrast.Images are reformatted in the sagittal and coronal planes.    COMPARISON: CT abdomen pelvis 2018.    FINDINGS:    Absence of intravenous contrast limits evaluation for focal lesions, neoplasm, and vascular pathology.    Lower Thorax: Partially imaged median sternotomy and postoperative changes of TAVR. Partially imaged intracardiac leads. Cardiomegaly. Trace left pleural effusion. Interlobular septal thickening and mild groundglass attenuation, likely in keeping with mild pulmonary edema.    Liver: No suspicious lesions.  Biliary: No significant dilatation, postcholecystectomy.  Spleen: No suspicious lesions.  Pancreas: No inflammatory changes or ductal dilatation.  Adrenals: Normal.  Kidneys: Mild right hydronephrosis secondary to 6mm right ureteropelvic junction stone on image 59 of series 3. Additional 3 mm nonobstructive stone in the upper pole of right kidney. Mild bilateral perinephric stranding, right greater than left.  Vessels: Normal caliber. Atherosclerotic disease ofthe aorta and its branches.    GI tract: No evidence of small bowel obstruction. Bowel postoperative changes. No acute bowel inflammatory changes. Colonic diverticulosis without evidence of acute diverticulitis.    Peritoneum/retroperitoneum and mesentery: No free air. No organized fluid collection. No adenopathy.    Pelvic organs/Bladder: Uterus is atrophied or surgically absent. Mild bladder wall thickening, difficult to assess secondary to inadequate distention.  Abdominal wall: Small left infraumbilical abdominal wall hernia containing nonobstructed bowel loop.  Bones and soft tissues: Multilevel degenerative changes of the spine noted.    IMPRESSION:    Mild right hydronephrosis secondary to 6 mm right ureteropelvic junction stone. Additional 3 mm nonobstructive stone in the upper pole of right kidney. Mild bilateral perinephric stranding, right greater than left.    Mild bladder wall thickening, difficult to assess secondary to inadequate distention. Correlate with urinalysis and laboratory values to assess for cystitis, ascending urinary tract infection or right pyelonephritis.    Additional findings as mentioned above.      < end of copied text >        Imaging Personally Reviewed:  [X] YES      HEALTH ISSUES - PROBLEM Dx:  Prophylactic measure  Prophylactic measure    CAD (coronary artery disease)  CAD (coronary artery disease)    Hypothyroidism, unspecified type  Hypothyroidism, unspecified type    Gastroesophageal reflux disease without esophagitis  Gastroesophageal reflux disease without esophagitis    Hyperlipidemia, unspecified hyperlipidemia type  Hyperlipidemia, unspecified hyperlipidemia type    Atrial fibrillation, unspecified  Atrial fibrillation, unspecified    Anemia  Anemia    UTI (urinary tract infection)  UTI (urinary tract infection)    Sepsis  Sepsis    Pyelonephritis  Pyelonephritis

## 2021-01-25 LAB
-  AMIKACIN: SIGNIFICANT CHANGE UP
-  AMOXICILLIN/CLAVULANIC ACID: SIGNIFICANT CHANGE UP
-  AMPICILLIN/SULBACTAM: SIGNIFICANT CHANGE UP
-  AMPICILLIN: SIGNIFICANT CHANGE UP
-  AZTREONAM: SIGNIFICANT CHANGE UP
-  CEFAZOLIN: SIGNIFICANT CHANGE UP
-  CEFEPIME: SIGNIFICANT CHANGE UP
-  CEFOXITIN: SIGNIFICANT CHANGE UP
-  CEFTRIAXONE: SIGNIFICANT CHANGE UP
-  CIPROFLOXACIN: SIGNIFICANT CHANGE UP
-  ERTAPENEM: SIGNIFICANT CHANGE UP
-  GENTAMICIN: SIGNIFICANT CHANGE UP
-  IMIPENEM: SIGNIFICANT CHANGE UP
-  LEVOFLOXACIN: SIGNIFICANT CHANGE UP
-  MEROPENEM: SIGNIFICANT CHANGE UP
-  NITROFURANTOIN: SIGNIFICANT CHANGE UP
-  PIPERACILLIN/TAZOBACTAM: SIGNIFICANT CHANGE UP
-  TIGECYCLINE: SIGNIFICANT CHANGE UP
-  TOBRAMYCIN: SIGNIFICANT CHANGE UP
-  TRIMETHOPRIM/SULFAMETHOXAZOLE: SIGNIFICANT CHANGE UP
ANION GAP SERPL CALC-SCNC: 10 MMOL/L — SIGNIFICANT CHANGE UP (ref 5–17)
BASOPHILS # BLD AUTO: 0.03 K/UL — SIGNIFICANT CHANGE UP (ref 0–0.2)
BASOPHILS NFR BLD AUTO: 0.3 % — SIGNIFICANT CHANGE UP (ref 0–2)
BUN SERPL-MCNC: 21 MG/DL — SIGNIFICANT CHANGE UP (ref 7–23)
CALCIUM SERPL-MCNC: 8.4 MG/DL — SIGNIFICANT CHANGE UP (ref 8.4–10.5)
CHLORIDE SERPL-SCNC: 112 MMOL/L — HIGH (ref 96–108)
CO2 SERPL-SCNC: 23 MMOL/L — SIGNIFICANT CHANGE UP (ref 22–31)
CREAT SERPL-MCNC: 1.05 MG/DL — SIGNIFICANT CHANGE UP (ref 0.5–1.3)
CULTURE RESULTS: SIGNIFICANT CHANGE UP
EOSINOPHIL # BLD AUTO: 0.05 K/UL — SIGNIFICANT CHANGE UP (ref 0–0.5)
EOSINOPHIL NFR BLD AUTO: 0.4 % — SIGNIFICANT CHANGE UP (ref 0–6)
GLUCOSE SERPL-MCNC: 140 MG/DL — HIGH (ref 70–99)
HCT VFR BLD CALC: 25.4 % — LOW (ref 34.5–45)
HGB BLD-MCNC: 6.8 G/DL — CRITICAL LOW (ref 11.5–15.5)
IMM GRANULOCYTES NFR BLD AUTO: 1 % — SIGNIFICANT CHANGE UP (ref 0–1.5)
LYMPHOCYTES # BLD AUTO: 0.97 K/UL — LOW (ref 1–3.3)
LYMPHOCYTES # BLD AUTO: 8.7 % — LOW (ref 13–44)
MCHC RBC-ENTMCNC: 20.5 PG — LOW (ref 27–34)
MCHC RBC-ENTMCNC: 26.8 GM/DL — LOW (ref 32–36)
MCV RBC AUTO: 76.7 FL — LOW (ref 80–100)
METHOD TYPE: SIGNIFICANT CHANGE UP
MONOCYTES # BLD AUTO: 0.83 K/UL — SIGNIFICANT CHANGE UP (ref 0–0.9)
MONOCYTES NFR BLD AUTO: 7.5 % — SIGNIFICANT CHANGE UP (ref 2–14)
NEUTROPHILS # BLD AUTO: 9.15 K/UL — HIGH (ref 1.8–7.4)
NEUTROPHILS NFR BLD AUTO: 82.1 % — HIGH (ref 43–77)
NRBC # BLD: 0 /100 WBCS — SIGNIFICANT CHANGE UP (ref 0–0)
ORGANISM # SPEC MICROSCOPIC CNT: SIGNIFICANT CHANGE UP
ORGANISM # SPEC MICROSCOPIC CNT: SIGNIFICANT CHANGE UP
PLATELET # BLD AUTO: 236 K/UL — SIGNIFICANT CHANGE UP (ref 150–400)
POTASSIUM SERPL-MCNC: 3.9 MMOL/L — SIGNIFICANT CHANGE UP (ref 3.5–5.3)
POTASSIUM SERPL-SCNC: 3.9 MMOL/L — SIGNIFICANT CHANGE UP (ref 3.5–5.3)
RBC # BLD: 3.31 M/UL — LOW (ref 3.8–5.2)
RBC # FLD: 17.5 % — HIGH (ref 10.3–14.5)
SODIUM SERPL-SCNC: 145 MMOL/L — SIGNIFICANT CHANGE UP (ref 135–145)
SPECIMEN SOURCE: SIGNIFICANT CHANGE UP
WBC # BLD: 11.14 K/UL — HIGH (ref 3.8–10.5)
WBC # FLD AUTO: 11.14 K/UL — HIGH (ref 3.8–10.5)

## 2021-01-25 RX ORDER — NYSTATIN CREAM 100000 [USP'U]/G
1 CREAM TOPICAL
Refills: 0 | Status: DISCONTINUED | OUTPATIENT
Start: 2021-01-25 | End: 2021-01-28

## 2021-01-25 RX ORDER — IRON SUCROSE 20 MG/ML
100 INJECTION, SOLUTION INTRAVENOUS EVERY 24 HOURS
Refills: 0 | Status: COMPLETED | OUTPATIENT
Start: 2021-01-26 | End: 2021-01-28

## 2021-01-25 RX ADMIN — Medication 1 MILLIGRAM(S): at 12:59

## 2021-01-25 RX ADMIN — RIVAROXABAN 15 MILLIGRAM(S): KIT at 17:31

## 2021-01-25 RX ADMIN — Medication 240 MILLIGRAM(S): at 05:02

## 2021-01-25 RX ADMIN — Medication 1 TABLET(S): at 12:59

## 2021-01-25 RX ADMIN — NYSTATIN CREAM 1 APPLICATION(S): 100000 CREAM TOPICAL at 17:31

## 2021-01-25 RX ADMIN — Medication 1 TABLET(S): at 17:31

## 2021-01-25 RX ADMIN — PANTOPRAZOLE SODIUM 40 MILLIGRAM(S): 20 TABLET, DELAYED RELEASE ORAL at 05:03

## 2021-01-25 RX ADMIN — NYSTATIN CREAM 1 APPLICATION(S): 100000 CREAM TOPICAL at 05:34

## 2021-01-25 RX ADMIN — Medication 100 MICROGRAM(S): at 05:02

## 2021-01-25 RX ADMIN — Medication 325 MILLIGRAM(S): at 12:59

## 2021-01-25 RX ADMIN — Medication 25 MILLIGRAM(S): at 17:31

## 2021-01-25 RX ADMIN — Medication 1 TABLET(S): at 08:00

## 2021-01-25 RX ADMIN — SIMVASTATIN 20 MILLIGRAM(S): 20 TABLET, FILM COATED ORAL at 21:58

## 2021-01-25 RX ADMIN — SODIUM CHLORIDE 75 MILLILITER(S): 9 INJECTION INTRAMUSCULAR; INTRAVENOUS; SUBCUTANEOUS at 10:18

## 2021-01-25 RX ADMIN — CEFTRIAXONE 100 MILLIGRAM(S): 500 INJECTION, POWDER, FOR SOLUTION INTRAMUSCULAR; INTRAVENOUS at 19:54

## 2021-01-25 RX ADMIN — Medication 25 MILLIGRAM(S): at 05:02

## 2021-01-25 NOTE — PROGRESS NOTE ADULT - SUBJECTIVE AND OBJECTIVE BOX
INTERVAL Hx:  No acute events overnight.  Denies fever, flank pain.  Receiving PRBC transfusion for anemia per hematology.  Blood Cx neg.  Urine culture growing E. coli.  on Ceftriaxone.  No signs of sepsis.    MEDICATIONS  (STANDING):  cefTRIAXone   IVPB 1000 milliGRAM(s) IV Intermittent every 24 hours  diltiazem    milliGRAM(s) Oral daily  ferrous    sulfate 325 milliGRAM(s) Oral daily  folic acid 1 milliGRAM(s) Oral daily  lactobacillus acidophilus 1 Tablet(s) Oral three times a day with meals  levothyroxine 100 MICROGram(s) Oral daily  metoprolol tartrate 25 milliGRAM(s) Oral two times a day  multivitamin 1 Tablet(s) Oral daily  nystatin Powder 1 Application(s) Topical two times a day  pantoprazole    Tablet 40 milliGRAM(s) Oral before breakfast  rivaroxaban 15 milliGRAM(s) Oral with dinner  simvastatin 20 milliGRAM(s) Oral at bedtime  sodium chloride 0.9%. 1000 milliLiter(s) (75 mL/Hr) IV Continuous <Continuous>    MEDICATIONS  (PRN):  acetaminophen   Tablet .. 650 milliGRAM(s) Oral every 4 hours PRN Temp greater or equal to 38C (100.4F), Mild Pain (1 - 3)        Vital Signs Last 24 Hrs  T(C): 37.3 (25 Jan 2021 17:03), Max: 37.7 (24 Jan 2021 22:29)  T(F): 99.2 (25 Jan 2021 17:03), Max: 99.9 (24 Jan 2021 22:29)  HR: 73 (25 Jan 2021 17:03) (59 - 73)  BP: 150/89 (25 Jan 2021 17:03) (138/66 - 176/71)  BP(mean): --  RR: 18 (25 Jan 2021 17:03) (17 - 18)  SpO2: 96% (25 Jan 2021 17:03) (92% - 98%)    PHYSICAL EXAM:    ABDOMEN: soft, NT, no CVAT    Sargent: No    LABS:                        6.8    11.14 )-----------( 236      ( 25 Jan 2021 07:01 )             25.4     01-25    145  |  112<H>  |  21  ----------------------------<  140<H>  3.9   |  23  |  1.05    Ca    8.4      25 Jan 2021 07:01      Urine culture:  01-23 @ 23:15 --   No growth to date.  Urine culture:  01-23 @ 23:12 --   >100,000 CFU/ml Escherichia coli

## 2021-01-25 NOTE — PROGRESS NOTE ADULT - SUBJECTIVE AND OBJECTIVE BOX
[facility], Division of Infectious Diseases  JOY Malave Y. Patel, S. Shah  [phone]    Name: RON ABREU  Age: 89y  Gender: Female  MRN: [id number]  Note Date: 01-25-21    Interval History:  Patient seen and examined at bedside this afternoon  No acute overnight events.   Notes reviewed    Antibiotics:  cefTRIAXone   IVPB 1000 milliGRAM(s) IV Intermittent every 24 hours      Medications:  acetaminophen   Tablet .. 650 milliGRAM(s) Oral every 4 hours PRN  cefTRIAXone   IVPB 1000 milliGRAM(s) IV Intermittent every 24 hours  diltiazem    milliGRAM(s) Oral daily  ferrous    sulfate 325 milliGRAM(s) Oral daily  folic acid 1 milliGRAM(s) Oral daily  lactobacillus acidophilus 1 Tablet(s) Oral three times a day with meals  levothyroxine 100 MICROGram(s) Oral daily  metoprolol tartrate 25 milliGRAM(s) Oral two times a day  multivitamin 1 Tablet(s) Oral daily  nystatin Powder 1 Application(s) Topical two times a day  pantoprazole    Tablet 40 milliGRAM(s) Oral before breakfast  rivaroxaban 15 milliGRAM(s) Oral with dinner  simvastatin 20 milliGRAM(s) Oral at bedtime  sodium chloride 0.9%. 1000 milliLiter(s) IV Continuous <Continuous>      Review of Systems:  A 10-point review of systems was obtained.     Pertinent positives and negatives--  Constitutional: No fevers. No Chills. No Rigors.   Cardiovascular: No chest pain. No palpitations.  Respiratory: No shortness of breath. No cough.  Gastrointestinal: No nausea or vomiting. No diarrhea or constipation.   Psychiatric: Pleasant. Appropriate affect.    Review of systems otherwise negative except as previously noted.    Allergies: amoxicillin (Unknown)    For details regarding the patient's past medical history, social history, family history, and other miscellaneous elements, please refer the initial infectious diseases consultation and/or the admitting history and physical examination for this admission.    Objective:  Vitals:   T(C): 36.5 (01-25-21 @ 14:00), Max: 37.7 (01-24-21 @ 22:29)  HR: 60 (01-25-21 @ 14:00) (59 - 62)  BP: 138/66 (01-25-21 @ 14:00) (138/66 - 176/71)  RR: 17 (01-25-21 @ 14:00) (17 - 18)  SpO2: 96% (01-25-21 @ 14:00) (92% - 98%)    Physical Examination:  General: no acute distress  HEENT: NC/AT, EOMI, anicteric, no oral lesions  Neck: supple, no palpable LAD  Cardio: S1, S2 heard, RRR, no murmurs  Resp: breath sounds heard bilaterally, no rales, wheezes or rhonchi  Abd: soft, NT, ND, + bowel sounds  Neuro: no obvious focal deficits  Ext: no edema or cyanosis  Skin: warm, dry, no visible rash      Laboratory Studies:  CBC:                       6.8    11.14 )-----------( 236      ( 25 Jan 2021 07:01 )             25.4     CMP: 01-25    145  |  112<H>  |  21  ----------------------------<  140<H>  3.9   |  23  |  1.05    Ca    8.4      25 Jan 2021 07:01          Microbiology: reviewed    Culture - Blood (collected 01-23-21 @ 23:15)  Source: .Blood Blood-Peripheral  Preliminary Report (01-25-21 @ 01:09):    No growth to date.    Culture - Blood (collected 01-23-21 @ 23:15)  Source: .Blood Blood-Peripheral  Preliminary Report (01-25-21 @ 01:09):    No growth to date.    Culture - Urine (collected 01-23-21 @ 23:12)  Source: .Urine Clean Catch (Midstream)  Preliminary Report (01-24-21 @ 21:27):    >100,000 CFU/ml Escherichia coli        Radiology: reviewed

## 2021-01-25 NOTE — CONSULT NOTE ADULT - SUBJECTIVE AND OBJECTIVE BOX
Hematology/Oncology consult     Patient is seen and examined  notes/labs reviewed  pt family is at bedside and d/w family.  consult dictated,  Job #    contact #  son/daughter----  phone #    IMPRESSION:      RECOMMENDATION:              ,thanks for courtsey of this consult,will follow this pt with you for hem/onc issue while pt is in hospital. Hematology/Oncology consult     Patient is seen and examined  notes/labs reviewed  pt family is at bedside and d/w family.  consult dictated,  Job # [id number]    contact #  son----Nohemy Hoyt  phone # [phone]--called and discussed with son    Iron with Total Binding Capacity in AM (21 @ 11:51)    % Saturation, Iron: 5 %    Iron - Total Binding Capacity.: 232 ug/dL    Iron Total, Serum: 11 ug/dL    Unsaturated Iron Binding Capacity: 220 ug/dL    Ferritin, Serum in AM (21 @ 11:50)    Ferritin, Serum: 55 ng/mL    Reticulocyte Count in AM (21 @ 06:53)    RBC Count: 3.63 M/uL    Reticulocyte Percent: 1.9 %    Absolute Reticulocytes: 67.5 K/uL    Vitamin B12, Serum in AM (21 @ 11:50)    Vitamin B12, Serum: 386 pg/mL    Folate, Serum in AM (21 @ 11:50)    Folate, Serum: >20.0 ng/mL        IMPRESSION:    89 years old female with past medical history of atrial fibrillation, anemia, CAD, dyslipidemia, GERD, GI bleed in past, s/p hemicolectomy, hypertension, constipation, who  in to ER with c/o being weak and urinating all over. She denies any abdominal pain. No nausea or vomiting.   In ER patient is noted to have fever and high white count. She also has abnormal UA. She is being admitted for possible sepsis due to UTI.   Patient is on iv fluids and iv abx as per id for uti/pyelonephritis  noted anemia, hb dropped at 6.8 on 21 , requested prbc and getting prbc at consult, hem was consulted  patient with gallito and is on xarelto per cardiology    RECOMMENDATION:  microcytic anemia, nl mcv in 2018, hx of gi bleed in 2018, hx of ch anemia per son, getting prbc on   iron panel--low iron/transferrin saturation, ferritin is an acute phase reactant and will give iv iron from tomorrow as looking for a rapid response  monitor cbc, h/h  fobt to r/o gi blood loss  gallito--ac/management per cardiology  d/w pt son on phone, who refuses invasive gi procedure, extensive radio scan/aggressive work up considering her age and multiple co morbid medical history as well as will not go for any aggressive treatment despite any diagnosis  d/w pt at bedside, d/w son on phone            ,thanks for courtsey of this consult,will follow this pt with you for hem/onc issue while pt is in hospital.

## 2021-01-25 NOTE — PROGRESS NOTE ADULT - SUBJECTIVE AND OBJECTIVE BOX
Patient is a 89y old  Female who presents with a chief complaint of Urinary and bladder complaints. (2021 12:51)    HPI: 89 years old female with past medical history of atrial fibrillation, anemia, CAD, dyslipidemia, GERD, GI bleed in past, s/p hemicolectomy, hypertension, constipation, who  in to ER with c/o being weak and urinating all over. She denies any abdominal pain. No nausea or vomiting.     In ER patient is noted to have fever and high white count. She also has abnormal UA. She is being admitted for possible sepsis due to UTI.     INTERVAL HPI/OVERNIGHT EVENTS:  Chart reviewed, notes reviewed.   Patient seen and examined.  Being followed by following specialists: ID. Urology consult called.     Consultant(s) Notes Reviewed:  [X] Yes    Care Discussed with Consultants/Other Providers: [X] Yes    2021 --> Doing better. Denies any chest pain or pressure. Denies any flank pain. No fever or chills. Seen by PT.     2021 --> Doing OK. Getting blood transfusion. No abdominal pain. No chest pain or pressure. No nausea or vomiting. No fever or chills.     REVIEW OF SYSTEMS:  CONSTITUTIONAL: + fatigue  EYES: No eye pain, or discharge  ENMT: No sinus or throat pain  NECK: No pain or stiffness  BREASTS: No pain, masses, or nipple discharge  RESPIRATORY: No cough, wheezing, chills or hemoptysis; No shortness of breath  CARDIOVASCULAR: No chest pain, palpitations, dizziness, or leg swelling  GASTROINTESTINAL: No abdominal or epigastric pain. No nausea, vomiting.  GENITOURINARY:  + incontinence  NEUROLOGICAL: No loss of strength, numbness, or tremors  SKIN: No itching, burning, rashes, or lesions   LYMPH NODES: No enlarged glands  ENDOCRINE: No polydipsia or polyuria  MUSCULOSKELETAL: No muscle, back, or extremity pain  PSYCHIATRIC: No depression, anxiety, mood swings.  HEME/LYMPH: No easy bruising, or bleeding gums  ALLERGY AND IMMUNOLOGIC: No hives or eczema    Allergies    amoxicillin (Unknown)    Intolerances      Home Medications:  diltiazem 240 mg/24 hours oral capsule, extended release: 1 cap(s) orally once a day (2021 09:48)  folic acid 1 mg oral tablet: 1 tab(s) orally once a day (2021 09:48)  levothyroxine 100 mcg (0.1 mg) oral tablet: 1 tab(s) orally once a day (2021 09:48)  Metoprolol Tartrate 25 mg oral tablet: 1 tab(s) orally 2 times a day (2021 09:48)  Ocuvite oral tablet: 1 tab(s) orally once a day (2021 09:48)  pantoprazole 20 mg oral delayed release tablet: 1 tab(s) orally once a day (2021 09:48)  simvastatin 20 mg oral tablet: 1 tab(s) orally once a day (at bedtime)    MEDICATIONS  (STANDING):  cefTRIAXone   IVPB 1000 milliGRAM(s) IV Intermittent every 24 hours  diltiazem    milliGRAM(s) Oral daily  ferrous    sulfate 325 milliGRAM(s) Oral daily  folic acid 1 milliGRAM(s) Oral daily  lactobacillus acidophilus 1 Tablet(s) Oral three times a day with meals  levothyroxine 100 MICROGram(s) Oral daily  metoprolol tartrate 25 milliGRAM(s) Oral two times a day  multivitamin 1 Tablet(s) Oral daily  nystatin Powder 1 Application(s) Topical two times a day  pantoprazole    Tablet 40 milliGRAM(s) Oral before breakfast  rivaroxaban 15 milliGRAM(s) Oral with dinner  simvastatin 20 milliGRAM(s) Oral at bedtime  sodium chloride 0.9%. 1000 milliLiter(s) (75 mL/Hr) IV Continuous <Continuous>    MEDICATIONS  (PRN):  acetaminophen   Tablet .. 650 milliGRAM(s) Oral every 4 hours PRN Temp greater or equal to 38C (100.4F), Mild Pain (1 - 3)    Vital Signs Last 24 Hrs  T(C): 36.9 (2021 12:23), Max: 37.7 (2021 22:29)  T(F): 98.4 (2021 12:23), Max: 99.9 (2021 22:29)  HR: 59 (2021 12:23) (59 - 65)  BP: 149/83 (2021 12:23) (144/68 - 176/71)  BP(mean): --  RR: 18 (2021 12:23) (17 - 18)  SpO2: 98% (2021 12:23) (92% - 98%)    PHYSICAL EXAM:  GENERAL: NAD, well-groomed, well-developed  HEAD:  Atraumatic, Normocephalic  EYES: Pallor +  ENMT: Moist mucous membranes, no lesions  NECK: Supple.  CHEST/LUNG: Decreased breath sounds at bases, rest is clear.   HEART: S1, S2.   ABDOMEN: Soft, Nontender, Nondistended; Bowel sounds present  EXTREMITIES:  2+ Peripheral Pulses, No clubbing, cyanosis, or edema  MS: No joint swelling or deformity.   LYMPH: No lymphadenopathy noted  SKIN: No rashes or lesions  NERVOUS SYSTEM:  No focal deficit.   PSYCH:  Awake and alert.   LABS:                         6.8    11.14 )-----------( 236      ( 2021 07:01 )             25.4     2021 07:01    145    |  112    |  21     ----------------------------<  140    3.9     |  23     |  1.05     Ca    8.4        2021 07:01  Phos  4.4       2021 15:56  Mg     2.2       2021 15:56     Chol 117 LDL -- HDL 44<L> Trig 77  Iron - Total Binding Capacity.: 232 ug/dL ( @ 11:51)  Iron Total, Serum: 11 ug/dL ( @ 11:51)  Ferritin, Serum: 55 ng/mL ( @ 11:50)    Thyroid Stimulating Hormone, Serum: 2.59 uIU/mL ( @ 23:38)    Culture Results:   No growth to date. ( @ 23:15)  Culture Results:   No growth to date. ( @ 23:15)  Culture Results:   >100,000 CFU/ml Escherichia coli ( @ 23:12)      RADIOLOGY TEST: (IMAGES REVIEWED BY ME)  Vital Signs Last 24 Hrs  T(C): 36.9 (2021 12:23), Max: 37.7 (2021 22:29)  T(F): 98.4 (2021 12:23), Max: 99.9 (2021 22:29)  HR: 59 (2021 12:23) (59 - 65)  BP: 149/83 (2021 12:23) (144/68 - 176/71)  BP(mean): --  RR: 18 (2021 12:23) (17 - 18)  SpO2: 98% (2021 12:23) (92% - 98%)< from: CT Chest No Cont (21 @ 14:34) >  EXAM:  CT CHEST                        PROCEDURE DATE:  2021    INTERPRETATION:  CT CHEST WITHOUT CONTRAST    INDICATION: Possible mass on chest x-ray.    TECHNIQUE: Unenhanced helical images were obtained of the chest. Coronal and sagittal images were reconstructed. Maximum intensity projection images were generated.    COMPARISON: Radiograph chest 2021. CT chest 2018.    FINDINGS:    Tubes/Lines: None.    Lungs, airways, and pleura: The opacity along the recentradiograph was secondary to summation of shadows from the pulmonary vasculature and the heart border. There is mosaic attenuation of the lungs. The airways are unremarkable.    Mediastinum: The chest lymph nodes measure less than 15 mm in the short axis. The visualized thyroid gland is atrophic. The esophagus is unremarkable.    Heart and Vasculature: The heart is enlarged. No pericardial effusion. Coronary artery calcifications. Coronary artery stents. Mitral annular calcification. Status post TAVR .    Left-sided aortic arch and left-sided descending thoracic aorta. The ascending aorta measures 4.5 cm at the main pulmonary artery. Aorta is tortuous and ectatic. Aortic calcifications. The pulmonary artery is enlarged which can suggest pulmonary artery hypertension.    Upper Abdomen: The upper abdomen is unremarkable.    Bones And Soft Tissues: Sternotomy. Degenerative change of the spine.  The soft tissues are unremarkable.      IMPRESSION:    1.  The abnormality on the recent radiograph was secondary to summation of shadows.  2.  Mosaic attenuation of the lungs.      < end of copied text >  < from: CT Renal Stone Hunt (21 @ 21:18) >  EXAM:  CT RENAL STONE HUNT                        PROCEDURE DATE:  2021    INTERPRETATION:  CT ABDOMEN AND PELVIS WITHOUT CONTRAST    INDICATION: Urinary tract infection.    TECHNIQUE: Abdominopelvic CT without intravenous contrast.Images are reformatted in the sagittal and coronal planes.    COMPARISON: CT abdomen pelvis 2018.    FINDINGS:    Absence of intravenous contrast limits evaluation for focal lesions, neoplasm, and vascular pathology.    Lower Thorax: Partially imaged median sternotomy and postoperative changes of TAVR. Partially imaged intracardiac leads. Cardiomegaly. Trace left pleural effusion. Interlobular septal thickening and mild groundglass attenuation, likely in keeping with mild pulmonary edema.    Liver: No suspicious lesions.  Biliary: No significant dilatation, postcholecystectomy.  Spleen: No suspicious lesions.  Pancreas: No inflammatory changes or ductal dilatation.  Adrenals: Normal.  Kidneys: Mild right hydronephrosis secondary to 6mm right ureteropelvic junction stone on image 59 of series 3. Additional 3 mm nonobstructive stone in the upper pole of right kidney. Mild bilateral perinephric stranding, right greater than left.  Vessels: Normal caliber. Atherosclerotic disease ofthe aorta and its branches.    GI tract: No evidence of small bowel obstruction. Bowel postoperative changes. No acute bowel inflammatory changes. Colonic diverticulosis without evidence of acute diverticulitis.    Peritoneum/retroperitoneum and mesentery: No free air. No organized fluid collection. No adenopathy.    Pelvic organs/Bladder: Uterus is atrophied or surgically absent. Mild bladder wall thickening, difficult to assess secondary to inadequate distention.  Abdominal wall: Small left infraumbilical abdominal wall hernia containing non obstructed bowel loop.  Bones and soft tissues: Multilevel degenerative changes of the spine noted.    IMPRESSION:    Mild right hydronephrosis secondary to 6 mm right ureteropelvic junction stone. Additional 3 mm nonobstructive stone in the upper pole of right kidney. Mild bilateral perinephric stranding, right greater than left.    Mild bladder wall thickening, difficult to assess secondary to inadequate distention. Correlate with urinalysis and laboratory values to assess for cystitis, ascending urinary tract infection or right pyelonephritis.    Additional findings as mentioned above.      < end of copied text >        Imaging Personally Reviewed:  [X] YES      HEALTH ISSUES - PROBLEM Dx:  Prophylactic measure  Prophylactic measure    CAD (coronary artery disease)  CAD (coronary artery disease)    Hypothyroidism, unspecified type  Hypothyroidism, unspecified type    Gastroesophageal reflux disease without esophagitis  Gastroesophageal reflux disease without esophagitis    Hyperlipidemia, unspecified hyperlipidemia type  Hyperlipidemia, unspecified hyperlipidemia type    Atrial fibrillation, unspecified  Atrial fibrillation, unspecified    Anemia  Anemia    UTI (urinary tract infection)  UTI (urinary tract infection)    Sepsis  Sepsis    Pyelonephritis  Pyelonephritis

## 2021-01-26 LAB
HCT VFR BLD CALC: 34.2 % — LOW (ref 34.5–45)
HGB BLD-MCNC: 9.9 G/DL — LOW (ref 11.5–15.5)
MCHC RBC-ENTMCNC: 23.1 PG — LOW (ref 27–34)
MCHC RBC-ENTMCNC: 28.9 GM/DL — LOW (ref 32–36)
MCV RBC AUTO: 79.7 FL — LOW (ref 80–100)
NRBC # BLD: 0 /100 WBCS — SIGNIFICANT CHANGE UP (ref 0–0)
OB PNL STL: POSITIVE
PLATELET # BLD AUTO: 245 K/UL — SIGNIFICANT CHANGE UP (ref 150–400)
RBC # BLD: 4.29 M/UL — SIGNIFICANT CHANGE UP (ref 3.8–5.2)
RBC # FLD: 17.5 % — HIGH (ref 10.3–14.5)
WBC # BLD: 13.24 K/UL — HIGH (ref 3.8–10.5)
WBC # FLD AUTO: 13.24 K/UL — HIGH (ref 3.8–10.5)

## 2021-01-26 RX ORDER — CEFPODOXIME PROXETIL 100 MG
200 TABLET ORAL EVERY 12 HOURS
Refills: 0 | Status: DISCONTINUED | OUTPATIENT
Start: 2021-01-27 | End: 2021-01-28

## 2021-01-26 RX ADMIN — Medication 1 MILLIGRAM(S): at 11:53

## 2021-01-26 RX ADMIN — Medication 1 TABLET(S): at 11:53

## 2021-01-26 RX ADMIN — Medication 325 MILLIGRAM(S): at 11:53

## 2021-01-26 RX ADMIN — Medication 25 MILLIGRAM(S): at 17:43

## 2021-01-26 RX ADMIN — RIVAROXABAN 15 MILLIGRAM(S): KIT at 17:43

## 2021-01-26 RX ADMIN — Medication 25 MILLIGRAM(S): at 05:29

## 2021-01-26 RX ADMIN — Medication 100 MICROGRAM(S): at 05:29

## 2021-01-26 RX ADMIN — NYSTATIN CREAM 1 APPLICATION(S): 100000 CREAM TOPICAL at 17:44

## 2021-01-26 RX ADMIN — SIMVASTATIN 20 MILLIGRAM(S): 20 TABLET, FILM COATED ORAL at 20:45

## 2021-01-26 RX ADMIN — Medication 1 TABLET(S): at 17:43

## 2021-01-26 RX ADMIN — IRON SUCROSE 100 MILLIGRAM(S): 20 INJECTION, SOLUTION INTRAVENOUS at 11:53

## 2021-01-26 RX ADMIN — CEFTRIAXONE 100 MILLIGRAM(S): 500 INJECTION, POWDER, FOR SOLUTION INTRAMUSCULAR; INTRAVENOUS at 17:43

## 2021-01-26 RX ADMIN — Medication 240 MILLIGRAM(S): at 05:29

## 2021-01-26 RX ADMIN — Medication 1 TABLET(S): at 08:13

## 2021-01-26 RX ADMIN — PANTOPRAZOLE SODIUM 40 MILLIGRAM(S): 20 TABLET, DELAYED RELEASE ORAL at 05:29

## 2021-01-26 RX ADMIN — NYSTATIN CREAM 1 APPLICATION(S): 100000 CREAM TOPICAL at 05:29

## 2021-01-26 NOTE — PROVIDER CONTACT NOTE (OTHER) - ACTION/TREATMENT ORDERED:
notified Dr. Flores regarding continuing of elevated BP. no action/treatment necessary at this time. will continue current medication regimen as ordered.
Per MD, no intervention needed at this time.  Report given to on coming nurse.

## 2021-01-26 NOTE — PROGRESS NOTE ADULT - SUBJECTIVE AND OBJECTIVE BOX
[facility], Division of Infectious Diseases  JOY Malave Y. Patel, S. Shah  [phone]    Name: RON ABREU  Age: 89y  Gender: Female  MRN: [id number]  Note Date: 01-26-21    Interval History:  Patient seen and examined at bedside this morning  No acute overnight events. Afebrile  Sleeping comfortably  Notes reviewed    Antibiotics:  cefTRIAXone   IVPB 1000 milliGRAM(s) IV Intermittent every 24 hours      Medications:  acetaminophen   Tablet .. 650 milliGRAM(s) Oral every 4 hours PRN  cefTRIAXone   IVPB 1000 milliGRAM(s) IV Intermittent every 24 hours  diltiazem    milliGRAM(s) Oral daily  ferrous    sulfate 325 milliGRAM(s) Oral daily  folic acid 1 milliGRAM(s) Oral daily  iron sucrose Injectable 100 milliGRAM(s) IV Push every 24 hours  lactobacillus acidophilus 1 Tablet(s) Oral three times a day with meals  levothyroxine 100 MICROGram(s) Oral daily  metoprolol tartrate 25 milliGRAM(s) Oral two times a day  multivitamin 1 Tablet(s) Oral daily  nystatin Powder 1 Application(s) Topical two times a day  pantoprazole    Tablet 40 milliGRAM(s) Oral before breakfast  rivaroxaban 15 milliGRAM(s) Oral with dinner  simvastatin 20 milliGRAM(s) Oral at bedtime  sodium chloride 0.9%. 1000 milliLiter(s) IV Continuous <Continuous>      Review of Systems:  A 10-point review of systems was obtained.     Pertinent positives and negatives--  Constitutional: No fevers. No Chills. No Rigors.   Cardiovascular: No chest pain. No palpitations.  Respiratory: No shortness of breath. No cough.  Gastrointestinal: No nausea or vomiting. No diarrhea or constipation.   Psychiatric: Pleasant. Appropriate affect.    Review of systems otherwise negative except as previously noted.    Allergies: amoxicillin (Unknown)    For details regarding the patient's past medical history, social history, family history, and other miscellaneous elements, please refer the initial infectious diseases consultation and/or the admitting history and physical examination for this admission.    Objective:  Vitals:   T(C): 37.2 (01-26-21 @ 05:00), Max: 37.4 (01-25-21 @ 19:50)  HR: 65 (01-26-21 @ 05:00) (59 - 73)  BP: 179/70 (01-26-21 @ 05:00) (138/66 - 179/70)  RR: 18 (01-26-21 @ 05:00) (17 - 18)  SpO2: 93% (01-26-21 @ 05:00) (93% - 98%)    Physical Examination:  General: no acute distress  HEENT: NC/AT, EOMI, anicteric, no oral lesions  Neck: supple, no palpable LAD  Cardio: S1, S2 heard, RRR, no murmurs  Resp: breath sounds heard bilaterally, no rales, wheezes or rhonchi  Abd: soft, NT, ND, + bowel sounds  Neuro: no obvious focal deficits  Ext: no edema or cyanosis  Skin: warm, dry, no visible rash    Laboratory Studies:  CBC:                       9.9    13.24 )-----------( 245      ( 26 Jan 2021 07:49 )             34.2     CMP: 01-25    145  |  112<H>  |  21  ----------------------------<  140<H>  3.9   |  23  |  1.05    Ca    8.4      25 Jan 2021 07:01          Microbiology: reviewed    Culture - Blood (collected 01-23-21 @ 23:15)  Source: .Blood Blood-Peripheral  Preliminary Report (01-25-21 @ 01:09):    No growth to date.    Culture - Blood (collected 01-23-21 @ 23:15)  Source: .Blood Blood-Peripheral  Preliminary Report (01-25-21 @ 01:09):    No growth to date.    Culture - Urine (collected 01-23-21 @ 23:12)  Source: .Urine Clean Catch (Midstream)  Final Report (01-25-21 @ 17:41):    >100,000 CFU/ml Escherichia coli  Organism: Escherichia coli (01-25-21 @ 17:41)  Organism: Escherichia coli (01-25-21 @ 17:41)      -  Amikacin: S <=16      -  Amoxicillin/Clavulanic Acid: S <=8/4      -  Ampicillin: R >16 These ampicillin results predict results for amoxicillin      -  Ampicillin/Sulbactam: S <=4/2 Enterobacter, Citrobacter, and Serratia may develop resistance during prolonged therapy (3-4 days)      -  Aztreonam: S <=4      -  Cefazolin: R >16 (MIC_CL_COM_ENTERIC_CEFAZU) For uncomplicated UTI with K. pneumoniae, E. coli, or P. mirablis: JOHN <=16 is sensitive and JOHN >=32 is resistant. This also predicts results for oral agents cefaclor, cefdinir, cefpodoxime, cefprozil, cefuroxime axetil, cephalexin and locarbef for uncomplicated UTI. Note that some isolates may be susceptible to these agents while testing resistant to cefazolin.      -  Cefepime: S <=2      -  Cefoxitin: S <=8      -  Ceftriaxone: S <=1 Enterobacter, Citrobacter, and Serratia may develop resistance during prolonged therapy      -  Ciprofloxacin: I 0.5      -  Ertapenem: S <=0.5      -  Gentamicin: S <=2      -  Imipenem: S <=1      -  Levofloxacin: S <=0.5      -  Meropenem: S <=1      -  Nitrofurantoin: S <=32 Should not be used to treat pyelonephritis      -  Piperacillin/Tazobactam: S <=8      -  Tigecycline: S <=2      -  Tobramycin: S <=2      -  Trimethoprim/Sulfamethoxazole: R >2/38      Method Type: [facility]        Radiology: reviewed    < from: CT Renal Stone Hunt (01.23.21 @ 21:18) >    EXAM:  CT RENAL STONE HUNT                                  PROCEDURE DATE:  01/23/2021          INTERPRETATION:  CT ABDOMEN AND PELVIS WITHOUT CONTRAST    INDICATION: Urinary tract infection.    TECHNIQUE: Abdominopelvic CT without intravenous contrast.Images are reformatted in the sagittal and coronal planes.    COMPARISON: CT abdomen pelvis 6/9/2018.    FINDINGS:    Absence of intravenous contrast limits evaluation for focal lesions, neoplasm, and vascular pathology.    Lower Thorax: Partially imaged median sternotomy and postoperative changes of TAVR. Partially imaged intracardiac leads. Cardiomegaly. Trace left pleural effusion. Interlobular septal thickening and mild groundglass attenuation, likely in keeping with mild pulmonary edema.    Liver: No suspicious lesions.  Biliary: No significant dilatation, postcholecystectomy.  Spleen: No suspicious lesions.  Pancreas: No inflammatory changes or ductal dilatation.  Adrenals: Normal.  Kidneys: Mild right hydronephrosis secondary to 6mm right ureteropelvic junction stone on image 59 of series 3. Additional 3 mm nonobstructive stone in the upper pole of right kidney. Mild bilateral perinephric stranding, right greater than left.  Vessels: Normal caliber. Atherosclerotic disease ofthe aorta and its branches.    GI tract: No evidence of small bowel obstruction. Bowel postoperative changes. No acute bowel inflammatory changes. Colonic diverticulosis without evidence of acute diverticulitis.    Peritoneum/retroperitoneum and mesentery: No free air. No organized fluid collection. No adenopathy.    Pelvic organs/Bladder: Uterus is atrophied or surgically absent. Mild bladder wall thickening, difficult to assess secondary to inadequate distention.  Abdominal wall: Small left infraumbilical abdominal wall hernia containing nonobstructed bowel loop.  Bones and soft tissues: Multilevel degenerative changes of the spine noted.    IMPRESSION:    Mild right hydronephrosis secondary to 6 mm right ureteropelvic junction stone. Additional 3 mm nonobstructive stone in the upper pole of right kidney. Mild bilateral perinephric stranding, right greater than left.    Mild bladder wall thickening, difficult to assess secondary to inadequate distention. Correlate with urinalysis and laboratory values to assess for cystitis, ascending urinary tract infection or right pyelonephritis.    Additional findings as mentioned above.                    ALEXY KNUTSON MD; Attending Radiologist  This document has been electronically signed. Jan 24 2021  2:51AM    < end of copied text >

## 2021-01-26 NOTE — PROGRESS NOTE ADULT - SUBJECTIVE AND OBJECTIVE BOX
Patient is a 89y old  Female who presents with a chief complaint of Urinary and bladder complaints. (2021 12:51)    HPI: 89 years old female with past medical history of atrial fibrillation, anemia, CAD, dyslipidemia, GERD, GI bleed in past, s/p hemicolectomy, hypertension, constipation, who  in to ER with c/o being weak and urinating all over. She denies any abdominal pain. No nausea or vomiting.     In ER patient is noted to have fever and high white count. She also has abnormal UA. She is being admitted for possible sepsis due to UTI.     INTERVAL HPI/OVERNIGHT EVENTS:  Chart reviewed, notes reviewed.   Patient seen and examined.  Being followed by following specialists: ID. Urology consult called.     Consultant(s) Notes Reviewed:  [X] Yes    Care Discussed with Consultants/Other Providers: [X] Yes    2021 --> Doing better. Denies any chest pain or pressure. Denies any flank pain. No fever or chills. Seen by PT.   2021 --> Doing OK. Getting blood transfusion. No abdominal pain. No chest pain or pressure. No nausea or vomiting. No fever or chills.     2021 --> No new issues. Tolerated blood transfusions without any problems. No fever or chills.     REVIEW OF SYSTEMS:  CONSTITUTIONAL: + fatigue  EYES: No eye pain, or discharge  ENMT: No sinus or throat pain  NECK: No pain or stiffness  BREASTS: No pain, masses, or nipple discharge  RESPIRATORY: No cough, wheezing, chills or hemoptysis; No shortness of breath  CARDIOVASCULAR: No chest pain, palpitations, dizziness, or leg swelling  GASTROINTESTINAL: No abdominal or epigastric pain. No nausea, vomiting.  GENITOURINARY:  + incontinence  NEUROLOGICAL: No loss of strength, numbness, or tremors  SKIN: No itching, burning, rashes, or lesions   LYMPH NODES: No enlarged glands  ENDOCRINE: No polydipsia or polyuria  MUSCULOSKELETAL: No muscle, back, or extremity pain  PSYCHIATRIC: No depression, anxiety, mood swings.  HEME/LYMPH: No easy bruising, or bleeding gums  ALLERGY AND IMMUNOLOGIC: No hives or eczema    Allergies    amoxicillin (Unknown)    Intolerances      Home Medications:  diltiazem 240 mg/24 hours oral capsule, extended release: 1 cap(s) orally once a day (2021 09:48)  folic acid 1 mg oral tablet: 1 tab(s) orally once a day (2021 09:48)  levothyroxine 100 mcg (0.1 mg) oral tablet: 1 tab(s) orally once a day (2021 09:48)  Metoprolol Tartrate 25 mg oral tablet: 1 tab(s) orally 2 times a day (2021 09:48)  Ocuvite oral tablet: 1 tab(s) orally once a day (2021 09:48)  pantoprazole 20 mg oral delayed release tablet: 1 tab(s) orally once a day (2021 09:48)  simvastatin 20 mg oral tablet: 1 tab(s) orally once a day (at bedtime)    MEDICATIONS  (STANDING):  cefTRIAXone   IVPB 1000 milliGRAM(s) IV Intermittent every 24 hours  diltiazem    milliGRAM(s) Oral daily  ferrous    sulfate 325 milliGRAM(s) Oral daily  folic acid 1 milliGRAM(s) Oral daily  iron sucrose Injectable 100 milliGRAM(s) IV Push every 24 hours  lactobacillus acidophilus 1 Tablet(s) Oral three times a day with meals  levothyroxine 100 MICROGram(s) Oral daily  metoprolol tartrate 25 milliGRAM(s) Oral two times a day  multivitamin 1 Tablet(s) Oral daily  nystatin Powder 1 Application(s) Topical two times a day  pantoprazole    Tablet 40 milliGRAM(s) Oral before breakfast  rivaroxaban 15 milliGRAM(s) Oral with dinner  simvastatin 20 milliGRAM(s) Oral at bedtime    MEDICATIONS  (PRN):  acetaminophen   Tablet .. 650 milliGRAM(s) Oral every 4 hours PRN Temp greater or equal to 38C (100.4F), Mild Pain (1 - 3)    Vital Signs Last 24 Hrs  T(C): 37.2 (2021 09:52), Max: 37.4 (2021 19:50)  T(F): 99 (2021 09:52), Max: 99.4 (2021 19:50)  HR: 61 (2021 09:52) (60 - 73)  BP: 195/71 (2021 09:52) (138/66 - 195/71)  BP(mean): --  RR: 18 (2021 09:52) (17 - 18)  SpO2: 93% (2021 09:52) (93% - 98%)    PHYSICAL EXAM:  GENERAL: NAD, well-groomed, well-developed  HEAD:  Atraumatic, Normocephalic  EYES: Pallor +  ENMT: Moist mucous membranes, no lesions  NECK: Supple.  CHEST/LUNG: Decreased breath sounds at bases, rest is clear.   HEART: S1, S2.   ABDOMEN: Soft, Nontender, Nondistended; Bowel sounds present  EXTREMITIES:  2+ Peripheral Pulses, No clubbing, cyanosis, or edema  MS: No joint swelling or deformity.   LYMPH: No lymphadenopathy noted  SKIN: No rashes or lesions  NERVOUS SYSTEM:  No focal deficit.   PSYCH:  Awake and alert.     LABS:               9.9    13.24 )-----------( 245      ( 2021 07:49 )             34.2     2021 07:01    145    |  112    |  21     ----------------------------<  140    3.9     |  23     |  1.05     Ca    8.4        2021 07:01     Chol 117 LDL -- HDL 44<L> Trig 77  Iron - Total Binding Capacity.: 232 ug/dL ( @ 11:51)  Iron Total, Serum: 11 ug/dL ( @ 11:51)  Ferritin, Serum: 55 ng/mL ( @ 11:50)    Thyroid Stimulating Hormone, Serum: 2.59 uIU/mL ( @ 23:38)    Culture Results:   No growth to date. ( @ 23:15)  Culture Results:   No growth to date. ( @ 23:15)  Culture Results:   >100,000 CFU/ml Escherichia coli ( @ 23:12)    RADIOLOGY TEST: (IMAGES REVIEWED BY ME)  EXAM:  CT CHEST                        PROCEDURE DATE:  2021    INTERPRETATION:  CT CHEST WITHOUT CONTRAST    INDICATION: Possible mass on chest x-ray.    TECHNIQUE: Unenhanced helical images were obtained of the chest. Coronal and sagittal images were reconstructed. Maximum intensity projection images were generated.    COMPARISON: Radiograph chest 2021. CT chest 2018.    FINDINGS:    Tubes/Lines: None.    Lungs, airways, and pleura: The opacity along the recentradiograph was secondary to summation of shadows from the pulmonary vasculature and the heart border. There is mosaic attenuation of the lungs. The airways are unremarkable.    Mediastinum: The chest lymph nodes measure less than 15 mm in the short axis. The visualized thyroid gland is atrophic. The esophagus is unremarkable.    Heart and Vasculature: The heart is enlarged. No pericardial effusion. Coronary artery calcifications. Coronary artery stents. Mitral annular calcification. Status post TAVR .    Left-sided aortic arch and left-sided descending thoracic aorta. The ascending aorta measures 4.5 cm at the main pulmonary artery. Aorta is tortuous and ectatic. Aortic calcifications. The pulmonary artery is enlarged which can suggest pulmonary artery hypertension.    Upper Abdomen: The upper abdomen is unremarkable.    Bones And Soft Tissues: Sternotomy. Degenerative change of the spine.  The soft tissues are unremarkable.      IMPRESSION:    1.  The abnormality on the recent radiograph was secondary to summation of shadows.  2.  Mosaic attenuation of the lungs.      < end of copied text >  < from: CT Renal Stone Hunt (21 @ 21:18) >  EXAM:  CT RENAL STONE HUNT                        PROCEDURE DATE:  2021    INTERPRETATION:  CT ABDOMEN AND PELVIS WITHOUT CONTRAST    INDICATION: Urinary tract infection.    TECHNIQUE: Abdominopelvic CT without intravenous contrast.Images are reformatted in the sagittal and coronal planes.    COMPARISON: CT abdomen pelvis 2018.    FINDINGS:    Absence of intravenous contrast limits evaluation for focal lesions, neoplasm, and vascular pathology.    Lower Thorax: Partially imaged median sternotomy and postoperative changes of TAVR. Partially imaged intracardiac leads. Cardiomegaly. Trace left pleural effusion. Interlobular septal thickening and mild groundglass attenuation, likely in keeping with mild pulmonary edema.    Liver: No suspicious lesions.  Biliary: No significant dilatation, postcholecystectomy.  Spleen: No suspicious lesions.  Pancreas: No inflammatory changes or ductal dilatation.  Adrenals: Normal.  Kidneys: Mild right hydronephrosis secondary to 6mm right ureteropelvic junction stone on image 59 of series 3. Additional 3 mm nonobstructive stone in the upper pole of right kidney. Mild bilateral perinephric stranding, right greater than left.  Vessels: Normal caliber. Atherosclerotic disease ofthe aorta and its branches.    GI tract: No evidence of small bowel obstruction. Bowel postoperative changes. No acute bowel inflammatory changes. Colonic diverticulosis without evidence of acute diverticulitis.    Peritoneum/retroperitoneum and mesentery: No free air. No organized fluid collection. No adenopathy.    Pelvic organs/Bladder: Uterus is atrophied or surgically absent. Mild bladder wall thickening, difficult to assess secondary to inadequate distention.  Abdominal wall: Small left infraumbilical abdominal wall hernia containing non obstructed bowel loop.  Bones and soft tissues: Multilevel degenerative changes of the spine noted.    IMPRESSION:    Mild right hydronephrosis secondary to 6 mm right ureteropelvic junction stone. Additional 3 mm nonobstructive stone in the upper pole of right kidney. Mild bilateral perinephric stranding, right greater than left.    Mild bladder wall thickening, difficult to assess secondary to inadequate distention. Correlate with urinalysis and laboratory values to assess for cystitis, ascending urinary tract infection or right pyelonephritis.    Additional findings as mentioned above.      < end of copied text >        Imaging Personally Reviewed:  [X] YES      HEALTH ISSUES - PROBLEM Dx:  Prophylactic measure  Prophylactic measure    CAD (coronary artery disease)  CAD (coronary artery disease)    Hypothyroidism, unspecified type  Hypothyroidism, unspecified type    Gastroesophageal reflux disease without esophagitis  Gastroesophageal reflux disease without esophagitis    Hyperlipidemia, unspecified hyperlipidemia type  Hyperlipidemia, unspecified hyperlipidemia type    Atrial fibrillation, unspecified  Atrial fibrillation, unspecified    Anemia  Anemia    UTI (urinary tract infection)  UTI (urinary tract infection)    Sepsis  Sepsis    Pyelonephritis  Pyelonephritis

## 2021-01-26 NOTE — PROVIDER CONTACT NOTE (OTHER) - SITUATION
pt with elevated BP throughout the day, vitals repeated- BP: 180/76 HR: 60
Dr Baldwin informed of pt's H/H 7.7/27.7

## 2021-01-26 NOTE — PROGRESS NOTE ADULT - SUBJECTIVE AND OBJECTIVE BOX
Patient is a 89y old  Female who presents with a chief complaint of Urinary and bladder complaints. (2021 13:46)       Pt is seen and examined  pt is awake and lying in bed/out of bed to chair  pt seems comfortable and denies any complaints at this time    HPI:  89 years old female with past medical history of atrial fibrillation, anemia, CAD, dyslipidemia, GERD, GI bleed in past, s/p hemicolectomy, hypertension, constipation, who  in to ER with c/o being weak and urinating all over. She denies any abdominal pain. No nausea or vomiting.     In ER patient is noted to have fever and high white count. She also has abnormal UA. She is being admitted for possible sepsis due to UTI.     Patient denies any chest pain or pressure.   No nausea or vomiting.   No fever or chills.  (2021 15:29)         ROS:  Negative except for:    MEDICATIONS  (STANDING):  cefTRIAXone   IVPB 1000 milliGRAM(s) IV Intermittent every 24 hours  diltiazem    milliGRAM(s) Oral daily  ferrous    sulfate 325 milliGRAM(s) Oral daily  folic acid 1 milliGRAM(s) Oral daily  iron sucrose Injectable 100 milliGRAM(s) IV Push every 24 hours  lactobacillus acidophilus 1 Tablet(s) Oral three times a day with meals  levothyroxine 100 MICROGram(s) Oral daily  metoprolol tartrate 25 milliGRAM(s) Oral two times a day  multivitamin 1 Tablet(s) Oral daily  nystatin Powder 1 Application(s) Topical two times a day  pantoprazole    Tablet 40 milliGRAM(s) Oral before breakfast  rivaroxaban 15 milliGRAM(s) Oral with dinner  simvastatin 20 milliGRAM(s) Oral at bedtime    MEDICATIONS  (PRN):  acetaminophen   Tablet .. 650 milliGRAM(s) Oral every 4 hours PRN Temp greater or equal to 38C (100.4F), Mild Pain (1 - 3)      Allergies    amoxicillin (Unknown)    Intolerances        Vital Signs Last 24 Hrs  T(C): 37.2 (2021 09:52), Max: 37.4 (2021 19:50)  T(F): 99 (2021 09:52), Max: 99.4 (2021 19:50)  HR: 61 (2021 09:52) (60 - 73)  BP: 195/71 (2021 09:52) (138/66 - 195/71)  BP(mean): --  RR: 18 (2021 09:52) (17 - 18)  SpO2: 93% (2021 09:52) (93% - 98%)    PHYSICAL EXAM  General: adult in NAD  HEENT: clear oropharynx, anicteric sclera, pink conjunctiva  Neck: supple  CV: normal S1/S2 with no murmur rubs or gallops  Lungs: positive air movement b/l ant lungs,clear to auscultation, no wheezes, no rales  Abdomen: soft non-tender non-distended, no hepatosplenomegaly  Ext: no clubbing cyanosis or edema  Skin: no rashes and no petechiae  Neuro: alert and oriented X 4, no focal deficits  LABS:                          9.9    13.24 )-----------( 245      ( 2021 07:49 )             34.2         Mean Cell Volume : 79.7 fl  Mean Cell Hemoglobin : 23.1 pg  Mean Cell Hemoglobin Concentration : 28.9 gm/dL  Auto Neutrophil # : x  Auto Lymphocyte # : x  Auto Monocyte # : x  Auto Eosinophil # : x  Auto Basophil # : x  Auto Neutrophil % : x  Auto Lymphocyte % : x  Auto Monocyte % : x  Auto Eosinophil % : x  Auto Basophil % : x    Serial CBC's   @ 07:49  Hct-34.2 / Hgb-9.9 / Plat-245 / RBC-4.29 / WBC-13.24          Serial CBC's   @ 07:01  Hct-25.4 / Hgb-6.8 / Plat-236 / RBC-3.31 / WBC-11.14          Serial CBC's   @ 16:08  Hct-27.7 / Hgb-7.7 / Plat-227 / RBC-3.65 / WBC-12.63          Serial CBC's   @ 06:53  Hct--- / Hgb--- / Plat--- / RBC-3.63 / WBC---          Serial CBC's   @ 10:27  Hct-30.5 / Hgb-8.3 / Plat-306 / RBC-4.09 / WBC-18.02                145  |  112<H>  |  21  ----------------------------<  140<H>  3.9   |  23  |  1.05    Ca    8.4      2021 07:01            Iron - Total Binding Capacity.: 232 ug/dL (21 @ 11:51)  Ferritin, Serum: 55 ng/mL (21 @ 11:50)  Vitamin B12, Serum: 386 pg/mL (21 @ 11:50)  Folate, Serum: >20.0 ng/mL (21 @ 11:50)  Reticulocyte Percent: 1.9 % (21 @ 06:53)                BLOOD SMEAR INTERPRETATION:       RADIOLOGY & ADDITIONAL STUDIES:     Patient is a 89y old  Female who presents with a chief complaint of Urinary and bladder complaints. (2021 13:46)       Pt is seen and examined  pt is awake and lying in bed  pt seems comfortable and denies any complaints at this time  s/p prbc     HPI:  89 years old female with past medical history of atrial fibrillation, anemia, CAD, dyslipidemia, GERD, GI bleed in past, s/p hemicolectomy, hypertension, constipation, who  in to ER with c/o being weak and urinating all over. She denies any abdominal pain. No nausea or vomiting.     In ER patient is noted to have fever and high white count. She also has abnormal UA. She is being admitted for possible sepsis due to UTI.     Patient denies any chest pain or pressure.   No nausea or vomiting.   No fever or chills.  (2021 15:29)         ROS:  as per hpi    MEDICATIONS  (STANDING):  cefTRIAXone   IVPB 1000 milliGRAM(s) IV Intermittent every 24 hours  diltiazem    milliGRAM(s) Oral daily  ferrous    sulfate 325 milliGRAM(s) Oral daily  folic acid 1 milliGRAM(s) Oral daily  iron sucrose Injectable 100 milliGRAM(s) IV Push every 24 hours  lactobacillus acidophilus 1 Tablet(s) Oral three times a day with meals  levothyroxine 100 MICROGram(s) Oral daily  metoprolol tartrate 25 milliGRAM(s) Oral two times a day  multivitamin 1 Tablet(s) Oral daily  nystatin Powder 1 Application(s) Topical two times a day  pantoprazole    Tablet 40 milliGRAM(s) Oral before breakfast  rivaroxaban 15 milliGRAM(s) Oral with dinner  simvastatin 20 milliGRAM(s) Oral at bedtime    MEDICATIONS  (PRN):  acetaminophen   Tablet .. 650 milliGRAM(s) Oral every 4 hours PRN Temp greater or equal to 38C (100.4F), Mild Pain (1 - 3)      Allergies    amoxicillin (Unknown)    Intolerances        Vital Signs Last 24 Hrs  T(C): 37.2 (2021 09:52), Max: 37.4 (2021 19:50)  T(F): 99 (2021 09:52), Max: 99.4 (2021 19:50)  HR: 61 (2021 09:52) (60 - 73)  BP: 195/71 (2021 09:52) (138/66 - 195/71)  BP(mean): --  RR: 18 (2021 09:52) (17 - 18)  SpO2: 93% (2021 09:52) (93% - 98%)    PHYSICAL EXAM  General: adult in NAD  HEENT: clear oropharynx, anicteric sclera, pink conjunctiva  Neck: supple  CV: normal S1/S2 with no murmur rubs or gallops  Lungs: positive air movement b/l ant lungs,clear to auscultation, no wheezes, no rales  Abdomen: soft non-tender non-distended, no hepatosplenomegaly  Ext: no clubbing cyanosis or edema  Skin: no rashes and no petechiae  Neuro: alert and oriented X 4, no focal deficits  LABS:                          9.9    13.24 )-----------( 245      ( 2021 07:49 )             34.2         Mean Cell Volume : 79.7 fl  Mean Cell Hemoglobin : 23.1 pg  Mean Cell Hemoglobin Concentration : 28.9 gm/dL  Auto Neutrophil # : x  Auto Lymphocyte # : x  Auto Monocyte # : x  Auto Eosinophil # : x  Auto Basophil # : x  Auto Neutrophil % : x  Auto Lymphocyte % : x  Auto Monocyte % : x  Auto Eosinophil % : x  Auto Basophil % : x    Serial CBC's   @ 07:49  Hct-34.2 / Hgb-9.9 / Plat-245 / RBC-4.29 / WBC-13.24          Serial CBC's   @ 07:01  Hct-25.4 / Hgb-6.8 / Plat-236 / RBC-3.31 / WBC-11.14          Serial CBC's   @ 16:08  Hct-27.7 / Hgb-7.7 / Plat-227 / RBC-3.65 / WBC-12.63          Serial CBC's   @ 06:53  Hct--- / Hgb--- / Plat--- / RBC-3.63 / WBC---          Serial CBC's   @ 10:27  Hct-30.5 / Hgb-8.3 / Plat-306 / RBC-4.09 / WBC-18.02                145  |  112<H>  |  21  ----------------------------<  140<H>  3.9   |  23  |  1.05    Ca    8.4      2021 07:01            Iron - Total Binding Capacity.: 232 ug/dL (21 @ 11:51)  Ferritin, Serum: 55 ng/mL (21 @ 11:50)  Vitamin B12, Serum: 386 pg/mL (21 @ 11:50)  Folate, Serum: >20.0 ng/mL (21 @ 11:50)  Reticulocyte Percent: 1.9 % (21 @ 06:53)                BLOOD SMEAR INTERPRETATION: Normal WBC series and morphology, no apparent blast cells or immature wbc, no schistocytes or fragmented rbc, platelets appears adequate, no clumping of platelets or giant platelets.

## 2021-01-26 NOTE — PROVIDER CONTACT NOTE (OTHER) - ASSESSMENT
Pt asymptomatic
pt awake and alert. pt has no complaints at this time. no distress noted. denies chest pain, denies SOB. pt currently resting comfortably in bed. BP remains elevated from this morning throughout the day. pt received scheduled medications of lopressor and Cardizem today.

## 2021-01-27 LAB
ANION GAP SERPL CALC-SCNC: 11 MMOL/L — SIGNIFICANT CHANGE UP (ref 5–17)
BASOPHILS # BLD AUTO: 0.03 K/UL — SIGNIFICANT CHANGE UP (ref 0–0.2)
BASOPHILS NFR BLD AUTO: 0.3 % — SIGNIFICANT CHANGE UP (ref 0–2)
BUN SERPL-MCNC: 20 MG/DL — SIGNIFICANT CHANGE UP (ref 7–23)
CALCIUM SERPL-MCNC: 9.2 MG/DL — SIGNIFICANT CHANGE UP (ref 8.4–10.5)
CHLORIDE SERPL-SCNC: 109 MMOL/L — HIGH (ref 96–108)
CO2 SERPL-SCNC: 26 MMOL/L — SIGNIFICANT CHANGE UP (ref 22–31)
CREAT SERPL-MCNC: 0.87 MG/DL — SIGNIFICANT CHANGE UP (ref 0.5–1.3)
EOSINOPHIL # BLD AUTO: 0.21 K/UL — SIGNIFICANT CHANGE UP (ref 0–0.5)
EOSINOPHIL NFR BLD AUTO: 1.9 % — SIGNIFICANT CHANGE UP (ref 0–6)
GLUCOSE SERPL-MCNC: 135 MG/DL — HIGH (ref 70–99)
HCT VFR BLD CALC: 34.6 % — SIGNIFICANT CHANGE UP (ref 34.5–45)
HGB BLD-MCNC: 9.9 G/DL — LOW (ref 11.5–15.5)
IMM GRANULOCYTES NFR BLD AUTO: 2 % — HIGH (ref 0–1.5)
LYMPHOCYTES # BLD AUTO: 0.93 K/UL — LOW (ref 1–3.3)
LYMPHOCYTES # BLD AUTO: 8.5 % — LOW (ref 13–44)
MCHC RBC-ENTMCNC: 22.9 PG — LOW (ref 27–34)
MCHC RBC-ENTMCNC: 28.6 GM/DL — LOW (ref 32–36)
MCV RBC AUTO: 79.9 FL — LOW (ref 80–100)
MONOCYTES # BLD AUTO: 0.84 K/UL — SIGNIFICANT CHANGE UP (ref 0–0.9)
MONOCYTES NFR BLD AUTO: 7.7 % — SIGNIFICANT CHANGE UP (ref 2–14)
NEUTROPHILS # BLD AUTO: 8.68 K/UL — HIGH (ref 1.8–7.4)
NEUTROPHILS NFR BLD AUTO: 79.6 % — HIGH (ref 43–77)
NRBC # BLD: 0 /100 WBCS — SIGNIFICANT CHANGE UP (ref 0–0)
PLATELET # BLD AUTO: 264 K/UL — SIGNIFICANT CHANGE UP (ref 150–400)
POTASSIUM SERPL-MCNC: 3.8 MMOL/L — SIGNIFICANT CHANGE UP (ref 3.5–5.3)
POTASSIUM SERPL-SCNC: 3.8 MMOL/L — SIGNIFICANT CHANGE UP (ref 3.5–5.3)
RBC # BLD: 4.33 M/UL — SIGNIFICANT CHANGE UP (ref 3.8–5.2)
RBC # FLD: 18.1 % — HIGH (ref 10.3–14.5)
SARS-COV-2 RNA SPEC QL NAA+PROBE: SIGNIFICANT CHANGE UP
SODIUM SERPL-SCNC: 146 MMOL/L — HIGH (ref 135–145)
WBC # BLD: 10.91 K/UL — HIGH (ref 3.8–10.5)
WBC # FLD AUTO: 10.91 K/UL — HIGH (ref 3.8–10.5)

## 2021-01-27 RX ORDER — FUROSEMIDE 40 MG
40 TABLET ORAL ONCE
Refills: 0 | Status: COMPLETED | OUTPATIENT
Start: 2021-01-27 | End: 2021-01-27

## 2021-01-27 RX ORDER — FUROSEMIDE 40 MG
20 TABLET ORAL ONCE
Refills: 0 | Status: DISCONTINUED | OUTPATIENT
Start: 2021-01-27 | End: 2021-01-27

## 2021-01-27 RX ORDER — HYDRALAZINE HCL 50 MG
25 TABLET ORAL EVERY 8 HOURS
Refills: 0 | Status: DISCONTINUED | OUTPATIENT
Start: 2021-01-27 | End: 2021-01-28

## 2021-01-27 RX ADMIN — Medication 1 TABLET(S): at 17:39

## 2021-01-27 RX ADMIN — Medication 25 MILLIGRAM(S): at 05:18

## 2021-01-27 RX ADMIN — Medication 25 MILLIGRAM(S): at 17:39

## 2021-01-27 RX ADMIN — Medication 240 MILLIGRAM(S): at 05:18

## 2021-01-27 RX ADMIN — PANTOPRAZOLE SODIUM 40 MILLIGRAM(S): 20 TABLET, DELAYED RELEASE ORAL at 05:18

## 2021-01-27 RX ADMIN — Medication 1 MILLIGRAM(S): at 12:08

## 2021-01-27 RX ADMIN — Medication 200 MILLIGRAM(S): at 17:39

## 2021-01-27 RX ADMIN — RIVAROXABAN 15 MILLIGRAM(S): KIT at 17:39

## 2021-01-27 RX ADMIN — Medication 1 TABLET(S): at 12:08

## 2021-01-27 RX ADMIN — Medication 200 MILLIGRAM(S): at 05:18

## 2021-01-27 RX ADMIN — SIMVASTATIN 20 MILLIGRAM(S): 20 TABLET, FILM COATED ORAL at 21:54

## 2021-01-27 RX ADMIN — Medication 1 TABLET(S): at 09:51

## 2021-01-27 RX ADMIN — NYSTATIN CREAM 1 APPLICATION(S): 100000 CREAM TOPICAL at 05:18

## 2021-01-27 RX ADMIN — Medication 40 MILLIGRAM(S): at 14:32

## 2021-01-27 RX ADMIN — Medication 100 MICROGRAM(S): at 05:18

## 2021-01-27 RX ADMIN — Medication 25 MILLIGRAM(S): at 14:32

## 2021-01-27 RX ADMIN — IRON SUCROSE 100 MILLIGRAM(S): 20 INJECTION, SOLUTION INTRAVENOUS at 12:08

## 2021-01-27 RX ADMIN — Medication 25 MILLIGRAM(S): at 21:54

## 2021-01-27 RX ADMIN — NYSTATIN CREAM 1 APPLICATION(S): 100000 CREAM TOPICAL at 17:38

## 2021-01-27 RX ADMIN — Medication 325 MILLIGRAM(S): at 12:08

## 2021-01-27 NOTE — DIETITIAN INITIAL EVALUATION ADULT. - PERTINENT LABORATORY DATA
1/27 WBC 10.9, hgb 9.9, hct 34.6, MCV 79.7, Na 146, glu 135, A1c 6.0% - acceptable given advanced age

## 2021-01-27 NOTE — PROGRESS NOTE ADULT - SUBJECTIVE AND OBJECTIVE BOX
Patient is a 89y old  Female who presents with a chief complaint of Urinary and bladder complaints. (2021 12:51)    HPI: 89 years old female with past medical history of atrial fibrillation, anemia, CAD, dyslipidemia, GERD, GI bleed in past, s/p hemicolectomy, hypertension, constipation, who  in to ER with c/o being weak and urinating all over. She denies any abdominal pain. No nausea or vomiting.     In ER patient is noted to have fever and high white count. She also has abnormal UA. She is being admitted for possible sepsis due to UTI.     INTERVAL HPI/OVERNIGHT EVENTS:  Chart reviewed, notes reviewed.   Patient seen and examined.  Being followed by following specialists: ID. Urology consult called.     Consultant(s) Notes Reviewed:  [X] Yes    Care Discussed with Consultants/Other Providers: [X] Yes    2021 --> Doing better. Denies any chest pain or pressure. Denies any flank pain. No fever or chills. Seen by PT.   2021 --> Doing OK. Getting blood transfusion. No abdominal pain. No chest pain or pressure. No nausea or vomiting. No fever or chills.   2021 --> No new issues. Tolerated blood transfusions without any problems. No fever or chills.     2021 --> Doing well. No chest pain or pressure. No nausea or vomiting. BP was high last night and this morning.     REVIEW OF SYSTEMS:  CONSTITUTIONAL: + fatigue  EYES: No eye pain, or discharge  ENMT: No sinus or throat pain  NECK: No pain or stiffness  BREASTS: No pain, masses, or nipple discharge  RESPIRATORY: No cough, wheezing, chills or hemoptysis; No shortness of breath  CARDIOVASCULAR: No chest pain, palpitations, dizziness, or leg swelling  GASTROINTESTINAL: No abdominal or epigastric pain. No nausea, vomiting.  GENITOURINARY:  + incontinence  NEUROLOGICAL: No loss of strength, numbness, or tremors  SKIN: No itching, burning, rashes, or lesions   LYMPH NODES: No enlarged glands  ENDOCRINE: No polydipsia or polyuria  MUSCULOSKELETAL: No muscle, back, or extremity pain  PSYCHIATRIC: No depression, anxiety, mood swings.  HEME/LYMPH: No easy bruising, or bleeding gums  ALLERGY AND IMMUNOLOGIC: No hives or eczema    Allergies    amoxicillin (Unknown)    Intolerances      Home Medications:  diltiazem 240 mg/24 hours oral capsule, extended release: 1 cap(s) orally once a day (2021 09:48)  folic acid 1 mg oral tablet: 1 tab(s) orally once a day (2021 09:48)  levothyroxine 100 mcg (0.1 mg) oral tablet: 1 tab(s) orally once a day (2021 09:48)  Metoprolol Tartrate 25 mg oral tablet: 1 tab(s) orally 2 times a day (2021 09:48)  Ocuvite oral tablet: 1 tab(s) orally once a day (2021 09:48)  pantoprazole 20 mg oral delayed release tablet: 1 tab(s) orally once a day (2021 09:48)  simvastatin 20 mg oral tablet: 1 tab(s) orally once a day (at bedtime)    MEDICATIONS  (STANDING):  cefpodoxime 200 milliGRAM(s) Oral every 12 hours  diltiazem    milliGRAM(s) Oral daily  ferrous    sulfate 325 milliGRAM(s) Oral daily  folic acid 1 milliGRAM(s) Oral daily  furosemide    Tablet 40 milliGRAM(s) Oral once  hydrALAZINE 25 milliGRAM(s) Oral every 8 hours  iron sucrose Injectable 100 milliGRAM(s) IV Push every 24 hours  lactobacillus acidophilus 1 Tablet(s) Oral three times a day with meals  levothyroxine 100 MICROGram(s) Oral daily  metoprolol tartrate 25 milliGRAM(s) Oral two times a day  multivitamin 1 Tablet(s) Oral daily  nystatin Powder 1 Application(s) Topical two times a day  pantoprazole    Tablet 40 milliGRAM(s) Oral before breakfast  rivaroxaban 15 milliGRAM(s) Oral with dinner  simvastatin 20 milliGRAM(s) Oral at bedtime    MEDICATIONS  (PRN):  acetaminophen   Tablet .. 650 milliGRAM(s) Oral every 4 hours PRN Temp greater or equal to 38C (100.4F), Mild Pain (1 - 3)    Vital Signs Last 24 Hrs  T(C): 36.6 (2021 10:22), Max: 37.3 (2021 05:14)  T(F): 97.9 (2021 10:22), Max: 99.1 (2021 05:14)  HR: 60 (2021 10:22) (56 - 60)  BP: 167/70 (2021 10:22) (167/70 - 192/74)  BP(mean): --  RR: 18 (2021 10:22) (16 - 18)  SpO2: 96% (2021 10:22) (95% - 97%)    PHYSICAL EXAM:  GENERAL: NAD, well-groomed, well-developed  HEAD:  Atraumatic, Normocephalic  EYES: Pallor +  ENMT: Moist mucous membranes, no lesions  NECK: Supple.  CHEST/LUNG: Decreased breath sounds at bases, rest is clear.   HEART: S1, S2.   ABDOMEN: Soft, Nontender, Nondistended; Bowel sounds present  EXTREMITIES:  2+ Peripheral Pulses, No clubbing, cyanosis, or edema  MS: No joint swelling or deformity.   LYMPH: No lymphadenopathy noted  SKIN: No rashes or lesions  NERVOUS SYSTEM:  No focal deficit.   PSYCH:  Awake and alert.     LABS:                              9.9    10.91 )-----------( 264      ( 2021 07:41 )             34.6     2021 07:41    146    |  109    |  20     ----------------------------<  135    3.8     |  26     |  0.87     Ca    9.2        2021 07:41     Chol 117 LDL -- HDL 44<L> Trig 77  Iron - Total Binding Capacity.: 232 ug/dL ( @ 11:51)  Iron Total, Serum: 11 ug/dL ( @ 11:51)  Ferritin, Serum: 55 ng/mL ( @ 11:50)    Thyroid Stimulating Hormone, Serum: 2.59 uIU/mL ( @ 23:38)    Culture Results:   No growth to date. ( @ 23:15)  Culture Results:   No growth to date. ( @ 23:15)  Culture Results:   >100,000 CFU/ml Escherichia coli ( @ 23:12)    Occult Blood, Feces (21 @ 11:23)   Occult Blood, Feces: Positive: Method: Peroxidase Catalyzation Activity       RADIOLOGY TEST: (IMAGES REVIEWED BY ME)  EXAM:  CT CHEST                        PROCEDURE DATE:  2021    INTERPRETATION:  CT CHEST WITHOUT CONTRAST    INDICATION: Possible mass on chest x-ray.    TECHNIQUE: Unenhanced helical images were obtained of the chest. Coronal and sagittal images were reconstructed. Maximum intensity projection images were generated.    COMPARISON: Radiograph chest 2021. CT chest 2018.    FINDINGS:    Tubes/Lines: None.    Lungs, airways, and pleura: The opacity along the recentradiograph was secondary to summation of shadows from the pulmonary vasculature and the heart border. There is mosaic attenuation of the lungs. The airways are unremarkable.    Mediastinum: The chest lymph nodes measure less than 15 mm in the short axis. The visualized thyroid gland is atrophic. The esophagus is unremarkable.    Heart and Vasculature: The heart is enlarged. No pericardial effusion. Coronary artery calcifications. Coronary artery stents. Mitral annular calcification. Status post TAVR .    Left-sided aortic arch and left-sided descending thoracic aorta. The ascending aorta measures 4.5 cm at the main pulmonary artery. Aorta is tortuous and ectatic. Aortic calcifications. The pulmonary artery is enlarged which can suggest pulmonary artery hypertension.    Upper Abdomen: The upper abdomen is unremarkable.    Bones And Soft Tissues: Sternotomy. Degenerative change of the spine.  The soft tissues are unremarkable.      IMPRESSION:    1.  The abnormality on the recent radiograph was secondary to summation of shadows.  2.  Mosaic attenuation of the lungs.      < end of copied text >  < from: CT Renal Stone Hunt (21 @ 21:18) >  EXAM:  CT RENAL STONE HUNT                        PROCEDURE DATE:  2021    INTERPRETATION:  CT ABDOMEN AND PELVIS WITHOUT CONTRAST    INDICATION: Urinary tract infection.    TECHNIQUE: Abdominopelvic CT without intravenous contrast.Images are reformatted in the sagittal and coronal planes.    COMPARISON: CT abdomen pelvis 2018.    FINDINGS:    Absence of intravenous contrast limits evaluation for focal lesions, neoplasm, and vascular pathology.    Lower Thorax: Partially imaged median sternotomy and postoperative changes of TAVR. Partially imaged intracardiac leads. Cardiomegaly. Trace left pleural effusion. Interlobular septal thickening and mild groundglass attenuation, likely in keeping with mild pulmonary edema.    Liver: No suspicious lesions.  Biliary: No significant dilatation, postcholecystectomy.  Spleen: No suspicious lesions.  Pancreas: No inflammatory changes or ductal dilatation.  Adrenals: Normal.  Kidneys: Mild right hydronephrosis secondary to 6mm right ureteropelvic junction stone on image 59 of series 3. Additional 3 mm nonobstructive stone in the upper pole of right kidney. Mild bilateral perinephric stranding, right greater than left.  Vessels: Normal caliber. Atherosclerotic disease ofthe aorta and its branches.    GI tract: No evidence of small bowel obstruction. Bowel postoperative changes. No acute bowel inflammatory changes. Colonic diverticulosis without evidence of acute diverticulitis.    Peritoneum/retroperitoneum and mesentery: No free air. No organized fluid collection. No adenopathy.    Pelvic organs/Bladder: Uterus is atrophied or surgically absent. Mild bladder wall thickening, difficult to assess secondary to inadequate distention.  Abdominal wall: Small left infraumbilical abdominal wall hernia containing non obstructed bowel loop.  Bones and soft tissues: Multilevel degenerative changes of the spine noted.    IMPRESSION:    Mild right hydronephrosis secondary to 6 mm right ureteropelvic junction stone. Additional 3 mm nonobstructive stone in the upper pole of right kidney. Mild bilateral perinephric stranding, right greater than left.    Mild bladder wall thickening, difficult to assess secondary to inadequate distention. Correlate with urinalysis and laboratory values to assess for cystitis, ascending urinary tract infection or right pyelonephritis.    Additional findings as mentioned above.      < end of copied text >        Imaging Personally Reviewed:  [X] YES      HEALTH ISSUES - PROBLEM Dx:  Prophylactic measure  Prophylactic measure    CAD (coronary artery disease)  CAD (coronary artery disease)    Hypothyroidism, unspecified type  Hypothyroidism, unspecified type    Gastroesophageal reflux disease without esophagitis  Gastroesophageal reflux disease without esophagitis    Hyperlipidemia, unspecified hyperlipidemia type  Hyperlipidemia, unspecified hyperlipidemia type    Atrial fibrillation, unspecified  Atrial fibrillation, unspecified    Anemia  Anemia    UTI (urinary tract infection)  UTI (urinary tract infection)    Sepsis  Sepsis    Pyelonephritis  Pyelonephritis

## 2021-01-27 NOTE — CONSULT NOTE ADULT - SUBJECTIVE AND OBJECTIVE BOX
Chief Complaint:  Patient is a 89y old  Female who presents with a chief complaint of Urinary and bladder complaints. called to see pt with drop in hgb and hct no overt melena no brbpr she had a colonosopcy many years ago as per patient 89 years old female with past medical history of atrial fibrillation, anemia, CAD, dyslipidemia, GERD, GI bleed in past, s/p hemicolectomy, hypertension, constipation, who  in to ER with c/o being weak and urinating all over. She denies any abdominal pain. No nausea or vomiting.     In ER patient is noted to have fever and high white count. She also has abnormal UA. She is being admitted for possible sepsis due to UTI.     Patient denies any chest pain or pressure.   No nausea or vomiting.   No fever or chills.      Review of Systems:  Review of Systems: REVIEW OF SYSTEMS:  CONSTITUTIONAL: + weakness.   EYES: No eye pain, or discharge  ENMT: No sinus or throat pain  NECK: No pain or stiffness  BREASTS: No pain, masses, or nipple discharge  RESPIRATORY: No cough, wheezing, chills or hemoptysis; No shortness of breath  CARDIOVASCULAR: No chest pain, palpitations, dizziness, or leg swelling  GASTROINTESTINAL: No abdominal or epigastric pain. No nausea, vomiting.  GENITOURINARY: + incontinence, + increased frequency.   NEUROLOGICAL: No loss of strength, numbness, or tremors  SKIN: No itching, burning, rashes, or lesions   LYMPH NODES: No enlarged glands  ENDOCRINE: No polydipsia or polyuria  MUSCULOSKELETAL: No muscle, back, or extremity pain  PSYCHIATRIC: No depression, anxiety, mood swings.  HEME/LYMPH: No easy bruising, or bleeding gums  ALLERGY AND IMMUNOLOGIC: No hives or eczema      Allergies:  amoxicillin (Unknown)      Medications:  acetaminophen   Tablet .. 650 milliGRAM(s) Oral every 4 hours PRN  cefpodoxime 200 milliGRAM(s) Oral every 12 hours  diltiazem    milliGRAM(s) Oral daily  ferrous    sulfate 325 milliGRAM(s) Oral daily  folic acid 1 milliGRAM(s) Oral daily  hydrALAZINE 25 milliGRAM(s) Oral every 8 hours  iron sucrose Injectable 100 milliGRAM(s) IV Push every 24 hours  lactobacillus acidophilus 1 Tablet(s) Oral three times a day with meals  levothyroxine 100 MICROGram(s) Oral daily  metoprolol tartrate 25 milliGRAM(s) Oral two times a day  multivitamin 1 Tablet(s) Oral daily  nystatin Powder 1 Application(s) Topical two times a day  pantoprazole    Tablet 40 milliGRAM(s) Oral before breakfast  rivaroxaban 15 milliGRAM(s) Oral with dinner  simvastatin 20 milliGRAM(s) Oral at bedtime      PMHX/PSHX:  Xerophthalmia    Anemia    Constipation    Gastrointestinal hemorrhage, unspecified gastritis, unspecified gastrointestinal hemorrhage type    Gastritis    Atrial fibrillation, unspecified    Hyperlipidemia    Gastroesophageal reflux disease without esophagitis    Hypothyroidism    Obesity    Dyslipidemia    Hypertension    CAD (coronary artery disease)    History of colon polyps    H/O abdominal hysterectomy    Rectal mass    S/P CABG (coronary artery bypass graft)        Family history:  No pertinent family history in first degree relatives        Social History: lives at home no etoh no cigs no ivda    ROS:     General:  No wt loss, fevers, chills, night sweats, fatigue,   Eyes:  Good vision, no reported pain  ENT:  No sore throat, pain, runny nose, dysphagia  CV:  No pain, palpitations, hypo/hypertension  Resp:  No dyspnea, cough, tachypnea, wheezing  GI:  No pain, No nausea, No vomiting, No diarrhea, No constipation, No weight loss, No fever, No pruritis, No rectal bleeding, No tarry stools, No dysphagia,  :  No pain, bleeding, incontinence, nocturia  Muscle:  No pain, weakness  Neuro:  No weakness, tingling, memory problems  Psych:  No fatigue, insomnia, mood problems, depression  Endocrine:  No polyuria, polydipsia, cold/heat intolerance  Heme:  No petechiae, ecchymosis, easy bruisability  Skin:  No rash, tattoos, scars, edema      PHYSICAL EXAM:   Vital Signs:  Vital Signs Last 24 Hrs  T(C): 36.9 (2021 14:21), Max: 37.3 (2021 05:14)  T(F): 98.5 (2021 14:21), Max: 99.1 (2021 05:14)  HR: 60 (2021 14:21) (58 - 60)  BP: 158/63 (2021 14:21) (158/63 - 192/74)  BP(mean): --  RR: 18 (2021 14:21) (16 - 18)  SpO2: 95% (2021 14:21) (95% - 97%)  Daily     Daily     GENERAL:  Appears stated age, well-groomed, well-nourished, no distress  HEENT:  NC/AT,  conjunctivae clear and pink, no thyromegaly, nodules, adenopathy, no JVD, sclera -anicteric  CHEST:  Full & symmetric excursion, no increased effort, breath sounds clear  HEART:  Regular rhythm, S1, S2, no murmur/rub/S3/S4, no abdominal bruit, no edema  ABDOMEN:  Soft, non-tender, non-distended, normoactive bowel sounds,  no masses ,no hepato-splenomegaly, no signs of chronic liver disease  EXTEREMITIES:  no cyanosis,clubbing or edema  SKIN:  No rash/erythema/ecchymoses/petechiae/wounds/abscess/warm/dry  NEURO:  Alert, oriented, no asterixis, no tremor, no encephalopathy    LABS:                        9.9    10.91 )-----------( 264      ( 2021 07:41 )             34.6         146<H>  |  109<H>  |  20  ----------------------------<  135<H>  3.8   |  26  |  0.87    Ca    9.2      2021 07:41                Imaging:

## 2021-01-27 NOTE — DIETITIAN INITIAL EVALUATION ADULT. - OTHER INFO
Per H&P, pt is a 89 years old female with past medical history of atrial fibrillation, anemia, CAD, dyslipidemia, GERD, GI bleed in past, s/p hemicolectomy, hypertension, constipation, who  in to ER with c/o being weak and urinating all over. Pt admitted for possible sepsis due to UTI; s/p 2u PRBC for anemia/chr gi blood loss.    Pt on DASH/TLC diet throughout admission with reported fair appetite and intake; per nursing documentation, po intake variable 30-90% of most meals.  Encouraged po intake of meals and fluids; will provide magic cup fortified ice cream bid to supplement variable intake.  Pt states she lives at home with son; reports he does food shopping and she normally does her own food preparation.  Usually consumes 2x meals per day (B/D) and does not add salt to food. Denies chewing or swallowing difficulties.  +BM .  Skin intact.

## 2021-01-27 NOTE — CONSULT NOTE ADULT - REASON FOR ADMISSION
Urinary and bladder complaints.
negative

## 2021-01-27 NOTE — PROGRESS NOTE ADULT - SUBJECTIVE AND OBJECTIVE BOX
Patient is a 89y old  Female who presents with a chief complaint of Urinary and bladder complaints. (2021 13:53)       Pt is seen and examined  pt is awake and lying in bed/out of bed to chair  pt seems comfortable and denies any complaints at this time    HPI:  89 years old female with past medical history of atrial fibrillation, anemia, CAD, dyslipidemia, GERD, GI bleed in past, s/p hemicolectomy, hypertension, constipation, who  in to ER with c/o being weak and urinating all over. She denies any abdominal pain. No nausea or vomiting.     In ER patient is noted to have fever and high white count. She also has abnormal UA. She is being admitted for possible sepsis due to UTI.     Patient denies any chest pain or pressure.   No nausea or vomiting.   No fever or chills.  (2021 15:29)         ROS:  Negative except for:    MEDICATIONS  (STANDING):  cefpodoxime 200 milliGRAM(s) Oral every 12 hours  diltiazem    milliGRAM(s) Oral daily  ferrous    sulfate 325 milliGRAM(s) Oral daily  folic acid 1 milliGRAM(s) Oral daily  furosemide    Tablet 40 milliGRAM(s) Oral once  hydrALAZINE 25 milliGRAM(s) Oral every 8 hours  iron sucrose Injectable 100 milliGRAM(s) IV Push every 24 hours  lactobacillus acidophilus 1 Tablet(s) Oral three times a day with meals  levothyroxine 100 MICROGram(s) Oral daily  metoprolol tartrate 25 milliGRAM(s) Oral two times a day  multivitamin 1 Tablet(s) Oral daily  nystatin Powder 1 Application(s) Topical two times a day  pantoprazole    Tablet 40 milliGRAM(s) Oral before breakfast  rivaroxaban 15 milliGRAM(s) Oral with dinner  simvastatin 20 milliGRAM(s) Oral at bedtime    MEDICATIONS  (PRN):  acetaminophen   Tablet .. 650 milliGRAM(s) Oral every 4 hours PRN Temp greater or equal to 38C (100.4F), Mild Pain (1 - 3)      Allergies    amoxicillin (Unknown)    Intolerances        Vital Signs Last 24 Hrs  T(C): 36.9 (2021 14:21), Max: 37.3 (2021 05:14)  T(F): 98.5 (2021 14:21), Max: 99.1 (2021 05:14)  HR: 60 (2021 14:21) (58 - 60)  BP: 158/63 (2021 14:21) (158/63 - 192/74)  BP(mean): --  RR: 18 (2021 14:21) (16 - 18)  SpO2: 95% (2021 14:21) (95% - 97%)    PHYSICAL EXAM  General: adult in NAD  HEENT: clear oropharynx, anicteric sclera, pink conjunctiva  Neck: supple  CV: normal S1/S2 with no murmur rubs or gallops  Lungs: positive air movement b/l ant lungs,clear to auscultation, no wheezes, no rales  Abdomen: soft non-tender non-distended, no hepatosplenomegaly  Ext: no clubbing cyanosis or edema  Skin: no rashes and no petechiae  Neuro: alert and oriented X 4, no focal deficits  LABS:                          9.9    10.91 )-----------( 264      ( 2021 07:41 )             34.6         Mean Cell Volume : 79.9 fl  Mean Cell Hemoglobin : 22.9 pg  Mean Cell Hemoglobin Concentration : 28.6 gm/dL  Auto Neutrophil # : 8.68 K/uL  Auto Lymphocyte # : 0.93 K/uL  Auto Monocyte # : 0.84 K/uL  Auto Eosinophil # : 0.21 K/uL  Auto Basophil # : 0.03 K/uL  Auto Neutrophil % : 79.6 %  Auto Lymphocyte % : 8.5 %  Auto Monocyte % : 7.7 %  Auto Eosinophil % : 1.9 %  Auto Basophil % : 0.3 %    Serial CBC's   @ 07:41  Hct-34.6 / Hgb-9.9 / Plat-264 / RBC-4.33 / WBC-10.91          Serial CBC's   @ 07:49  Hct-34.2 / Hgb-9.9 / Plat-245 / RBC-4.29 / WBC-13.24          Serial CBC's   @ 07:01  Hct-25.4 / Hgb-6.8 / Plat-236 / RBC-3.31 / WBC-11.14          Serial CBC's   @ 16:08  Hct-27.7 / Hgb-7.7 / Plat-227 / RBC-3.65 / WBC-12.63          Serial CBC's   @ 06:53  Hct--- / Hgb--- / Plat--- / RBC-3.63 / WBC---                146<H>  |  109<H>  |  20  ----------------------------<  135<H>  3.8   |  26  |  0.87    Ca    9.2      2021 07:41            Iron - Total Binding Capacity.: 232 ug/dL (21 @ 11:51)  Ferritin, Serum: 55 ng/mL (21 @ 11:50)  Vitamin B12, Serum: 386 pg/mL (21 @ 11:50)  Folate, Serum: >20.0 ng/mL (21 @ 11:50)  Reticulocyte Percent: 1.9 % (21 @ 06:53)                BLOOD SMEAR INTERPRETATION:       RADIOLOGY & ADDITIONAL STUDIES:     Patient is a 89y old  Female who presents with a chief complaint of Urinary and bladder complaints. (2021 13:53)       Pt is seen and examined  pt is awake and is sitting in chair, out of bed to chair  pt seems comfortable at this time    HPI:  89 years old female with past medical history of atrial fibrillation, anemia, CAD, dyslipidemia, GERD, GI bleed in past, s/p hemicolectomy, hypertension, constipation, who  in to ER with c/o being weak and urinating all over. She denies any abdominal pain. No nausea or vomiting.     In ER patient is noted to have fever and high white count. She also has abnormal UA. She is being admitted for possible sepsis due to UTI.     Patient denies any chest pain or pressure.   No nausea or vomiting.   No fever or chills.  (2021 15:29)         ROS:  as per hpi    MEDICATIONS  (STANDING):  cefpodoxime 200 milliGRAM(s) Oral every 12 hours  diltiazem    milliGRAM(s) Oral daily  ferrous    sulfate 325 milliGRAM(s) Oral daily  folic acid 1 milliGRAM(s) Oral daily  furosemide    Tablet 40 milliGRAM(s) Oral once  hydrALAZINE 25 milliGRAM(s) Oral every 8 hours  iron sucrose Injectable 100 milliGRAM(s) IV Push every 24 hours  lactobacillus acidophilus 1 Tablet(s) Oral three times a day with meals  levothyroxine 100 MICROGram(s) Oral daily  metoprolol tartrate 25 milliGRAM(s) Oral two times a day  multivitamin 1 Tablet(s) Oral daily  nystatin Powder 1 Application(s) Topical two times a day  pantoprazole    Tablet 40 milliGRAM(s) Oral before breakfast  rivaroxaban 15 milliGRAM(s) Oral with dinner  simvastatin 20 milliGRAM(s) Oral at bedtime    MEDICATIONS  (PRN):  acetaminophen   Tablet .. 650 milliGRAM(s) Oral every 4 hours PRN Temp greater or equal to 38C (100.4F), Mild Pain (1 - 3)      Allergies    amoxicillin (Unknown)    Intolerances        Vital Signs Last 24 Hrs  T(C): 36.9 (2021 14:21), Max: 37.3 (2021 05:14)  T(F): 98.5 (2021 14:21), Max: 99.1 (2021 05:14)  HR: 60 (2021 14:21) (58 - 60)  BP: 158/63 (2021 14:21) (158/63 - 192/74)  BP(mean): --  RR: 18 (2021 14:21) (16 - 18)  SpO2: 95% (2021 14:21) (95% - 97%)    PHYSICAL EXAM  General: adult in NAD  HEENT: clear oropharynx, anicteric sclera, pink conjunctiva  Neck: supple  CV: normal S1/S2 with no murmur rubs or gallops  Lungs: positive air movement b/l ant lungs,clear to auscultation, no wheezes, no rales  Abdomen: soft non-tender non-distended, no hepatosplenomegaly  Ext: no clubbing cyanosis or edema  Skin: no rashes and no petechiae  Neuro: alert and oriented X 4, no focal deficits  LABS:                          9.9    10.91 )-----------( 264      ( 2021 07:41 )             34.6         Mean Cell Volume : 79.9 fl  Mean Cell Hemoglobin : 22.9 pg  Mean Cell Hemoglobin Concentration : 28.6 gm/dL  Auto Neutrophil # : 8.68 K/uL  Auto Lymphocyte # : 0.93 K/uL  Auto Monocyte # : 0.84 K/uL  Auto Eosinophil # : 0.21 K/uL  Auto Basophil # : 0.03 K/uL  Auto Neutrophil % : 79.6 %  Auto Lymphocyte % : 8.5 %  Auto Monocyte % : 7.7 %  Auto Eosinophil % : 1.9 %  Auto Basophil % : 0.3 %    Serial CBC's   @ 07:41  Hct-34.6 / Hgb-9.9 / Plat-264 / RBC-4.33 / WBC-10.91          Serial CBC's   @ 07:49  Hct-34.2 / Hgb-9.9 / Plat-245 / RBC-4.29 / WBC-13.24          Serial CBC's   @ 07:01  Hct-25.4 / Hgb-6.8 / Plat-236 / RBC-3.31 / WBC-11.14          Serial CBC's   @ 16:08  Hct-27.7 / Hgb-7.7 / Plat-227 / RBC-3.65 / WBC-12.63          Serial CBC's   @ 06:53  Hct--- / Hgb--- / Plat--- / RBC-3.63 / WBC---                146<H>  |  109<H>  |  20  ----------------------------<  135<H>  3.8   |  26  |  0.87    Ca    9.2      2021 07:41            Iron - Total Binding Capacity.: 232 ug/dL (21 @ 11:51)  Ferritin, Serum: 55 ng/mL (21 @ 11:50)  Vitamin B12, Serum: 386 pg/mL (21 @ 11:50)  Folate, Serum: >20.0 ng/mL (21 @ 11:50)  Reticulocyte Percent: 1.9 % (21 @ 06:53)

## 2021-01-27 NOTE — PROGRESS NOTE ADULT - SUBJECTIVE AND OBJECTIVE BOX
[facility], Division of Infectious Diseases  JOY Malave Y. Patel, S. Shah  [phone]    Name: RON ABREU  Age: 89y  Gender: Female  MRN: [id number]  Note Date: 01-27-21    Interval History:  Patient seen and examined at bedside this morning  No acute overnight events. Afebrile  No complaints. Sleeping comfortably  Notes reviewed    Antibiotics:  cefpodoxime 200 milliGRAM(s) Oral every 12 hours      Medications:  acetaminophen   Tablet .. 650 milliGRAM(s) Oral every 4 hours PRN  cefpodoxime 200 milliGRAM(s) Oral every 12 hours  diltiazem    milliGRAM(s) Oral daily  ferrous    sulfate 325 milliGRAM(s) Oral daily  folic acid 1 milliGRAM(s) Oral daily  iron sucrose Injectable 100 milliGRAM(s) IV Push every 24 hours  lactobacillus acidophilus 1 Tablet(s) Oral three times a day with meals  levothyroxine 100 MICROGram(s) Oral daily  metoprolol tartrate 25 milliGRAM(s) Oral two times a day  multivitamin 1 Tablet(s) Oral daily  nystatin Powder 1 Application(s) Topical two times a day  pantoprazole    Tablet 40 milliGRAM(s) Oral before breakfast  rivaroxaban 15 milliGRAM(s) Oral with dinner  simvastatin 20 milliGRAM(s) Oral at bedtime      Review of Systems:  A 10-point review of systems was obtained.     Pertinent positives and negatives--  Constitutional: No fevers. No Chills. No Rigors.   Cardiovascular: No chest pain. No palpitations.  Respiratory: No shortness of breath. No cough.  Gastrointestinal: No nausea or vomiting. No diarrhea or constipation.   Psychiatric: Pleasant. Appropriate affect.    Review of systems otherwise negative except as previously noted.    Allergies: amoxicillin (Unknown)    For details regarding the patient's past medical history, social history, family history, and other miscellaneous elements, please refer the initial infectious diseases consultation and/or the admitting history and physical examination for this admission.    Objective:  Vitals:   T(C): 37.3 (01-27-21 @ 05:14), Max: 37.3 (01-27-21 @ 05:14)  HR: 60 (01-27-21 @ 05:14) (56 - 61)  BP: 192/74 (01-27-21 @ 05:14) (180/76 - 195/71)  RR: 17 (01-27-21 @ 05:14) (16 - 18)  SpO2: 95% (01-27-21 @ 05:14) (93% - 97%)    Physical Examination:  General: no acute distress  HEENT: NC/AT, EOMI, anicteric, no oral lesions  Neck: supple, no palpable LAD  Cardio: S1, S2 heard, RRR, no murmurs  Resp: breath sounds heard bilaterally, no rales, wheezes or rhonchi  Abd: soft, NT, ND, + bowel sounds  Neuro: no obvious focal deficits  Ext: no edema or cyanosis  Skin: warm, dry, no visible rash    Laboratory Studies:  CBC:                       9.9    10.91 )-----------( 264      ( 27 Jan 2021 07:41 )             34.6     CMP: 01-27    146<H>  |  109<H>  |  20  ----------------------------<  135<H>  3.8   |  26  |  0.87    Ca    9.2      27 Jan 2021 07:41          Microbiology: reviewed    Culture - Blood (collected 01-23-21 @ 23:15)  Source: .Blood Blood-Peripheral  Preliminary Report (01-25-21 @ 01:09):    No growth to date.    Culture - Blood (collected 01-23-21 @ 23:15)  Source: .Blood Blood-Peripheral  Preliminary Report (01-25-21 @ 01:09):    No growth to date.    Culture - Urine (collected 01-23-21 @ 23:12)  Source: .Urine Clean Catch (Midstream)  Final Report (01-25-21 @ 17:41):    >100,000 CFU/ml Escherichia coli  Organism: Escherichia coli (01-25-21 @ 17:41)  Organism: Escherichia coli (01-25-21 @ 17:41)      -  Amikacin: S <=16      -  Amoxicillin/Clavulanic Acid: S <=8/4      -  Ampicillin: R >16 These ampicillin results predict results for amoxicillin      -  Ampicillin/Sulbactam: S <=4/2 Enterobacter, Citrobacter, and Serratia may develop resistance during prolonged therapy (3-4 days)      -  Aztreonam: S <=4      -  Cefazolin: R >16 ([id number]) For uncomplicated UTI with K. pneumoniae, E. coli, or P. mirablis: JOHN <=16 is sensitive and JOHN >=32 is resistant. This also predicts results for oral agents cefaclor, cefdinir, cefpodoxime, cefprozil, cefuroxime axetil, cephalexin and locarbef for uncomplicated UTI. Note that some isolates may be susceptible to these agents while testing resistant to cefazolin.      -  Cefepime: S <=2      -  Cefoxitin: S <=8      -  Ceftriaxone: S <=1 Enterobacter, Citrobacter, and Serratia may develop resistance during prolonged therapy      -  Ciprofloxacin: I 0.5      -  Ertapenem: S <=0.5      -  Gentamicin: S <=2      -  Imipenem: S <=1      -  Levofloxacin: S <=0.5      -  Meropenem: S <=1      -  Nitrofurantoin: S <=32 Should not be used to treat pyelonephritis      -  Piperacillin/Tazobactam: S <=8      -  Tigecycline: S <=2      -  Tobramycin: S <=2      -  Trimethoprim/Sulfamethoxazole: R >2/38      Method Type: JOHN        Radiology: reviewed

## 2021-01-27 NOTE — CONSULT NOTE ADULT - ASSESSMENT
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  
6mm right UPJ stone and 3mm right upper pole stone  UTI, cystitis and pyelo  Not septic  Continue Rocephin IV for now; management per ID  No  intervention for stones if clinical continues to improve.
89f with hld, htn, cadk, afib admitted with weakness and urinary compliant  found to have leukocytosis with pyuria and ct scan findings consistent with   pyelonephritis / nephrolithiasis

## 2021-01-27 NOTE — DIETITIAN INITIAL EVALUATION ADULT. - PERTINENT MEDS FT
rocephin, cardizem, ferrous sulfate, folic acid, lasix, hydralazine, venofer, lactobacillus acidophilus, synthroid, lopressor, mvi, protonix, xarelto, zocor, 2u PRBC

## 2021-01-27 NOTE — DIETITIAN INITIAL EVALUATION ADULT. - PHYSCIAL ASSESSMENT
BMI 27.6; pt reports ht 4'4", adm ht 4'7" (appears accurate); pt reports UBW ~148#, states wt has been stable; per bed scale 1/26 wt 122.7#, adm wt 119#; ? accuracy of stated ht/wt, pt appears forgetful/overweight

## 2021-01-28 ENCOUNTER — TRANSCRIPTION ENCOUNTER (OUTPATIENT)
Age: 86
End: 2021-01-28

## 2021-01-28 VITALS
HEART RATE: 60 BPM | OXYGEN SATURATION: 99 % | RESPIRATION RATE: 18 BRPM | TEMPERATURE: 98 F | SYSTOLIC BLOOD PRESSURE: 157 MMHG | DIASTOLIC BLOOD PRESSURE: 77 MMHG

## 2021-01-28 LAB
ANION GAP SERPL CALC-SCNC: 7 MMOL/L — SIGNIFICANT CHANGE UP (ref 5–17)
BASOPHILS # BLD AUTO: 0.03 K/UL — SIGNIFICANT CHANGE UP (ref 0–0.2)
BASOPHILS NFR BLD AUTO: 0.3 % — SIGNIFICANT CHANGE UP (ref 0–2)
BUN SERPL-MCNC: 20 MG/DL — SIGNIFICANT CHANGE UP (ref 7–23)
CALCIUM SERPL-MCNC: 9.1 MG/DL — SIGNIFICANT CHANGE UP (ref 8.4–10.5)
CHLORIDE SERPL-SCNC: 105 MMOL/L — SIGNIFICANT CHANGE UP (ref 96–108)
CO2 SERPL-SCNC: 32 MMOL/L — HIGH (ref 22–31)
CREAT SERPL-MCNC: 0.77 MG/DL — SIGNIFICANT CHANGE UP (ref 0.5–1.3)
EOSINOPHIL # BLD AUTO: 0.3 K/UL — SIGNIFICANT CHANGE UP (ref 0–0.5)
EOSINOPHIL NFR BLD AUTO: 2.7 % — SIGNIFICANT CHANGE UP (ref 0–6)
GLUCOSE SERPL-MCNC: 121 MG/DL — HIGH (ref 70–99)
HCT VFR BLD CALC: 32.7 % — LOW (ref 34.5–45)
HGB BLD-MCNC: 9.4 G/DL — LOW (ref 11.5–15.5)
IMM GRANULOCYTES NFR BLD AUTO: 1.7 % — HIGH (ref 0–1.5)
LYMPHOCYTES # BLD AUTO: 1.07 K/UL — SIGNIFICANT CHANGE UP (ref 1–3.3)
LYMPHOCYTES # BLD AUTO: 9.5 % — LOW (ref 13–44)
MCHC RBC-ENTMCNC: 22.7 PG — LOW (ref 27–34)
MCHC RBC-ENTMCNC: 28.7 GM/DL — LOW (ref 32–36)
MCV RBC AUTO: 79 FL — LOW (ref 80–100)
MONOCYTES # BLD AUTO: 0.72 K/UL — SIGNIFICANT CHANGE UP (ref 0–0.9)
MONOCYTES NFR BLD AUTO: 6.4 % — SIGNIFICANT CHANGE UP (ref 2–14)
NEUTROPHILS # BLD AUTO: 8.91 K/UL — HIGH (ref 1.8–7.4)
NEUTROPHILS NFR BLD AUTO: 79.4 % — HIGH (ref 43–77)
NRBC # BLD: 0 /100 WBCS — SIGNIFICANT CHANGE UP (ref 0–0)
PLATELET # BLD AUTO: 263 K/UL — SIGNIFICANT CHANGE UP (ref 150–400)
POTASSIUM SERPL-MCNC: 3.6 MMOL/L — SIGNIFICANT CHANGE UP (ref 3.5–5.3)
POTASSIUM SERPL-SCNC: 3.6 MMOL/L — SIGNIFICANT CHANGE UP (ref 3.5–5.3)
RBC # BLD: 4.14 M/UL — SIGNIFICANT CHANGE UP (ref 3.8–5.2)
RBC # FLD: 18.9 % — HIGH (ref 10.3–14.5)
SODIUM SERPL-SCNC: 144 MMOL/L — SIGNIFICANT CHANGE UP (ref 135–145)
WBC # BLD: 11.22 K/UL — HIGH (ref 3.8–10.5)
WBC # FLD AUTO: 11.22 K/UL — HIGH (ref 3.8–10.5)

## 2021-01-28 PROCEDURE — 84100 ASSAY OF PHOSPHORUS: CPT

## 2021-01-28 PROCEDURE — 86900 BLOOD TYPING SEROLOGIC ABO: CPT

## 2021-01-28 PROCEDURE — 83036 HEMOGLOBIN GLYCOSYLATED A1C: CPT

## 2021-01-28 PROCEDURE — 71045 X-RAY EXAM CHEST 1 VIEW: CPT

## 2021-01-28 PROCEDURE — 85730 THROMBOPLASTIN TIME PARTIAL: CPT

## 2021-01-28 PROCEDURE — 85025 COMPLETE CBC W/AUTO DIFF WBC: CPT

## 2021-01-28 PROCEDURE — 86850 RBC ANTIBODY SCREEN: CPT

## 2021-01-28 PROCEDURE — 97110 THERAPEUTIC EXERCISES: CPT

## 2021-01-28 PROCEDURE — 85027 COMPLETE CBC AUTOMATED: CPT

## 2021-01-28 PROCEDURE — 87186 SC STD MICRODIL/AGAR DIL: CPT

## 2021-01-28 PROCEDURE — 86901 BLOOD TYPING SEROLOGIC RH(D): CPT

## 2021-01-28 PROCEDURE — 36415 COLL VENOUS BLD VENIPUNCTURE: CPT

## 2021-01-28 PROCEDURE — 86769 SARS-COV-2 COVID-19 ANTIBODY: CPT

## 2021-01-28 PROCEDURE — 93005 ELECTROCARDIOGRAM TRACING: CPT

## 2021-01-28 PROCEDURE — 87040 BLOOD CULTURE FOR BACTERIA: CPT

## 2021-01-28 PROCEDURE — 97161 PT EVAL LOW COMPLEX 20 MIN: CPT

## 2021-01-28 PROCEDURE — 87631 RESP VIRUS 3-5 TARGETS: CPT

## 2021-01-28 PROCEDURE — 96365 THER/PROPH/DIAG IV INF INIT: CPT

## 2021-01-28 PROCEDURE — 87086 URINE CULTURE/COLONY COUNT: CPT

## 2021-01-28 PROCEDURE — 36430 TRANSFUSION BLD/BLD COMPNT: CPT

## 2021-01-28 PROCEDURE — P9016: CPT

## 2021-01-28 PROCEDURE — 83735 ASSAY OF MAGNESIUM: CPT

## 2021-01-28 PROCEDURE — 80048 BASIC METABOLIC PNL TOTAL CA: CPT

## 2021-01-28 PROCEDURE — 83540 ASSAY OF IRON: CPT

## 2021-01-28 PROCEDURE — 85045 AUTOMATED RETICULOCYTE COUNT: CPT

## 2021-01-28 PROCEDURE — 82728 ASSAY OF FERRITIN: CPT

## 2021-01-28 PROCEDURE — 73562 X-RAY EXAM OF KNEE 3: CPT

## 2021-01-28 PROCEDURE — 82607 VITAMIN B-12: CPT

## 2021-01-28 PROCEDURE — 82272 OCCULT BLD FECES 1-3 TESTS: CPT

## 2021-01-28 PROCEDURE — 82746 ASSAY OF FOLIC ACID SERUM: CPT

## 2021-01-28 PROCEDURE — U0003: CPT

## 2021-01-28 PROCEDURE — 83550 IRON BINDING TEST: CPT

## 2021-01-28 PROCEDURE — 85610 PROTHROMBIN TIME: CPT

## 2021-01-28 PROCEDURE — 97116 GAIT TRAINING THERAPY: CPT

## 2021-01-28 PROCEDURE — 83605 ASSAY OF LACTIC ACID: CPT

## 2021-01-28 PROCEDURE — 71250 CT THORAX DX C-: CPT

## 2021-01-28 PROCEDURE — 86923 COMPATIBILITY TEST ELECTRIC: CPT

## 2021-01-28 PROCEDURE — 96361 HYDRATE IV INFUSION ADD-ON: CPT

## 2021-01-28 PROCEDURE — 99285 EMERGENCY DEPT VISIT HI MDM: CPT

## 2021-01-28 PROCEDURE — 81001 URINALYSIS AUTO W/SCOPE: CPT

## 2021-01-28 PROCEDURE — 80053 COMPREHEN METABOLIC PANEL: CPT

## 2021-01-28 PROCEDURE — 80061 LIPID PANEL: CPT

## 2021-01-28 PROCEDURE — 74176 CT ABD & PELVIS W/O CONTRAST: CPT

## 2021-01-28 PROCEDURE — U0005: CPT

## 2021-01-28 PROCEDURE — 84443 ASSAY THYROID STIM HORMONE: CPT

## 2021-01-28 RX ORDER — NYSTATIN CREAM 100000 [USP'U]/G
1 CREAM TOPICAL
Qty: 0 | Refills: 0 | DISCHARGE
Start: 2021-01-28

## 2021-01-28 RX ORDER — FERROUS SULFATE 325(65) MG
1 TABLET ORAL
Qty: 0 | Refills: 0 | DISCHARGE
Start: 2021-01-28

## 2021-01-28 RX ORDER — CEFPODOXIME PROXETIL 100 MG
1 TABLET ORAL
Qty: 0 | Refills: 0 | DISCHARGE
Start: 2021-01-28

## 2021-01-28 RX ORDER — LACTOBACILLUS ACIDOPHILUS 100MM CELL
1 CAPSULE ORAL
Qty: 0 | Refills: 0 | DISCHARGE
Start: 2021-01-28

## 2021-01-28 RX ORDER — FUROSEMIDE 40 MG
1 TABLET ORAL
Qty: 0 | Refills: 0 | DISCHARGE
Start: 2021-01-28

## 2021-01-28 RX ORDER — HYDRALAZINE HCL 50 MG
1 TABLET ORAL
Qty: 0 | Refills: 0 | DISCHARGE
Start: 2021-01-28

## 2021-01-28 RX ORDER — POTASSIUM CHLORIDE 20 MEQ
1 PACKET (EA) ORAL
Qty: 0 | Refills: 0 | DISCHARGE
Start: 2021-01-28

## 2021-01-28 RX ORDER — POTASSIUM CHLORIDE 20 MEQ
10 PACKET (EA) ORAL DAILY
Refills: 0 | Status: DISCONTINUED | OUTPATIENT
Start: 2021-01-28 | End: 2021-01-28

## 2021-01-28 RX ORDER — HYDRALAZINE HCL 50 MG
50 TABLET ORAL EVERY 8 HOURS
Refills: 0 | Status: DISCONTINUED | OUTPATIENT
Start: 2021-01-28 | End: 2021-01-28

## 2021-01-28 RX ORDER — ACETAMINOPHEN 500 MG
2 TABLET ORAL
Qty: 0 | Refills: 0 | DISCHARGE
Start: 2021-01-28

## 2021-01-28 RX ORDER — FUROSEMIDE 40 MG
20 TABLET ORAL DAILY
Refills: 0 | Status: DISCONTINUED | OUTPATIENT
Start: 2021-01-28 | End: 2021-01-28

## 2021-01-28 RX ADMIN — Medication 25 MILLIGRAM(S): at 16:52

## 2021-01-28 RX ADMIN — Medication 25 MILLIGRAM(S): at 06:03

## 2021-01-28 RX ADMIN — Medication 1 TABLET(S): at 13:33

## 2021-01-28 RX ADMIN — Medication 100 MICROGRAM(S): at 06:03

## 2021-01-28 RX ADMIN — NYSTATIN CREAM 1 APPLICATION(S): 100000 CREAM TOPICAL at 06:02

## 2021-01-28 RX ADMIN — RIVAROXABAN 15 MILLIGRAM(S): KIT at 16:52

## 2021-01-28 RX ADMIN — Medication 1 TABLET(S): at 16:52

## 2021-01-28 RX ADMIN — Medication 200 MILLIGRAM(S): at 16:52

## 2021-01-28 RX ADMIN — Medication 325 MILLIGRAM(S): at 13:33

## 2021-01-28 RX ADMIN — NYSTATIN CREAM 1 APPLICATION(S): 100000 CREAM TOPICAL at 16:53

## 2021-01-28 RX ADMIN — Medication 10 MILLIEQUIVALENT(S): at 16:52

## 2021-01-28 RX ADMIN — Medication 25 MILLIGRAM(S): at 13:33

## 2021-01-28 RX ADMIN — Medication 1 TABLET(S): at 09:08

## 2021-01-28 RX ADMIN — Medication 240 MILLIGRAM(S): at 06:02

## 2021-01-28 RX ADMIN — Medication 1 MILLIGRAM(S): at 13:33

## 2021-01-28 RX ADMIN — IRON SUCROSE 100 MILLIGRAM(S): 20 INJECTION, SOLUTION INTRAVENOUS at 13:33

## 2021-01-28 RX ADMIN — PANTOPRAZOLE SODIUM 40 MILLIGRAM(S): 20 TABLET, DELAYED RELEASE ORAL at 06:03

## 2021-01-28 RX ADMIN — Medication 20 MILLIGRAM(S): at 16:52

## 2021-01-28 RX ADMIN — Medication 200 MILLIGRAM(S): at 06:02

## 2021-01-28 NOTE — PROGRESS NOTE ADULT - PROBLEM SELECTOR PLAN 8
Continue Synthroid.
Continue Synthroid.   Check TSH
Continue Synthroid.

## 2021-01-28 NOTE — PROGRESS NOTE ADULT - PROBLEM SELECTOR PLAN 10
On Eliquis for anticoagulation.   No additional prophylaxis needed.

## 2021-01-28 NOTE — PROGRESS NOTE ADULT - PROBLEM SELECTOR PLAN 1
Patient with possible Sepsis, POA.   She came in with fever, elevated WBC and elevated lactate.   Most likely due to UTI.   Improved.   Continue IV antibiotics.   Monitor culture data.   ID follow up.
Patient with possible Sepsis, POA.   She came in with fever, elevated WBC and elevated lactate.   Most likely due to UTI.   Improved.   Continue IVF and IV antibiotics.   Monitor culture data.   ID consult called.
Patient with possible Sepsis, POA.   She came in with fever, elevated WBC and elevated lactate.   Most likely due to UTI.   Improved.   Continue IVF and IV antibiotics.   Monitor culture data.   ID consult called.
Patient with possible Sepsis, POA.   She came in with fever, elevated WBC and elevated lactate.   Most likely due to UTI.   Improved.   Changed to oral antibiotics as per ID.   Monitor culture data.   ID follow up.
Patient with possible Sepsis, POA.   She came in with fever, elevated WBC and elevated lactate.   Most likely due to UTI.   Improved.   Changed to oral antibiotics as per ID.   To complete course of oral antibiotics.

## 2021-01-28 NOTE — PROGRESS NOTE ADULT - PROBLEM SELECTOR PLAN 4
H/H improved with transfusion.  Monitor serial CBC.   Hematology follow up.
Patient with history of anemia due to GI bleed in past.   Will transfuse 2 units of PRBC.   hematology eval called.
Patient with anemia of chronic GI blood loss.   H/H improved with transfusion.  Monitor serial CBC.   Hematology follow up.  GI input noted and appreciated.
Patient with anemia of chronic GI blood loss.   H/H improved with transfusion.  Monitor serial CBC.   Hematology follow up.  Will get GI to see patient.
Patient with history of anemia due to GI bleed in past.   Will obtain anemia work up.   Monitor CBC and transfuse if needed.

## 2021-01-28 NOTE — PROGRESS NOTE ADULT - PROBLEM SELECTOR PROBLEM 5
Atrial fibrillation, unspecified

## 2021-01-28 NOTE — PROGRESS NOTE ADULT - ATTENDING COMMENTS
HTN: Increase hydralazine to 50 mg Q 8 Hrs. .   Add low dose Lasix.     Patient is medically stable for discharge to rehab.     I spent more than 35 mins on discharging the patient.
Abnormal Chest X-ray: CT results noted. Mass ruled out.
Abnormal Chest X-ray: CT results noted. Mass ruled out.    Discharge planning.
Infectious Diseases will continue to follow. Please call with any questions.   Octavia Keller M.D.  [facility], Division of Infectious Diseases [phone]  For over the weekend and after hours, please call [phone]
Infectious Diseases will continue to follow. Please call with any questions.   Octavia Keller M.D.  [facility], Division of Infectious Diseases [phone]  For over the weekend and after hours, please call [phone]
Infectious Diseases will continue to follow. Please call with any questions.   Octavia Keller M.D.  [facility], Division of Infectious Diseases [phone]  For over the weekend and after hours, please call [phone]
Infectious Diseases will continue to follow. Please call with any questions.   Octavia Keller M.D.  [facility], Division of Infectious Diseases [phone]  For over the weekend and after hours, please call [phone]
HTN: Will add hydralazine to her regimen.   Will give one dose of Lasix PO.     Discharge planning.
Abnormal Chest X-ray: CT results noted. Mass ruled out.

## 2021-01-28 NOTE — PROGRESS NOTE ADULT - PROBLEM SELECTOR PLAN 5
Continue patient on Cardizem and Metoprolol for rate control.   On Eliquis for anticoagulation.

## 2021-01-28 NOTE — PROGRESS NOTE ADULT - SUBJECTIVE AND OBJECTIVE BOX
Patient is a 89y old  Female who presents with a chief complaint of Urinary and bladder complaints. (2021 14:20)       Pt is seen and examined  pt is awake and lying in bed/out of bed to chair  pt seems comfortable and denies any complaints at this time    HPI:  89 years old female with past medical history of atrial fibrillation, anemia, CAD, dyslipidemia, GERD, GI bleed in past, s/p hemicolectomy, hypertension, constipation, who  in to ER with c/o being weak and urinating all over. She denies any abdominal pain. No nausea or vomiting.     In ER patient is noted to have fever and high white count. She also has abnormal UA. She is being admitted for possible sepsis due to UTI.     Patient denies any chest pain or pressure.   No nausea or vomiting.   No fever or chills.  (2021 15:29)         ROS:  Negative except for:    MEDICATIONS  (STANDING):  cefpodoxime 200 milliGRAM(s) Oral every 12 hours  diltiazem    milliGRAM(s) Oral daily  ferrous    sulfate 325 milliGRAM(s) Oral daily  folic acid 1 milliGRAM(s) Oral daily  furosemide    Tablet 20 milliGRAM(s) Oral daily  hydrALAZINE 50 milliGRAM(s) Oral every 8 hours  lactobacillus acidophilus 1 Tablet(s) Oral three times a day with meals  levothyroxine 100 MICROGram(s) Oral daily  metoprolol tartrate 25 milliGRAM(s) Oral two times a day  multivitamin 1 Tablet(s) Oral daily  nystatin Powder 1 Application(s) Topical two times a day  pantoprazole    Tablet 40 milliGRAM(s) Oral before breakfast  potassium chloride    Tablet ER 10 milliEquivalent(s) Oral daily  rivaroxaban 15 milliGRAM(s) Oral with dinner  simvastatin 20 milliGRAM(s) Oral at bedtime    MEDICATIONS  (PRN):  acetaminophen   Tablet .. 650 milliGRAM(s) Oral every 4 hours PRN Temp greater or equal to 38C (100.4F), Mild Pain (1 - 3)      Allergies    amoxicillin (Unknown)    Intolerances        Vital Signs Last 24 Hrs  T(C): 36.8 (2021 16:44), Max: 37.1 (2021 23:19)  T(F): 98.2 (2021 16:44), Max: 98.7 (2021 23:19)  HR: 60 (2021 16:44) (60 - 82)  BP: 150/58 (2021 16:44) (144/74 - 180/82)  BP(mean): --  RR: 16 (2021 16:44) (16 - 18)  SpO2: 97% (2021 16:44) (73% - 97%)    PHYSICAL EXAM  General: adult in NAD  HEENT: clear oropharynx, anicteric sclera, pink conjunctiva  Neck: supple  CV: normal S1/S2 with no murmur rubs or gallops  Lungs: positive air movement b/l ant lungs,clear to auscultation, no wheezes, no rales  Abdomen: soft non-tender non-distended, no hepatosplenomegaly  Ext: no clubbing cyanosis or edema  Skin: no rashes and no petechiae  Neuro: alert and oriented X 4, no focal deficits  LABS:                          9.4    11.22 )-----------( 263      ( 2021 07:40 )             32.7         Mean Cell Volume : 79.0 fl  Mean Cell Hemoglobin : 22.7 pg  Mean Cell Hemoglobin Concentration : 28.7 gm/dL  Auto Neutrophil # : 8.91 K/uL  Auto Lymphocyte # : 1.07 K/uL  Auto Monocyte # : 0.72 K/uL  Auto Eosinophil # : 0.30 K/uL  Auto Basophil # : 0.03 K/uL  Auto Neutrophil % : 79.4 %  Auto Lymphocyte % : 9.5 %  Auto Monocyte % : 6.4 %  Auto Eosinophil % : 2.7 %  Auto Basophil % : 0.3 %    Serial CBC's   @ 07:40  Hct-32.7 / Hgb-9.4 / Plat-263 / RBC-4.14 / WBC-11.22          Serial CBC's   @ 07:41  Hct-34.6 / Hgb-9.9 / Plat-264 / RBC-4.33 / WBC-10.91          Serial CBC's   @ 07:49  Hct-34.2 / Hgb-9.9 / Plat-245 / RBC-4.29 / WBC-13.24          Serial CBC's   @ 07:01  Hct-25.4 / Hgb-6.8 / Plat-236 / RBC-3.31 / WBC-11.14                144  |  105  |  20  ----------------------------<  121<H>  3.6   |  32<H>  |  0.77    Ca    9.1      2021 07:40            Iron - Total Binding Capacity.: 232 ug/dL (21 @ 11:51)  Ferritin, Serum: 55 ng/mL (21 @ 11:50)  Vitamin B12, Serum: 386 pg/mL (21 @ 11:50)  Folate, Serum: >20.0 ng/mL (21 @ 11:50)  Reticulocyte Percent: 1.9 % (21 @ 06:53)                BLOOD SMEAR INTERPRETATION:       RADIOLOGY & ADDITIONAL STUDIES:     Patient is a 89y old  Female who presents with a chief complaint of Urinary and bladder complaints. (2021 14:20)       Pt is seen and examined  pt is awake and is out of bed  pt seems comfortable and denies any complaints at this time  patient is for planned discharge today per pcp    HPI:  89 years old female with past medical history of atrial fibrillation, anemia, CAD, dyslipidemia, GERD, GI bleed in past, s/p hemicolectomy, hypertension, constipation, who  in to ER with c/o being weak and urinating all over. She denies any abdominal pain. No nausea or vomiting.     In ER patient is noted to have fever and high white count. She also has abnormal UA. She is being admitted for possible sepsis due to UTI.     Patient denies any chest pain or pressure.   No nausea or vomiting.   No fever or chills.  (2021 15:29)         ROS:  as per hpi    MEDICATIONS  (STANDING):  cefpodoxime 200 milliGRAM(s) Oral every 12 hours  diltiazem    milliGRAM(s) Oral daily  ferrous    sulfate 325 milliGRAM(s) Oral daily  folic acid 1 milliGRAM(s) Oral daily  furosemide    Tablet 20 milliGRAM(s) Oral daily  hydrALAZINE 50 milliGRAM(s) Oral every 8 hours  lactobacillus acidophilus 1 Tablet(s) Oral three times a day with meals  levothyroxine 100 MICROGram(s) Oral daily  metoprolol tartrate 25 milliGRAM(s) Oral two times a day  multivitamin 1 Tablet(s) Oral daily  nystatin Powder 1 Application(s) Topical two times a day  pantoprazole    Tablet 40 milliGRAM(s) Oral before breakfast  potassium chloride    Tablet ER 10 milliEquivalent(s) Oral daily  rivaroxaban 15 milliGRAM(s) Oral with dinner  simvastatin 20 milliGRAM(s) Oral at bedtime    MEDICATIONS  (PRN):  acetaminophen   Tablet .. 650 milliGRAM(s) Oral every 4 hours PRN Temp greater or equal to 38C (100.4F), Mild Pain (1 - 3)      Allergies    amoxicillin (Unknown)    Intolerances        Vital Signs Last 24 Hrs  T(C): 36.8 (2021 16:44), Max: 37.1 (2021 23:19)  T(F): 98.2 (2021 16:44), Max: 98.7 (2021 23:19)  HR: 60 (2021 16:44) (60 - 82)  BP: 150/58 (2021 16:44) (144/74 - 180/82)  BP(mean): --  RR: 16 (2021 16:44) (16 - 18)  SpO2: 97% (2021 16:44) (73% - 97%)    PHYSICAL EXAM  General: adult in NAD  HEENT: clear oropharynx, anicteric sclera, pink conjunctiva  Neck: supple  CV: normal S1/S2 with no murmur rubs or gallops  Lungs: positive air movement b/l ant lungs,clear to auscultation, no wheezes, no rales  Abdomen: soft non-tender non-distended, no hepatosplenomegaly  Ext: no clubbing cyanosis or edema  Skin: no rashes and no petechiae  Neuro: alert and oriented X 4, no focal deficits  LABS:                          9.4    11. )-----------( 263      ( 2021 07:40 )             32.7         Mean Cell Volume : 79.0 fl  Mean Cell Hemoglobin : 22.7 pg  Mean Cell Hemoglobin Concentration : 28.7 gm/dL  Auto Neutrophil # : 8.91 K/uL  Auto Lymphocyte # : 1.07 K/uL  Auto Monocyte # : 0.72 K/uL  Auto Eosinophil # : 0.30 K/uL  Auto Basophil # : 0.03 K/uL  Auto Neutrophil % : 79.4 %  Auto Lymphocyte % : 9.5 %  Auto Monocyte % : 6.4 %  Auto Eosinophil % : 2.7 %  Auto Basophil % : 0.3 %    Serial CBC's   @ 07:40  Hct-32.7 / Hgb-9.4 / Plat-263 / RBC-4.14 / WBC-11.22          Serial CBC's   @ 07:41  Hct-34.6 / Hgb-9.9 / Plat-264 / RBC-4.33 / WBC-10.91          Serial CBC's   @ 07:49  Hct-34.2 / Hgb-9.9 / Plat-245 / RBC-4.29 / WBC-13.24          Serial CBC's   @ 07:01  Hct-25.4 / Hgb-6.8 / Plat-236 / RBC-3.31 / WBC-11.14                144  |  105  |  20  ----------------------------<  121<H>  3.6   |  32<H>  |  0.77    Ca    9.1      2021 07:40            Iron - Total Binding Capacity.: 232 ug/dL (21 @ 11:51)  Ferritin, Serum: 55 ng/mL (21 @ 11:50)  Vitamin B12, Serum: 386 pg/mL (21 @ 11:50)  Folate, Serum: >20.0 ng/mL (21 @ 11:50)  Reticulocyte Percent: 1.9 % (21 @ 06:53)

## 2021-01-28 NOTE — PROGRESS NOTE ADULT - PROBLEM SELECTOR PROBLEM 7
Gastroesophageal reflux disease without esophagitis

## 2021-01-28 NOTE — DISCHARGE NOTE PROVIDER - NSDCMRMEDTOKEN_GEN_ALL_CORE_FT
acetaminophen 325 mg oral tablet: 2 tab(s) orally every 4 hours, As needed, Temp greater or equal to 38C (100.4F), Mild Pain (1 - 3)  cefpodoxime 200 mg oral tablet: 1 tab(s) orally every 12 hours stop after 10 days.   diltiazem 240 mg/24 hours oral capsule, extended release: 1 cap(s) orally once a day. Hold if SBP less than 110 or HR less than 60  ferrous sulfate 325 mg (65 mg elemental iron) oral tablet: 1 tab(s) orally once a day  folic acid 1 mg oral tablet: 1 tab(s) orally once a day  furosemide 20 mg oral tablet: 1 tab(s) orally once a day  hydrALAZINE 50 mg oral tablet: 1 tab(s) orally every 8 hours. Hold if SBP less than 110  lactobacillus acidophilus oral capsule: 1 tab(s) orally 3 times a day for 2 weeks.   levothyroxine 100 mcg (0.1 mg) oral tablet: 1 tab(s) orally once a day  Metoprolol Tartrate 25 mg oral tablet: 1 tab(s) orally 2 times a day. Hold if SBP less than 110 or HR less than 60  nystatin 100,000 units/g topical powder: 1 application topically 2 times a day for 2 weeks.   Ocuvite oral tablet: 1 tab(s) orally once a day  pantoprazole 20 mg oral delayed release tablet: 1 tab(s) orally once a day  potassium chloride 10 mEq oral tablet, extended release: 1 tab(s) orally once a day  rivaroxaban 15 mg oral tablet: 1 tab(s) orally every 24 hours  simvastatin 20 mg oral tablet: 1 tab(s) orally once a day (at bedtime)    home

## 2021-01-28 NOTE — DISCHARGE NOTE PROVIDER - CARE PROVIDERS DIRECT ADDRESSES
[email or web address]

## 2021-01-28 NOTE — DISCHARGE NOTE NURSING/CASE MANAGEMENT/SOCIAL WORK - PATIENT PORTAL LINK FT
You can access the FollowMyHealth Patient Portal offered by [facility] by registering at the following website: [email or web address]. By joining [employer]’s FollowMyHealth portal, you will also be able to view your health information using other applications (apps) compatible with our system.

## 2021-01-28 NOTE — PROGRESS NOTE ADULT - SUBJECTIVE AND OBJECTIVE BOX
[facility], Division of Infectious Diseases  JOY Malave Y. Patel, S. Shah  [phone]    Name: RON ABREU  Age: 89y  Gender: Female  MRN: [id number]  Note Date: 01-28-21    Interval History:  Patient seen and examined  No acute overnight events. Afebrile  No complaints.  Notes reviewed    Antibiotics:  cefpodoxime 200 milliGRAM(s) Oral every 12 hours      Medications:  acetaminophen   Tablet .. 650 milliGRAM(s) Oral every 4 hours PRN  cefpodoxime 200 milliGRAM(s) Oral every 12 hours  diltiazem    milliGRAM(s) Oral daily  ferrous    sulfate 325 milliGRAM(s) Oral daily  folic acid 1 milliGRAM(s) Oral daily  iron sucrose Injectable 100 milliGRAM(s) IV Push every 24 hours  lactobacillus acidophilus 1 Tablet(s) Oral three times a day with meals  levothyroxine 100 MICROGram(s) Oral daily  metoprolol tartrate 25 milliGRAM(s) Oral two times a day  multivitamin 1 Tablet(s) Oral daily  nystatin Powder 1 Application(s) Topical two times a day  pantoprazole    Tablet 40 milliGRAM(s) Oral before breakfast  rivaroxaban 15 milliGRAM(s) Oral with dinner  simvastatin 20 milliGRAM(s) Oral at bedtime      Review of Systems:  A 10-point review of systems was obtained.     Pertinent positives and negatives--  Constitutional: No fevers. No Chills. No Rigors.   Cardiovascular: No chest pain. No palpitations.  Respiratory: No shortness of breath. No cough.  Gastrointestinal: No nausea or vomiting. No diarrhea or constipation.   Psychiatric: Pleasant. Appropriate affect.    Review of systems otherwise negative except as previously noted.    Allergies: amoxicillin (Unknown)    For details regarding the patient's past medical history, social history, family history, and other miscellaneous elements, please refer the initial infectious diseases consultation and/or the admitting history and physical examination for this admission.    Objective:  Vital Signs Last 24 Hrs  T(C): 36.6 (28 Jan 2021 11:00), Max: 37.1 (27 Jan 2021 23:19)  T(F): 97.9 (28 Jan 2021 11:00), Max: 98.7 (27 Jan 2021 23:19)  HR: 61 (28 Jan 2021 11:00) (60 - 82)  BP: 144/74 (28 Jan 2021 11:00) (144/74 - 189/76)  BP(mean): --  RR: 18 (28 Jan 2021 11:00) (18 - 18)  SpO2: 90% (28 Jan 2021 11:00) (90% - 97%)    Physical Examination:  General: no acute distress  HEENT: NC/AT, EOMI, anicteric, no oral lesions  Neck: supple, no palpable LAD  Cardio: S1, S2 heard, RRR, no murmurs  Resp: breath sounds heard bilaterally, no rales, wheezes or rhonchi  Abd: soft, NT, ND, + bowel sounds  Neuro: no obvious focal deficits  Ext: no edema or cyanosis  Skin: warm, dry, no visible rash    Laboratory Studies: reviewed    Microbiology: reviewed    Culture - Blood (collected 01-23-21 @ 23:15)  Source: .Blood Blood-Peripheral  Preliminary Report (01-25-21 @ 01:09):    No growth to date.    Culture - Blood (collected 01-23-21 @ 23:15)  Source: .Blood Blood-Peripheral  Preliminary Report (01-25-21 @ 01:09):    No growth to date.    Culture - Urine (collected 01-23-21 @ 23:12)  Source: .Urine Clean Catch (Midstream)  Final Report (01-25-21 @ 17:41):    >100,000 CFU/ml Escherichia coli  Organism: Escherichia coli (01-25-21 @ 17:41)  Organism: Escherichia coli (01-25-21 @ 17:41)      -  Amikacin: S <=16      -  Amoxicillin/Clavulanic Acid: S <=8/4      -  Ampicillin: R >16 These ampicillin results predict results for amoxicillin      -  Ampicillin/Sulbactam: S <=4/2 Enterobacter, Citrobacter, and Serratia may develop resistance during prolonged therapy (3-4 days)      -  Aztreonam: S <=4      -  Cefazolin: R >16 (MIC_CL_COM_ENTERIC_CEFAZU) For uncomplicated UTI with K. pneumoniae, E. coli, or P. mirablis: JOHN <=16 is sensitive and JOHN >=32 is resistant. This also predicts results for oral agents cefaclor, cefdinir, cefpodoxime, cefprozil, cefuroxime axetil, cephalexin and locarbef for uncomplicated UTI. Note that some isolates may be susceptible to these agents while testing resistant to cefazolin.      -  Cefepime: S <=2      -  Cefoxitin: S <=8      -  Ceftriaxone: S <=1 Enterobacter, Citrobacter, and Serratia may develop resistance during prolonged therapy      -  Ciprofloxacin: I 0.5      -  Ertapenem: S <=0.5      -  Gentamicin: S <=2      -  Imipenem: S <=1      -  Levofloxacin: S <=0.5      -  Meropenem: S <=1      -  Nitrofurantoin: S <=32 Should not be used to treat pyelonephritis      -  Piperacillin/Tazobactam: S <=8      -  Tigecycline: S <=2      -  Tobramycin: S <=2      -  Trimethoprim/Sulfamethoxazole: R >2/38      Method Type: JOHN        Radiology: reviewed

## 2021-01-28 NOTE — DISCHARGE NOTE PROVIDER - NSDCCPCAREPLAN_GEN_ALL_CORE_FT
PRINCIPAL DISCHARGE DIAGNOSIS  Diagnosis: Pyelonephritis  Assessment and Plan of Treatment: Complete course of antibiotics.   Increase activity with PT at [facility].   Follow up with Dr. Baldwin at [facility].   Follow up with Dr. [facility] after discharge from rehab.      SECONDARY DISCHARGE DIAGNOSES  Diagnosis: Anemia  Assessment and Plan of Treatment: Anemia    Diagnosis: Kidney stone on right side  Assessment and Plan of Treatment: Kidney stone on right side    Diagnosis: CAD (coronary artery disease)  Assessment and Plan of Treatment: CAD (coronary artery disease)    Diagnosis: Hyperlipidemia, unspecified hyperlipidemia type  Assessment and Plan of Treatment: Hyperlipidemia, unspecified hyperlipidemia type    Diagnosis: Atrial fibrillation, unspecified  Assessment and Plan of Treatment: Atrial fibrillation, unspecified    Diagnosis: Hypothyroidism, unspecified type  Assessment and Plan of Treatment: Hypothyroidism, unspecified type    Diagnosis: Prophylactic measure  Assessment and Plan of Treatment: Prophylactic measure

## 2021-01-28 NOTE — PROGRESS NOTE ADULT - PROBLEM SELECTOR PLAN 6
Continue patient on Zocor.

## 2021-01-28 NOTE — PROGRESS NOTE ADULT - NSHPATTENDINGPLANDISCUSS_GEN_ALL_CORE
patient and nursing.
patient, nursing and son on phone in detail.

## 2021-01-28 NOTE — PROGRESS NOTE ADULT - SUBJECTIVE AND OBJECTIVE BOX
Patient is a 89y old  Female who presents with a chief complaint of Urinary and bladder complaints. (2021 12:51)    HPI: 89 years old female with past medical history of atrial fibrillation, anemia, CAD, dyslipidemia, GERD, GI bleed in past, s/p hemicolectomy, hypertension, constipation, who  in to ER with c/o being weak and urinating all over. She denies any abdominal pain. No nausea or vomiting.     In ER patient is noted to have fever and high white count. She also has abnormal UA. She is being admitted for possible sepsis due to UTI.     INTERVAL HPI/OVERNIGHT EVENTS:  Chart reviewed, notes reviewed.   Patient seen and examined.  Being followed by following specialists: ID. Urology consult called.     Consultant(s) Notes Reviewed:  [X] Yes    Care Discussed with Consultants/Other Providers: [X] Yes    2021 --> Doing better. Denies any chest pain or pressure. Denies any flank pain. No fever or chills. Seen by PT.   2021 --> Doing OK. Getting blood transfusion. No abdominal pain. No chest pain or pressure. No nausea or vomiting. No fever or chills.   2021 --> No new issues. Tolerated blood transfusions without any problems. No fever or chills.   2021 --> Doing well. No chest pain or pressure. No nausea or vomiting. BP was high last night and this morning.     2021 --> Doing well. No new issues. No chest pain or pressure.     REVIEW OF SYSTEMS:  CONSTITUTIONAL: + fatigue  EYES: No eye pain, or discharge  ENMT: No sinus or throat pain  NECK: No pain or stiffness  BREASTS: No pain, masses, or nipple discharge  RESPIRATORY: No cough, wheezing, chills or hemoptysis; No shortness of breath  CARDIOVASCULAR: No chest pain, palpitations, dizziness, or leg swelling  GASTROINTESTINAL: No abdominal or epigastric pain. No nausea, vomiting.  GENITOURINARY:  + incontinence  NEUROLOGICAL: No loss of strength, numbness, or tremors  SKIN: No itching, burning, rashes, or lesions   LYMPH NODES: No enlarged glands  ENDOCRINE: No polydipsia or polyuria  MUSCULOSKELETAL: No muscle, back, or extremity pain  PSYCHIATRIC: No depression, anxiety, mood swings.  HEME/LYMPH: No easy bruising, or bleeding gums  ALLERGY AND IMMUNOLOGIC: No hives or eczema    Allergies    amoxicillin (Unknown)    Intolerances      Home Medications:  diltiazem 240 mg/24 hours oral capsule, extended release: 1 cap(s) orally once a day (2021 09:48)  folic acid 1 mg oral tablet: 1 tab(s) orally once a day (2021 09:48)  levothyroxine 100 mcg (0.1 mg) oral tablet: 1 tab(s) orally once a day (2021 09:48)  Metoprolol Tartrate 25 mg oral tablet: 1 tab(s) orally 2 times a day (2021 09:48)  Ocuvite oral tablet: 1 tab(s) orally once a day (2021 09:48)  pantoprazole 20 mg oral delayed release tablet: 1 tab(s) orally once a day (2021 09:48)  simvastatin 20 mg oral tablet: 1 tab(s) orally once a day (at bedtime)    MEDICATIONS  (STANDING):  cefpodoxime 200 milliGRAM(s) Oral every 12 hours  diltiazem    milliGRAM(s) Oral daily  ferrous    sulfate 325 milliGRAM(s) Oral daily  folic acid 1 milliGRAM(s) Oral daily  furosemide    Tablet 20 milliGRAM(s) Oral daily  hydrALAZINE 50 milliGRAM(s) Oral every 8 hours  lactobacillus acidophilus 1 Tablet(s) Oral three times a day with meals  levothyroxine 100 MICROGram(s) Oral daily  metoprolol tartrate 25 milliGRAM(s) Oral two times a day  multivitamin 1 Tablet(s) Oral daily  nystatin Powder 1 Application(s) Topical two times a day  pantoprazole    Tablet 40 milliGRAM(s) Oral before breakfast  potassium chloride    Tablet ER 10 milliEquivalent(s) Oral daily  rivaroxaban 15 milliGRAM(s) Oral with dinner  simvastatin 20 milliGRAM(s) Oral at bedtime    MEDICATIONS  (PRN):  acetaminophen   Tablet .. 650 milliGRAM(s) Oral every 4 hours PRN Temp greater or equal to 38C (100.4F), Mild Pain (1 - 3)    Vital Signs Last 24 Hrs  T(C): 37 (2021 14:21), Max: 37.1 (2021 23:19)  T(F): 98.6 (2021 14:21), Max: 98.7 (2021 23:19)  HR: 60 (2021 14:21) (60 - 82)  BP: 145/73 (2021 14:21) (144/74 - 189/76)  BP(mean): --  RR: 18 (2021 14:21) (18 - 18)  SpO2: 73% (2021 14:21) (73% - 97%)    PHYSICAL EXAM:  GENERAL: NAD, well-groomed, well-developed  HEAD:  Atraumatic, Normocephalic  EYES: Pallor +  ENMT: Moist mucous membranes, no lesions  NECK: Supple.  CHEST/LUNG: Decreased breath sounds at bases, rest is clear.   HEART: S1, S2.   ABDOMEN: Soft, Nontender, Nondistended; Bowel sounds present  EXTREMITIES:  2+ Peripheral Pulses, No clubbing, cyanosis, or edema  MS: No joint swelling or deformity.   LYMPH: No lymphadenopathy noted  SKIN: No rashes or lesions  NERVOUS SYSTEM:  No focal deficit.   PSYCH:  Awake and alert.     LABS:                             9.4    11.22 )-----------( 263      ( 2021 07:40 )             32.7     2021 07:40    144    |  105    |  20     ----------------------------<  121    3.6     |  32     |  0.77     Ca    9.1        2021 07:40     Chol 117 LDL -- HDL 44<L> Trig 77  Iron - Total Binding Capacity.: 232 ug/dL ( @ 11:51)  Iron Total, Serum: 11 ug/dL ( @ 11:51)  Ferritin, Serum: 55 ng/mL ( @ 11:50)    Thyroid Stimulating Hormone, Serum: 2.59 uIU/mL ( @ 23:38)  Occult Blood, Feces (21 @ 11:23)   Occult Blood, Feces: Positive: Method: Peroxidase Catalyzation Activity       RADIOLOGY TEST: (IMAGES REVIEWED BY ME)  EXAM:  CT CHEST                        PROCEDURE DATE:  2021    INTERPRETATION:  CT CHEST WITHOUT CONTRAST    INDICATION: Possible mass on chest x-ray.    TECHNIQUE: Unenhanced helical images were obtained of the chest. Coronal and sagittal images were reconstructed. Maximum intensity projection images were generated.    COMPARISON: Radiograph chest 2021. CT chest 2018.    FINDINGS:    Tubes/Lines: None.    Lungs, airways, and pleura: The opacity along the recentradiograph was secondary to summation of shadows from the pulmonary vasculature and the heart border. There is mosaic attenuation of the lungs. The airways are unremarkable.    Mediastinum: The chest lymph nodes measure less than 15 mm in the short axis. The visualized thyroid gland is atrophic. The esophagus is unremarkable.    Heart and Vasculature: The heart is enlarged. No pericardial effusion. Coronary artery calcifications. Coronary artery stents. Mitral annular calcification. Status post TAVR .    Left-sided aortic arch and left-sided descending thoracic aorta. The ascending aorta measures 4.5 cm at the main pulmonary artery. Aorta is tortuous and ectatic. Aortic calcifications. The pulmonary artery is enlarged which can suggest pulmonary artery hypertension.    Upper Abdomen: The upper abdomen is unremarkable.    Bones And Soft Tissues: Sternotomy. Degenerative change of the spine.  The soft tissues are unremarkable.      IMPRESSION:    1.  The abnormality on the recent radiograph was secondary to summation of shadows.  2.  Mosaic attenuation of the lungs.      < end of copied text >  < from: CT Renal Stone Hunt (21 @ 21:18) >  EXAM:  CT RENAL STONE HUNT                        PROCEDURE DATE:  2021    INTERPRETATION:  CT ABDOMEN AND PELVIS WITHOUT CONTRAST    INDICATION: Urinary tract infection.    TECHNIQUE: Abdominopelvic CT without intravenous contrast.Images are reformatted in the sagittal and coronal planes.    COMPARISON: CT abdomen pelvis 2018.    FINDINGS:    Absence of intravenous contrast limits evaluation for focal lesions, neoplasm, and vascular pathology.    Lower Thorax: Partially imaged median sternotomy and postoperative changes of TAVR. Partially imaged intracardiac leads. Cardiomegaly. Trace left pleural effusion. Interlobular septal thickening and mild groundglass attenuation, likely in keeping with mild pulmonary edema.    Liver: No suspicious lesions.  Biliary: No significant dilatation, postcholecystectomy.  Spleen: No suspicious lesions.  Pancreas: No inflammatory changes or ductal dilatation.  Adrenals: Normal.  Kidneys: Mild right hydronephrosis secondary to 6mm right ureteropelvic junction stone on image 59 of series 3. Additional 3 mm nonobstructive stone in the upper pole of right kidney. Mild bilateral perinephric stranding, right greater than left.  Vessels: Normal caliber. Atherosclerotic disease ofthe aorta and its branches.    GI tract: No evidence of small bowel obstruction. Bowel postoperative changes. No acute bowel inflammatory changes. Colonic diverticulosis without evidence of acute diverticulitis.    Peritoneum/retroperitoneum and mesentery: No free air. No organized fluid collection. No adenopathy.    Pelvic organs/Bladder: Uterus is atrophied or surgically absent. Mild bladder wall thickening, difficult to assess secondary to inadequate distention.  Abdominal wall: Small left infraumbilical abdominal wall hernia containing non obstructed bowel loop.  Bones and soft tissues: Multilevel degenerative changes of the spine noted.    IMPRESSION:    Mild right hydronephrosis secondary to 6 mm right ureteropelvic junction stone. Additional 3 mm nonobstructive stone in the upper pole of right kidney. Mild bilateral perinephric stranding, right greater than left.    Mild bladder wall thickening, difficult to assess secondary to inadequate distention. Correlate with urinalysis and laboratory values to assess for cystitis, ascending urinary tract infection or right pyelonephritis.    Additional findings as mentioned above.      < end of copied text >        Imaging Personally Reviewed:  [X] YES      HEALTH ISSUES - PROBLEM Dx:  Prophylactic measure  Prophylactic measure    CAD (coronary artery disease)  CAD (coronary artery disease)    Hypothyroidism, unspecified type  Hypothyroidism, unspecified type    Gastroesophageal reflux disease without esophagitis  Gastroesophageal reflux disease without esophagitis    Hyperlipidemia, unspecified hyperlipidemia type  Hyperlipidemia, unspecified hyperlipidemia type    Atrial fibrillation, unspecified  Atrial fibrillation, unspecified    Anemia  Anemia    UTI (urinary tract infection)  UTI (urinary tract infection)    Sepsis  Sepsis    Pyelonephritis  Pyelonephritis

## 2021-01-28 NOTE — PROGRESS NOTE ADULT - PROBLEM SELECTOR PLAN 2
Patient with possible pyelonephritis, POA as seen on CT scan.   Continue antibiotics as per ID.   ID follow up appreciated.   Continue oral antibiotics.
Patient with possible pyelonephritis, POA as seen on CT scan.   Continue IV antibiotics and follow culture data.   ID follow up and urology consult called.
Patient with possible pyelonephritis, POA as seen on CT scan.   Continue IV antibiotics and follow culture data.   ID follow up appreciated.   Will change to oral antibiotics in AM.
Patient with possible pyelonephritis, POA as seen on CT scan.   Continue antibiotics as per ID.   ID follow up appreciated.   Will change to oral antibiotics in AM.
Patient with possible pyelonephritis, POA as seen on CT scan.   Continue IV antibiotics and follow culture data.   ID follow up and urology consult called.

## 2021-01-28 NOTE — PROGRESS NOTE ADULT - PROBLEM SELECTOR PROBLEM 3
Kidney stone on right side

## 2021-01-28 NOTE — DISCHARGE NOTE PROVIDER - CARE PROVIDER_API CALL
Eran Baldwin; MBBS)  Internal Medicine  [address] [place] [address]  Phone: [phone]  Fax: [phone]  Follow Up Time:     Alonso Craig)  Cardiovascular Disease; Internal Medicine  [address] [place] [address]  Phone: [phone]  Fax: [phone]  Follow Up Time:

## 2021-01-28 NOTE — DISCHARGE NOTE PROVIDER - HOSPITAL COURSE
Patient admitted for Sepsis due to UTI.   Patient had UTI and possible pyelonephritis.   Patient was seen by ID for antibiotic management.   She also has kidney stone at right UPJ.   She was seen by Urology. No urological intervention at this time.   She also had anemia due to chronic GI blood loss and required 2 units of PRBC.   She was seen by GI.   Patient slowly improved.   She was changed to oral antibiotics.   Patient is being discharged to [facility] as per PT recommendation.

## 2021-01-28 NOTE — DISCHARGE NOTE PROVIDER - NSDCACTIVITY_GEN_ALL_CORE
Bathing allowed/Do not drive or operate machinery/Showering allowed/Do not make important decisions/Stairs allowed/Walking - Indoors allowed/No heavy lifting/straining/Walking - Outdoors allowed

## 2021-01-28 NOTE — PROGRESS NOTE ADULT - PROVIDER SPECIALTY LIST ADULT
Infectious Disease
Heme/Onc
Urology
Infectious Disease
Internal Medicine

## 2021-01-28 NOTE — PROGRESS NOTE ADULT - REASON FOR ADMISSION
Urinary and bladder complaints.

## 2021-01-28 NOTE — PROGRESS NOTE ADULT - PROBLEM SELECTOR PLAN 3
Patient with mild right hydronephrosis secondary to 6 mm right ureteropelvic junction stone, POA.   Urology consult called.   Patient seems to be improving with current therapy.   She will need to be transferred to [facility] if she needs any stent.
Patient with mild right hydronephrosis secondary to 6 mm right ureteropelvic junction stone, POA.   Urology consult noted and appreciated.  Patient seems to be improving with current therapy.   Continue current therapy.
Patient with mild right hydronephrosis secondary to 6 mm right ureteropelvic junction stone, POA.   Urology consult noted and appreciated.  Patient seems to be improving with current therapy.   Continue current therapy.
Patient with mild right hydronephrosis secondary to 6 mm right ureteropelvic junction stone, POA.   Urology consult noted and appreciated.  No urological intervention at this time.
Patient with mild right hydronephrosis secondary to 6 mm right ureteropelvic junction stone, POA.   Urology consult noted and appreciated.  No urological intervention at this time.

## 2021-01-28 NOTE — PROGRESS NOTE ADULT - PROBLEM SELECTOR PLAN 9
Continue Metoprolol and Zocor.

## 2021-01-28 NOTE — PROGRESS NOTE ADULT - ASSESSMENT
6mm right renal pelvic stone.  No S/S of sepsis or obstructive uropathy  Continue antibiotics per ID for cystitis/pyelonephritis  No  intervention  Reconsult prn
89 years old female with past medical history of atrial fibrillation, anemia, CAD, dyslipidemia, GERD, GI bleed in past, s/p hemicolectomy, hypertension, constipation, who  in to ER with c/o being weak and urinating all over. She denies any abdominal pain. No nausea or vomiting.     In ER patient is noted to have fever and high white count. She also has abnormal UA. She is being admitted for possible sepsis due to UTI. 
89 years old female with past medical history of atrial fibrillation, anemia, CAD, dyslipidemia, GERD, GI bleed in past, s/p hemicolectomy, hypertension, constipation, who  in to ER with c/o being weak and urinating all over. She denies any abdominal pain. No nausea or vomiting.     In ER patient is noted to have fever and high white count. She also has abnormal UA. She is being admitted for possible sepsis due to UTI. 
Pt is a 89W w/ PMHx of CAD, HTN, HLD, Afib here w/ urinary complaints and weakness.    Sepsis 2/2 E.coli Pyelonephritis  Leukocytosis  -BCx x2 NGTD  -UCx w/ E.coli, pending susceptibilities  -trend fevers/leukocytosis  -maintain aspiration precautions  -c/w ceftriaxone 1gm q24h  Will determine final therapy and duration pending above  
Pt is a 89W w/ PMHx of CAD, HTN, HLD, Afib here w/ urinary complaints and weakness.    Sepsis 2/2 E.coli Pyelonephritis  Nephrolithiasis  Leukocytosis  -BCx x2 NGTD  -UCx w/ E.coli, susceptibilities reviewed  -trend fevers/leukocytosis  --leukocytosis with slight uptrend, will c/w monitoring  -maintain aspiration precautions  -pain control prn  -c/w ceftriaxone 1gm q24h while inpatient  When ready for d/c can send home on cefpodoxime 200mg BID to complete total 14 day course until 2/5/2020  
Pt is a 89W w/ PMHx of CAD, HTN, HLD, Afib here w/ urinary complaints and weakness.    Sepsis 2/2 E.coli Pyelonephritis  Nephrolithiasis  Leukocytosis--stable  -BCx x2 NGTD  -UCx w/ E.coli, susceptibilities reviewed  -trend fevers/leukocytosis  --leukocytosis with slight uptrend, will c/w monitoring  -maintain aspiration precautions  -pain control prn  C/w cefpodoxime 200mg BID to complete total 14 day course until 2/5/2020  
  contact #  son----Nohemy Hoyt  phone # [phone]--called and discussed with son    IMPRESSION:    89 years old female with past medical history of atrial fibrillation, anemia, CAD, dyslipidemia, GERD, GI bleed in past, s/p hemicolectomy, hypertension, constipation, who  in to ER with c/o being weak and urinating all over. She denies any abdominal pain. No nausea or vomiting.   In ER patient is noted to have fever and high white count. She also has abnormal UA. She is being admitted for possible sepsis due to UTI.   Patient is on iv fluids and iv abx as per id for uti/pyelonephritis  noted anemia, hb dropped at 6.8 on 21 , requested prbc and getting prbc at consult, hem was consulted  patient with gallito and is on xarelto per cardiology    RECOMMENDATION:  Anemia--likely multifactorial  patient with microcytic anemia now and had nl mcv in 2018, patient with hx of gi bleed in 2018 and hx of ch anemia per son  guaiac positive stool--consider gi evaluation  s/p prbc on   iron panel--low iron/transferrin saturation, ferritin is an acute phase reactant and started iv iron from today as looking for a rapid response, day 1 (1/3) today  hb is at 9.9 today  monitor cbc, h/h  gallito--ac/management per cardiology  dementia--per primary care  d/w pt son on phone, who refuses invasive gi procedure, extensive radio scan/aggressive work up considering her age and multiple co morbid medical history as well as will not go for any aggressive treatment despite any diagnosis  d/w pt at bedside, d/w son on phone  
contact #  son----Nohemy Hoyt  phone # [phone]--called and discussed with son    IMPRESSION:    89 years old female with past medical history of atrial fibrillation, anemia, CAD, dyslipidemia, GERD, GI bleed in past, s/p hemicolectomy, hypertension, constipation, who  in to ER with c/o being weak and urinating all over. She denies any abdominal pain. No nausea or vomiting.   In ER patient is noted to have fever and high white count. She also has abnormal UA. She is being admitted for possible sepsis due to UTI.   Patient is on iv fluids and iv abx as per id for uti/pyelonephritis  noted anemia, hb dropped at 6.8 on 21 , requested prbc and getting prbc at consult, hem was consulted  patient with gallito and is on xarelto per cardiology    RECOMMENDATION:  Anemia--likely multifactorial, guaiac pos stool, s/p prbc, seen by gi  patient with microcytic anemia now and had nl mcv in 2018, patient with hx of gi bleed in 2018 and hx of ch anemia per son  guaiac positive stool/anemia--seen by gi and final decision/management/plan as per gi/pt/family to exclude gi pathology/malignancy  s/p prbc on 21  iron panel--low iron/transferrin saturation, ferritin is an acute phase reactant   s/p iron from -- as were looking for a rapid response, now on po iron  hb is at 9.4 today, hb was at 9.9 yesterday  monitor cbc, h/h  bridgetteb--ac/management per cardiology  dementia--per primary care  uti/sepsis--abx per id/pcp  d/w pt son on phone, who refuses invasive gi procedure, extensive radio scan/aggressive work up considering her age and multiple co morbid medical history as well as will not go for any aggressive treatment despite any diagnosis  d/w pt at bedside, d/w son on phone  
Pt is a 89W w/ PMHx of CAD, HTN, HLD, Afib here w/ urinary complaints and weakness.    Sepsis 2/2 E.coli Pyelonephritis  Nephrolithiasis  Leukocytosis  -BCx x2 NGTD  -UCx w/ E.coli, susceptibilities reviewed  -trend fevers/leukocytosis  --leukocytosis with slight uptrend, will c/w monitoring  -maintain aspiration precautions  -pain control prn  C/w cefpodoxime 200mg BID to complete total 14 day course until 2/5/2020  
contact #  son----Nohemy Hoyt  phone # [phone]--called and discussed with son    IMPRESSION:    89 years old female with past medical history of atrial fibrillation, anemia, CAD, dyslipidemia, GERD, GI bleed in past, s/p hemicolectomy, hypertension, constipation, who  in to ER with c/o being weak and urinating all over. She denies any abdominal pain. No nausea or vomiting.   In ER patient is noted to have fever and high white count. She also has abnormal UA. She is being admitted for possible sepsis due to UTI.   Patient is on iv fluids and iv abx as per id for uti/pyelonephritis  noted anemia, hb dropped at 6.8 on 21 , requested prbc and getting prbc at consult, hem was consulted  patient with gallito and is on xarelto per cardiology    RECOMMENDATION:  Anemia--likely multifactorial  patient with microcytic anemia now and had nl mcv in 2018, patient with hx of gi bleed in 2018 and hx of ch anemia per son  guaiac positive stool--follow gi recommendations  s/p prbc on   iron panel--low iron/transferrin saturation, ferritin is an acute phase reactant and started iv iron from today as looking for a rapid response, day 1 (1/3) today  hb is at 9.9 today, hb was at 9.9 yesterday  monitor cbc, h/h  gallito--ac/management per cardiology  dementia--per primary care  d/w pt son on phone, who refuses invasive gi procedure, extensive radio scan/aggressive work up considering her age and multiple co morbid medical history as well as will not go for any aggressive treatment despite any diagnosis  d/w pt at bedside, d/w son on phone  
89 years old female with past medical history of atrial fibrillation, anemia, CAD, dyslipidemia, GERD, GI bleed in past, s/p hemicolectomy, hypertension, constipation, who  in to ER with c/o being weak and urinating all over. She denies any abdominal pain. No nausea or vomiting.     In ER patient is noted to have fever and high white count. She also has abnormal UA. She is being admitted for possible sepsis due to UTI. 

## 2021-01-29 LAB
CULTURE RESULTS: SIGNIFICANT CHANGE UP
CULTURE RESULTS: SIGNIFICANT CHANGE UP
SPECIMEN SOURCE: SIGNIFICANT CHANGE UP
SPECIMEN SOURCE: SIGNIFICANT CHANGE UP

## 2021-02-08 ENCOUNTER — RX RENEWAL (OUTPATIENT)
Age: 86
End: 2021-02-08

## 2021-02-16 ENCOUNTER — APPOINTMENT (OUTPATIENT)
Dept: CARDIOLOGY | Facility: CLINIC | Age: 86
End: 2021-02-16

## 2021-04-14 ENCOUNTER — NON-APPOINTMENT (OUTPATIENT)
Age: 86
End: 2021-04-14

## 2021-06-01 ENCOUNTER — NON-APPOINTMENT (OUTPATIENT)
Age: 86
End: 2021-06-01

## 2021-06-23 ENCOUNTER — EMERGENCY (EMERGENCY)
Facility: HOSPITAL | Age: 86
LOS: 1 days | Discharge: NURSING, REHAB OR LONG-TERM CARE | End: 2021-06-23
Attending: EMERGENCY MEDICINE | Admitting: EMERGENCY MEDICINE
Payer: MEDICARE

## 2021-06-23 VITALS
DIASTOLIC BLOOD PRESSURE: 71 MMHG | WEIGHT: 117.07 LBS | TEMPERATURE: 98 F | RESPIRATION RATE: 18 BRPM | SYSTOLIC BLOOD PRESSURE: 138 MMHG | HEART RATE: 60 BPM | HEIGHT: 55 IN | OXYGEN SATURATION: 96 %

## 2021-06-23 VITALS
OXYGEN SATURATION: 97 % | DIASTOLIC BLOOD PRESSURE: 78 MMHG | RESPIRATION RATE: 20 BRPM | SYSTOLIC BLOOD PRESSURE: 129 MMHG | HEART RATE: 61 BPM | TEMPERATURE: 98 F

## 2021-06-23 DIAGNOSIS — K62.9 DISEASE OF ANUS AND RECTUM, UNSPECIFIED: Chronic | ICD-10-CM

## 2021-06-23 DIAGNOSIS — Z90.710 ACQUIRED ABSENCE OF BOTH CERVIX AND UTERUS: Chronic | ICD-10-CM

## 2021-06-23 DIAGNOSIS — Z86.010 PERSONAL HISTORY OF COLONIC POLYPS: Chronic | ICD-10-CM

## 2021-06-23 DIAGNOSIS — Z95.1 PRESENCE OF AORTOCORONARY BYPASS GRAFT: Chronic | ICD-10-CM

## 2021-06-23 LAB
ALBUMIN SERPL ELPH-MCNC: 3.4 G/DL — SIGNIFICANT CHANGE UP (ref 3.3–5)
ALP SERPL-CCNC: 86 U/L — SIGNIFICANT CHANGE UP (ref 30–120)
ALT FLD-CCNC: 21 U/L DA — SIGNIFICANT CHANGE UP (ref 10–60)
ANION GAP SERPL CALC-SCNC: 10 MMOL/L — SIGNIFICANT CHANGE UP (ref 5–17)
APTT BLD: 38.7 SEC — HIGH (ref 27.5–35.5)
AST SERPL-CCNC: 20 U/L — SIGNIFICANT CHANGE UP (ref 10–40)
BASOPHILS # BLD AUTO: 0.02 K/UL — SIGNIFICANT CHANGE UP (ref 0–0.2)
BASOPHILS NFR BLD AUTO: 0.2 % — SIGNIFICANT CHANGE UP (ref 0–2)
BILIRUB SERPL-MCNC: 0.4 MG/DL — SIGNIFICANT CHANGE UP (ref 0.2–1.2)
BUN SERPL-MCNC: 20 MG/DL — SIGNIFICANT CHANGE UP (ref 7–23)
CALCIUM SERPL-MCNC: 9.3 MG/DL — SIGNIFICANT CHANGE UP (ref 8.4–10.5)
CHLORIDE SERPL-SCNC: 98 MMOL/L — SIGNIFICANT CHANGE UP (ref 96–108)
CO2 SERPL-SCNC: 27 MMOL/L — SIGNIFICANT CHANGE UP (ref 22–31)
CREAT SERPL-MCNC: 1.29 MG/DL — SIGNIFICANT CHANGE UP (ref 0.5–1.3)
EOSINOPHIL # BLD AUTO: 0.2 K/UL — SIGNIFICANT CHANGE UP (ref 0–0.5)
EOSINOPHIL NFR BLD AUTO: 2.1 % — SIGNIFICANT CHANGE UP (ref 0–6)
GLUCOSE SERPL-MCNC: 154 MG/DL — HIGH (ref 70–99)
HCT VFR BLD CALC: 39.3 % — SIGNIFICANT CHANGE UP (ref 34.5–45)
HGB BLD-MCNC: 12.5 G/DL — SIGNIFICANT CHANGE UP (ref 11.5–15.5)
IMM GRANULOCYTES NFR BLD AUTO: 0.7 % — SIGNIFICANT CHANGE UP (ref 0–1.5)
INR BLD: 1.74 RATIO — HIGH (ref 0.88–1.16)
LYMPHOCYTES # BLD AUTO: 1.47 K/UL — SIGNIFICANT CHANGE UP (ref 1–3.3)
LYMPHOCYTES # BLD AUTO: 15.4 % — SIGNIFICANT CHANGE UP (ref 13–44)
MAGNESIUM SERPL-MCNC: 2.1 MG/DL — SIGNIFICANT CHANGE UP (ref 1.6–2.6)
MCHC RBC-ENTMCNC: 28.3 PG — SIGNIFICANT CHANGE UP (ref 27–34)
MCHC RBC-ENTMCNC: 31.8 GM/DL — LOW (ref 32–36)
MCV RBC AUTO: 89.1 FL — SIGNIFICANT CHANGE UP (ref 80–100)
MONOCYTES # BLD AUTO: 0.73 K/UL — SIGNIFICANT CHANGE UP (ref 0–0.9)
MONOCYTES NFR BLD AUTO: 7.7 % — SIGNIFICANT CHANGE UP (ref 2–14)
NEUTROPHILS # BLD AUTO: 7.04 K/UL — SIGNIFICANT CHANGE UP (ref 1.8–7.4)
NEUTROPHILS NFR BLD AUTO: 73.9 % — SIGNIFICANT CHANGE UP (ref 43–77)
NRBC # BLD: 0 /100 WBCS — SIGNIFICANT CHANGE UP (ref 0–0)
PLATELET # BLD AUTO: 177 K/UL — SIGNIFICANT CHANGE UP (ref 150–400)
POTASSIUM SERPL-MCNC: 4 MMOL/L — SIGNIFICANT CHANGE UP (ref 3.5–5.3)
POTASSIUM SERPL-SCNC: 4 MMOL/L — SIGNIFICANT CHANGE UP (ref 3.5–5.3)
PROT SERPL-MCNC: 7.6 G/DL — SIGNIFICANT CHANGE UP (ref 6–8.3)
PROTHROM AB SERPL-ACNC: 20.5 SEC — HIGH (ref 10.6–13.6)
RBC # BLD: 4.41 M/UL — SIGNIFICANT CHANGE UP (ref 3.8–5.2)
RBC # FLD: 15.8 % — HIGH (ref 10.3–14.5)
SODIUM SERPL-SCNC: 135 MMOL/L — SIGNIFICANT CHANGE UP (ref 135–145)
TROPONIN I SERPL-MCNC: 0.01 NG/ML — LOW (ref 0.02–0.06)
WBC # BLD: 9.53 K/UL — SIGNIFICANT CHANGE UP (ref 3.8–10.5)
WBC # FLD AUTO: 9.53 K/UL — SIGNIFICANT CHANGE UP (ref 3.8–10.5)

## 2021-06-23 PROCEDURE — 72131 CT LUMBAR SPINE W/O DYE: CPT

## 2021-06-23 PROCEDURE — 93005 ELECTROCARDIOGRAM TRACING: CPT

## 2021-06-23 PROCEDURE — 93010 ELECTROCARDIOGRAM REPORT: CPT

## 2021-06-23 PROCEDURE — 84484 ASSAY OF TROPONIN QUANT: CPT

## 2021-06-23 PROCEDURE — 83735 ASSAY OF MAGNESIUM: CPT

## 2021-06-23 PROCEDURE — 99284 EMERGENCY DEPT VISIT MOD MDM: CPT | Mod: 25

## 2021-06-23 PROCEDURE — 71045 X-RAY EXAM CHEST 1 VIEW: CPT | Mod: 26

## 2021-06-23 PROCEDURE — 71045 X-RAY EXAM CHEST 1 VIEW: CPT

## 2021-06-23 PROCEDURE — 72110 X-RAY EXAM L-2 SPINE 4/>VWS: CPT | Mod: 26

## 2021-06-23 PROCEDURE — 70450 CT HEAD/BRAIN W/O DYE: CPT | Mod: 26,MA

## 2021-06-23 PROCEDURE — 72131 CT LUMBAR SPINE W/O DYE: CPT | Mod: 26,MA

## 2021-06-23 PROCEDURE — 72110 X-RAY EXAM L-2 SPINE 4/>VWS: CPT

## 2021-06-23 PROCEDURE — 80053 COMPREHEN METABOLIC PANEL: CPT

## 2021-06-23 PROCEDURE — 99285 EMERGENCY DEPT VISIT HI MDM: CPT

## 2021-06-23 PROCEDURE — 85730 THROMBOPLASTIN TIME PARTIAL: CPT

## 2021-06-23 PROCEDURE — 36415 COLL VENOUS BLD VENIPUNCTURE: CPT

## 2021-06-23 PROCEDURE — 85025 COMPLETE CBC W/AUTO DIFF WBC: CPT

## 2021-06-23 PROCEDURE — 85610 PROTHROMBIN TIME: CPT

## 2021-06-23 PROCEDURE — 70450 CT HEAD/BRAIN W/O DYE: CPT

## 2021-06-23 NOTE — ED PROVIDER NOTE - CARE PROVIDER_API CALL
Eran Baldwin; MBBS)  Internal Medicine  [address] [place] [address]  Phone: [phone]  Fax: [phone]  Follow Up Time: 1-3 Days

## 2021-06-23 NOTE — ED PROVIDER NOTE - MUSCULOSKELETAL, MLM
pelvis stable, FROM of all extremities without pain, able to flex spine without pain, some mild tenderness to left sacral area.

## 2021-06-23 NOTE — ED PROVIDER NOTE - RESPIRATORY, MLM
OPEN/DIRECT ENDOSCOPY SCREENING ASSESSMENT        PCP/Referring:  Oscar Whittaker D.O.    Procedure:  Colonoscopy    Diagnosis:  History of colon polyps    Diabetic:  no    Iron:  no    Anti-Coagulants:  No    ASA:  Excedrin PRN    Oxycodone: Not taking    History of Reflux/Heartburn:  no    Difficulty Swallowing:  yes  Occasional due to previous surgery, not new    Taking Any Acid Reflux Medication:  No    Frequent Diarrhea Or Constipation:  IBS on chart, patient does not have any complaints of IBS    History of GI Evaluations:  EGD  Yes - date: 8-19-14                                                   Colonoscopy  Yes - date: 8-19-14  ( If yes, please obtain operative and pathology reports)    Family Hx Colon CA Or Polyps: No    Review Of Systems:  Chest Pain: no                                           Shortness of Breath:  no                                       Previous Sedation Experience:  IV kept comfortable    Pacemaker/Defibrillator:  no  H/O Coronary Stents:  no    Bleeding Tendencies: no  Patient To Check Insurance Coverage: yes  Diagnostic colonoscopy for history of colon polyps    Pharmacy:  [facility]                                Procedure Date:  Thursday, September 19, 2019                               Procedure Time:  0800                      
Received fax from [facility] in [place] that Valerie has no contraindications to colonoscopy with [facility]. Call to patient to schedule her colonoscopy.  Open endoscopy screening assessment form completed with patient and she is scheduled on Thursday, September 19, 2019 at 0800 at [facility].   
Breath sounds clear and equal bilaterally.

## 2021-06-23 NOTE — ED PROVIDER NOTE - ENMT, MLM
Airway patent, Nasal mucosa clear. Mouth with normal mucosa. Throat has no vesicles, no oropharyngeal exudates and uvula is midline. No external sign of head injury, neck is supple non tender.

## 2021-06-23 NOTE — ED ADULT NURSE NOTE - OBJECTIVE STATEMENT
89yo female BIBA, as per EMS" [facility] staff sent her here because she fell twice". pt is alert with moments of forgetfulness. pt is c/o lower back pain "I fell at the nursing home" as per pt. Pt vaguely recalls events prior and post incident. pt denies head, neck pain. No major trauma noted.

## 2021-06-23 NOTE — ED ADULT NURSE NOTE - NSIMPLEMENTINTERV_GEN_ALL_ED
Implemented All Fall with Harm Risk Interventions:  [place] to call system. Call bell, personal items and telephone within reach. Instruct patient to call for assistance. Room bathroom lighting operational. Non-slip footwear when patient is off stretcher. Physically safe environment: no spills, clutter or unnecessary equipment. Stretcher in lowest position, wheels locked, appropriate side rails in place. Provide visual cue, wrist band, yellow gown, etc. Monitor gait and stability. Monitor for mental status changes and reorient to person, place, and time. Review medications for side effects contributing to fall risk. Reinforce activity limits and safety measures with patient and family. Provide visual clues: red socks.

## 2021-06-23 NOTE — ED PROVIDER NOTE - OBJECTIVE STATEMENT
89 y/o female with PMHx of dyslipidemia, constipation, gastritis, GERD, gastrointestinal hemorrhage, HLD, HTN, hypothyroidism, obesity, xerophthalmia, s/p abdominal hysterectomy, colon polyps s/p removal, rectal mass s/p removal, CAD s/p CABG presents to the ED from assisted living who fell while walking. Pt reported had 2 falls today. Pt states her feet just went out from under her. No LOC. Pt states she has some pain to her lower back. Unsure if hit her head but transfer paper work states she had a head injury. Pt is on xarelto. No headache, nausea, vomiting, pain in arms or legs, fever, dizziness, chest pain, SOB.

## 2021-06-23 NOTE — ED PROVIDER NOTE - PATIENT PORTAL LINK FT
You can access the FollowMyHealth Patient Portal offered by [facility] by registering at the following website: [email or web address]. By joining [employer]’s FollowMyHealth portal, you will also be able to view your health information using other applications (apps) compatible with our system.

## 2021-06-23 NOTE — ED PROVIDER NOTE - CLINICAL SUMMARY MEDICAL DECISION MAKING FREE TEXT BOX
Pt with 2 falls today, no sign of head injury, isolated tenderness to left sacral region. Will get x-ray of back, and CT of head as pt is on Xarelto. Will check labs. If no significant findings, pt can return to assisted living.

## 2021-06-23 NOTE — ED PROVIDER NOTE - NS_ ATTENDINGSCRIBEDETAILS _ED_A_ED_FT
Wally García MD - The scribe's documentation has been prepared under my direction and personally reviewed by me in its entirety. I confirm that the note above accurately reflects all work, treatment, procedures, and medical decision making performed by me.

## 2021-07-26 ENCOUNTER — APPOINTMENT (OUTPATIENT)
Dept: OBGYN | Facility: CLINIC | Age: 86
End: 2021-07-26
Payer: MEDICARE

## 2021-07-26 DIAGNOSIS — R39.89 OTHER SYMPTOMS AND SIGNS INVOLVING THE GENITOURINARY SYSTEM: ICD-10-CM

## 2021-07-26 DIAGNOSIS — R31.9 HEMATURIA, UNSPECIFIED: ICD-10-CM

## 2021-07-26 PROCEDURE — 99202 OFFICE O/P NEW SF 15 MIN: CPT

## 2021-11-05 NOTE — PROGRESS NOTE ADULT - PROBLEM SELECTOR PLAN 1
Maulik Russell Xarelto 20 bid  labs in am   asa 81 metoprolol 25 bid atorvastatin  ambulate tid  incentive spirometrty   hold Cardizem per Cardiology  disposition  to home Xarelto 20 bid  labs in am   asa 81 metoprolol 25 bid atorvastatin  multiple 3-5 sec pause  PPM with Dr Mccray 2/15  ambulate tid  incentive spirometrty   hold Cardizem per Cardiology  disposition  to home

## 2021-11-08 ENCOUNTER — NON-APPOINTMENT (OUTPATIENT)
Age: 86
End: 2021-11-08

## 2021-11-15 ENCOUNTER — APPOINTMENT (OUTPATIENT)
Dept: CARDIOLOGY | Facility: CLINIC | Age: 86
End: 2021-11-15

## 2021-11-22 ENCOUNTER — NON-APPOINTMENT (OUTPATIENT)
Age: 86
End: 2021-11-22

## 2022-04-22 ENCOUNTER — APPOINTMENT (OUTPATIENT)
Dept: CARDIOLOGY | Facility: CLINIC | Age: 87
End: 2022-04-22
Payer: MEDICARE

## 2022-04-22 ENCOUNTER — NON-APPOINTMENT (OUTPATIENT)
Age: 87
End: 2022-04-22

## 2022-04-22 VITALS
HEIGHT: 55 IN | BODY MASS INDEX: 28.46 KG/M2 | HEART RATE: 60 BPM | SYSTOLIC BLOOD PRESSURE: 162 MMHG | WEIGHT: 123 LBS | DIASTOLIC BLOOD PRESSURE: 80 MMHG | OXYGEN SATURATION: 96 %

## 2022-04-22 DIAGNOSIS — I25.10 ATHEROSCLEROTIC HEART DISEASE OF NATIVE CORONARY ARTERY W/OUT ANGINA PECTORIS: ICD-10-CM

## 2022-04-22 DIAGNOSIS — E78.5 HYPERLIPIDEMIA, UNSPECIFIED: ICD-10-CM

## 2022-04-22 DIAGNOSIS — Z95.2 PRESENCE OF PROSTHETIC HEART VALVE: ICD-10-CM

## 2022-04-22 DIAGNOSIS — I48.91 UNSPECIFIED ATRIAL FIBRILLATION: ICD-10-CM

## 2022-04-22 DIAGNOSIS — I10 ESSENTIAL (PRIMARY) HYPERTENSION: ICD-10-CM

## 2022-04-22 PROCEDURE — 93000 ELECTROCARDIOGRAM COMPLETE: CPT

## 2022-04-22 PROCEDURE — 99214 OFFICE O/P EST MOD 30 MIN: CPT

## 2022-04-22 RX ORDER — ATORVASTATIN CALCIUM 20 MG/1
20 TABLET, FILM COATED ORAL
Refills: 0 | Status: ACTIVE | COMMUNITY

## 2022-04-22 RX ORDER — HYDRALAZINE HYDROCHLORIDE 25 MG/1
25 TABLET ORAL 3 TIMES DAILY
Qty: 90 | Refills: 2 | Status: ACTIVE | COMMUNITY

## 2022-04-22 RX ORDER — POTASSIUM CHLORIDE 750 MG/1
10 CAPSULE, EXTENDED RELEASE ORAL
Refills: 0 | Status: ACTIVE | COMMUNITY

## 2022-04-22 RX ORDER — OMEPRAZOLE 20 MG/1
20 TABLET, DELAYED RELEASE ORAL
Refills: 0 | Status: ACTIVE | COMMUNITY

## 2022-04-22 RX ORDER — FERROUS SULFATE 325(65) MG
325 (65 FE) TABLET ORAL
Refills: 0 | Status: ACTIVE | COMMUNITY

## 2022-04-22 RX ORDER — FUROSEMIDE 20 MG/1
20 TABLET ORAL
Refills: 0 | Status: ACTIVE | COMMUNITY

## 2022-04-22 NOTE — REVIEW OF SYSTEMS
[Palpitations] : palpitations [Joint Pain] : joint pain [Negative] : Genitourinary [Rash] : no rash [Dizziness] : no dizziness [Depression] : no depression [Anxiety] : no anxiety [Easy Bleeding] : no tendency for easy bleeding [Easy Bruising] : no tendency for easy bruising

## 2022-04-22 NOTE — DISCUSSION/SUMMARY
[FreeTextEntry1] : Patient's cardiac status appears stable.  There is no evidence of volume overload except for some mild swelling of the left leg which is probably due to venous insufficiency.  This is the leg that was used to harvest her vein for bypass.  Chest is clear.  Blood pressure is well controlled.\par \par I would appreciate a copy of the blood work that was drawn today.  Patient will come back to have her pacemaker check, to have an echocardiogram to check on a prosthetic valve and a carotid Doppler.\par \par If all is well we will see the patient again in about 6 months.

## 2022-04-22 NOTE — HISTORY OF PRESENT ILLNESS
[FreeTextEntry1] : I saw Mrs. Hoyt in the office today for a follow up visit. She is a 89-year-old white female who is status post coronary bypass graft surgery in 1989 with a LIMA graft to LAD and vein grafts to the other vessels. She had a stress test in June of 2011 that was normal. She also is being treated for hypertension, hyperlipidemia, and hypothyroidism. She has chronic atrial fibrillation and moderate aortic stenosis on echocardiogram from a year and half ago.\par \par 5/15 she was admitted to [facility]  with rapid atrial fibrillation, complaining of SOB, and had elevated troponins. She was rate controlled and sent to [facility]. Cardiac catheterization showed normal ejection fraction. She had a 90% distal left main, 100% ostial LAD, 90% circumflex 85% RCA. The LIMA graft to the LAD was patent and the vein grafts to the OM and RCA were closed. She underwent drug-eluting stents to the RCA and circumflex arteries. By echocardiogram 7/14 she has moderate aortic stenosis. A repeat echocardiogram performed 3/16 demonstrated ejection fraction of 66%. There was moderate aortic stenosis with a peak gradient of 32 and valve area 1.1 cmÂ². Showed mild MR and moderate TR with a PA pressure of 40. Most recent echo performed 5/17 demonstrates an ejection fraction of 57%. Distal moderate aortic stenosis. It is now moderate to severe TR with estimated PA pressure 62.\par \par She underwent colonoscopy with removal of a rectal mass 6/30/15 by Dr. Little. Apparently her Plavix and Xarelto stopped, even though she drug-eluting stents placed 5/15. She underwent repeat colonoscopy that according to the patient turned out okay. She was admitted to [facility] at Thanksgiving time with black stool. Hemoglobin was down to 8.7 and she had 2 units of packed cells. Endoscopy showed that the scab over the lesion that had been removed was leaving. Patient was treated with protonix and Carafate and anticoagulation was held. She is now off of those medications doing well on Xarelto and aspirin.\par \par The patient underwent an endoscopy for a bleeding lesion in the stomach. She's now on protonix. She underwent her third colonoscopy and the polyp was finally removed from the colon. She's had no signs of active bleeding clinically on Xarelto 15 mg once a day and off the aspirin.\par \par The patient underwent TAVR 2/18for severe symptomatic aortic stenosis. Cardiac catheterization showed 100% mid LAD, 60% diagonal, 20% circumflex and 40% RCA. LIMA graft to LAD was patent. Hospital course was complicated by 3-5 sec pauses requiring a permanent pacemaker.Pacemaker was complicated by hematoma.  Echocardiogram showed ejection fraction of 75% with mild MR, and mild to moderate TR. PA pressure 49, stage II diastolic dysfunction with LVH. Severe left atrial enlargement. Blood work on discharge showed a hemoglobin of 7.9 with hematocrit 24.2. Normal BUN creatinine and potassium..She is now on on replacement therapy\par \par The patient had been complaining of increasing fatigue, and increasing heart rate with increasing shortness of breath. Blood work from your office demonstrated an abrupt decrease in her hemoglobin down to 7.5 and she was admitted to the hospital in [place]. Workup showed lower GI bleeding and she underwent right hemicolectomy with anastomosis of the ileum to the colon. This was felt to be the source of bleeding. Upon discharge from the hospital hemoglobin 8.7 with hematocrit 29.1. Creatinine 0.9, BUN of 8, potassium 3.9.\par \par At home she's been gradually improving. Her appetite is better and she is feeling stronger. She's getting back to her normal activities any major symptoms. Pacemaker check 12/19 showed normal function.\par \par Blood work dated 8/20. Chemistries were normal. A1c 6.1. Cholesterol 146, triglycerides 126, HDL 55, and LDL 61.  Normal thyroid.  Hemoglobin 11 with hematocrit 36.3.\par ECG shows underlying A. fib with LBBB.\par \par Patient was hospitalized for urinary tract infection and since that time has been in a nursing home.  She has no complaints of chest pain, shortness of breath, palpitation.  Pacemaker has not been checked since August 2020.  Does have some mild swelling of the left leg is not painful.  This was the leg was used to harvest the vein for her bypass.

## 2022-04-22 NOTE — PHYSICAL EXAM
[General Appearance - Well Developed] : well developed [Normal Appearance] : normal appearance [Well Groomed] : well groomed [No Deformities] : no deformities [General Appearance - Well Nourished] : well nourished [General Appearance - In No Acute Distress] : no acute distress [Normal Conjunctiva] : the conjunctiva exhibited no abnormalities [Eyelids - No Xanthelasma] : the eyelids demonstrated no xanthelasmas [Normal Oral Mucosa] : normal oral mucosa [Normal Jugular Venous A Waves Present] : normal jugular venous A waves present [Normal Jugular Venous V Waves Present] : normal jugular venous V waves present [No Jugular Venous Beckham A Waves] : no jugular venous beckham A waves [Respiration, Rhythm And Depth] : normal respiratory rhythm and effort [Auscultation Breath Sounds / Voice Sounds] : lungs were clear to auscultation bilaterally [Exaggerated Use Of Accessory Muscles For Inspiration] : no accessory muscle use [Bowel Sounds] : normal bowel sounds [Abdomen Soft] : soft [Abnormal Walk] : normal gait [Abdomen Tenderness] : non-tender [Gait - Sufficient For Exercise Testing] : the gait was sufficient for exercise testing [Nail Clubbing] : no clubbing of the fingernails [Cyanosis, Localized] : no localized cyanosis [Skin Color & Pigmentation] : normal skin color and pigmentation [Skin Turgor] : normal skin turgor [] : no rash [Oriented To Time, Place, And Person] : oriented to person, place, and time [Impaired Insight] : insight and judgment were intact [No Anxiety] : not feeling anxious [Not Palpable] : not palpable [No Precordial Heave] : no precordial heave was noted [Normal Rate] : normal [Irregularly Irregular] : irregularly irregular [Normal S1] : normal S1 [Normal S2] : normal S2 [No Gallop] : no gallop heard [Distant] : the heart sounds were distant [III] : a grade 3 [Crescendo-Decrescendo] : crescendo-decrescendo [Carotids] : the murmur was transmitted to the carotid arteries [2+] : left 2+ [1+] : left 1+ [No Abnormalities] : the abdominal aorta was not enlarged and no bruit was heard [Rt] : varicose veins of the right leg noted [Lt] : varicose veins of the left leg noted [___+] : [unfilled]U+ pretibial pitting edema on the left [Apical Thrill] : no thrill palpable at the apex [Click] : no click [Pericardial Rub] : no pericardial rub

## 2022-05-03 ENCOUNTER — APPOINTMENT (OUTPATIENT)
Dept: CARDIOLOGY | Facility: CLINIC | Age: 87
End: 2022-05-03

## 2022-05-04 NOTE — ED ADULT NURSE NOTE - PMH
Atrial fibrillation, unspecified    CAD (coronary artery disease)  S/P CABG 1999  Dyslipidemia    Gastritis    Gastroesophageal reflux disease without esophagitis    Gastrointestinal hemorrhage, unspecified gastritis, unspecified gastrointestinal hemorrhage type    Hyperlipidemia    Hypertension    Hypothyroidism    Obesity Epidermal Closure: simple interrupted

## 2022-05-10 ENCOUNTER — APPOINTMENT (OUTPATIENT)
Dept: CARDIOLOGY | Facility: CLINIC | Age: 87
End: 2022-05-10
Payer: MEDICARE

## 2022-05-10 PROCEDURE — 93880 EXTRACRANIAL BILAT STUDY: CPT

## 2022-05-10 PROCEDURE — 93306 TTE W/DOPPLER COMPLETE: CPT

## 2022-06-09 ENCOUNTER — APPOINTMENT (OUTPATIENT)
Dept: CARDIOLOGY | Facility: CLINIC | Age: 87
End: 2022-06-09
Payer: MEDICARE

## 2022-06-09 PROCEDURE — 93279 PRGRMG DEV EVAL PM/LDLS PM: CPT

## 2022-08-29 ENCOUNTER — INPATIENT (INPATIENT)
Facility: HOSPITAL | Age: 87
LOS: 3 days | Discharge: NURSING, REHAB OR LONG-TERM CARE | End: 2022-09-02
Attending: STUDENT IN AN ORGANIZED HEALTH CARE EDUCATION/TRAINING PROGRAM | Admitting: STUDENT IN AN ORGANIZED HEALTH CARE EDUCATION/TRAINING PROGRAM

## 2022-08-29 VITALS
HEIGHT: 55 IN | OXYGEN SATURATION: 99 % | HEART RATE: 60 BPM | DIASTOLIC BLOOD PRESSURE: 48 MMHG | SYSTOLIC BLOOD PRESSURE: 120 MMHG | TEMPERATURE: 98 F | RESPIRATION RATE: 16 BRPM

## 2022-08-29 DIAGNOSIS — Z86.010 PERSONAL HISTORY OF COLONIC POLYPS: Chronic | ICD-10-CM

## 2022-08-29 DIAGNOSIS — N20.0 CALCULUS OF KIDNEY: ICD-10-CM

## 2022-08-29 DIAGNOSIS — N12 TUBULO-INTERSTITIAL NEPHRITIS, NOT SPECIFIED AS ACUTE OR CHRONIC: ICD-10-CM

## 2022-08-29 DIAGNOSIS — K62.9 DISEASE OF ANUS AND RECTUM, UNSPECIFIED: Chronic | ICD-10-CM

## 2022-08-29 DIAGNOSIS — I25.10 ATHEROSCLEROTIC HEART DISEASE OF NATIVE CORONARY ARTERY WITHOUT ANGINA PECTORIS: ICD-10-CM

## 2022-08-29 DIAGNOSIS — Z90.710 ACQUIRED ABSENCE OF BOTH CERVIX AND UTERUS: Chronic | ICD-10-CM

## 2022-08-29 DIAGNOSIS — E03.9 HYPOTHYROIDISM, UNSPECIFIED: ICD-10-CM

## 2022-08-29 DIAGNOSIS — Z29.9 ENCOUNTER FOR PROPHYLACTIC MEASURES, UNSPECIFIED: ICD-10-CM

## 2022-08-29 DIAGNOSIS — I48.91 UNSPECIFIED ATRIAL FIBRILLATION: ICD-10-CM

## 2022-08-29 DIAGNOSIS — Z95.1 PRESENCE OF AORTOCORONARY BYPASS GRAFT: Chronic | ICD-10-CM

## 2022-08-29 LAB
ALBUMIN SERPL ELPH-MCNC: 3 G/DL — LOW (ref 3.3–5)
ALP SERPL-CCNC: 92 U/L — SIGNIFICANT CHANGE UP (ref 40–120)
ALT FLD-CCNC: 6 U/L — SIGNIFICANT CHANGE UP (ref 4–33)
ANION GAP SERPL CALC-SCNC: 9 MMOL/L — SIGNIFICANT CHANGE UP (ref 7–14)
ANISOCYTOSIS BLD QL: SLIGHT — SIGNIFICANT CHANGE UP
APPEARANCE UR: CLEAR — SIGNIFICANT CHANGE UP
AST SERPL-CCNC: 15 U/L — SIGNIFICANT CHANGE UP (ref 4–32)
BACTERIA # UR AUTO: NEGATIVE — SIGNIFICANT CHANGE UP
BASE EXCESS BLDV CALC-SCNC: 4.5 MMOL/L — HIGH (ref -2–3)
BASOPHILS # BLD AUTO: 0 K/UL — SIGNIFICANT CHANGE UP (ref 0–0.2)
BASOPHILS NFR BLD AUTO: 0 % — SIGNIFICANT CHANGE UP (ref 0–2)
BILIRUB SERPL-MCNC: 0.4 MG/DL — SIGNIFICANT CHANGE UP (ref 0.2–1.2)
BILIRUB UR-MCNC: NEGATIVE — SIGNIFICANT CHANGE UP
BLOOD GAS VENOUS COMPREHENSIVE RESULT: SIGNIFICANT CHANGE UP
BUN SERPL-MCNC: 12 MG/DL — SIGNIFICANT CHANGE UP (ref 7–23)
CALCIUM SERPL-MCNC: 9.1 MG/DL — SIGNIFICANT CHANGE UP (ref 8.4–10.5)
CHLORIDE BLDV-SCNC: 107 MMOL/L — SIGNIFICANT CHANGE UP (ref 96–108)
CHLORIDE SERPL-SCNC: 106 MMOL/L — SIGNIFICANT CHANGE UP (ref 98–107)
CO2 BLDV-SCNC: 31.2 MMOL/L — HIGH (ref 22–26)
CO2 SERPL-SCNC: 28 MMOL/L — SIGNIFICANT CHANGE UP (ref 22–31)
COLOR SPEC: YELLOW — SIGNIFICANT CHANGE UP
CREAT SERPL-MCNC: 0.96 MG/DL — SIGNIFICANT CHANGE UP (ref 0.5–1.3)
DIFF PNL FLD: ABNORMAL
EGFR: 54 ML/MIN/1.73M2 — LOW
EOSINOPHIL # BLD AUTO: 0 K/UL — SIGNIFICANT CHANGE UP (ref 0–0.5)
EOSINOPHIL NFR BLD AUTO: 0 % — SIGNIFICANT CHANGE UP (ref 0–6)
EPI CELLS # UR: 1 /HPF — SIGNIFICANT CHANGE UP (ref 0–5)
FLUAV AG NPH QL: SIGNIFICANT CHANGE UP
FLUBV AG NPH QL: SIGNIFICANT CHANGE UP
GAS PNL BLDV: 139 MMOL/L — SIGNIFICANT CHANGE UP (ref 136–145)
GLUCOSE BLDV-MCNC: 98 MG/DL — SIGNIFICANT CHANGE UP (ref 70–99)
GLUCOSE SERPL-MCNC: 102 MG/DL — HIGH (ref 70–99)
GLUCOSE UR QL: NEGATIVE — SIGNIFICANT CHANGE UP
HCO3 BLDV-SCNC: 30 MMOL/L — HIGH (ref 22–29)
HCT VFR BLD CALC: 33.3 % — LOW (ref 34.5–45)
HCT VFR BLDA CALC: 31 % — LOW (ref 34.5–46.5)
HGB BLD CALC-MCNC: 10.4 G/DL — LOW (ref 11.5–15.5)
HGB BLD-MCNC: 10.3 G/DL — LOW (ref 11.5–15.5)
HYALINE CASTS # UR AUTO: 2 /LPF — SIGNIFICANT CHANGE UP (ref 0–7)
IANC: 7.21 K/UL — SIGNIFICANT CHANGE UP (ref 1.8–7.4)
KETONES UR-MCNC: NEGATIVE — SIGNIFICANT CHANGE UP
LACTATE BLDV-MCNC: 1 MMOL/L — SIGNIFICANT CHANGE UP (ref 0.5–2)
LEUKOCYTE ESTERASE UR-ACNC: ABNORMAL
LYMPHOCYTES # BLD AUTO: 0.55 K/UL — LOW (ref 1–3.3)
LYMPHOCYTES # BLD AUTO: 5.4 % — LOW (ref 13–44)
MACROCYTES BLD QL: SLIGHT — SIGNIFICANT CHANGE UP
MAGNESIUM SERPL-MCNC: 1.9 MG/DL — SIGNIFICANT CHANGE UP (ref 1.6–2.6)
MANUAL SMEAR VERIFICATION: SIGNIFICANT CHANGE UP
MCHC RBC-ENTMCNC: 27.7 PG — SIGNIFICANT CHANGE UP (ref 27–34)
MCHC RBC-ENTMCNC: 30.9 GM/DL — LOW (ref 32–36)
MCV RBC AUTO: 89.5 FL — SIGNIFICANT CHANGE UP (ref 80–100)
METAMYELOCYTES # FLD: 0.9 % — SIGNIFICANT CHANGE UP (ref 0–1)
MICROCYTES BLD QL: SIGNIFICANT CHANGE UP
MONOCYTES # BLD AUTO: 0.46 K/UL — SIGNIFICANT CHANGE UP (ref 0–0.9)
MONOCYTES NFR BLD AUTO: 4.5 % — SIGNIFICANT CHANGE UP (ref 2–14)
MYELOCYTES NFR BLD: 3.6 % — HIGH (ref 0–0)
NEUTROPHILS # BLD AUTO: 8.12 K/UL — HIGH (ref 1.8–7.4)
NEUTROPHILS NFR BLD AUTO: 80.2 % — HIGH (ref 43–77)
NITRITE UR-MCNC: NEGATIVE — SIGNIFICANT CHANGE UP
OVALOCYTES BLD QL SMEAR: SLIGHT — SIGNIFICANT CHANGE UP
PCO2 BLDV: 47 MMHG — HIGH (ref 39–42)
PH BLDV: 7.41 — SIGNIFICANT CHANGE UP (ref 7.32–7.43)
PH UR: 6 — SIGNIFICANT CHANGE UP (ref 5–8)
PLAT MORPH BLD: NORMAL — SIGNIFICANT CHANGE UP
PLATELET # BLD AUTO: 287 K/UL — SIGNIFICANT CHANGE UP (ref 150–400)
PLATELET COUNT - ESTIMATE: NORMAL — SIGNIFICANT CHANGE UP
PO2 BLDV: 41 MMHG — SIGNIFICANT CHANGE UP
POIKILOCYTOSIS BLD QL AUTO: SLIGHT — SIGNIFICANT CHANGE UP
POTASSIUM BLDV-SCNC: 4.2 MMOL/L — SIGNIFICANT CHANGE UP (ref 3.5–5.1)
POTASSIUM SERPL-MCNC: 4.3 MMOL/L — SIGNIFICANT CHANGE UP (ref 3.5–5.3)
POTASSIUM SERPL-SCNC: 4.3 MMOL/L — SIGNIFICANT CHANGE UP (ref 3.5–5.3)
PROT SERPL-MCNC: 7.4 G/DL — SIGNIFICANT CHANGE UP (ref 6–8.3)
PROT UR-MCNC: NEGATIVE — SIGNIFICANT CHANGE UP
RBC # BLD: 3.72 M/UL — LOW (ref 3.8–5.2)
RBC # FLD: 15 % — HIGH (ref 10.3–14.5)
RBC BLD AUTO: NORMAL — SIGNIFICANT CHANGE UP
RBC CASTS # UR COMP ASSIST: 10 /HPF — HIGH (ref 0–4)
RSV RNA NPH QL NAA+NON-PROBE: SIGNIFICANT CHANGE UP
SAO2 % BLDV: 67 % — SIGNIFICANT CHANGE UP
SARS-COV-2 RNA SPEC QL NAA+PROBE: SIGNIFICANT CHANGE UP
SODIUM SERPL-SCNC: 143 MMOL/L — SIGNIFICANT CHANGE UP (ref 135–145)
SP GR SPEC: 1.03 — SIGNIFICANT CHANGE UP (ref 1.01–1.05)
UROBILINOGEN FLD QL: SIGNIFICANT CHANGE UP
VARIANT LYMPHS # BLD: 5.4 % — SIGNIFICANT CHANGE UP (ref 0–6)
WBC # BLD: 10.13 K/UL — SIGNIFICANT CHANGE UP (ref 3.8–10.5)
WBC # FLD AUTO: 10.13 K/UL — SIGNIFICANT CHANGE UP (ref 3.8–10.5)
WBC UR QL: 36 /HPF — HIGH (ref 0–5)

## 2022-08-29 PROCEDURE — 74177 CT ABD & PELVIS W/CONTRAST: CPT | Mod: 26,MA

## 2022-08-29 PROCEDURE — 99223 1ST HOSP IP/OBS HIGH 75: CPT | Mod: GC,AI

## 2022-08-29 PROCEDURE — 99285 EMERGENCY DEPT VISIT HI MDM: CPT | Mod: GC

## 2022-08-29 RX ORDER — METOPROLOL TARTRATE 50 MG
25 TABLET ORAL
Refills: 0 | Status: DISCONTINUED | OUTPATIENT
Start: 2022-08-29 | End: 2022-09-02

## 2022-08-29 RX ORDER — ATORVASTATIN CALCIUM 80 MG/1
20 TABLET, FILM COATED ORAL AT BEDTIME
Refills: 0 | Status: DISCONTINUED | OUTPATIENT
Start: 2022-08-29 | End: 2022-09-02

## 2022-08-29 RX ORDER — CEFEPIME 1 G/1
1000 INJECTION, POWDER, FOR SOLUTION INTRAMUSCULAR; INTRAVENOUS EVERY 12 HOURS
Refills: 0 | Status: DISCONTINUED | OUTPATIENT
Start: 2022-08-29 | End: 2022-08-29

## 2022-08-29 RX ORDER — MEROPENEM 1 G/30ML
1000 INJECTION INTRAVENOUS ONCE
Refills: 0 | Status: DISCONTINUED | OUTPATIENT
Start: 2022-08-29 | End: 2022-08-29

## 2022-08-29 RX ORDER — RIVAROXABAN 15 MG-20MG
15 KIT ORAL
Refills: 0 | Status: DISCONTINUED | OUTPATIENT
Start: 2022-08-29 | End: 2022-09-02

## 2022-08-29 RX ORDER — LEVOTHYROXINE SODIUM 125 MCG
125 TABLET ORAL DAILY
Refills: 0 | Status: DISCONTINUED | OUTPATIENT
Start: 2022-08-29 | End: 2022-09-02

## 2022-08-29 RX ORDER — DILTIAZEM HCL 120 MG
240 CAPSULE, EXT RELEASE 24 HR ORAL DAILY
Refills: 0 | Status: DISCONTINUED | OUTPATIENT
Start: 2022-08-29 | End: 2022-09-02

## 2022-08-29 RX ORDER — SODIUM CHLORIDE 9 MG/ML
1000 INJECTION INTRAMUSCULAR; INTRAVENOUS; SUBCUTANEOUS ONCE
Refills: 0 | Status: COMPLETED | OUTPATIENT
Start: 2022-08-29 | End: 2022-08-29

## 2022-08-29 RX ORDER — SENNA PLUS 8.6 MG/1
2 TABLET ORAL AT BEDTIME
Refills: 0 | Status: DISCONTINUED | OUTPATIENT
Start: 2022-08-29 | End: 2022-09-02

## 2022-08-29 RX ORDER — ACETAMINOPHEN 500 MG
650 TABLET ORAL EVERY 6 HOURS
Refills: 0 | Status: DISCONTINUED | OUTPATIENT
Start: 2022-08-29 | End: 2022-09-02

## 2022-08-29 RX ORDER — HYDRALAZINE HCL 50 MG
25 TABLET ORAL THREE TIMES A DAY
Refills: 0 | Status: DISCONTINUED | OUTPATIENT
Start: 2022-08-29 | End: 2022-09-02

## 2022-08-29 RX ORDER — FOLIC ACID 0.8 MG
1 TABLET ORAL
Qty: 0 | Refills: 0 | DISCHARGE

## 2022-08-29 RX ORDER — MULTIVIT-MIN/FERROUS GLUCONATE 9 MG/15 ML
1 LIQUID (ML) ORAL
Qty: 0 | Refills: 0 | DISCHARGE

## 2022-08-29 RX ORDER — CEFEPIME 1 G/1
1000 INJECTION, POWDER, FOR SOLUTION INTRAMUSCULAR; INTRAVENOUS EVERY 24 HOURS
Refills: 0 | Status: COMPLETED | OUTPATIENT
Start: 2022-08-29 | End: 2022-08-30

## 2022-08-29 RX ORDER — POLYETHYLENE GLYCOL 3350 17 G/17G
17 POWDER, FOR SOLUTION ORAL DAILY
Refills: 0 | Status: DISCONTINUED | OUTPATIENT
Start: 2022-08-29 | End: 2022-09-02

## 2022-08-29 RX ORDER — PANTOPRAZOLE SODIUM 20 MG/1
20 TABLET, DELAYED RELEASE ORAL
Refills: 0 | Status: DISCONTINUED | OUTPATIENT
Start: 2022-08-29 | End: 2022-08-30

## 2022-08-29 RX ADMIN — SENNA PLUS 2 TABLET(S): 8.6 TABLET ORAL at 21:47

## 2022-08-29 RX ADMIN — Medication 25 MILLIGRAM(S): at 21:47

## 2022-08-29 RX ADMIN — CEFEPIME 100 MILLIGRAM(S): 1 INJECTION, POWDER, FOR SOLUTION INTRAMUSCULAR; INTRAVENOUS at 21:06

## 2022-08-29 RX ADMIN — SODIUM CHLORIDE 1000 MILLILITER(S): 9 INJECTION INTRAMUSCULAR; INTRAVENOUS; SUBCUTANEOUS at 13:02

## 2022-08-29 RX ADMIN — ATORVASTATIN CALCIUM 20 MILLIGRAM(S): 80 TABLET, FILM COATED ORAL at 21:48

## 2022-08-29 NOTE — H&P ADULT - ASSESSMENT
89 yo F with PMHx of CAD s/p CABG, a. fib, hypothyroidism, rectal mass s/p resection, aortic valve replacement, and prior UTIs i/s/o staghorn calculi presents with pyelonephritis.

## 2022-08-29 NOTE — CONSULT NOTE ADULT - ASSESSMENT
89 y.o female was sent to the ER for UTI/Pyelonephritis. It is unknown if she had a CT as outpatient. Patient has been afebrile in ED, non-toxic appearing. Creatinine is normal. No urological intervention needed at this time.

## 2022-08-29 NOTE — ED PROVIDER NOTE - OBJECTIVE STATEMENT
89 yr female Pre-DM, IHD resident of [facility], UTI/Pyelonephritis on Day#4 of Abx Ceftiraxone now referred to ED for failure to thrive.  Per details from NH, patient has not been eating/drinking for 1 week and has been c/o abdominal pain for past few days.   There is no report of fever, chest pain, SOB, bowel complaints. 89 yr female Pre-DM, IHD resident of [facility], UTI/Pyelonephritis on Day#4 of Abx Ceftriaxone now referred to ED for failure to thrive.  Per details from NH, patient has not been eating/drinking for 1 week and has been c/o abdominal pain for past few days.   There is no report of fever, chest pain, SOB, bowel complaints.

## 2022-08-29 NOTE — H&P ADULT - NSHPPHYSICALEXAM_GEN_ALL_CORE
VITALS:   T(C): 36.1 (08-29-22 @ 16:28), Max: 36.9 (08-29-22 @ 12:58)  HR: 65 (08-29-22 @ 16:28) (60 - 65)  BP: 133/54 (08-29-22 @ 16:28) (120/48 - 133/54)  RR: 16 (08-29-22 @ 16:28) (16 - 16)  SpO2: 97% (08-29-22 @ 16:28) (97% - 99%)    GENERAL: NAD, lying in bed comfortably   HEAD:  Atraumatic, normocephalic   EYES: EOMI, PERRLA, conjunctiva and sclera clear   ENT: Moist mucous membranes   NECK: Supple, no JVD   HEART: Regular rate and rhythm. Systolic murmurs   LUNGS: Unlabored respirations.  Clear to auscultation bilaterally, no crackles, wheezing, or rhonchi   ABDOMEN: Soft, nontender, nondistended, +BS   EXTREMITIES: 2+ peripheral pulses bilaterally. No clubbing, cyanosis, or edema   NERVOUS SYSTEM:  A&Ox3, no focal deficits    SKIN: No rashes or lesions

## 2022-08-29 NOTE — ED PROVIDER NOTE - ATTENDING CONTRIBUTION TO CARE
89 y/o female with PMHx of dyslipidemia, constipation, gastritis, GERD, gastrointestinal hemorrhage, HLD, HTN, hypothyroidism, obesity, xerophthalmia, s/p abdominal hysterectomy, colon polyps s/p removal, rectal mass s/p removal, CAD s/p CABG presents to the ED from NH for FTT in setting of CT showing pyelonpehritis, pt unable to provide history, on exam tender In abdomen, will check labs, Ct, abx, admit

## 2022-08-29 NOTE — ED PROVIDER NOTE - PHYSICAL EXAMINATION
PHYSICAL EXAM:    GENERAL: Lying in bed comfortably  HEAD:  Atraumatic, Normocephalic  EYES: EOMI, PERRLA, conjunctiva and sclera clear  ENT: No erythema/pallor/petechiae/lesions  NECK: Supple  LUNG: CTA b/l; no r/r/w  HEART: RRR, +S1/S2; No m/r/g  ABDOMEN: soft, NT/ND; BS audible   EXTREMITIES:  2+ Peripheral Pulses, brisk cap refill. No clubbing, cyanosis, or edema  NERVOUS SYSTEM:  AAOx3, speech clear. No sensory/motor deficits   SKIN: No rashes or lesions

## 2022-08-29 NOTE — H&P ADULT - PROBLEM SELECTOR PLAN 1
Patient has hx of UTIs i/s/o known staghorn calculi   Previous culture data shows sensitivity to Cefepime   - Start Cefepime 1g BID   - F/u blood cultures and urine cultures   - Urology consulted, appreciate recs Patient has hx of UTIs i/s/o known staghorn calculi   Previous culture data shows sensitivity to Cefepime   - Start Cefepime 1g qd   - F/u blood cultures and urine cultures   - Urology consulted, appreciate recs

## 2022-08-29 NOTE — PATIENT PROFILE ADULT - FALL HARM RISK - HARM RISK INTERVENTIONS
Assistance with ambulation/Assistance OOB with selected safe patient handling equipment/Communicate Risk of Fall with Harm to all staff/Discuss with provider need for PT consult/Monitor gait and stability/Reinforce activity limits and safety measures with patient and family/Tailored Fall Risk Interventions/Visual Cue: Yellow wristband and red socks/Bed in lowest position, wheels locked, appropriate side rails in place/Call bell, personal items and telephone in reach/Instruct patient to call for assistance before getting out of bed or chair/Non-slip footwear when patient is out of bed/[place] to call system/Physically safe environment - no spills, clutter or unnecessary equipment/Purposeful Proactive Rounding/Room/bathroom lighting operational, light cord in reach

## 2022-08-29 NOTE — ED ADULT NURSE NOTE - OBJECTIVE STATEMENT
pt. received to room 19 A&O to self, ambulatory at her baseline BIBEMS from NH  p/w failure to thrive. as per triage note, pt. is here for FTT and a UTI. pt. inconsistent and poor historian at this time. states "I don't know why I'm here, I think I fell." as per triage, this it pt. baseline mental status. pt. denies  symptoms at this time. NAD noted at this time. respirations even and unlabored. VSS. denies CP, SOB, HA, NVD, fever, chills. 20g IV placed in the R AC. labs drawn and sent. L sided PICC line noted, dressing clean dry and intact. pt. endorses not knowing why she has a PICC line. due meds given. comfort measures porvided. safety precautions maintained.

## 2022-08-29 NOTE — CONSULT NOTE ADULT - ATTENDING COMMENTS
right kidney stone and UTI. afebrile, hemodynamically stable. no indication for intervention at this time. should patient become febrile or hemodynamically unstable would recommend percutaneous nephrostomy tube for drainage. otherwise, patient can follow up as outpatient with Dr. Davis to discuss treatment options if in line with goals of care

## 2022-08-29 NOTE — ED ADULT TRIAGE NOTE - CHIEF COMPLAINT QUOTE
Pt brought in by EMS from [facility] [place] for failure to thrive and UTI. Pt denies any complaints. As per EMS pt at is a baseline per NH staff.

## 2022-08-29 NOTE — CONSULT NOTE ADULT - PROBLEM SELECTOR RECOMMENDATION 9
Conservative management  F/U urine culture  Antibiotics  Hydration Conservative management  F/U urine culture  Antibiotics  Hydration  Primary team to discuss goals care with family  Case discussed with Dr Stark

## 2022-08-29 NOTE — ED ADULT NURSE NOTE - CHIEF COMPLAINT QUOTE
Pt brought in by EMS from NH [place] for failure to thrive and UTI. Pt denies any complaints. As per EMS pt at is a baseline per NH staff.

## 2022-08-29 NOTE — H&P ADULT - NSHPLABSRESULTS_GEN_ALL_CORE
Urinalysis (08.29.22 @ 17:23)   pH Urine: 6.0   Glucose Qualitative, Urine: Negative   Blood, Urine: Trace   Color: Yellow   Urine Appearance: Clear   Bilirubin: Negative   Ketone - Urine: Negative   Specific Gravity: 1.026   Protein, Urine: Negative   Urobilinogen: <2 mg/dL   Nitrite: Negative   Leukocyte Esterase Concentration: Moderate Urine Microscopic-Add On (NC) (08.29.22 @ 17:23)   Epithelial Cells: 1 /HPF   Red Blood Cell - Urine: 10 /HPF   White Blood Cell - Urine: 36 /HPF   Hyaline Casts: 2 /LPF   Bacteria: Negative Flu With COVID-19 By AUGUSTUS (08.29.22 @ 16:00)   SARS-CoV-2 Result: NotDete: This Respiratory Panel uses polymerase chain reaction (PCR) to detect for   influenza A; influenza B; respiratory syncytial virus; and SARS-CoV-2.   This test was validated by [facility] and is in use under the FDA   Emergency Use Authorization (EUA) for clinical labs CLIA-certified to   perform high complexity testing. Test results should be correlated with   clinical presentation, patient history, and epidemiology.   [facility]   Influenza B Result: [facility]   Resp Syn Virus Result: NotDete Blood Gas Venous - Lactate (08.29.22 @ 12:25)   Blood Gas Venous - Lactate: 1.0 mmol/L Blood Gas Profile - Venous (08.29.22 @ 12:25)   pH, Venous: 7.41: Due to specimen delivery delays, please interpret with caution   pCO2, Venous: 47 mmHg   pO2, Venous: 41 mmHg   HCO3, Venous: 30 mmol/L   Base Excess, Venous: 4.5 mmol/L   Oxygen Saturation, Venous: 67.0 %   Total CO2, Venous: 31.2 mmol/L < from: Transthoracic Echocardiogram (04.03.18 @ 12:57) >    Conclusions:  1. Mitral annular calcification. Thickened mitral leaflets.  Mild mitral regurgitation.  2. Transcatheter aortic valve replacement. Peak transaortic  valve gradient equals 9 mm Hg, mean transaortic valve  gradient equals 4 mm Hg, which is probably normal in the  presence of a transcatheter aortic valve replacement. No  aortic valve regurgitation seen.  3. Normal left ventricular systolic function. Septal motion  consistent with paced rhythm/conduction defect.  4. Mild right ventricular enlargement with mildly decreased  right ventricular systolic function. A device wire is noted  in the right heart.  5. Estimated pulmonary artery systolic pressure equals 57  mm Hg, assuming right atrial pressure equals 8 mm Hg,  consistent with moderate pulmonary pressures.  *** Compared with echocardiogram of 2/15/2018, results are  similar on today's study.    < end of copied text > Urinalysis (08.29.22 @ 17:23)   pH Urine: 6.0   Glucose Qualitative, Urine: Negative   Blood, Urine: Trace   Color: Yellow   Urine Appearance: Clear   Bilirubin: Negative   Ketone - Urine: Negative   Specific Gravity: 1.026   Protein, Urine: Negative   Urobilinogen: <2 mg/dL   Nitrite: Negative   Leukocyte Esterase Concentration: Moderate Urine Microscopic-Add On (NC) (08.29.22 @ 17:23)   Epithelial Cells: 1 /HPF   Red Blood Cell - Urine: 10 /HPF   White Blood Cell - Urine: 36 /HPF   Hyaline Casts: 2 /LPF   Bacteria: Negative Flu With COVID-19 By AUGUSTUS (08.29.22 @ 16:00)   SARS-CoV-2 Result: NotDete: This Respiratory Panel uses polymerase chain reaction (PCR) to detect for   influenza A; influenza B; respiratory syncytial virus; and SARS-CoV-2.   This test was validated by [facility] and is in use under the FDA   Emergency Use Authorization (EUA) for clinical labs CLIA-certified to   perform high complexity testing. Test results should be correlated with   clinical presentation, patient history, and epidemiology.   [facility]   Influenza B Result: [facility]   Resp Syn Virus Result: NotDete Blood Gas Venous - Lactate (08.29.22 @ 12:25)   Blood Gas Venous - Lactate: 1.0 mmol/L Blood Gas Profile - Venous (08.29.22 @ 12:25)   pH, Venous: 7.41: Due to specimen delivery delays, please interpret with caution   pCO2, Venous: 47 mmHg   pO2, Venous: 41 mmHg   HCO3, Venous: 30 mmol/L   Base Excess, Venous: 4.5 mmol/L   Oxygen Saturation, Venous: 67.0 %   Total CO2, Venous: 31.2 mmol/L < from: Transthoracic Echocardiogram (04.03.18 @ 12:57) >    LVEF = 70%     Conclusions:  1. Mitral annular calcification. Thickened mitral leaflets.  Mild mitral regurgitation.  2. Transcatheter aortic valve replacement. Peak transaortic  valve gradient equals 9 mm Hg, mean transaortic valve  gradient equals 4 mm Hg, which is probably normal in the  presence of a transcatheter aortic valve replacement. No  aortic valve regurgitation seen.  3. Normal left ventricular systolic function. Septal motion  consistent with paced rhythm/conduction defect.  4. Mild right ventricular enlargement with mildly decreased  right ventricular systolic function. A device wire is noted  in the right heart.  5. Estimated pulmonary artery systolic pressure equals 57  mm Hg, assuming right atrial pressure equals 8 mm Hg,  consistent with moderate pulmonary pressures.  *** Compared with echocardiogram of 2/15/2018, results are  similar on today's study.    < end of copied text >

## 2022-08-29 NOTE — ED ADULT NURSE REASSESSMENT NOTE - NS ED NURSE REASSESS COMMENT FT1
pt. intermittently catheterized for residual urine using aseptic technique, tolerated well. approx 200 mL of clear yellow urine noted. urine sent as per orders.

## 2022-08-29 NOTE — H&P ADULT - ATTENDING COMMENTS
Patient seen and examined at bedside on 8/29 in the afternoon. In brief, patient is a 89 yo F with PMHx of CAD s/p CABG, a. fib on xarelto , hypothyroidism, rectal mass s/p resection, aortic valve replacement, and prior UTIs i/s/o staghorn calculi presenting from NH with concern for FTT. Patient found to be AOx2 (hospital and self) on exam with no leukocytosis, Patchy heterogeneous enhancement of the right kidney, concerning for   acute pyelonephritis on CT A/P. Unclear which abx patient was on in rehab, will start patient on cefepime. Despite presenting chief complaint on exam, patient noted to be eating dinner and denies any issues with Po intake.   #Hypothyroid - continue synthroid   #Afib on AC   - Continue xarelto and metoprolol   #HLD - continue statin   #HTN - continue dilt and hydralazine       Rest of plan as above

## 2022-08-29 NOTE — ED PROVIDER NOTE - CLINICAL SUMMARY MEDICAL DECISION MAKING FREE TEXT BOX
89 yr female Pre-DM, IHD resident of [facility], UTI/Pyelonephritis on Day#4 of Abx Ceftiraxone now referred to ED for reduced PO intake and abdominal pain. VS wnl. PE unremarkable. Will evaluate for Pylenephritis. Pending basic labs, UA, UCS, CT Abd/Pelvis. Will give IVF, Abx. +/- Admit. 89 yr female Pre-DM, IHD resident of [facility], UTI/Pyelonephritis on Day#4 of Abx Ceftriaxone now referred to ED for reduced PO intake and abdominal pain. VS wnl. PE unremarkable. Will evaluate for Pyelonephritis. Pending basic labs, UA, UCS, CT Abd/Pelvis. Will give IVF, Abx. +/- Admit.

## 2022-08-29 NOTE — H&P ADULT - NSICDXPASTMEDICALHX_GEN_ALL_CORE_FT
PAST MEDICAL HISTORY:  Anemia     Atrial fibrillation, unspecified     CAD (coronary artery disease) S/P CABG 1999    Constipation     Dyslipidemia     Gastritis     Gastroesophageal reflux disease without esophagitis     Gastrointestinal hemorrhage, unspecified gastritis, unspecified gastrointestinal hemorrhage type     Hyperlipidemia     Hypertension     Hypothyroidism     Obesity     Xerophthalmia      PAST MEDICAL HISTORY:  Anemia     Atrial fibrillation, unspecified     CAD (coronary artery disease) S/P CABG 1999    Constipation     Dyslipidemia     Gastritis     Gastroesophageal reflux disease without esophagitis     Gastrointestinal hemorrhage, unspecified gastritis, unspecified gastrointestinal hemorrhage type     H/O aortic valve replacement     Hyperlipidemia     Hypertension     Hypothyroidism     Obesity     Xerophthalmia

## 2022-08-29 NOTE — H&P ADULT - PROBLEM SELECTOR PLAN 4
Previous TTE shows aortic valve replacement with EF of 70%   - C/w Metoprolol 25 mg BID   - C/w Hydralazine 50 mg

## 2022-08-29 NOTE — H&P ADULT - HISTORY OF PRESENT ILLNESS
89 yo F with PMHx of CAD s/p CABG, a. fib, hypothyroidism, rectal mass s/p resection, and prior UTIs i/s/o staghorn calculi presents with pyelonephritis. Patient presented to the ED from nursing home with reports of failure to thrive, lack of eating/drinking for 1 week with complaints of abdominal pain for past few days. Patient was A&Ox2 on presentation and denied any symptoms, noting that her reason for presentation was "I slipped and fell". Patient also denies being from a nursing home and denies having any medical history. Patient received a CT abdomen and pelvis which showed "staghorn calculus centered in the right renal pelvis with mild right hydronephrosis. Patchy heterogeneous enhancement of the right kidney, concerning for acute pyelonephritis." Per ED report, patient received 4 days of ceftriaxone prior to presentation. In the ED, VS were stable and CBC and CMP were unremarkable. UA showed moderate leukocyte esterase and trace blood. Blood cultures and urine cultures were also ordered. Patient also received 1L bolus of NS.   Patient was previously admitted in January 2021 for sepsis 2/2 UTI i/s/o staghorn calculi. Urology was previously consulted and noted no intervention at the time. Patient was treated with Cefpodoxime 200 mg BID for 14 days total. Outpatient records also show urine culture data from February 2022 growing E. coli with sensitivity to Cefepime.  89 yo F with PMHx of CAD s/p CABG, a. fib, hypothyroidism, rectal mass s/p resection, and prior UTIs i/s/o staghorn calculi presents with pyelonephritis. Patient presented to the ED from nursing home with reports of failure to thrive, lack of eating/drinking for 1 week with complaints of abdominal pain for past few days. Patient was A&Ox2 on presentation and denied any symptoms, including shortness of breath, chest pain, abdominal pain, headache, dizziness, lightheadedness, fever, dysuria, and problems with walking or bowel movements. Patient also notes that her reason for presentation was "I slipped and fell". Patient also denies being from a nursing home and denies having any medical history. Patient received a CT abdomen and pelvis which showed "staghorn calculus centered in the right renal pelvis with mild right hydronephrosis. Patchy heterogeneous enhancement of the right kidney, concerning for acute pyelonephritis." Per ED report, patient received 4 days of ceftriaxone prior to presentation. In the ED, VS were stable and CBC and CMP were unremarkable. UA showed moderate leukocyte esterase and trace blood. Blood cultures and urine cultures were also ordered. Patient also received 1L bolus of NS.   Patient was previously admitted in January 2021 for sepsis 2/2 UTI i/s/o staghorn calculi in right UPJ. Urology was previously consulted and noted no intervention at the time. Patient was treated with Cefpodoxime 200 mg BID for 14 days total. Outpatient records also show urine culture data from February 2022 growing E. coli with sensitivity to Cefepime.  89 yo F with PMHx of CAD s/p CABG, a. fib, hypothyroidism, rectal mass s/p resection, aortic valve replacement, and prior UTIs i/s/o staghorn calculi presents with pyelonephritis. Patient presented to the ED from nursing home with reports of failure to thrive, lack of eating/drinking for 1 week with complaints of abdominal pain for past few days. Patient was A&Ox2 on presentation and denied any symptoms, including shortness of breath, chest pain, abdominal pain, headache, dizziness, lightheadedness, fever, dysuria, and problems with walking or bowel movements. Patient also notes that her reason for presentation was "I slipped and fell". Patient also denies being from a nursing home and denies having any medical history. Patient received a CT abdomen and pelvis which showed "staghorn calculus centered in the right renal pelvis with mild right hydronephrosis. Patchy heterogeneous enhancement of the right kidney, concerning for acute pyelonephritis." Per ED report, patient received 4 days of ceftriaxone prior to presentation. In the ED, VS were stable and CBC and CMP were unremarkable. UA showed moderate leukocyte esterase and trace blood. Blood cultures and urine cultures were also ordered. Patient also received 1L bolus of NS.   Patient was previously admitted in January 2021 for sepsis 2/2 UTI i/s/o staghorn calculi in right UPJ. Urology was previously consulted and noted no intervention at the time. Patient was treated with Cefpodoxime 200 mg BID for 14 days total. Outpatient records also show urine culture data from February 2022 growing E. coli with sensitivity to Cefepime.

## 2022-08-29 NOTE — ED ADULT NURSE NOTE - NSIMPLEMENTINTERV_GEN_ALL_ED
Implemented All Fall Risk Interventions:  [place] to call system. Call bell, personal items and telephone within reach. Instruct patient to call for assistance. Room bathroom lighting operational. Non-slip footwear when patient is off stretcher. Physically safe environment: no spills, clutter or unnecessary equipment. Stretcher in lowest position, wheels locked, appropriate side rails in place. Provide visual cue, wrist band, yellow gown, etc. Monitor gait and stability. Monitor for mental status changes and reorient to person, place, and time. Review medications for side effects contributing to fall risk. Reinforce activity limits and safety measures with patient and family.

## 2022-08-29 NOTE — CONSULT NOTE ADULT - SUBJECTIVE AND OBJECTIVE BOX
HPI  89 y.o female was sent from a nursing home to the ED due to UTI/Pyelonephritis. Patient is a poor historian, she states that she was sent to the ER due to syncope. She was complaining of back pain, no nausea no vomiting, no dysuria, hematuria.  Patient was seen bu urology [facility] due to a right UPJ stone but was treated conservatively.  In ED she was afebrile,   PAST MEDICAL & SURGICAL HISTORY:  CAD (coronary artery disease)  S/P CABG       Hypertension      Dyslipidemia      Obesity      Hypothyroidism      Gastroesophageal reflux disease without esophagitis      Hyperlipidemia      Atrial fibrillation, unspecified      Gastritis      Gastrointestinal hemorrhage, unspecified gastritis, unspecified gastrointestinal hemorrhage type      Constipation      Anemia      Xerophthalmia      H/O aortic valve replacement      S/P CABG (coronary artery bypass graft)  in       Rectal mass  removal in 2015      H/O abdominal hysterectomy      History of colon polyps  removal           MEDICATIONS  (STANDING):  atorvastatin 20 milliGRAM(s) Oral at bedtime  cefepime   IVPB 1000 milliGRAM(s) IV Intermittent every 24 hours  diltiazem    milliGRAM(s) Oral daily  hydrALAZINE 25 milliGRAM(s) Oral three times a day  levothyroxine 125 MICROGram(s) Oral daily  metoprolol tartrate 25 milliGRAM(s) Oral two times a day  pantoprazole    Tablet 20 milliGRAM(s) Oral before breakfast  polyethylene glycol 3350 17 Gram(s) Oral daily  rivaroxaban 15 milliGRAM(s) Oral with dinner  senna 2 Tablet(s) Oral at bedtime    MEDICATIONS  (PRN):  acetaminophen     Tablet .. 650 milliGRAM(s) Oral every 6 hours PRN Temp greater or equal to 38C (100.4F), Mild Pain (1 - 3)      FAMILY HISTORY:      Allergies    amoxicillin (Unknown)    Intolerances        SOCIAL HISTORY: No smoking    REVIEW OF SYSTEMS: Otherwise negative as stated in HPI    Physical Exam  Vital signs  T(F): 97 (22 @ 16:28), Max: 98.4 (22 @ 12:58)  HR: 65 (22 @ 16:28)  BP: 133/54 (22 @ 16:28)  SpO2: 97% (22 @ 16:28)    Output    UOP    Gen:  [x] NAD [] toxic    Pulm:  [x] no resp distress [x] no substernal retractions  	  CV:  [] no JVD  [x] RRR    GI:  [x] Soft [x] ND [x] NT  Mild right CVA tenderness                        	  MSK:  Edema []Y [x]N    LABS:       @ 12:25    WBC 10.13 / Hct 33.3  / SCr 0.96       Urinalysis Basic - ( 29 Aug 2022 17:23 )    Color: Yellow / Appearance: Clear / S.026 / pH: x  Gluc: x / Ketone: Negative  / Bili: Negative / Urobili: <2 mg/dL   Blood: x / Protein: Negative / Nitrite: Negative   Leuk Esterase: Moderate / RBC: 10 /HPF / WBC 36 /HPF   Sq Epi: x / Non Sq Epi: 1 /HPF / Bacteria: Negative        Urine Cx: p  Blood Cx:    RADIOLOGY:  < from: CT Abdomen and Pelvis w/ IV Cont (22 @ 14:48) >    ACC: [id number] EXAM:  CT ABDOMEN AND PELVIS IC                          PROCEDURE DATE:  2022          INTERPRETATION:  CLINICAL INFORMATION: Failure to thrive. Currently   receiving antibiotics for UTI/pyelonephritis. Abdominal pain.    COMPARISON: CT abdomen and pelvis 2021 and 2018. Cardiac CT   2018.    CONTRAST/COMPLICATIONS:  IV Contrast: Omnipaque 350  62 cc administered   8 cc discarded  Oral Contrast: NONE  Complications: None reported at time of study completion    PROCEDURE:  CT of the Abdomen and Pelvis was performed.  Sagittal and coronal reformats were performed.    FINDINGS:  LOWER CHEST: Mosaic attenuation of the lung bases. Small bilateral   pleural effusions. Bibasilar subsegmental atelectasis. Partiallyimaged   left chest wall cardiac device and lead. TAVR. Coronary artery   calcification and likely stenting. Cardiomegaly.    LIVER: A subcentimeter hypervascular focus within the left lateral   segment is unchanged since at least 2018, and may represent a   hemangioma.  BILE DUCTS: Normal caliber.  GALLBLADDER: Cholecystectomy.  SPLEEN: Within normal limits.  PANCREAS: Within normal limits.  ADRENALS: Within normal limits.  KIDNEYS/URETERS: Staghorn calculus centered in the right renal pelvis   with mild right hydronephrosis. No left hydronephrosis. Patchy   heterogeneous parenchymal enhancement in the upper and lower poles of the   right kidney, which may be related to acute pyelonephritis; the region of   abnormal enhancement within the right lower pole has a somewhat bulbous   appearance with bulging of the outer renal contour (series 602 image 51).   A subcentimeter hypodense focus in the upper pole of the right kidney is   too small to characterize. Asymmetric right perinephricfat infiltration.    BLADDER: Within normal limits.  REPRODUCTIVE ORGANS: Hysterectomy.    BOWEL: Right hemicolectomy with ileocolic anastomosis. Colonic   diverticulosis without evidence of acute diverticulitis. No bowel   obstruction.  PERITONEUM: No ascites.  VESSELS: Atherosclerotic changes. Accessory or replaced right hepatic   artery arising from the superior mesenteric artery.  RETROPERITONEUM/LYMPH NODES: Small nonspecific retroperitoneal lymph   nodes measuring up to 8 mm in short axis.  ABDOMINAL WALL: Postoperative changes in the ventral abdominal wall.   Small infraumbilical hernia containing small bowel without evidence of   obstruction.  BONES: Median sternotomy wires. Degenerative changes. Osseous   demineralization.    IMPRESSION:  Staghorn calculus centered in the right renal pelvis with mild right   hydronephrosis.    Patchy heterogeneous enhancement of the right kidney, concerning for   acute pyelonephritis. A region of abnormal enhancement in the right lower   pole is somewhat bulbous in appearance with bulging of the outer renal   contour, and underlying neoplasm is not excluded. Recommend follow-up CT   utilizing renal mass protocol after appropriate treatment to assess for   resolution or persistent lesion.    Small bilateral pleural effusions.    Mosaic attenuation of the lung bases, which may be seen with small airway   disease/air trapping.        --- End of Report ---            HANG HOWELL MD; Attending Radiologist  This document has been electronically signed. Aug 29 2022  3:26PM    < end of copied text >       HPI  89 y.o female was sent from a nursing home to the ED due to UTI/Pyelonephritis. Patient is a poor historian, she states that she was sent to the ER due to syncope. She was complaining of back pain but denies any fever, nausea, vomiting, dysuria, hematuria.  Patient was seen by urology [facility] in January due to a right UPJ stone but was treated conservatively.  In ED she was afebrile, urine and blood cultures were sent. She received Cefepime.    PAST MEDICAL & SURGICAL HISTORY:  CAD (coronary artery disease)  S/P CABG       Hypertension      Dyslipidemia      Obesity      Hypothyroidism      Gastroesophageal reflux disease without esophagitis      Hyperlipidemia      Atrial fibrillation, unspecified      Gastritis      Gastrointestinal hemorrhage, unspecified gastritis, unspecified gastrointestinal hemorrhage type      Constipation      Anemia      Xerophthalmia      H/O aortic valve replacement      S/P CABG (coronary artery bypass graft)  in       Rectal mass  removal in 2015      H/O abdominal hysterectomy      History of colon polyps  removal           MEDICATIONS  (STANDING):  atorvastatin 20 milliGRAM(s) Oral at bedtime  cefepime   IVPB 1000 milliGRAM(s) IV Intermittent every 24 hours  diltiazem    milliGRAM(s) Oral daily  hydrALAZINE 25 milliGRAM(s) Oral three times a day  levothyroxine 125 MICROGram(s) Oral daily  metoprolol tartrate 25 milliGRAM(s) Oral two times a day  pantoprazole    Tablet 20 milliGRAM(s) Oral before breakfast  polyethylene glycol 3350 17 Gram(s) Oral daily  rivaroxaban 15 milliGRAM(s) Oral with dinner  senna 2 Tablet(s) Oral at bedtime    MEDICATIONS  (PRN):  acetaminophen     Tablet .. 650 milliGRAM(s) Oral every 6 hours PRN Temp greater or equal to 38C (100.4F), Mild Pain (1 - 3)      FAMILY HISTORY:      Allergies    amoxicillin (Unknown)    Intolerances        SOCIAL HISTORY: No smoking    REVIEW OF SYSTEMS: Otherwise negative as stated in HPI    Physical Exam  Vital signs  T(F): 97 (22 @ 16:28), Max: 98.4 (22 @ 12:58)  HR: 65 (22 @ 16:28)  BP: 133/54 (22 @ 16:28)  SpO2: 97% (22 @ 16:28)    Output    UOP    Gen:  [x] NAD [] toxic    Pulm:  [x] no resp distress [x] no substernal retractions  	  CV:  [] no JVD  [x] RRR    GI:  [x] Soft [x] ND [x] NT  Mild right CVA tenderness                        	  MSK:  Edema []Y [x]N    LABS:       @ 12:25    WBC 10.13 / Hct 33.3  / SCr 0.96       Urinalysis Basic - ( 29 Aug 2022 17:23 )    Color: Yellow / Appearance: Clear / S.026 / pH: x  Gluc: x / Ketone: Negative  / Bili: Negative / Urobili: <2 mg/dL   Blood: x / Protein: Negative / Nitrite: Negative   Leuk Esterase: Moderate / RBC: 10 /HPF / WBC 36 /HPF   Sq Epi: x / Non Sq Epi: 1 /HPF / Bacteria: Negative        Urine Cx: p  Blood Cx:    RADIOLOGY:  < from: CT Abdomen and Pelvis w/ IV Cont (22 @ 14:48) >    ACC: [id number] EXAM:  CT ABDOMEN AND PELVIS IC                          PROCEDURE DATE:  2022          INTERPRETATION:  CLINICAL INFORMATION: Failure to thrive. Currently   receiving antibiotics for UTI/pyelonephritis. Abdominal pain.    COMPARISON: CT abdomen and pelvis 2021 and 2018. Cardiac CT   2018.    CONTRAST/COMPLICATIONS:  IV Contrast: Omnipaque 350  62 cc administered   8 cc discarded  Oral Contrast: NONE  Complications: None reported at time of study completion    PROCEDURE:  CT of the Abdomen and Pelvis was performed.  Sagittal and coronal reformats were performed.    FINDINGS:  LOWER CHEST: Mosaic attenuation of the lung bases. Small bilateral   pleural effusions. Bibasilar subsegmental atelectasis. Partiallyimaged   left chest wall cardiac device and lead. TAVR. Coronary artery   calcification and likely stenting. Cardiomegaly.    LIVER: A subcentimeter hypervascular focus within the left lateral   segment is unchanged since at least 2018, and may represent a   hemangioma.  BILE DUCTS: Normal caliber.  GALLBLADDER: Cholecystectomy.  SPLEEN: Within normal limits.  PANCREAS: Within normal limits.  ADRENALS: Within normal limits.  KIDNEYS/URETERS: Staghorn calculus centered in the right renal pelvis   with mild right hydronephrosis. No left hydronephrosis. Patchy   heterogeneous parenchymal enhancement in the upper and lower poles of the   right kidney, which may be related to acute pyelonephritis; the region of   abnormal enhancement within the right lower pole has a somewhat bulbous   appearance with bulging of the outer renal contour (series 602 image 51).   A subcentimeter hypodense focus in the upper pole of the right kidney is   too small to characterize. Asymmetric right perinephricfat infiltration.    BLADDER: Within normal limits.  REPRODUCTIVE ORGANS: Hysterectomy.    BOWEL: Right hemicolectomy with ileocolic anastomosis. Colonic   diverticulosis without evidence of acute diverticulitis. No bowel   obstruction.  PERITONEUM: No ascites.  VESSELS: Atherosclerotic changes. Accessory or replaced right hepatic   artery arising from the superior mesenteric artery.  RETROPERITONEUM/LYMPH NODES: Small nonspecific retroperitoneal lymph   nodes measuring up to 8 mm in short axis.  ABDOMINAL WALL: Postoperative changes in the ventral abdominal wall.   Small infraumbilical hernia containing small bowel without evidence of   obstruction.  BONES: Median sternotomy wires. Degenerative changes. Osseous   demineralization.    IMPRESSION:  Staghorn calculus centered in the right renal pelvis with mild right   hydronephrosis.    Patchy heterogeneous enhancement of the right kidney, concerning for   acute pyelonephritis. A region of abnormal enhancement in the right lower   pole is somewhat bulbous in appearance with bulging of the outer renal   contour, and underlying neoplasm is not excluded. Recommend follow-up CT   utilizing renal mass protocol after appropriate treatment to assess for   resolution or persistent lesion.    Small bilateral pleural effusions.    Mosaic attenuation of the lung bases, which may be seen with small airway   disease/air trapping.        --- End of Report ---            HANG HOWELL MD; Attending Radiologist  This document has been electronically signed. Aug 29 2022  3:26PM    < end of copied text >

## 2022-08-30 LAB
ANION GAP SERPL CALC-SCNC: 11 MMOL/L — SIGNIFICANT CHANGE UP (ref 7–14)
APTT BLD: 29.3 SEC — SIGNIFICANT CHANGE UP (ref 27–36.3)
BUN SERPL-MCNC: 8 MG/DL — SIGNIFICANT CHANGE UP (ref 7–23)
CALCIUM SERPL-MCNC: 9 MG/DL — SIGNIFICANT CHANGE UP (ref 8.4–10.5)
CHLORIDE SERPL-SCNC: 105 MMOL/L — SIGNIFICANT CHANGE UP (ref 98–107)
CO2 SERPL-SCNC: 25 MMOL/L — SIGNIFICANT CHANGE UP (ref 22–31)
CREAT SERPL-MCNC: 0.77 MG/DL — SIGNIFICANT CHANGE UP (ref 0.5–1.3)
EGFR: 71 ML/MIN/1.73M2 — SIGNIFICANT CHANGE UP
GLUCOSE SERPL-MCNC: 94 MG/DL — SIGNIFICANT CHANGE UP (ref 70–99)
HCT VFR BLD CALC: 34.4 % — LOW (ref 34.5–45)
HGB BLD-MCNC: 10.6 G/DL — LOW (ref 11.5–15.5)
INR BLD: 1.19 RATIO — HIGH (ref 0.88–1.16)
MAGNESIUM SERPL-MCNC: 1.8 MG/DL — SIGNIFICANT CHANGE UP (ref 1.6–2.6)
MCHC RBC-ENTMCNC: 27.1 PG — SIGNIFICANT CHANGE UP (ref 27–34)
MCHC RBC-ENTMCNC: 30.8 GM/DL — LOW (ref 32–36)
MCV RBC AUTO: 88 FL — SIGNIFICANT CHANGE UP (ref 80–100)
NRBC # BLD: 0 /100 WBCS — SIGNIFICANT CHANGE UP (ref 0–0)
NRBC # FLD: 0 K/UL — SIGNIFICANT CHANGE UP (ref 0–0)
PHOSPHATE SERPL-MCNC: 3.4 MG/DL — SIGNIFICANT CHANGE UP (ref 2.5–4.5)
PLATELET # BLD AUTO: 280 K/UL — SIGNIFICANT CHANGE UP (ref 150–400)
POTASSIUM SERPL-MCNC: 3.9 MMOL/L — SIGNIFICANT CHANGE UP (ref 3.5–5.3)
POTASSIUM SERPL-SCNC: 3.9 MMOL/L — SIGNIFICANT CHANGE UP (ref 3.5–5.3)
PROTHROM AB SERPL-ACNC: 13.8 SEC — HIGH (ref 10.5–13.4)
RBC # BLD: 3.91 M/UL — SIGNIFICANT CHANGE UP (ref 3.8–5.2)
RBC # FLD: 15.1 % — HIGH (ref 10.3–14.5)
SODIUM SERPL-SCNC: 141 MMOL/L — SIGNIFICANT CHANGE UP (ref 135–145)
WBC # BLD: 9.29 K/UL — SIGNIFICANT CHANGE UP (ref 3.8–10.5)
WBC # FLD AUTO: 9.29 K/UL — SIGNIFICANT CHANGE UP (ref 3.8–10.5)

## 2022-08-30 PROCEDURE — 99233 SBSQ HOSP IP/OBS HIGH 50: CPT | Mod: GC

## 2022-08-30 PROCEDURE — 99222 1ST HOSP IP/OBS MODERATE 55: CPT

## 2022-08-30 RX ORDER — PANTOPRAZOLE SODIUM 20 MG/1
40 TABLET, DELAYED RELEASE ORAL
Refills: 0 | Status: DISCONTINUED | OUTPATIENT
Start: 2022-08-30 | End: 2022-09-02

## 2022-08-30 RX ADMIN — Medication 25 MILLIGRAM(S): at 06:09

## 2022-08-30 RX ADMIN — Medication 25 MILLIGRAM(S): at 06:10

## 2022-08-30 RX ADMIN — ATORVASTATIN CALCIUM 20 MILLIGRAM(S): 80 TABLET, FILM COATED ORAL at 21:38

## 2022-08-30 RX ADMIN — Medication 25 MILLIGRAM(S): at 21:39

## 2022-08-30 RX ADMIN — SENNA PLUS 2 TABLET(S): 8.6 TABLET ORAL at 21:39

## 2022-08-30 RX ADMIN — PANTOPRAZOLE SODIUM 40 MILLIGRAM(S): 20 TABLET, DELAYED RELEASE ORAL at 06:47

## 2022-08-30 RX ADMIN — RIVAROXABAN 15 MILLIGRAM(S): KIT at 17:59

## 2022-08-30 RX ADMIN — Medication 25 MILLIGRAM(S): at 17:59

## 2022-08-30 RX ADMIN — CEFEPIME 100 MILLIGRAM(S): 1 INJECTION, POWDER, FOR SOLUTION INTRAMUSCULAR; INTRAVENOUS at 17:59

## 2022-08-30 RX ADMIN — Medication 650 MILLIGRAM(S): at 12:37

## 2022-08-30 RX ADMIN — Medication 650 MILLIGRAM(S): at 13:07

## 2022-08-30 RX ADMIN — Medication 240 MILLIGRAM(S): at 06:10

## 2022-08-30 RX ADMIN — Medication 125 MICROGRAM(S): at 06:10

## 2022-08-30 RX ADMIN — Medication 25 MILLIGRAM(S): at 14:51

## 2022-08-30 NOTE — DIETITIAN INITIAL EVALUATION ADULT - OTHER INFO
Per chart, pt is 89 year old female PMH CAD s/p CABG, Afib, hypothyroidism, rectal mass s/p resection, aortic valve replacement, prior UTIs in setting of staghorn calculi presenting with pyelonephritis. Urology was consulted, pt continues on antibiotics.     NKFA. Diet PTA per nursing home transfer records. Noted with decreased PO intake, abdominal discomfort x1 week PTA.     No noted GI distress, ordered for senna 2 tablets qHS and Miralax 17 gm qD.  Per chart, pt is 89 year old female PMH CAD s/p CABG, Afib, hypothyroidism, rectal mass s/p resection, aortic valve replacement, prior UTIs in setting of staghorn calculi presenting with pyelonephritis. Urology was consulted, pt continues on antibiotics.     Pt unable to meaningfully participate in interview, complaining of abdominal pain at time of visit; comprehensive chart review completed including review of nursing home transfer records. NKFA. Notable medications PTA inclusive of Zofran, folic acid and multivitamin with mineral. Diet PTA inclusive of minced solids/thin liquids, no added salt, Ensure Clear 1 PO 3x daily, LPS 30 mL TID with aspiration precautions and no straws. Of note, pt was ordered for a clear liquid diet at nursing home immediately prior to admit secondary to nausea/vomiting. Noted with decreased PO intake, abdominal discomfort x1 week PTA.     Pt is actively ordered for a texture modified diet per MD, noted with poor PO intake thus far in house per flowsheet documentation. Pt requires assistance at meal times. Ordered for senna 2 tablets qHS and Miralax 17 gm qD.

## 2022-08-30 NOTE — OCCUPATIONAL THERAPY INITIAL EVALUATION ADULT - LIVES WITH, PROFILE
Pt. reports she lives with her son in a house with no steps to enter. Once inside, pt. reports bedroom and bathroom are located on the main level. Accuracy of Social History questionable secondary to pt. with noted confusion & difficulty following commands during OT evaluation. Per chart, pt. presented to [facility] from [facility] [place] since January 2021.

## 2022-08-30 NOTE — DIETITIAN INITIAL EVALUATION ADULT - NSICDXPASTMEDICALHX_GEN_ALL_CORE_FT
PAST MEDICAL HISTORY:  Anemia     Atrial fibrillation, unspecified     CAD (coronary artery disease) S/P CABG 1999    Constipation     Dyslipidemia     Gastritis     Gastroesophageal reflux disease without esophagitis     Gastrointestinal hemorrhage, unspecified gastritis, unspecified gastrointestinal hemorrhage type     H/O aortic valve replacement     Hyperlipidemia     Hypertension     Hypothyroidism     Obesity     Xerophthalmia

## 2022-08-30 NOTE — OCCUPATIONAL THERAPY INITIAL EVALUATION ADULT - ADDITIONAL COMMENTS
Pt. reports she was independent prior to hospitalization and using no assistive devices. Accuracy of Prior Level of Function questionable secondary to pt. with noted confusion & difficulty following commands during OT evaluation. Per chart, pt. presented to [facility] from [facility] [place] since January 2021 and received assistance with ADL's and IADL's from nursing home staff.

## 2022-08-30 NOTE — OCCUPATIONAL THERAPY INITIAL EVALUATION ADULT - PERTINENT HX OF CURRENT PROBLEM, REHAB EVAL
Pt is a 89 year old female with hx of CAD s/p CABG, AFib, hypothyroidism, rectal mass s/p resection, aortic valve replacement, and prior UTIs in the setting of staghorn calculi, who presented from nursing home to [facility] on 8/29/22 with pyelonephritis, failure to thrive, lack of eating/drinking for 1 week with complaints of abdominal pain for past few days. CT Abdomen and Pelvis on 8/29/22 displayed staghorn calculus centered in the right renal pelvis with mild right hydronephrosis. Patchy heterogeneous enhancement of the right kidney, concerning for acute pyelonephritis. A region of abnormal enhancement in the right lower pole is somewhat bulbous in appearance with bulging of the outer renal contour, and underlying neoplasm is not excluded. Small bilateral pleural effusions. Mosaic attenuation of the lung bases, which may be seen with small airway disease/air trapping.

## 2022-08-30 NOTE — PROGRESS NOTE ADULT - SUBJECTIVE AND OBJECTIVE BOX
PROGRESS NOTE:     Patient is a 89y old  Female who presents with a chief complaint of Pyelonephritis (29 Aug 2022 20:11)      SUBJECTIVE / OVERNIGHT EVENTS:    No acute events overnight. Patient examined at bedside with no acute complaints.     Pain:  Bowel Movements:  Urination:  OOB:  PT:    REVIEW OF SYSTEMS:    CONSTITUTIONAL: No weakness, fevers or chills  EYES/ENT: No visual changes;  No vertigo or throat pain   NECK: No pain or stiffness  RESPIRATORY: No cough, wheezing, hemoptysis; No shortness of breath  CARDIOVASCULAR: No chest pain or palpitations  GASTROINTESTINAL: No abdominal or epigastric pain. No nausea, vomiting, or hematemesis; No diarrhea or constipation. No melena or hematochezia.  GENITOURINARY: No dysuria, frequency or hematuria  NEUROLOGICAL: No numbness or weakness  SKIN: No itching, rashes      MEDICATIONS  (STANDING):  atorvastatin 20 milliGRAM(s) Oral at bedtime  cefepime   IVPB 1000 milliGRAM(s) IV Intermittent every 24 hours  diltiazem    milliGRAM(s) Oral daily  hydrALAZINE 25 milliGRAM(s) Oral three times a day  levothyroxine 125 MICROGram(s) Oral daily  metoprolol tartrate 25 milliGRAM(s) Oral two times a day  pantoprazole    Tablet 40 milliGRAM(s) Oral before breakfast  polyethylene glycol 3350 17 Gram(s) Oral daily  rivaroxaban 15 milliGRAM(s) Oral with dinner  senna 2 Tablet(s) Oral at bedtime    MEDICATIONS  (PRN):  acetaminophen     Tablet .. 650 milliGRAM(s) Oral every 6 hours PRN Temp greater or equal to 38C (100.4F), Mild Pain (1 - 3)      CAPILLARY BLOOD GLUCOSE        I&O's Summary    29 Aug 2022 07:01  -  30 Aug 2022 07:00  --------------------------------------------------------  IN: 0 mL / OUT: 600 mL / NET: -600 mL        VITALS:   T(C): 37.1 (22 @ 05:09), Max: 37.1 (22 @ 05:09)  HR: 60 (22 @ 05:09) (59 - 65)  BP: 139/63 (22 @ 05:09) (120/48 - 152/60)  RR: 18 (22 @ 05:09) (16 - 18)  SpO2: 99% (22 @ 05:09) (96% - 99%)    GENERAL: NAD, lying in bed comfortably  HEAD:  Atraumatic, normocephalic  EYES: EOMI, PERRLA, conjunctiva and sclera clear  ENT: Moist mucous membranes  NECK: Supple, no JVD  HEART: Regular rate and rhythm, no murmurs, rubs, or gallops  LUNGS: Unlabored respirations.  Clear to auscultation bilaterally, no crackles, wheezing, or rhonchi  ABDOMEN: Soft, nontender, nondistended, +BS  EXTREMITIES: 2+ peripheral pulses bilaterally. No clubbing, cyanosis, or edema  NERVOUS SYSTEM:  A&Ox3, no focal deficits   SKIN: No rashes or lesions    LABS:                        10.3   10.13 )-----------( 287      ( 29 Aug 2022 12:25 )             33.3         143  |  106  |  12  ----------------------------<  102<H>  4.3   |  28  |  0.96    Ca    9.1      29 Aug 2022 12:25  Mg     1.90         TPro  7.4  /  Alb  3.0<L>  /  TBili  0.4  /  DBili  x   /  AST  15  /  ALT  6   /  AlkPhos  92            Urinalysis Basic - ( 29 Aug 2022 17:23 )    Color: Yellow / Appearance: Clear / S.026 / pH: x  Gluc: x / Ketone: Negative  / Bili: Negative / Urobili: <2 mg/dL   Blood: x / Protein: Negative / Nitrite: Negative   Leuk Esterase: Moderate / RBC: 10 /HPF / WBC 36 /HPF   Sq Epi: x / Non Sq Epi: 1 /HPF / Bacteria: Negative          RADIOLOGY & ADDITIONAL TESTS:  Results Reviewed:   Imaging Personally Reviewed:  Electrocardiogram Personally Reviewed:    COORDINATION OF CARE:  Care Discussed with Consultants/Other Providers [Y/N]:  Prior or Outpatient Records Reviewed [Y/N]:   PROGRESS NOTE:     Patient is a 89y old  Female who presents with a chief complaint of Pyelonephritis (29 Aug 2022 20:11)      SUBJECTIVE / OVERNIGHT EVENTS:    No acute events overnight. Patient examined at bedside with no acute complaints.     Pain: N/A   Bowel Movements: one last night   Urination: regular   OOB: as tolerated     REVIEW OF SYSTEMS:    CONSTITUTIONAL: No weakness, fevers or chills  EYES/ENT: No visual changes;  No vertigo or throat pain   NECK: No pain or stiffness  RESPIRATORY: No cough, wheezing, hemoptysis; No shortness of breath  CARDIOVASCULAR: No chest pain or palpitations  GASTROINTESTINAL: No abdominal or epigastric pain. No nausea, vomiting, or hematemesis; No diarrhea or constipation. No melena or hematochezia.  GENITOURINARY: No dysuria, frequency or hematuria  NEUROLOGICAL: No numbness or weakness  SKIN: No itching, rashes      MEDICATIONS  (STANDING):  atorvastatin 20 milliGRAM(s) Oral at bedtime  cefepime   IVPB 1000 milliGRAM(s) IV Intermittent every 24 hours  diltiazem    milliGRAM(s) Oral daily  hydrALAZINE 25 milliGRAM(s) Oral three times a day  levothyroxine 125 MICROGram(s) Oral daily  metoprolol tartrate 25 milliGRAM(s) Oral two times a day  pantoprazole    Tablet 40 milliGRAM(s) Oral before breakfast  polyethylene glycol 3350 17 Gram(s) Oral daily  rivaroxaban 15 milliGRAM(s) Oral with dinner  senna 2 Tablet(s) Oral at bedtime    MEDICATIONS  (PRN):  acetaminophen     Tablet .. 650 milliGRAM(s) Oral every 6 hours PRN Temp greater or equal to 38C (100.4F), Mild Pain (1 - 3)      CAPILLARY BLOOD GLUCOSE        I&O's Summary    29 Aug 2022 07:01  -  30 Aug 2022 07:00  --------------------------------------------------------  IN: 0 mL / OUT: 600 mL / NET: -600 mL        VITALS:   T(C): 37.1 (22 @ 05:09), Max: 37.1 (22 @ 05:09)  HR: 60 (22 @ 05:09) (59 - 65)  BP: 139/63 (22 @ 05:09) (120/48 - 152/60)  RR: 18 (22 @ 05:09) (16 - 18)  SpO2: 99% (22 @ 05:09) (96% - 99%)    GENERAL: NAD, lying in bed comfortably  HEAD:  Atraumatic, normocephalic  EYES: EOMI, PERRLA, conjunctiva and sclera clear  ENT: Moist mucous membranes  NECK: Supple, no JVD  HEART: Regular rate and rhythm, systolic murmur   LUNGS: Unlabored respirations.  Clear to auscultation bilaterally, no crackles, wheezing, or rhonchi  ABDOMEN: Soft, nontender, nondistended, +BS  EXTREMITIES: 2+ peripheral pulses bilaterally. No clubbing, cyanosis, or edema  NERVOUS SYSTEM:  A&Ox3, no focal deficits   SKIN: No rashes or lesions    LABS:                        10.3   10.13 )-----------( 287      ( 29 Aug 2022 12:25 )             33.3         143  |  106  |  12  ----------------------------<  102<H>  4.3   |  28  |  0.96    Ca    9.1      29 Aug 2022 12:25  Mg     1.90         TPro  7.4  /  Alb  3.0<L>  /  TBili  0.4  /  DBili  x   /  AST  15  /  ALT  6   /  AlkPhos  92            Urinalysis Basic - ( 29 Aug 2022 17:23 )    Color: Yellow / Appearance: Clear / S.026 / pH: x  Gluc: x / Ketone: Negative  / Bili: Negative / Urobili: <2 mg/dL   Blood: x / Protein: Negative / Nitrite: Negative   Leuk Esterase: Moderate / RBC: 10 /HPF / WBC 36 /HPF   Sq Epi: x / Non Sq Epi: 1 /HPF / Bacteria: Negative          RADIOLOGY & ADDITIONAL TESTS:  Results Reviewed:   Imaging Personally Reviewed:  Electrocardiogram Personally Reviewed:    COORDINATION OF CARE:  Care Discussed with Consultants/Other Providers [Y/N]:  Prior or Outpatient Records Reviewed [Y/N]:    PROGRESS NOTE:     Patient is a 89y old  Female who presents with a chief complaint of Pyelonephritis (29 Aug 2022 20:11)      SUBJECTIVE / OVERNIGHT EVENTS:    No acute events overnight. Patient examined at bedside with no acute complaints. Per nursing home report, patient received Ertapenem prior to admission.     Pain: N/A   Bowel Movements: one last night   Urination: regular   OOB: as tolerated     REVIEW OF SYSTEMS:    CONSTITUTIONAL: No weakness, fevers or chills  EYES/ENT: No visual changes;  No vertigo or throat pain   NECK: No pain or stiffness  RESPIRATORY: No cough, wheezing, hemoptysis; No shortness of breath  CARDIOVASCULAR: No chest pain or palpitations  GASTROINTESTINAL: No abdominal or epigastric pain. No nausea, vomiting, or hematemesis; No diarrhea or constipation. No melena or hematochezia.  GENITOURINARY: No dysuria, frequency or hematuria  NEUROLOGICAL: No numbness or weakness  SKIN: No itching, rashes      MEDICATIONS  (STANDING):  atorvastatin 20 milliGRAM(s) Oral at bedtime  cefepime   IVPB 1000 milliGRAM(s) IV Intermittent every 24 hours  diltiazem    milliGRAM(s) Oral daily  hydrALAZINE 25 milliGRAM(s) Oral three times a day  levothyroxine 125 MICROGram(s) Oral daily  metoprolol tartrate 25 milliGRAM(s) Oral two times a day  pantoprazole    Tablet 40 milliGRAM(s) Oral before breakfast  polyethylene glycol 3350 17 Gram(s) Oral daily  rivaroxaban 15 milliGRAM(s) Oral with dinner  senna 2 Tablet(s) Oral at bedtime    MEDICATIONS  (PRN):  acetaminophen     Tablet .. 650 milliGRAM(s) Oral every 6 hours PRN Temp greater or equal to 38C (100.4F), Mild Pain (1 - 3)      CAPILLARY BLOOD GLUCOSE        I&O's Summary    29 Aug 2022 07:01  -  30 Aug 2022 07:00  --------------------------------------------------------  IN: 0 mL / OUT: 600 mL / NET: -600 mL        VITALS:   T(C): 37.1 (22 @ 05:09), Max: 37.1 (22 @ 05:09)  HR: 60 (22 @ 05:09) (59 - 65)  BP: 139/63 (22 @ 05:09) (120/48 - 152/60)  RR: 18 (22 @ 05:09) (16 - 18)  SpO2: 99% (22 @ 05:09) (96% - 99%)    GENERAL: NAD, lying in bed comfortably  HEAD:  Atraumatic, normocephalic  EYES: EOMI, PERRLA, conjunctiva and sclera clear  ENT: Moist mucous membranes  NECK: Supple, no JVD  HEART: Regular rate and rhythm, systolic murmur   LUNGS: Unlabored respirations.  Clear to auscultation bilaterally, no crackles, wheezing, or rhonchi  ABDOMEN: Soft, nontender, nondistended, +BS  EXTREMITIES: 2+ peripheral pulses bilaterally. No clubbing, cyanosis, or edema  NERVOUS SYSTEM:  A&Ox3, no focal deficits   SKIN: No rashes or lesions    LABS:                        10.3   10.13 )-----------( 287      ( 29 Aug 2022 12:25 )             33.3         143  |  106  |  12  ----------------------------<  102<H>  4.3   |  28  |  0.96    Ca    9.1      29 Aug 2022 12:25  Mg     1.90         TPro  7.4  /  Alb  3.0<L>  /  TBili  0.4  /  DBili  x   /  AST  15  /  ALT  6   /  AlkPhos  92            Urinalysis Basic - ( 29 Aug 2022 17:23 )    Color: Yellow / Appearance: Clear / S.026 / pH: x  Gluc: x / Ketone: Negative  / Bili: Negative / Urobili: <2 mg/dL   Blood: x / Protein: Negative / Nitrite: Negative   Leuk Esterase: Moderate / RBC: 10 /HPF / WBC 36 /HPF   Sq Epi: x / Non Sq Epi: 1 /HPF / Bacteria: Negative          RADIOLOGY & ADDITIONAL TESTS:  Results Reviewed:   Imaging Personally Reviewed:  Electrocardiogram Personally Reviewed:    COORDINATION OF CARE:  Care Discussed with Consultants/Other Providers [Y/N]:  Prior or Outpatient Records Reviewed [Y/N]:    PROGRESS NOTE:     Patient is a 89y old  Female who presents with a chief complaint of Pyelonephritis (29 Aug 2022 20:11)      SUBJECTIVE / OVERNIGHT EVENTS:    No acute events overnight. Patient examined at bedside with no acute complaints. Per nursing home report, patient received Ertapenem prior to admission.     Pain: N/A   Bowel Movements: one last night   Urination: regular   OOB: as tolerated     REVIEW OF SYSTEMS:    CONSTITUTIONAL: No weakness, fevers or chills  EYES/ENT: No visual changes;  No vertigo or throat pain   NECK: No pain or stiffness  RESPIRATORY: No cough, wheezing, hemoptysis; No shortness of breath  CARDIOVASCULAR: No chest pain or palpitations  GASTROINTESTINAL: No abdominal or epigastric pain. No nausea, vomiting, or hematemesis; No diarrhea or constipation. No melena or hematochezia.  GENITOURINARY: No dysuria, frequency or hematuria  NEUROLOGICAL: No numbness or weakness  SKIN: No itching, rashes      MEDICATIONS  (STANDING):  atorvastatin 20 milliGRAM(s) Oral at bedtime  cefepime   IVPB 1000 milliGRAM(s) IV Intermittent every 24 hours  diltiazem    milliGRAM(s) Oral daily  hydrALAZINE 25 milliGRAM(s) Oral three times a day  levothyroxine 125 MICROGram(s) Oral daily  metoprolol tartrate 25 milliGRAM(s) Oral two times a day  pantoprazole    Tablet 40 milliGRAM(s) Oral before breakfast  polyethylene glycol 3350 17 Gram(s) Oral daily  rivaroxaban 15 milliGRAM(s) Oral with dinner  senna 2 Tablet(s) Oral at bedtime    MEDICATIONS  (PRN):  acetaminophen     Tablet .. 650 milliGRAM(s) Oral every 6 hours PRN Temp greater or equal to 38C (100.4F), Mild Pain (1 - 3)      CAPILLARY BLOOD GLUCOSE        I&O's Summary    29 Aug 2022 07:01  -  30 Aug 2022 07:00  --------------------------------------------------------  IN: 0 mL / OUT: 600 mL / NET: -600 mL        VITALS:   T(C): 37.1 (22 @ 05:09), Max: 37.1 (22 @ 05:09)  HR: 60 (22 @ 05:09) (59 - 65)  BP: 139/63 (22 @ 05:09) (120/48 - 152/60)  RR: 18 (22 @ 05:09) (16 - 18)  SpO2: 99% (22 @ 05:09) (96% - 99%)    GENERAL: NAD, lying in bed comfortably  HEAD:  Atraumatic, normocephalic  EYES: EOMI, PERRLA, conjunctiva and sclera clear  ENT: Moist mucous membranes  NECK: Supple, no JVD  HEART: Regular rate and rhythm, systolic murmur   LUNGS: Unlabored respirations.  Clear to auscultation bilaterally, no crackles, wheezing, or rhonchi  ABDOMEN: Soft, nontender, nondistended, +BS  EXTREMITIES: 2+ peripheral pulses bilaterally. No clubbing, cyanosis, or edema  NERVOUS SYSTEM:  A&Ox1-2, no focal deficits   SKIN: No rashes or lesions    LABS:                        10.3   10.13 )-----------( 287      ( 29 Aug 2022 12:25 )             33.3         143  |  106  |  12  ----------------------------<  102<H>  4.3   |  28  |  0.96    Ca    9.1      29 Aug 2022 12:25  Mg     1.90         TPro  7.4  /  Alb  3.0<L>  /  TBili  0.4  /  DBili  x   /  AST  15  /  ALT  6   /  AlkPhos  92            Urinalysis Basic - ( 29 Aug 2022 17:23 )    Color: Yellow / Appearance: Clear / S.026 / pH: x  Gluc: x / Ketone: Negative  / Bili: Negative / Urobili: <2 mg/dL   Blood: x / Protein: Negative / Nitrite: Negative   Leuk Esterase: Moderate / RBC: 10 /HPF / WBC 36 /HPF   Sq Epi: x / Non Sq Epi: 1 /HPF / Bacteria: Negative          RADIOLOGY & ADDITIONAL TESTS:  Results Reviewed:   Imaging Personally Reviewed:  Electrocardiogram Personally Reviewed:    COORDINATION OF CARE:  Care Discussed with Consultants/Other Providers [Y/N]:  Prior or Outpatient Records Reviewed [Y/N]:

## 2022-08-30 NOTE — OCCUPATIONAL THERAPY INITIAL EVALUATION ADULT - MD ORDER
Occupational Therapy (OT) to evaluate and treat. Per JOHNNY Garcia, pt is okay to participate in OT evaluation and perform activity as tolerated.

## 2022-08-30 NOTE — PHYSICAL THERAPY INITIAL EVALUATION ADULT - PERTINENT HX OF CURRENT PROBLEM, REHAB EVAL
89 year old Female with PMHx of CAD s/p CABG, atrial fibrillation, hypothyroidism, rectal mass s/p resection, aortic valve replacement, and prior UTIs i/s/o staghorn calculi presents with pyelonephritis.

## 2022-08-30 NOTE — OCCUPATIONAL THERAPY INITIAL EVALUATION ADULT - DIAGNOSIS, OT EVAL
Pyelonephritis; AAO x1; Decreased sitting balance; Decreased functional mobility; Decreased ADL management

## 2022-08-30 NOTE — PROGRESS NOTE ADULT - SUBJECTIVE AND OBJECTIVE BOX
DAILY PROGRESS NOTE:         24 hr events:  mild abdominal pain  no flank pain  afebrile overnight    Objective:    Vital Signs Last 24 Hrs  T(C): 36.4 (30 Aug 2022 14:36), Max: 37.1 (30 Aug 2022 05:09)  T(F): 97.6 (30 Aug 2022 14:36), Max: 98.7 (30 Aug 2022 05:09)  HR: 60 (30 Aug 2022 17:56) (59 - 62)  BP: 124/60 (30 Aug 2022 17:56) (124/60 - 156/64)  BP(mean): --  RR: 18 (30 Aug 2022 17:56) (18 - 19)  SpO2: 97% (30 Aug 2022 17:56) (96% - 99%)    Parameters below as of 30 Aug 2022 17:56  Patient On (Oxygen Delivery Method): room air        I&O's Detail    29 Aug 2022 07:01  -  30 Aug 2022 07:00  --------------------------------------------------------  IN:  Total IN: 0 mL    OUT:    Voided (mL): 600 mL  Total OUT: 600 mL    Total NET: -600 mL          Physical Exam:    General: NAD, well-nourished  Abd: no ttp. soft   Psych: AOx3  Neuro: No focal deficits       Laboratory Results:                          10.6   9.29  )-----------( 280      ( 30 Aug 2022 06:45 )             34.4     08    141  |  105  |  8   ----------------------------<  94  3.9   |  25  |  0.77    Ca    9.0      30 Aug 2022 06:45  Phos  3.4     08-30  Mg     1.80     0830    TPro  7.4  /  Alb  3.0<L>  /  TBili  0.4  /  DBili  x   /  AST  15  /  ALT  6   /  AlkPhos  92  08-29    PT/INR - ( 30 Aug 2022 06:45 )   PT: 13.8 sec;   INR: 1.19 ratio         PTT - ( 30 Aug 2022 06:45 )  PTT:29.3 sec  Urinalysis Basic - ( 29 Aug 2022 17:23 )    Color: Yellow / Appearance: Clear / S.026 / pH: x  Gluc: x / Ketone: Negative  / Bili: Negative / Urobili: <2 mg/dL   Blood: x / Protein: Negative / Nitrite: Negative   Leuk Esterase: Moderate / RBC: 10 /HPF / WBC 36 /HPF   Sq Epi: x / Non Sq Epi: 1 /HPF / Bacteria: Negative

## 2022-08-30 NOTE — OCCUPATIONAL THERAPY INITIAL EVALUATION ADULT - GENERAL OBSERVATIONS, REHAB EVAL
Pt. received semisupine in bed. No acute distress. Patient agreed to evaluation from Occupational Therapist. +Daniel.

## 2022-08-30 NOTE — DIETITIAN INITIAL EVALUATION ADULT - ADD RECOMMEND
1) Consider bedside swallow assessment to determine appropriate diet texture, as pt with history of dysphagia.  2) Recommend continue diet without therapeutic restrictions to maximize menu item availability.   3) Recommend addition of Ensure Compact 1 PO 3x daily (provides 220 kcal, 9 gm protein per 4oz serving).   4) Recommend addition of multivitamin to provide micronutrient coverage.  5) Provide assistance and encouragement at meal times. Document % PO intake in flowsheets.

## 2022-08-30 NOTE — DIETITIAN INITIAL EVALUATION ADULT - OTHER CALCULATIONS
Weight history, per HIE: (8/29 dosing) 125.6 lbs, (4/22) 123 lbs, (6/23/21) 117 lbs.   Ideal Body Weight: 90 lbs / 40.9 kg +/-10%

## 2022-08-30 NOTE — OCCUPATIONAL THERAPY INITIAL EVALUATION ADULT - LEVEL OF INDEPENDENCE: SIT/STAND, REHAB EVAL
Not appropriate to assess secondary to decreased postural control and decreased ability to follow commands. To be assessed at later date/time if and when safe and appropriate.

## 2022-08-30 NOTE — PROGRESS NOTE ADULT - PROBLEM SELECTOR PLAN 1
Patient has hx of UTIs i/s/o known staghorn calculi   Previous culture data shows sensitivity to Cefepime   - Start Cefepime 1g qd   - F/u blood cultures and urine cultures   - Urology consulted, appreciate recs Patient has hx of UTIs i/s/o known staghorn calculi   Previous culture data shows sensitivity to Cefepime   - C/w Cefepime 1g qd   - F/u blood cultures and urine cultures   - Urology consulted, appreciate recs. Per urology, no acute intervention at this time. Patient has hx of UTIs i/s/o known staghorn calculi   Previous culture data shows sensitivity to Cefepime   - C/w Cefepime 1g qd   - F/u blood cultures and urine cultures   - Urology consulted, appreciate recs. Per urology, no acute intervention at this time

## 2022-08-30 NOTE — PHYSICAL THERAPY INITIAL EVALUATION ADULT - ADDITIONAL COMMENTS
pt admitted to [facility] from nursing home. Pt with confusion throughout session unable to obtain social history, Please consult with case management/social work for clarification of history.     Pt. left comfortable in bed with all tubes/lines intact, call bell in reach and in NAD.

## 2022-08-31 ENCOUNTER — TRANSCRIPTION ENCOUNTER (OUTPATIENT)
Age: 87
End: 2022-08-31

## 2022-08-31 LAB
ALBUMIN SERPL ELPH-MCNC: 3.2 G/DL — LOW (ref 3.3–5)
ALP SERPL-CCNC: 92 U/L — SIGNIFICANT CHANGE UP (ref 40–120)
ALT FLD-CCNC: 6 U/L — SIGNIFICANT CHANGE UP (ref 4–33)
ANION GAP SERPL CALC-SCNC: 12 MMOL/L — SIGNIFICANT CHANGE UP (ref 7–14)
AST SERPL-CCNC: 15 U/L — SIGNIFICANT CHANGE UP (ref 4–32)
BILIRUB SERPL-MCNC: 0.5 MG/DL — SIGNIFICANT CHANGE UP (ref 0.2–1.2)
BUN SERPL-MCNC: 8 MG/DL — SIGNIFICANT CHANGE UP (ref 7–23)
CALCIUM SERPL-MCNC: 8.9 MG/DL — SIGNIFICANT CHANGE UP (ref 8.4–10.5)
CHLORIDE SERPL-SCNC: 104 MMOL/L — SIGNIFICANT CHANGE UP (ref 98–107)
CO2 SERPL-SCNC: 25 MMOL/L — SIGNIFICANT CHANGE UP (ref 22–31)
CREAT SERPL-MCNC: 0.87 MG/DL — SIGNIFICANT CHANGE UP (ref 0.5–1.3)
CULTURE RESULTS: SIGNIFICANT CHANGE UP
EGFR: 61 ML/MIN/1.73M2 — SIGNIFICANT CHANGE UP
GLUCOSE SERPL-MCNC: 98 MG/DL — SIGNIFICANT CHANGE UP (ref 70–99)
HCT VFR BLD CALC: 34.3 % — LOW (ref 34.5–45)
HGB BLD-MCNC: 10.7 G/DL — LOW (ref 11.5–15.5)
MAGNESIUM SERPL-MCNC: 1.9 MG/DL — SIGNIFICANT CHANGE UP (ref 1.6–2.6)
MCHC RBC-ENTMCNC: 27.7 PG — SIGNIFICANT CHANGE UP (ref 27–34)
MCHC RBC-ENTMCNC: 31.2 GM/DL — LOW (ref 32–36)
MCV RBC AUTO: 88.9 FL — SIGNIFICANT CHANGE UP (ref 80–100)
NRBC # BLD: 0 /100 WBCS — SIGNIFICANT CHANGE UP (ref 0–0)
NRBC # FLD: 0 K/UL — SIGNIFICANT CHANGE UP (ref 0–0)
PHOSPHATE SERPL-MCNC: 3.3 MG/DL — SIGNIFICANT CHANGE UP (ref 2.5–4.5)
PLATELET # BLD AUTO: 288 K/UL — SIGNIFICANT CHANGE UP (ref 150–400)
POTASSIUM SERPL-MCNC: 3.7 MMOL/L — SIGNIFICANT CHANGE UP (ref 3.5–5.3)
POTASSIUM SERPL-SCNC: 3.7 MMOL/L — SIGNIFICANT CHANGE UP (ref 3.5–5.3)
PROT SERPL-MCNC: 7.5 G/DL — SIGNIFICANT CHANGE UP (ref 6–8.3)
RBC # BLD: 3.86 M/UL — SIGNIFICANT CHANGE UP (ref 3.8–5.2)
RBC # FLD: 15.1 % — HIGH (ref 10.3–14.5)
SODIUM SERPL-SCNC: 141 MMOL/L — SIGNIFICANT CHANGE UP (ref 135–145)
SPECIMEN SOURCE: SIGNIFICANT CHANGE UP
WBC # BLD: 10.35 K/UL — SIGNIFICANT CHANGE UP (ref 3.8–10.5)
WBC # FLD AUTO: 10.35 K/UL — SIGNIFICANT CHANGE UP (ref 3.8–10.5)

## 2022-08-31 PROCEDURE — 99232 SBSQ HOSP IP/OBS MODERATE 35: CPT | Mod: GC

## 2022-08-31 RX ADMIN — Medication 240 MILLIGRAM(S): at 05:40

## 2022-08-31 RX ADMIN — ATORVASTATIN CALCIUM 20 MILLIGRAM(S): 80 TABLET, FILM COATED ORAL at 23:08

## 2022-08-31 RX ADMIN — Medication 125 MICROGRAM(S): at 05:41

## 2022-08-31 RX ADMIN — Medication 25 MILLIGRAM(S): at 14:23

## 2022-08-31 RX ADMIN — Medication 25 MILLIGRAM(S): at 17:09

## 2022-08-31 RX ADMIN — POLYETHYLENE GLYCOL 3350 17 GRAM(S): 17 POWDER, FOR SOLUTION ORAL at 11:21

## 2022-08-31 RX ADMIN — Medication 25 MILLIGRAM(S): at 05:39

## 2022-08-31 RX ADMIN — RIVAROXABAN 15 MILLIGRAM(S): KIT at 17:08

## 2022-08-31 RX ADMIN — PANTOPRAZOLE SODIUM 40 MILLIGRAM(S): 20 TABLET, DELAYED RELEASE ORAL at 05:41

## 2022-08-31 RX ADMIN — Medication 25 MILLIGRAM(S): at 05:40

## 2022-08-31 NOTE — DISCHARGE NOTE PROVIDER - NSDCCPCAREPLAN_GEN_ALL_CORE_FT
PRINCIPAL DISCHARGE DIAGNOSIS  Diagnosis: Pyelonephritis  Assessment and Plan of Treatment: You were admitted for an infection in your kidney due to a stone in your kidney. You were given antibiotics and your urine was tested to see if there was any bacteria. After the urine showed there was no bacteria, your antibiotics were stopped. We consulted our urology colleagues and they recommended that no acute surgery needed to be done. You were monitored until your appetite improved. You should follow-up with your primary care physician within 2 weeks of leaving the hospital. You should follow-up with your urologist to monitor and evaluate your kidney stone. You should continue taking your medications as you have been taking them at home.

## 2022-08-31 NOTE — PROGRESS NOTE ADULT - PROBLEM SELECTOR PLAN 5
DVT: Rivaroxaban   Diet: Soft and bite-sized   Dispo: pending clinical outcome DVT: Rivaroxaban   Diet: Soft and bite-sized   Dispo: pending clinical outcome    Per nutrition recommendations, will follow patient's calorie count to evaluate patient's appetite

## 2022-08-31 NOTE — DISCHARGE NOTE PROVIDER - CARE PROVIDER_API CALL
Glenys Davis)  Urology  [address], [address]  [place] [address]  Phone: [phone]  Fax: [phone]  Follow Up Time:     Chris Mcgregor)  Internal Medicine  [address], [place] [address]  Phone: [phone]  Fax: [phone]  Follow Up Time:    Glenys Davis)  Urology  [address], [address]  [place] [address]  Phone: [phone]  Fax: [phone]  Established Patient  Follow Up Time:     Chris Mcgregor)  Internal Medicine  [address], [place] [address]  Phone: [phone]  Fax: [phone]  Follow Up Time:

## 2022-08-31 NOTE — PROGRESS NOTE ADULT - SUBJECTIVE AND OBJECTIVE BOX
PROGRESS NOTE:     Patient is a 89y old  Female who presents with a chief complaint of Pyelonephritis (30 Aug 2022 18:08)      SUBJECTIVE / OVERNIGHT EVENTS:    No acute events overnight. Patient examined at bedside with no acute complaints.     Pain:  Bowel Movements:  Urination:  OOB:  PT:    REVIEW OF SYSTEMS:    CONSTITUTIONAL: No weakness, fevers or chills  EYES/ENT: No visual changes;  No vertigo or throat pain   NECK: No pain or stiffness  RESPIRATORY: No cough, wheezing, hemoptysis; No shortness of breath  CARDIOVASCULAR: No chest pain or palpitations  GASTROINTESTINAL: No abdominal or epigastric pain. No nausea, vomiting, or hematemesis; No diarrhea or constipation. No melena or hematochezia.  GENITOURINARY: No dysuria, frequency or hematuria  NEUROLOGICAL: No numbness or weakness  SKIN: No itching, rashes      MEDICATIONS  (STANDING):  aluminum hydroxide/magnesium hydroxide/simethicone Suspension 30 milliLiter(s) Oral once  atorvastatin 20 milliGRAM(s) Oral at bedtime  diltiazem    milliGRAM(s) Oral daily  hydrALAZINE 25 milliGRAM(s) Oral three times a day  levothyroxine 125 MICROGram(s) Oral daily  metoprolol tartrate 25 milliGRAM(s) Oral two times a day  pantoprazole    Tablet 40 milliGRAM(s) Oral before breakfast  polyethylene glycol 3350 17 Gram(s) Oral daily  rivaroxaban 15 milliGRAM(s) Oral with dinner  senna 2 Tablet(s) Oral at bedtime    MEDICATIONS  (PRN):  acetaminophen     Tablet .. 650 milliGRAM(s) Oral every 6 hours PRN Temp greater or equal to 38C (100.4F), Mild Pain (1 - 3)  aluminum hydroxide/magnesium hydroxide/simethicone Suspension 30 milliLiter(s) Oral every 6 hours PRN Dyspepsia      CAPILLARY BLOOD GLUCOSE        I&O's Summary    30 Aug 2022 07:01  -  31 Aug 2022 07:00  --------------------------------------------------------  IN: 0 mL / OUT: 450 mL / NET: -450 mL        VITALS:   T(C): 36.4 (22 @ 05:38), Max: 36.8 (22 @ 20:44)  HR: 61 (22 @ 05:38) (60 - 61)  BP: 125/64 (22 @ 05:38) (124/60 - 156/64)  RR: 18 (22 @ 05:38) (18 - 19)  SpO2: 97% (22 @ 05:38) (95% - 97%)    GENERAL: NAD, lying in bed comfortably  HEAD:  Atraumatic, normocephalic  EYES: EOMI, PERRLA, conjunctiva and sclera clear  ENT: Moist mucous membranes  NECK: Supple, no JVD  HEART: Regular rate and rhythm, no murmurs, rubs, or gallops  LUNGS: Unlabored respirations.  Clear to auscultation bilaterally, no crackles, wheezing, or rhonchi  ABDOMEN: Soft, nontender, nondistended, +BS  EXTREMITIES: 2+ peripheral pulses bilaterally. No clubbing, cyanosis, or edema  NERVOUS SYSTEM:  A&Ox3, no focal deficits   SKIN: No rashes or lesions    LABS:                        10.6   9.29  )-----------( 280      ( 30 Aug 2022 06:45 )             34.4     08-    141  |  105  |  8   ----------------------------<  94  3.9   |  25  |  0.77    Ca    9.0      30 Aug 2022 06:45  Phos  3.4     08-30  Mg     1.80     08-30    TPro  7.4  /  Alb  3.0<L>  /  TBili  0.4  /  DBili  x   /  AST  15  /  ALT  6   /  AlkPhos  92  08-29    PT/INR - ( 30 Aug 2022 06:45 )   PT: 13.8 sec;   INR: 1.19 ratio         PTT - ( 30 Aug 2022 06:45 )  PTT:29.3 sec      Urinalysis Basic - ( 29 Aug 2022 17:23 )    Color: Yellow / Appearance: Clear / S.026 / pH: x  Gluc: x / Ketone: Negative  / Bili: Negative / Urobili: <2 mg/dL   Blood: x / Protein: Negative / Nitrite: Negative   Leuk Esterase: Moderate / RBC: 10 /HPF / WBC 36 /HPF   Sq Epi: x / Non Sq Epi: 1 /HPF / Bacteria: Negative        Culture - Urine (collected 29 Aug 2022 17:17)  Source: Clean Catch Clean Catch (Midstream)  Final Report (31 Aug 2022 01:39):    <10,000 CFU/mL Normal Urogenital [place]    Culture - Blood (collected 29 Aug 2022 12:23)  Source: .Blood Blood-Peripheral  Preliminary Report (30 Aug 2022 17:02):    No growth to date.    Culture - Blood (collected 29 Aug 2022 12:15)  Source: .Blood Blood-Peripheral  Preliminary Report (30 Aug 2022 17:02):    No growth to date.        RADIOLOGY & ADDITIONAL TESTS:  Results Reviewed:   Imaging Personally Reviewed:  Electrocardiogram Personally Reviewed:    COORDINATION OF CARE:  Care Discussed with Consultants/Other Providers [Y/N]:  Prior or Outpatient Records Reviewed [Y/N]:   PROGRESS NOTE:     Patient is a 89y old  Female who presents with a chief complaint of Pyelonephritis (30 Aug 2022 18:08)      SUBJECTIVE / OVERNIGHT EVENTS:    No acute events overnight. Patient examined at bedside with no acute complaints. Patient reports eating very well.     Pain: N/A   Bowel Movements: regular   Urination: regular   OOB: as tolerated   PT: evaluating     REVIEW OF SYSTEMS:    CONSTITUTIONAL: No weakness, fevers or chills  EYES/ENT: No visual changes;  No vertigo or throat pain   NECK: No pain or stiffness  RESPIRATORY: No cough, wheezing, hemoptysis; No shortness of breath  CARDIOVASCULAR: No chest pain or palpitations  GASTROINTESTINAL: No abdominal or epigastric pain. No nausea, vomiting, or hematemesis; No diarrhea or constipation. No melena or hematochezia.  GENITOURINARY: No dysuria, frequency or hematuria  NEUROLOGICAL: No numbness or weakness  SKIN: No itching, rashes      MEDICATIONS  (STANDING):  aluminum hydroxide/magnesium hydroxide/simethicone Suspension 30 milliLiter(s) Oral once  atorvastatin 20 milliGRAM(s) Oral at bedtime  diltiazem    milliGRAM(s) Oral daily  hydrALAZINE 25 milliGRAM(s) Oral three times a day  levothyroxine 125 MICROGram(s) Oral daily  metoprolol tartrate 25 milliGRAM(s) Oral two times a day  pantoprazole    Tablet 40 milliGRAM(s) Oral before breakfast  polyethylene glycol 3350 17 Gram(s) Oral daily  rivaroxaban 15 milliGRAM(s) Oral with dinner  senna 2 Tablet(s) Oral at bedtime    MEDICATIONS  (PRN):  acetaminophen     Tablet .. 650 milliGRAM(s) Oral every 6 hours PRN Temp greater or equal to 38C (100.4F), Mild Pain (1 - 3)  aluminum hydroxide/magnesium hydroxide/simethicone Suspension 30 milliLiter(s) Oral every 6 hours PRN Dyspepsia      CAPILLARY BLOOD GLUCOSE        I&O's Summary    30 Aug 2022 07:01  -  31 Aug 2022 07:00  --------------------------------------------------------  IN: 0 mL / OUT: 450 mL / NET: -450 mL        VITALS:   T(C): 36.4 (22 @ 05:38), Max: 36.8 (22 @ 20:44)  HR: 61 (22 @ 05:38) (60 - 61)  BP: 125/64 (22 @ 05:38) (124/60 - 156/64)  RR: 18 (22 @ 05:38) (18 - 19)  SpO2: 97% (22 @ 05:38) (95% - 97%)    GENERAL: NAD, lying in bed comfortably  HEAD:  Atraumatic, normocephalic  EYES: EOMI, PERRLA, conjunctiva and sclera clear  ENT: Moist mucous membranes  NECK: Supple, no JVD  HEART: Regular rate and rhythm, no murmurs, rubs, or gallops  LUNGS: Unlabored respirations.  Clear to auscultation bilaterally, no crackles, wheezing, or rhonchi  ABDOMEN: Soft, nontender, nondistended, +BS  EXTREMITIES: 2+ peripheral pulses bilaterally. No clubbing, cyanosis, or edema  NERVOUS SYSTEM:  A&Ox2, no focal deficits   SKIN: No rashes or lesions    LABS:                        10.6   9.29  )-----------( 280      ( 30 Aug 2022 06:45 )             34.4     08-    141  |  105  |  8   ----------------------------<  94  3.9   |  25  |  0.77    Ca    9.0      30 Aug 2022 06:45  Phos  3.4     08-  Mg     1.80     -    TPro  7.4  /  Alb  3.0<L>  /  TBili  0.4  /  DBili  x   /  AST  15  /  ALT  6   /  AlkPhos  92  08-29    PT/INR - ( 30 Aug 2022 06:45 )   PT: 13.8 sec;   INR: 1.19 ratio         PTT - ( 30 Aug 2022 06:45 )  PTT:29.3 sec      Urinalysis Basic - ( 29 Aug 2022 17:23 )    Color: Yellow / Appearance: Clear / S.026 / pH: x  Gluc: x / Ketone: Negative  / Bili: Negative / Urobili: <2 mg/dL   Blood: x / Protein: Negative / Nitrite: Negative   Leuk Esterase: Moderate / RBC: 10 /HPF / WBC 36 /HPF   Sq Epi: x / Non Sq Epi: 1 /HPF / Bacteria: Negative        Culture - Urine (collected 29 Aug 2022 17:17)  Source: Clean Catch Clean Catch (Midstream)  Final Report (31 Aug 2022 01:39):    <10,000 CFU/mL Normal Urogenital [place]    Culture - Blood (collected 29 Aug 2022 12:23)  Source: .Blood Blood-Peripheral  Preliminary Report (30 Aug 2022 17:02):    No growth to date.    Culture - Blood (collected 29 Aug 2022 12:15)  Source: .Blood Blood-Peripheral  Preliminary Report (30 Aug 2022 17:02):    No growth to date.        RADIOLOGY & ADDITIONAL TESTS:  Results Reviewed:   Imaging Personally Reviewed:  Electrocardiogram Personally Reviewed:    COORDINATION OF CARE:  Care Discussed with Consultants/Other Providers [Y/N]:  Prior or Outpatient Records Reviewed [Y/N]:

## 2022-08-31 NOTE — PROGRESS NOTE ADULT - PROBLEM SELECTOR PLAN 1
Patient has hx of UTIs i/s/o known staghorn calculi   Previous culture data shows sensitivity to Cefepime   - C/w Cefepime 1g qd   - F/u blood cultures and urine cultures   - Urology consulted, appreciate recs. Per urology, no acute intervention at this time Patient has hx of UTIs i/s/o known staghorn calculi   Previous culture data shows sensitivity to Cefepime  Blood cultures show NGTD x 2   Urine cultures show normal urogenitcal zena    - D/C Cefepime 1g qd   - Urology consulted, appreciate recs. Per urology, no acute intervention at this time Patient has hx of UTIs i/s/o known staghorn calculi   Previous culture data shows sensitivity to Cefepime  Blood cultures show NGTD x 2   Urine cultures show normal urogenital zena    s/p 7 days of ertapenem prior to admission   s/p 2 days of Cefepime after admission   - D/C Cefepime 1g qd   - Urology consulted, appreciate recs. Per urology, no acute intervention at this time

## 2022-08-31 NOTE — DISCHARGE NOTE PROVIDER - HOSPITAL COURSE
89 yo F with PMHx of CAD s/p CABG, a. fib, hypothyroidism, rectal mass s/p resection, aortic valve replacement, and prior UTIs i/s/o staghorn calculi presents with pyelonephritis. Patient presented to the ED from nursing home with reports of failure to thrive, lack of eating/drinking for 1 week with complaints of abdominal pain for past few days. Patient was A&Ox2 on presentation and denied any symptoms, including shortness of breath, chest pain, abdominal pain, headache, dizziness, lightheadedness, fever, dysuria, and problems with walking or bowel movements. Patient received a CT abdomen and pelvis which showed "staghorn calculus centered in the right renal pelvis with mild right hydronephrosis. Patchy heterogeneous enhancement of the right kidney, concerning for acute pyelonephritis." Per nursing home, patient received 7 days of Ertapenem prior to presentation. Patient received 1L bolus of NS and was started on Cefepime 1g qd. UA showed moderate leukocyte esterase and trace blood. Blood cultures and urine cultures showed normal urogenital zena in the urine and NGTD x 2 in the blood. After urine culture resulted, patient's antibiotics were discontinued. Urology was consulted and recommended no acute intervention, but if the patient became unstable to consult IR for a nephrostomy tube. Patient was monitored off antibiotics and continued to be clinically stable without any symptoms or complaints. Patient was also monitored for her appetite and diet, and had no issues eating. Patient was deemed stable for discharge back to her facility.     89 yo F with PMHx of CAD s/p CABG, a. fib, hypothyroidism, rectal mass s/p resection, aortic valve replacement, and prior UTIs i/s/o staghorn calculi presents with pyelonephritis. Patient presented to the ED from nursing home with reports of failure to thrive, lack of eating/drinking for 1 week with complaints of abdominal pain for past few days. Patient was A&Ox2 on presentation and denied any symptoms, including shortness of breath, chest pain, abdominal pain, headache, dizziness, lightheadedness, fever, dysuria, and problems with walking or bowel movements. Patient received a CT abdomen and pelvis which showed "staghorn calculus centered in the right renal pelvis with mild right hydronephrosis. Patchy heterogeneous enhancement of the right kidney, concerning for acute pyelonephritis." Per nursing home, patient received 7 days of Ertapenem prior to presentation. Patient received 1L bolus of NS and was started on Cefepime 1g qd. UA showed moderate leukocyte esterase and trace blood. Blood cultures and urine cultures showed normal urogenital zena in the urine and NGTD x 2 in the blood. After urine culture resulted, patient's antibiotics were discontinued. Urology was consulted and recommended no acute intervention, but if the patient became unstable to consult IR for a nephrostomy tube. Patient was monitored off antibiotics and continued to be clinically stable without any symptoms or complaints. Patient was also monitored for her appetite and diet, with improved eating when diet liberalized. . Patient was deemed stable for discharge back to her facility.

## 2022-08-31 NOTE — DISCHARGE NOTE PROVIDER - CARE PROVIDERS DIRECT ADDRESSES
,[email or web address]

## 2022-08-31 NOTE — DISCHARGE NOTE PROVIDER - PROVIDER TOKENS
PROVIDER:[TOKEN:[[id number]]],PROVIDER:[TOKEN:[[id number]]] PROVIDER:[TOKEN:[[id number]],ESTABLISHEDPATIENT:[T]],PROVIDER:[TOKEN:[[id number]]]

## 2022-08-31 NOTE — DISCHARGE NOTE PROVIDER - NSDCMRMEDTOKEN_GEN_ALL_CORE_FT
acetaminophen 325 mg oral tablet: 2 tab(s) orally every 4 hours, As needed, Temp greater or equal to 38C (100.4F), Mild Pain (1 - 3)  atorvastatin 20 mg oral tablet: 1 tab(s) orally once a day  diltiazem 240 mg/24 hours oral capsule, extended release: 1 cap(s) orally once a day. Hold if SBP less than 110 or HR less than 60  folic acid 1 mg oral tablet: 1 tab(s) orally once a day  hydrALAZINE 25 mg oral tablet: 1 tab(s) orally 3 times a day  levothyroxine 125 mcg (0.125 mg) oral tablet: 1 tab(s) orally once a day  Metoprolol Tartrate 25 mg oral tablet: 1 tab(s) orally 2 times a day. Hold if SBP less than 110 or HR less than 60  pantoprazole 20 mg oral delayed release tablet: 1 tab(s) orally once a day  rivaroxaban 15 mg oral tablet: 1 tab(s) orally every 24 hours   acetaminophen 325 mg oral tablet: 2 tab(s) orally every 4 hours, As needed, Temp greater or equal to 38C (100.4F), Mild Pain (1 - 3)  atorvastatin 20 mg oral tablet: 1 tab(s) orally once a day  diltiazem 240 mg/24 hours oral capsule, extended release: 1 cap(s) orally once a day. Hold if SBP less than 110 or HR less than 60  folic acid 1 mg oral tablet: 1 tab(s) orally once a day  hydrALAZINE 25 mg oral tablet: 1 tab(s) orally 3 times a day  levothyroxine 125 mcg (0.125 mg) oral tablet: 1 tab(s) orally once a day  Metoprolol Tartrate 25 mg oral tablet: 1 tab(s) orally 2 times a day. Hold if SBP less than 110 or HR less than 60  Multiple Vitamins oral tablet: 1 tab(s) orally once a day  pantoprazole 20 mg oral delayed release tablet: 1 tab(s) orally once a day  rivaroxaban 15 mg oral tablet: 1 tab(s) orally every 24 hours

## 2022-09-01 DIAGNOSIS — R62.7 ADULT FAILURE TO THRIVE: ICD-10-CM

## 2022-09-01 LAB
ANION GAP SERPL CALC-SCNC: 11 MMOL/L — SIGNIFICANT CHANGE UP (ref 7–14)
BUN SERPL-MCNC: 10 MG/DL — SIGNIFICANT CHANGE UP (ref 7–23)
CALCIUM SERPL-MCNC: 8.6 MG/DL — SIGNIFICANT CHANGE UP (ref 8.4–10.5)
CHLORIDE SERPL-SCNC: 99 MMOL/L — SIGNIFICANT CHANGE UP (ref 98–107)
CO2 SERPL-SCNC: 27 MMOL/L — SIGNIFICANT CHANGE UP (ref 22–31)
CREAT SERPL-MCNC: 0.89 MG/DL — SIGNIFICANT CHANGE UP (ref 0.5–1.3)
EGFR: 60 ML/MIN/1.73M2 — SIGNIFICANT CHANGE UP
GLUCOSE SERPL-MCNC: 110 MG/DL — HIGH (ref 70–99)
HCT VFR BLD CALC: 34.4 % — LOW (ref 34.5–45)
HGB BLD-MCNC: 11.1 G/DL — LOW (ref 11.5–15.5)
MAGNESIUM SERPL-MCNC: 1.9 MG/DL — SIGNIFICANT CHANGE UP (ref 1.6–2.6)
MCHC RBC-ENTMCNC: 28.5 PG — SIGNIFICANT CHANGE UP (ref 27–34)
MCHC RBC-ENTMCNC: 32.3 GM/DL — SIGNIFICANT CHANGE UP (ref 32–36)
MCV RBC AUTO: 88.4 FL — SIGNIFICANT CHANGE UP (ref 80–100)
NRBC # BLD: 0 /100 WBCS — SIGNIFICANT CHANGE UP (ref 0–0)
NRBC # FLD: 0 K/UL — SIGNIFICANT CHANGE UP (ref 0–0)
PHOSPHATE SERPL-MCNC: 2.8 MG/DL — SIGNIFICANT CHANGE UP (ref 2.5–4.5)
PLATELET # BLD AUTO: 250 K/UL — SIGNIFICANT CHANGE UP (ref 150–400)
POTASSIUM SERPL-MCNC: 3.7 MMOL/L — SIGNIFICANT CHANGE UP (ref 3.5–5.3)
POTASSIUM SERPL-SCNC: 3.7 MMOL/L — SIGNIFICANT CHANGE UP (ref 3.5–5.3)
RBC # BLD: 3.89 M/UL — SIGNIFICANT CHANGE UP (ref 3.8–5.2)
RBC # FLD: 15.2 % — HIGH (ref 10.3–14.5)
SODIUM SERPL-SCNC: 137 MMOL/L — SIGNIFICANT CHANGE UP (ref 135–145)
WBC # BLD: 10.15 K/UL — SIGNIFICANT CHANGE UP (ref 3.8–10.5)
WBC # FLD AUTO: 10.15 K/UL — SIGNIFICANT CHANGE UP (ref 3.8–10.5)

## 2022-09-01 PROCEDURE — 99232 SBSQ HOSP IP/OBS MODERATE 35: CPT | Mod: GC

## 2022-09-01 RX ORDER — POTASSIUM CHLORIDE 20 MEQ
40 PACKET (EA) ORAL ONCE
Refills: 0 | Status: COMPLETED | OUTPATIENT
Start: 2022-09-01 | End: 2022-09-01

## 2022-09-01 RX ADMIN — POLYETHYLENE GLYCOL 3350 17 GRAM(S): 17 POWDER, FOR SOLUTION ORAL at 18:25

## 2022-09-01 RX ADMIN — Medication 25 MILLIGRAM(S): at 18:27

## 2022-09-01 RX ADMIN — Medication 125 MICROGRAM(S): at 06:47

## 2022-09-01 RX ADMIN — ATORVASTATIN CALCIUM 20 MILLIGRAM(S): 80 TABLET, FILM COATED ORAL at 22:38

## 2022-09-01 RX ADMIN — Medication 1 TABLET(S): at 18:25

## 2022-09-01 RX ADMIN — RIVAROXABAN 15 MILLIGRAM(S): KIT at 18:25

## 2022-09-01 RX ADMIN — PANTOPRAZOLE SODIUM 40 MILLIGRAM(S): 20 TABLET, DELAYED RELEASE ORAL at 06:47

## 2022-09-01 RX ADMIN — Medication 40 MILLIEQUIVALENT(S): at 09:28

## 2022-09-01 NOTE — PROGRESS NOTE ADULT - PROBLEM SELECTOR PLAN 6
DVT: Rivaroxaban   Diet: Soft and bite-sized   Dispo: pending clinical outcome    Per nutrition recommendations, will follow patient's calorie count to evaluate patient's appetite

## 2022-09-01 NOTE — PROGRESS NOTE ADULT - PROBLEM SELECTOR PLAN 1
Patient has hx of UTIs i/s/o known staghorn calculi   Previous culture data shows sensitivity to Cefepime  Blood cultures show NGTD x 2   Urine cultures show normal urogenital zena    s/p 7 days of ertapenem prior to admission   s/p 2 days of Cefepime after admission   - D/C Cefepime 1g qd   - Urology consulted, appreciate recs. Per urology, no acute intervention at this time

## 2022-09-01 NOTE — PROGRESS NOTE ADULT - PROBLEM SELECTOR PLAN 5
DVT: Rivaroxaban   Diet: Soft and bite-sized   Dispo: pending clinical outcome    Per nutrition recommendations, will follow patient's calorie count to evaluate patient's appetite Patient has had minimal PO intake    - Encouraged PO intake Patient has had minimal PO intake   Possibly related to dementia    - Encouraged PO intake    - May consider expanding diet to regular diet to improve appetite Patient has had minimal PO intake   Possibly related to dementia    - Encouraged PO intake    - May consider expanding diet to regular diet to improve appetite    - Assisted feeds

## 2022-09-01 NOTE — PROGRESS NOTE ADULT - SUBJECTIVE AND OBJECTIVE BOX
PROGRESS NOTE:     Patient is a 89y old  Female who presents with a chief complaint of Pyelonephritis (31 Aug 2022 10:18)      SUBJECTIVE / OVERNIGHT EVENTS:    No acute events overnight. Patient examined at bedside with no acute complaints.     Pain:  Bowel Movements:  Urination:  OOB:  PT:    REVIEW OF SYSTEMS:    CONSTITUTIONAL: No weakness, fevers or chills  EYES/ENT: No visual changes;  No vertigo or throat pain   NECK: No pain or stiffness  RESPIRATORY: No cough, wheezing, hemoptysis; No shortness of breath  CARDIOVASCULAR: No chest pain or palpitations  GASTROINTESTINAL: No abdominal or epigastric pain. No nausea, vomiting, or hematemesis; No diarrhea or constipation. No melena or hematochezia.  GENITOURINARY: No dysuria, frequency or hematuria  NEUROLOGICAL: No numbness or weakness  SKIN: No itching, rashes      MEDICATIONS  (STANDING):  aluminum hydroxide/magnesium hydroxide/simethicone Suspension 30 milliLiter(s) Oral once  atorvastatin 20 milliGRAM(s) Oral at bedtime  diltiazem    milliGRAM(s) Oral daily  hydrALAZINE 25 milliGRAM(s) Oral three times a day  levothyroxine 125 MICROGram(s) Oral daily  metoprolol tartrate 25 milliGRAM(s) Oral two times a day  pantoprazole    Tablet 40 milliGRAM(s) Oral before breakfast  polyethylene glycol 3350 17 Gram(s) Oral daily  rivaroxaban 15 milliGRAM(s) Oral with dinner  senna 2 Tablet(s) Oral at bedtime    MEDICATIONS  (PRN):  acetaminophen     Tablet .. 650 milliGRAM(s) Oral every 6 hours PRN Temp greater or equal to 38C (100.4F), Mild Pain (1 - 3)  aluminum hydroxide/magnesium hydroxide/simethicone Suspension 30 milliLiter(s) Oral every 6 hours PRN Dyspepsia      CAPILLARY BLOOD GLUCOSE        I&O's Summary      VITALS:   T(C): 36.5 (09-01-22 @ 05:25), Max: 36.7 (08-31-22 @ 11:20)  HR: 59 (09-01-22 @ 05:25) (59 - 61)  BP: 144/54 (09-01-22 @ 05:25) (102/51 - 144/54)  RR: 18 (09-01-22 @ 05:25) (18 - 20)  SpO2: 99% (09-01-22 @ 05:25) (95% - 99%)    GENERAL: NAD, lying in bed comfortably  HEAD:  Atraumatic, normocephalic  EYES: EOMI, PERRLA, conjunctiva and sclera clear  ENT: Moist mucous membranes  NECK: Supple, no JVD  HEART: Regular rate and rhythm, no murmurs, rubs, or gallops  LUNGS: Unlabored respirations.  Clear to auscultation bilaterally, no crackles, wheezing, or rhonchi  ABDOMEN: Soft, nontender, nondistended, +BS  EXTREMITIES: 2+ peripheral pulses bilaterally. No clubbing, cyanosis, or edema  NERVOUS SYSTEM:  A&Ox3, no focal deficits   SKIN: No rashes or lesions    LABS:                        10.7   10.35 )-----------( 288      ( 31 Aug 2022 06:50 )             34.3     08-31    141  |  104  |  8   ----------------------------<  98  3.7   |  25  |  0.87    Ca    8.9      31 Aug 2022 06:50  Phos  3.3     08-31  Mg     1.90     08-31    TPro  7.5  /  Alb  3.2<L>  /  TBili  0.5  /  DBili  x   /  AST  15  /  ALT  6   /  AlkPhos  92  08-31              Culture - Urine (collected 29 Aug 2022 17:17)  Source: Clean Catch Clean Catch (Midstream)  Final Report (31 Aug 2022 01:39):    <10,000 CFU/mL Normal Urogenital [place]    Culture - Blood (collected 29 Aug 2022 12:23)  Source: .Blood Blood-Peripheral  Preliminary Report (30 Aug 2022 17:02):    No growth to date.    Culture - Blood (collected 29 Aug 2022 12:15)  Source: .Blood Blood-Peripheral  Preliminary Report (30 Aug 2022 17:02):    No growth to date.        RADIOLOGY & ADDITIONAL TESTS:  Results Reviewed:   Imaging Personally Reviewed:  Electrocardiogram Personally Reviewed:    COORDINATION OF CARE:  Care Discussed with Consultants/Other Providers [Y/N]:  Prior or Outpatient Records Reviewed [Y/N]:   PROGRESS NOTE:     Patient is a 89y old  Female who presents with a chief complaint of Pyelonephritis (31 Aug 2022 10:18)      SUBJECTIVE / OVERNIGHT EVENTS:    No acute events overnight. Patient examined at bedside with no acute complaints. Over past 24 hours, patient has had minimal food intake. Patient denies being hungry and denies any symptoms or discomfort.     Pain: N/A   Bowel Movements: "recently"   Urination: regular   OOB: as tolerated     REVIEW OF SYSTEMS:    CONSTITUTIONAL: No weakness, fevers or chills  EYES/ENT: No visual changes;  No vertigo or throat pain   NECK: No pain or stiffness  RESPIRATORY: No cough, wheezing, hemoptysis; No shortness of breath  CARDIOVASCULAR: No chest pain or palpitations  GASTROINTESTINAL: No abdominal or epigastric pain. No nausea, vomiting, or hematemesis; No diarrhea or constipation. No melena or hematochezia.  GENITOURINARY: No dysuria, frequency or hematuria  NEUROLOGICAL: No numbness or weakness  SKIN: No itching, rashes      MEDICATIONS  (STANDING):  aluminum hydroxide/magnesium hydroxide/simethicone Suspension 30 milliLiter(s) Oral once  atorvastatin 20 milliGRAM(s) Oral at bedtime  diltiazem    milliGRAM(s) Oral daily  hydrALAZINE 25 milliGRAM(s) Oral three times a day  levothyroxine 125 MICROGram(s) Oral daily  metoprolol tartrate 25 milliGRAM(s) Oral two times a day  pantoprazole    Tablet 40 milliGRAM(s) Oral before breakfast  polyethylene glycol 3350 17 Gram(s) Oral daily  rivaroxaban 15 milliGRAM(s) Oral with dinner  senna 2 Tablet(s) Oral at bedtime    MEDICATIONS  (PRN):  acetaminophen     Tablet .. 650 milliGRAM(s) Oral every 6 hours PRN Temp greater or equal to 38C (100.4F), Mild Pain (1 - 3)  aluminum hydroxide/magnesium hydroxide/simethicone Suspension 30 milliLiter(s) Oral every 6 hours PRN Dyspepsia      CAPILLARY BLOOD GLUCOSE        I&O's Summary      VITALS:   T(C): 36.5 (09-01-22 @ 05:25), Max: 36.7 (08-31-22 @ 11:20)  HR: 59 (09-01-22 @ 05:25) (59 - 61)  BP: 144/54 (09-01-22 @ 05:25) (102/51 - 144/54)  RR: 18 (09-01-22 @ 05:25) (18 - 20)  SpO2: 99% (09-01-22 @ 05:25) (95% - 99%)    GENERAL: NAD, lying in bed comfortably  HEAD:  Atraumatic, normocephalic  EYES: EOMI, PERRLA, conjunctiva and sclera clear  ENT: Moist mucous membranes  NECK: Supple, no JVD  HEART: Regular rate and rhythm, no murmurs, rubs, or gallops  LUNGS: Unlabored respirations.  Clear to auscultation bilaterally, no crackles, wheezing, or rhonchi  ABDOMEN: Soft, nontender, nondistended, +BS  EXTREMITIES: 2+ peripheral pulses bilaterally. No clubbing, cyanosis, or edema  NERVOUS SYSTEM:  A&Ox3, no focal deficits   SKIN: No rashes or lesions    LABS:                        10.7   10.35 )-----------( 288      ( 31 Aug 2022 06:50 )             34.3     08-31    141  |  104  |  8   ----------------------------<  98  3.7   |  25  |  0.87    Ca    8.9      31 Aug 2022 06:50  Phos  3.3     08-31  Mg     1.90     08-31    TPro  7.5  /  Alb  3.2<L>  /  TBili  0.5  /  DBili  x   /  AST  15  /  ALT  6   /  AlkPhos  92  08-31              Culture - Urine (collected 29 Aug 2022 17:17)  Source: Clean Catch Clean Catch (Midstream)  Final Report (31 Aug 2022 01:39):    <10,000 CFU/mL Normal Urogenital [place]    Culture - Blood (collected 29 Aug 2022 12:23)  Source: .Blood Blood-Peripheral  Preliminary Report (30 Aug 2022 17:02):    No growth to date.    Culture - Blood (collected 29 Aug 2022 12:15)  Source: .Blood Blood-Peripheral  Preliminary Report (30 Aug 2022 17:02):    No growth to date.        RADIOLOGY & ADDITIONAL TESTS:  Results Reviewed:   Imaging Personally Reviewed:  Electrocardiogram Personally Reviewed:    COORDINATION OF CARE:  Care Discussed with Consultants/Other Providers [Y/N]:  Prior or Outpatient Records Reviewed [Y/N]:   PROGRESS NOTE:     Patient is a 89y old  Female who presents with a chief complaint of Pyelonephritis (31 Aug 2022 10:18)      SUBJECTIVE / OVERNIGHT EVENTS:    No acute events overnight. Patient examined at bedside with no acute complaints. Over past 24 hours, patient has had minimal food intake. Patient denies being hungry and denies any symptoms or discomfort.     Pain: N/A   Bowel Movements: "recently"   Urination: regular   OOB: as tolerated     REVIEW OF SYSTEMS:    CONSTITUTIONAL: No weakness, fevers or chills  EYES/ENT: No visual changes;  No vertigo or throat pain   NECK: No pain or stiffness  RESPIRATORY: No cough, wheezing, hemoptysis; No shortness of breath  CARDIOVASCULAR: No chest pain or palpitations  GASTROINTESTINAL: No abdominal or epigastric pain. No nausea, vomiting, or hematemesis; No diarrhea or constipation. No melena or hematochezia.  GENITOURINARY: No dysuria, frequency or hematuria  NEUROLOGICAL: No numbness or weakness  SKIN: No itching, rashes      MEDICATIONS  (STANDING):  aluminum hydroxide/magnesium hydroxide/simethicone Suspension 30 milliLiter(s) Oral once  atorvastatin 20 milliGRAM(s) Oral at bedtime  diltiazem    milliGRAM(s) Oral daily  hydrALAZINE 25 milliGRAM(s) Oral three times a day  levothyroxine 125 MICROGram(s) Oral daily  metoprolol tartrate 25 milliGRAM(s) Oral two times a day  pantoprazole    Tablet 40 milliGRAM(s) Oral before breakfast  polyethylene glycol 3350 17 Gram(s) Oral daily  rivaroxaban 15 milliGRAM(s) Oral with dinner  senna 2 Tablet(s) Oral at bedtime    MEDICATIONS  (PRN):  acetaminophen     Tablet .. 650 milliGRAM(s) Oral every 6 hours PRN Temp greater or equal to 38C (100.4F), Mild Pain (1 - 3)  aluminum hydroxide/magnesium hydroxide/simethicone Suspension 30 milliLiter(s) Oral every 6 hours PRN Dyspepsia      CAPILLARY BLOOD GLUCOSE        I&O's Summary      VITALS:   T(C): 36.5 (09-01-22 @ 05:25), Max: 36.7 (08-31-22 @ 11:20)  HR: 59 (09-01-22 @ 05:25) (59 - 61)  BP: 144/54 (09-01-22 @ 05:25) (102/51 - 144/54)  RR: 18 (09-01-22 @ 05:25) (18 - 20)  SpO2: 99% (09-01-22 @ 05:25) (95% - 99%)    GENERAL: NAD, lying in bed comfortably  HEAD:  Atraumatic, normocephalic  EYES: EOMI, PERRLA, conjunctiva and sclera clear  ENT: Moist mucous membranes  NECK: Supple, no JVD  HEART: Regular rate and rhythm, no murmurs, rubs, or gallops  LUNGS: Unlabored respirations.  Clear to auscultation bilaterally, no crackles, wheezing, or rhonchi  ABDOMEN: Soft, nontender, nondistended, +BS  EXTREMITIES: 2+ peripheral pulses bilaterally. No clubbing, cyanosis, or edema  NERVOUS SYSTEM:  A&Ox1-2, no focal deficits   SKIN: No rashes or lesions    LABS:                        10.7   10.35 )-----------( 288      ( 31 Aug 2022 06:50 )             34.3     08-31    141  |  104  |  8   ----------------------------<  98  3.7   |  25  |  0.87    Ca    8.9      31 Aug 2022 06:50  Phos  3.3     08-31  Mg     1.90     08-31    TPro  7.5  /  Alb  3.2<L>  /  TBili  0.5  /  DBili  x   /  AST  15  /  ALT  6   /  AlkPhos  92  08-31              Culture - Urine (collected 29 Aug 2022 17:17)  Source: Clean Catch Clean Catch (Midstream)  Final Report (31 Aug 2022 01:39):    <10,000 CFU/mL Normal Urogenital [facility]    Culture - Blood (collected 29 Aug 2022 12:23)  Source: .Blood Blood-Peripheral  Preliminary Report (30 Aug 2022 17:02):    No growth to date.    Culture - Blood (collected 29 Aug 2022 12:15)  Source: .Blood Blood-Peripheral  Preliminary Report (30 Aug 2022 17:02):    No growth to date.        RADIOLOGY & ADDITIONAL TESTS:  Results Reviewed:   Imaging Personally Reviewed:  Electrocardiogram Personally Reviewed:    COORDINATION OF CARE:  Care Discussed with Consultants/Other Providers [Y/N]:  Prior or Outpatient Records Reviewed [Y/N]:

## 2022-09-02 ENCOUNTER — TRANSCRIPTION ENCOUNTER (OUTPATIENT)
Age: 87
End: 2022-09-02

## 2022-09-02 VITALS
OXYGEN SATURATION: 98 % | HEART RATE: 60 BPM | DIASTOLIC BLOOD PRESSURE: 51 MMHG | RESPIRATION RATE: 18 BRPM | SYSTOLIC BLOOD PRESSURE: 124 MMHG

## 2022-09-02 LAB
ANION GAP SERPL CALC-SCNC: 8 MMOL/L — SIGNIFICANT CHANGE UP (ref 7–14)
BUN SERPL-MCNC: 13 MG/DL — SIGNIFICANT CHANGE UP (ref 7–23)
CALCIUM SERPL-MCNC: 9.2 MG/DL — SIGNIFICANT CHANGE UP (ref 8.4–10.5)
CHLORIDE SERPL-SCNC: 105 MMOL/L — SIGNIFICANT CHANGE UP (ref 98–107)
CO2 SERPL-SCNC: 28 MMOL/L — SIGNIFICANT CHANGE UP (ref 22–31)
CREAT SERPL-MCNC: 0.87 MG/DL — SIGNIFICANT CHANGE UP (ref 0.5–1.3)
EGFR: 61 ML/MIN/1.73M2 — SIGNIFICANT CHANGE UP
GLUCOSE SERPL-MCNC: 104 MG/DL — HIGH (ref 70–99)
HCT VFR BLD CALC: 34.1 % — LOW (ref 34.5–45)
HGB BLD-MCNC: 10.5 G/DL — LOW (ref 11.5–15.5)
MAGNESIUM SERPL-MCNC: 2 MG/DL — SIGNIFICANT CHANGE UP (ref 1.6–2.6)
MCHC RBC-ENTMCNC: 27.3 PG — SIGNIFICANT CHANGE UP (ref 27–34)
MCHC RBC-ENTMCNC: 30.8 GM/DL — LOW (ref 32–36)
MCV RBC AUTO: 88.6 FL — SIGNIFICANT CHANGE UP (ref 80–100)
NRBC # BLD: 0 /100 WBCS — SIGNIFICANT CHANGE UP (ref 0–0)
NRBC # FLD: 0 K/UL — SIGNIFICANT CHANGE UP (ref 0–0)
PHOSPHATE SERPL-MCNC: 2.8 MG/DL — SIGNIFICANT CHANGE UP (ref 2.5–4.5)
PLATELET # BLD AUTO: 263 K/UL — SIGNIFICANT CHANGE UP (ref 150–400)
POTASSIUM SERPL-MCNC: 4.3 MMOL/L — SIGNIFICANT CHANGE UP (ref 3.5–5.3)
POTASSIUM SERPL-SCNC: 4.3 MMOL/L — SIGNIFICANT CHANGE UP (ref 3.5–5.3)
RBC # BLD: 3.85 M/UL — SIGNIFICANT CHANGE UP (ref 3.8–5.2)
RBC # FLD: 15.5 % — HIGH (ref 10.3–14.5)
SARS-COV-2 RNA SPEC QL NAA+PROBE: SIGNIFICANT CHANGE UP
SARS-COV-2 RNA SPEC QL NAA+PROBE: SIGNIFICANT CHANGE UP
SODIUM SERPL-SCNC: 141 MMOL/L — SIGNIFICANT CHANGE UP (ref 135–145)
WBC # BLD: 8.97 K/UL — SIGNIFICANT CHANGE UP (ref 3.8–10.5)
WBC # FLD AUTO: 8.97 K/UL — SIGNIFICANT CHANGE UP (ref 3.8–10.5)

## 2022-09-02 PROCEDURE — 99239 HOSP IP/OBS DSCHRG MGMT >30: CPT

## 2022-09-02 RX ADMIN — Medication 1 TABLET(S): at 13:18

## 2022-09-02 RX ADMIN — Medication 25 MILLIGRAM(S): at 06:30

## 2022-09-02 RX ADMIN — Medication 240 MILLIGRAM(S): at 06:30

## 2022-09-02 RX ADMIN — PANTOPRAZOLE SODIUM 40 MILLIGRAM(S): 20 TABLET, DELAYED RELEASE ORAL at 06:30

## 2022-09-02 RX ADMIN — POLYETHYLENE GLYCOL 3350 17 GRAM(S): 17 POWDER, FOR SOLUTION ORAL at 13:18

## 2022-09-02 RX ADMIN — Medication 125 MICROGRAM(S): at 06:31

## 2022-09-02 NOTE — PROGRESS NOTE ADULT - SUBJECTIVE AND OBJECTIVE BOX
PROGRESS NOTE:     Patient is a 89y old  Female who presents with a chief complaint of Pyelonephritis (01 Sep 2022 07:10)      SUBJECTIVE / OVERNIGHT EVENTS:    No acute events overnight. Patient examined at bedside with no acute complaints.     Pain:  Bowel Movements:  Urination:  OOB:  PT:    REVIEW OF SYSTEMS:    CONSTITUTIONAL: No weakness, fevers or chills  EYES/ENT: No visual changes;  No vertigo or throat pain   NECK: No pain or stiffness  RESPIRATORY: No cough, wheezing, hemoptysis; No shortness of breath  CARDIOVASCULAR: No chest pain or palpitations  GASTROINTESTINAL: No abdominal or epigastric pain. No nausea, vomiting, or hematemesis; No diarrhea or constipation. No melena or hematochezia.  GENITOURINARY: No dysuria, frequency or hematuria  NEUROLOGICAL: No numbness or weakness  SKIN: No itching, rashes      MEDICATIONS  (STANDING):  aluminum hydroxide/magnesium hydroxide/simethicone Suspension 30 milliLiter(s) Oral once  atorvastatin 20 milliGRAM(s) Oral at bedtime  diltiazem    milliGRAM(s) Oral daily  hydrALAZINE 25 milliGRAM(s) Oral three times a day  levothyroxine 125 MICROGram(s) Oral daily  metoprolol tartrate 25 milliGRAM(s) Oral two times a day  multivitamin 1 Tablet(s) Oral daily  pantoprazole    Tablet 40 milliGRAM(s) Oral before breakfast  polyethylene glycol 3350 17 Gram(s) Oral daily  rivaroxaban 15 milliGRAM(s) Oral with dinner  senna 2 Tablet(s) Oral at bedtime    MEDICATIONS  (PRN):  acetaminophen     Tablet .. 650 milliGRAM(s) Oral every 6 hours PRN Temp greater or equal to 38C (100.4F), Mild Pain (1 - 3)  aluminum hydroxide/magnesium hydroxide/simethicone Suspension 30 milliLiter(s) Oral every 6 hours PRN Dyspepsia      CAPILLARY BLOOD GLUCOSE        I&O's Summary      VITALS:   T(C): 36.3 (09-02-22 @ 06:04), Max: 36.7 (09-01-22 @ 22:05)  HR: 60 (09-02-22 @ 06:04) (57 - 62)  BP: 151/60 (09-02-22 @ 06:04) (120/54 - 151/64)  RR: 18 (09-02-22 @ 06:04) (18 - 20)  SpO2: 98% (09-02-22 @ 06:04) (94% - 98%)    GENERAL: NAD, lying in bed comfortably  HEAD:  Atraumatic, normocephalic  EYES: EOMI, PERRLA, conjunctiva and sclera clear  ENT: Moist mucous membranes  NECK: Supple, no JVD  HEART: Regular rate and rhythm, no murmurs, rubs, or gallops  LUNGS: Unlabored respirations.  Clear to auscultation bilaterally, no crackles, wheezing, or rhonchi  ABDOMEN: Soft, nontender, nondistended, +BS  EXTREMITIES: 2+ peripheral pulses bilaterally. No clubbing, cyanosis, or edema  NERVOUS SYSTEM:  A&Ox3, no focal deficits   SKIN: No rashes or lesions    LABS:                        11.1   10.15 )-----------( 250      ( 01 Sep 2022 06:50 )             34.4     09-01    137  |  99  |  10  ----------------------------<  110<H>  3.7   |  27  |  0.89    Ca    8.6      01 Sep 2022 06:50  Phos  2.8     09-01  Mg     1.90     09-01                  RADIOLOGY & ADDITIONAL TESTS:  Results Reviewed:   Imaging Personally Reviewed:  Electrocardiogram Personally Reviewed:    COORDINATION OF CARE:  Care Discussed with Consultants/Other Providers [Y/N]:  Prior or Outpatient Records Reviewed [Y/N]:   PROGRESS NOTE:     Patient is a 89y old  Female who presents with a chief complaint of Pyelonephritis (01 Sep 2022 07:10)      SUBJECTIVE / OVERNIGHT EVENTS:    No acute events overnight. Patient examined at bedside with no acute complaints. Patient had little appetite over past day but improved in PO intake slightly, with 25% of dinner consumed     Pain: N/A   Bowel Movements: regular   Urination: regular   OOB: as tolerated   PT: to [facility]BI[facility]     REVIEW OF SYSTEMS:    CONSTITUTIONAL: No weakness, fevers or chills  EYES/ENT: No visual changes;  No vertigo or throat pain   NECK: No pain or stiffness  RESPIRATORY: No cough, wheezing, hemoptysis; No shortness of breath  CARDIOVASCULAR: No chest pain or palpitations  GASTROINTESTINAL: No abdominal or epigastric pain. No nausea, vomiting, or hematemesis; No diarrhea or constipation. No melena or hematochezia.  GENITOURINARY: No dysuria, frequency or hematuria  NEUROLOGICAL: No numbness or weakness  SKIN: No itching, rashes      MEDICATIONS  (STANDING):  aluminum hydroxide/magnesium hydroxide/simethicone Suspension 30 milliLiter(s) Oral once  atorvastatin 20 milliGRAM(s) Oral at bedtime  diltiazem    milliGRAM(s) Oral daily  hydrALAZINE 25 milliGRAM(s) Oral three times a day  levothyroxine 125 MICROGram(s) Oral daily  metoprolol tartrate 25 milliGRAM(s) Oral two times a day  multivitamin 1 Tablet(s) Oral daily  pantoprazole    Tablet 40 milliGRAM(s) Oral before breakfast  polyethylene glycol 3350 17 Gram(s) Oral daily  rivaroxaban 15 milliGRAM(s) Oral with dinner  senna 2 Tablet(s) Oral at bedtime    MEDICATIONS  (PRN):  acetaminophen     Tablet .. 650 milliGRAM(s) Oral every 6 hours PRN Temp greater or equal to 38C (100.4F), Mild Pain (1 - 3)  aluminum hydroxide/magnesium hydroxide/simethicone Suspension 30 milliLiter(s) Oral every 6 hours PRN Dyspepsia      CAPILLARY BLOOD GLUCOSE        I&O's Summary      VITALS:   T(C): 36.3 (09-02-22 @ 06:04), Max: 36.7 (09-01-22 @ 22:05)  HR: 60 (09-02-22 @ 06:04) (57 - 62)  BP: 151/60 (09-02-22 @ 06:04) (120/54 - 151/64)  RR: 18 (09-02-22 @ 06:04) (18 - 20)  SpO2: 98% (09-02-22 @ 06:04) (94% - 98%)    GENERAL: NAD, lying in bed comfortably  HEAD:  Atraumatic, normocephalic  EYES: EOMI, PERRLA, conjunctiva and sclera clear  ENT: Moist mucous membranes  NECK: Supple, no JVD  HEART: Regular rate and rhythm, no murmurs, rubs, or gallops  LUNGS: Unlabored respirations.  Clear to auscultation bilaterally, no crackles, wheezing, or rhonchi  ABDOMEN: Soft, nontender, nondistended, +BS  EXTREMITIES: 2+ peripheral pulses bilaterally. No clubbing, cyanosis, or edema  NERVOUS SYSTEM:  A&Ox1, no focal deficits   SKIN: No rashes or lesions    LABS:                        11.1   10.15 )-----------( 250      ( 01 Sep 2022 06:50 )             34.4     09-01    137  |  99  |  10  ----------------------------<  110<H>  3.7   |  27  |  0.89    Ca    8.6      01 Sep 2022 06:50  Phos  2.8     09-01  Mg     1.90     09-01                  RADIOLOGY & ADDITIONAL TESTS:  Results Reviewed:   Imaging Personally Reviewed:  Electrocardiogram Personally Reviewed:    COORDINATION OF CARE:  Care Discussed with Consultants/Other Providers [Y/N]:  Prior or Outpatient Records Reviewed [Y/N]:   PROGRESS NOTE:     Patient is a 89y old  Female who presents with a chief complaint of Pyelonephritis (01 Sep 2022 07:10)      SUBJECTIVE / OVERNIGHT EVENTS:    No acute events overnight. Patient examined at bedside with no acute complaints. Patient had little appetite over past day but improved in PO intake slightly, with 25% of dinner consumed. Patient now reports being hungry.     Pain: N/A   Bowel Movements: regular   Urination: regular   OOB: as tolerated   PT: to ROBBIE     REVIEW OF SYSTEMS:    CONSTITUTIONAL: No weakness, fevers or chills  EYES/ENT: No visual changes;  No vertigo or throat pain   NECK: No pain or stiffness  RESPIRATORY: No cough, wheezing, hemoptysis; No shortness of breath  CARDIOVASCULAR: No chest pain or palpitations  GASTROINTESTINAL: No abdominal or epigastric pain. No nausea, vomiting, or hematemesis; No diarrhea or constipation. No melena or hematochezia.  GENITOURINARY: No dysuria, frequency or hematuria  NEUROLOGICAL: No numbness or weakness  SKIN: No itching, rashes      MEDICATIONS  (STANDING):  aluminum hydroxide/magnesium hydroxide/simethicone Suspension 30 milliLiter(s) Oral once  atorvastatin 20 milliGRAM(s) Oral at bedtime  diltiazem    milliGRAM(s) Oral daily  hydrALAZINE 25 milliGRAM(s) Oral three times a day  levothyroxine 125 MICROGram(s) Oral daily  metoprolol tartrate 25 milliGRAM(s) Oral two times a day  multivitamin 1 Tablet(s) Oral daily  pantoprazole    Tablet 40 milliGRAM(s) Oral before breakfast  polyethylene glycol 3350 17 Gram(s) Oral daily  rivaroxaban 15 milliGRAM(s) Oral with dinner  senna 2 Tablet(s) Oral at bedtime    MEDICATIONS  (PRN):  acetaminophen     Tablet .. 650 milliGRAM(s) Oral every 6 hours PRN Temp greater or equal to 38C (100.4F), Mild Pain (1 - 3)  aluminum hydroxide/magnesium hydroxide/simethicone Suspension 30 milliLiter(s) Oral every 6 hours PRN Dyspepsia      CAPILLARY BLOOD GLUCOSE        I&O's Summary      VITALS:   T(C): 36.3 (09-02-22 @ 06:04), Max: 36.7 (09-01-22 @ 22:05)  HR: 60 (09-02-22 @ 06:04) (57 - 62)  BP: 151/60 (09-02-22 @ 06:04) (120/54 - 151/64)  RR: 18 (09-02-22 @ 06:04) (18 - 20)  SpO2: 98% (09-02-22 @ 06:04) (94% - 98%)    GENERAL: NAD, lying in bed comfortably  HEAD:  Atraumatic, normocephalic  EYES: EOMI, PERRLA, conjunctiva and sclera clear  ENT: Moist mucous membranes  NECK: Supple, no JVD  HEART: Regular rate and rhythm, no murmurs, rubs, or gallops  LUNGS: Unlabored respirations.  Clear to auscultation bilaterally, no crackles, wheezing, or rhonchi  ABDOMEN: Soft, nontender, nondistended, +BS  EXTREMITIES: 2+ peripheral pulses bilaterally. No clubbing, cyanosis, or edema  NERVOUS SYSTEM:  A&Ox1, no focal deficits   SKIN: No rashes or lesions    LABS:                        11.1   10.15 )-----------( 250      ( 01 Sep 2022 06:50 )             34.4     09-01    137  |  99  |  10  ----------------------------<  110<H>  3.7   |  27  |  0.89    Ca    8.6      01 Sep 2022 06:50  Phos  2.8     09-01  Mg     1.90     09-01                  RADIOLOGY & ADDITIONAL TESTS:  Results Reviewed:   Imaging Personally Reviewed:  Electrocardiogram Personally Reviewed:    COORDINATION OF CARE:  Care Discussed with Consultants/Other Providers [Y/N]:  Prior or Outpatient Records Reviewed [Y/N]:

## 2022-09-02 NOTE — PROGRESS NOTE ADULT - ATTENDING COMMENTS
Patient seen and examined at bedside. In brief, patient is a 89 yo F with PMHx of CAD s/p CABG, a. fib on xarelto , hypothyroidism, rectal mass s/p resection, aortic valve replacement, and prior UTIs i/s/o staghorn calculi presenting from NH with concern for FTT. Patient found to be AOx2 (hospital and self) on exam with no leukocytosis, Patchy heterogeneous enhancement of the right kidney, concerning for acute pyelonephritis on CT A/P.     Patient reports some generalized abdominal pain on exam this am. Reports that she had BM yesterday. Patient unable to further characterize discomfort. no N/V     #Generalized abdominal pain: unclear etiology -  related to pyelonephritis vs. constipation vs. GERD  - Continue PPI  - Continue bowel regimen   - Abx as below   #Pyelonephritis  - Per discussion with Rehab - patient was on ertapenem, will continue cefepime  for now  - Will attempt to find urine culture, however based on prior data no MDRO  - Urology consulted - no intervention      #Hypothyroid - continue synthroid   #Afib on AC   - Continue xarelto and metoprolol   #HLD - continue statin   #HTN - continue dilt and hydralazine       Rest of plan as above .
Patient seen and examined at bedside. In brief, patient is a 89 yo F with PMHx of CAD s/p CABG, a. fib on xarelto , hypothyroidism, rectal mass s/p resection, aortic valve replacement, and prior UTIs i/s/o staghorn calculi presenting from NH with concern for FTT. Patient found to be AOx2 (hospital and self) on exam with no leukocytosis, Patchy heterogeneous enhancement of the right kidney, concerning for acute pyelonephritis on CT A/P.     Patient reports resolution of abdominal pain. Reports appetite and no issues eating. Writer observed patient eating breakfast.     #Generalized abdominal pain: unclear etiology -  related to pyelonephritis vs. constipation vs. GERD: Resolved   - Continue PPI  - Continue bowel regimen   - As/p abx course    #Pyelonephritis  - Per discussion with Rehab - patient was on ertapenem for 7 day, completed course of abx on 8/30   - Will attempt to find urine culture, however based on prior data no MDRO  - Urology consulted - no intervention      #Hypothyroid - continue synthroid   #Afib on AC   - Continue xarelto and metoprolol   #HLD - continue statin   #HTN - continue dilt and hydralazine     Dispo: back to rehab on 9/1   Rest of plan as above .
Patient seen and examined at bedside. In brief, patient is a 89 yo F with PMHx of CAD s/p CABG, a. fib on xarelto , hypothyroidism, rectal mass s/p resection, aortic valve replacement, and prior UTIs i/s/o staghorn calculi presenting from NH with concern for FTT. Patient found to be AOx2 (hospital and self) on exam with no leukocytosis, Patchy heterogeneous enhancement of the right kidney, concerning for acute pyelonephritis on CT A/P.     Patient is Aox 2- Self and hospital. Reports she is not hungry. Denies dysphagia abdominal pain. Reports she likes pizza. Pt reported to eat ensures at meals and small bites of food.     #Poor PO intake: infection ruled out. CT A/P negative, denies dysphagia. 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   - Patient without electrolyte abnormalities     #Generalized abdominal pain: unclear etiology -  related to pyelonephritis vs. constipation vs. GERD: Resolved   - Continue PPI  - Continue bowel regimen   - As/p abx course    #Pyelonephritis  - Per discussion with Rehab - patient was on ertapenem for 7 day, completed course of abx on 8/30   - Will attempt to find urine culture, however based on prior data no MDRO  - Urology consulted - no intervention      #Hypothyroid - continue synthroid   #Afib on AC   - Continue xarelto and metoprolol   #HLD - continue statin   #HTN - continue dilt and hydralazine     Dispo: back to rehab on 9/2 pending PO intake   Rest of plan as above .
Patient is Aox 2- Self and hospital. Reports she is not hungry. Denies dysphagia abdominal pain. Reports she likes pizza. Pt reported to eat ensures at meals and small bites of food.   Patient with improved PO intake. Patient noted to drink ensure, coffee, part of breakfast and applesauce.   #Poor PO intake: infection ruled out. CT A/P negative, denies dysphagia. 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   - Patient without electrolyte abnormalities     #Generalized abdominal pain: unclear etiology -  related to pyelonephritis vs. constipation vs. GERD: Resolved   - Continue PPI  - Continue bowel regimen   - As/p abx course    #Pyelonephritis  - Per discussion with Rehab - patient was on ertapenem for 7 day, completed course of abx on 8/30   - Will attempt to find urine culture, however based on prior data no MDRO  - Urology consulted - no intervention      #Hypothyroid - continue synthroid   #Afib on AC   - Continue xarelto and metoprolol   #HLD - continue statin   #HTN - continue dilt and hydralazine     Dispo: back to rehab on 9/2 given improved PO intake 35 minutes spent discharge planning.   Rest of plan as above .

## 2022-09-02 NOTE — PROGRESS NOTE ADULT - PROBLEM SELECTOR PLAN 3
- C/w rivaroxaban 15 mg qd   - C/w Diltiazem 240 mg qd

## 2022-09-02 NOTE — PROGRESS NOTE ADULT - PROBLEM SELECTOR PLAN 6
DVT: Rivaroxaban   Diet: Soft and bite-sized   Dispo: pending clinical outcome    Per nutrition recommendations, will follow patient's calorie count to evaluate patient's appetite DVT: Rivaroxaban   Diet: Soft and bite-sized   Dispo: pending clinical outcome DVT: Rivaroxaban   Diet: regular   Dispo: to her original facility pending clinical course

## 2022-09-02 NOTE — CHART NOTE - NSCHARTNOTEFT_GEN_A_CORE
RDN visited with pt per team's request.    Pt's diet has been changed from soft and bite sized to regular solids. Pt with overall poor PO intake. Discussed alternative menu items and obtained food preferences (pizza, pasta, mashed potatoes, eggs, applesauce, sandwiches, ice cream, pudding), will provide requested items as able. Additionally, pt amenable to continued provision of Ensure shakes.    RDN remains available for consult and will follow up per protocol.  Kelli Morin RDN, CDN #[id number]  Also available on [employer] Teams

## 2022-09-02 NOTE — PROGRESS NOTE ADULT - PROBLEM SELECTOR PLAN 5
Patient has had minimal PO intake   Possibly related to dementia    - Encouraged PO intake    - May consider expanding diet to regular diet to improve appetite    - Assisted feeds Patient has had minimal PO intake   Possibly related to dementia   Patient able to verbalize her favorite foods    - Encouraged PO intake    - Expanded diet to regular diet to improve appetite    - Assisted feeds  - Per nutrition recommendations, will follow patient's calorie count to evaluate patient's appetite    - Nutrition consulted, recs appreciated. Will f/u nutrition recs. RESOLVED   Patient has hx of UTIs i/s/o known staghorn calculi   Previous culture data shows sensitivity to Cefepime  Blood cultures show NGTD x 2   Urine cultures show normal urogenital zena    s/p 7 days of ertapenem prior to admission   s/p 2 days of Cefepime after admission   - D/C Cefepime 1g qd   - Urology consulted, appreciate recs. Per urology, no acute intervention at this time

## 2022-09-02 NOTE — PROGRESS NOTE ADULT - PROBLEM SELECTOR PLAN 2
- C/w home synthroid 125 mcg

## 2022-09-02 NOTE — PROGRESS NOTE ADULT - PROBLEM SELECTOR PLAN 1
Patient has hx of UTIs i/s/o known staghorn calculi   Previous culture data shows sensitivity to Cefepime  Blood cultures show NGTD x 2   Urine cultures show normal urogenital zena    s/p 7 days of ertapenem prior to admission   s/p 2 days of Cefepime after admission   - D/C Cefepime 1g qd   - Urology consulted, appreciate recs. Per urology, no acute intervention at this time Patient has had minimal PO intake, slight improvement   Possibly related to dementia   Patient able to verbalize her favorite foods    - Encouraged PO intake    - Expanded diet to regular diet to improve appetite    - Assisted feeds  - Per nutrition recommendations, will follow patient's calorie count to evaluate patient's appetite    - Nutrition consulted, recs appreciated. Will f/u nutrition recs.

## 2022-09-02 NOTE — PROGRESS NOTE ADULT - ASSESSMENT
89 yo F with PMHx of CAD s/p CABG, a. fib, hypothyroidism, rectal mass s/p resection, aortic valve replacement, and prior UTIs i/s/o staghorn calculi presents with pyelonephritis. 
89 yo F with PMHx of CAD s/p CABG, a. fib, hypothyroidism, rectal mass s/p resection, aortic valve replacement, and prior UTIs i/s/o staghorn calculi presents with pyelonephritis. 
89 y.o female was sent to the ER for UTI/Pyelonephritis. It is unknown if she had a CT as outpatient. Patient has been afebrile in ED, non-toxic appearing. Creatinine is normal. Has a staghorn stone in R kidney     --Patient continues to remain nontoxic. Afebrile since admission  --Given clinical stability, no indication for intervention needed.   --If patient begins to decompensate, would recommend placement of R NT. Please consult IR as needed for intervention  --Please reconsult urology as needed    Available via Teams (Greg Kay) from 6a-6p, M-F. Please page [phone] for issues that arise on nights and weekends 
89 yo F with PMHx of CAD s/p CABG, a. fib, hypothyroidism, rectal mass s/p resection, aortic valve replacement, and prior UTIs i/s/o staghorn calculi presents with pyelonephritis. 
89 yo F with PMHx of CAD s/p CABG, a. fib, hypothyroidism, rectal mass s/p resection, aortic valve replacement, and prior UTIs i/s/o staghorn calculi presents with pyelonephritis.

## 2022-09-02 NOTE — DISCHARGE NOTE NURSING/CASE MANAGEMENT/SOCIAL WORK - NSDCPEFALRISK_GEN_ALL_CORE
For information on Fall & Injury Prevention, visit: [email or web address] OR  [email or web address] OR  https://www.cdc.gov/steadi/patient.html

## 2022-09-02 NOTE — DISCHARGE NOTE NURSING/CASE MANAGEMENT/SOCIAL WORK - PATIENT PORTAL LINK FT
You can access the FollowMyHealth Patient Portal offered by [facility] by registering at the following website: [email or web address]. By joining [facility]’s FollowMyHealth portal, you will also be able to view your health information using other applications (apps) compatible with our system.

## 2022-09-22 PROBLEM — Z95.2 PRESENCE OF PROSTHETIC HEART VALVE: Chronic | Status: ACTIVE | Noted: 2022-08-29

## 2022-10-17 ENCOUNTER — APPOINTMENT (OUTPATIENT)
Dept: UROLOGY | Facility: CLINIC | Age: 87
End: 2022-10-17

## 2022-10-17 VITALS
HEIGHT: 55 IN | BODY MASS INDEX: 28.46 KG/M2 | HEART RATE: 60 BPM | DIASTOLIC BLOOD PRESSURE: 67 MMHG | SYSTOLIC BLOOD PRESSURE: 133 MMHG | TEMPERATURE: 97 F | RESPIRATION RATE: 17 BRPM | WEIGHT: 123 LBS

## 2022-10-17 DIAGNOSIS — N20.0 CALCULUS OF KIDNEY: ICD-10-CM

## 2022-10-17 DIAGNOSIS — N39.0 URINARY TRACT INFECTION, SITE NOT SPECIFIED: ICD-10-CM

## 2022-10-17 PROCEDURE — 99213 OFFICE O/P EST LOW 20 MIN: CPT

## 2022-10-17 NOTE — HISTORY OF PRESENT ILLNESS
[None] : None [FreeTextEntry1] : 89 yr old female establish care for kidney stones. Pt arrives from [facility] with an aide, who is unfamiliar with the patient. Pt is A&O x 1. Pt was admitted to [facility] on 8/29/2022 for pyelonephritis. Pt has a history of UTIs and right staghorn stone. Pt was treated with 7 days of ertapenem prior to admission in rehab. Pt received Cefepime 1 gram q day x 2 days in the hospital. The decision was made for no intervention at the time.

## 2022-10-17 NOTE — ASSESSMENT
[FreeTextEntry1] : Due to age and comorbidities, patient is not a surgical candidate \par Treat UTI as needed \par \par Follow up as needed

## 2022-10-17 NOTE — PHYSICAL EXAM
[] : no respiratory distress [Skin Color & Pigmentation] : normal skin color and pigmentation [FreeTextEntry1] : A& O x 1

## 2022-12-01 ENCOUNTER — APPOINTMENT (OUTPATIENT)
Dept: GASTROENTEROLOGY | Facility: CLINIC | Age: 87
End: 2022-12-01

## 2022-12-01 VITALS
DIASTOLIC BLOOD PRESSURE: 85 MMHG | SYSTOLIC BLOOD PRESSURE: 166 MMHG | HEART RATE: 78 BPM | BODY MASS INDEX: 25.22 KG/M2 | WEIGHT: 109 LBS | HEIGHT: 55 IN | OXYGEN SATURATION: 98 % | TEMPERATURE: 97.6 F

## 2022-12-01 PROCEDURE — 99203 OFFICE O/P NEW LOW 30 MIN: CPT

## 2022-12-01 NOTE — REVIEW OF SYSTEMS
[Feeling Tired] : feeling tired [Negative] : Heme/Lymph [FreeTextEntry6] : loss of appetite Spiral Flap Text: The defect edges were debeveled with a #15 scalpel blade.  Given the location of the defect, shape of the defect and the proximity to free margins a spiral flap was deemed most appropriate.  Using a sterile surgical marker, an appropriate rotation flap was drawn incorporating the defect and placing the expected incisions within the relaxed skin tension lines where possible. The area thus outlined was incised deep to adipose tissue with a #15 scalpel blade.  The skin margins were undermined to an appropriate distance in all directions utilizing iris scissors.

## 2022-12-01 NOTE — HISTORY OF PRESENT ILLNESS
[FreeTextEntry1] : Rachel Hoyt 89-year-old white woman from [facility] and rehab was brought by an attendant on a wheelchair for consideration and evaluation for PEG procedures.  She denied any dysphagia, aspiration pneumonia or coughing episodes.  She admitted she is able to swallow liquids without any problem.  However she reported loss of appetite and no desire to eat.  Denied nausea, vomiting, abdominal pain or change in bowel habits.  She feels otherwise comfortable.  She also refused to have PEG procedure when I explained.\par Attendant is not sure whether she lost any weight

## 2022-12-01 NOTE — PHYSICAL EXAM
[Alert] : alert [Normal Voice/Communication] : normal voice/communication [No Acute Distress] : no acute distress [Sclera] : the sclera and conjunctiva were normal [Hearing Threshold Finger Rub Not [place]] : hearing was normal [Normal Lips/Gums] : the lips and gums were normal [Oropharynx] : the oropharynx was normal [Normal Appearance] : the appearance of the neck was normal [No Neck Mass] : no neck mass was observed [No Respiratory Distress] : no respiratory distress [No Acc Muscle Use] : no accessory muscle use [Respiration, Rhythm And Depth] : normal respiratory rhythm and effort [Auscultation Breath Sounds / Voice Sounds] : lungs were clear to auscultation bilaterally [Heart Rate And Rhythm] : heart rate was normal and rhythm regular [Normal S1, S2] : normal S1 and S2 [Murmurs] : no murmurs [None] : no edema [Bowel Sounds] : normal bowel sounds [Abdomen Tenderness] : non-tender [No Masses] : no abdominal mass palpated [Abdomen Soft] : soft [Cervical Lymph Nodes Enlarged Posterior Bilaterally] : no posterior cervical lymphadenopathy [Supraclavicular Lymph Nodes Enlarged Bilaterally] : no supraclavicular lymphadenopathy [Cervical Lymph Nodes Enlarged Anterior Bilaterally] : no anterior cervical lymphadenopathy [No CVA Tenderness] : no CVA  tenderness [No Spinal Tenderness] : no spinal tenderness [Abnormal Walk] : normal gait [No Clubbing, Cyanosis] : no clubbing or cyanosis of the fingernails [Involuntary Movements] : no involuntary movements were seen [Wheelchair] : Patient in wheelchair [Normal Color / Pigmentation] : normal skin color and pigmentation [] : no rash [Normal Turgor] : normal skin turgor [Oriented To Time, Place, And Person] : oriented to person, place, and time [de-identified] : frail looking old woman on a wheel chair

## 2023-04-14 NOTE — PRE-OP CHECKLIST - NS PREOP CHK MONITOR ANESTHESIA CONSENT
Chief Complaint   Patient presents with    Results     Imaging results 4/13/23       HPI:  Patient is a 89 y.o. female established patient who presents today, with her daughter Lorraine in person and daughter Melissa on speakerphone, to review CT imaging done 4/13/23. Historical information: Patient saw me 1/17/23 for her Medicare Wellness visit and CMP done 1/10/23 showed ongoing alkaline phosphatase elevation (379 from 138 previously) and ALT 51. Patient was concerned that current Lipitor use was impacting these lab values, and she also reported intermittent nausea symptoms at that time. Abdominal US was odered on 1/17/23 and patient scheduled US and then cancelled appointment - ultimately US was done on 4/10/23 and showed right renal mass (solid 3.8 cm hypervascular/hypoechoic mass protruding from lateral aspect of kidney). I contacted patient with US results and recommended proceeding with CT abdomen after GFR was checked. Patient is appropriately very worried about potential health problems beyond her baseline general anxieties with daily life.     Patient Active Problem List    Diagnosis Date Noted    Right renal mass 04/14/2023    History of COVID-19 11/14/2022    Mixed dyslipidemia 12/15/2020    Elevated hemoglobin A1c 12/15/2020    History of recurrent UTIs 01/10/2019    Vitamin D deficiency 09/25/2018    Benign essential HTN 07/13/2017    Systolic murmur 06/29/2017    Osteoporosis 08/24/2016       Past medical, surgical, family, and social history was reviewed and updated in Epic chart by me today.     Medications and allergies reviewed and updated in Epic chart by me today.     Imaging done 4/10/23 and 4/13/23 and CMP 1/10/23 reviewed with patient and daughters today.     ROS:  Pertinent positives listed above in HPI. All other systems have been reviewed and are negative.    PE:   BP (!) 142/76 (BP Location: Left arm, Patient Position: Sitting, BP Cuff Size: Adult)   Pulse 65   Temp 36.9 °C (98.5 °F)  "(Temporal)   Resp 17   Ht 1.676 m (5' 6\")   Wt 55.3 kg (121 lb 14.6 oz)   SpO2 97%   BMI 19.68 kg/m²   Vital signs reviewed with patient.     Gen: Well developed; well nourished; no acute distress; age appropriate appearance   Neuro: No focal deficits noted   Psych: AAOx4; mood and affect are appropriate    A/P:  1. Right renal mass/ Elevated alkaline phosphatase level  Incidental finding on CT abdomen 4/13/23 and abdominal US 4/10/23 as described above in HPI. Patient's functional age is at least 10 years younger than stated age, and both patient and daughters agree on proceeding with referral to Urology for specialty evaluation of current situation. Patient remains asymptomatic and I attempted to reassure her to keep a positive mindset moving forward through this process.   - Referral to Urology     Time spent: 30 minutes counseling patient and daughters today        " done

## 2023-06-16 NOTE — DISCHARGE NOTE PROVIDER - NSDCCAREPROVSEEN_GEN_ALL_CORE_FT
CHANDLERJ Team 6  Quality 226: Preventive Care And Screening: Tobacco Use: Screening And Cessation Intervention: Patient screened for tobacco use and is an ex/non-smoker Detail Level: Detailed Quality 431: Preventive Care And Screening: Unhealthy Alcohol Use - Screening: Patient not identified as an unhealthy alcohol user when screened for unhealthy alcohol use using a systematic screening method

## 2023-08-28 NOTE — PROGRESS NOTE ADULT - PROVIDER SPECIALTY LIST ADULT
CT Surgery
Cardiology
Electrophysiology
I have reviewed the H&P, I have examined the patient, and the changes to the patient's condition are as follows: no changes.     Echo shows 60% EF, which is preserved.     
Structural Heart
Structural Heart
Cardiology
Cardiology
CT Surgery

## 2023-12-05 NOTE — ED PROVIDER NOTE - MUSCULOSKELETAL, MLM
Spine appears normal, range of motion is not limited, no muscle or joint tenderness Pt is a 70-year-old male past medical history of HFrEF,  Cardiac Amyloidosis, Cardiomyopathy, Neuropathy, Carotid stenosis -> s/p stents, Hypothyroidism, Pituitary Adenoma, Hyperprolactinemia,  BPH, HTN, laryngeal cancer s/p radiation presents  for BILLY with increased leg and scrotal swelling for 4 days and no orthopnea.  Patient states symptoms similar to prior CHF exacerbation.   NO orthopnea, no PND and no chest pain , no cough.  Patient noted swelling with increased pain on the scrotum about 5 days ago and his Urologist ( started him on ABX as he states ) .  NO fever , no chest pain, no palpitation, no change in diet, no change in medication and he states to be compliant with his medications Endorses symptoms of scrotal swelling, dysuria, extremity tenderness. US Testicles: No evidence of testicular torsion. Moderate right hydrocele with associated prominence of the right epididymis which may be related to epididymitis. Questionable 1 cm hypoechoic right epididymal lesion. Consider follow-up ultrasound in 4-6 weeks.VA Duplex LE (-) X Ray Chest (-) Spine appears normal, range of motion is not limited, no muscle or joint tenderness, right knee full rom with pain

## 2024-01-01 ENCOUNTER — TRANSCRIPTION ENCOUNTER (OUTPATIENT)
Age: 89
End: 2024-01-01

## 2024-01-01 ENCOUNTER — INPATIENT (INPATIENT)
Facility: HOSPITAL | Age: 89
LOS: 5 days | Discharge: NURSING, REHAB OR LONG-TERM CARE | DRG: 66 | End: 2024-04-18
Attending: FAMILY MEDICINE | Admitting: FAMILY MEDICINE
Payer: MEDICARE

## 2024-01-01 VITALS
DIASTOLIC BLOOD PRESSURE: 70 MMHG | SYSTOLIC BLOOD PRESSURE: 140 MMHG | HEART RATE: 60 BPM | OXYGEN SATURATION: 97 % | RESPIRATION RATE: 18 BRPM | TEMPERATURE: 97 F | WEIGHT: 98.11 LBS

## 2024-01-01 VITALS
DIASTOLIC BLOOD PRESSURE: 77 MMHG | SYSTOLIC BLOOD PRESSURE: 162 MMHG | HEART RATE: 60 BPM | RESPIRATION RATE: 18 BRPM | OXYGEN SATURATION: 96 % | TEMPERATURE: 98 F

## 2024-01-01 DIAGNOSIS — N39.0 URINARY TRACT INFECTION, SITE NOT SPECIFIED: ICD-10-CM

## 2024-01-01 DIAGNOSIS — I63.9 CEREBRAL INFARCTION, UNSPECIFIED: ICD-10-CM

## 2024-01-01 DIAGNOSIS — E78.00 PURE HYPERCHOLESTEROLEMIA, UNSPECIFIED: ICD-10-CM

## 2024-01-01 DIAGNOSIS — I48.20 CHRONIC ATRIAL FIBRILLATION, UNSPECIFIED: ICD-10-CM

## 2024-01-01 DIAGNOSIS — K62.9 DISEASE OF ANUS AND RECTUM, UNSPECIFIED: Chronic | ICD-10-CM

## 2024-01-01 DIAGNOSIS — Z86.010 PERSONAL HISTORY OF COLONIC POLYPS: Chronic | ICD-10-CM

## 2024-01-01 DIAGNOSIS — Z90.710 ACQUIRED ABSENCE OF BOTH CERVIX AND UTERUS: Chronic | ICD-10-CM

## 2024-01-01 DIAGNOSIS — E03.9 HYPOTHYROIDISM, UNSPECIFIED: ICD-10-CM

## 2024-01-01 DIAGNOSIS — R62.7 ADULT FAILURE TO THRIVE: ICD-10-CM

## 2024-01-01 DIAGNOSIS — S20.20XA CONTUSION OF THORAX, UNSPECIFIED, INITIAL ENCOUNTER: ICD-10-CM

## 2024-01-01 DIAGNOSIS — R53.1 WEAKNESS: ICD-10-CM

## 2024-01-01 DIAGNOSIS — Z95.1 PRESENCE OF AORTOCORONARY BYPASS GRAFT: Chronic | ICD-10-CM

## 2024-01-01 DIAGNOSIS — Z29.9 ENCOUNTER FOR PROPHYLACTIC MEASURES, UNSPECIFIED: ICD-10-CM

## 2024-01-01 DIAGNOSIS — K21.9 GASTRO-ESOPHAGEAL REFLUX DISEASE WITHOUT ESOPHAGITIS: ICD-10-CM

## 2024-01-01 DIAGNOSIS — I10 ESSENTIAL (PRIMARY) HYPERTENSION: ICD-10-CM

## 2024-01-01 LAB
-  AMOXICILLIN/CLAVULANIC ACID: SIGNIFICANT CHANGE UP
-  AMPICILLIN/SULBACTAM: SIGNIFICANT CHANGE UP
-  AMPICILLIN: SIGNIFICANT CHANGE UP
-  AZTREONAM: SIGNIFICANT CHANGE UP
-  CEFAZOLIN: SIGNIFICANT CHANGE UP
-  CEFEPIME: SIGNIFICANT CHANGE UP
-  CEFOXITIN: SIGNIFICANT CHANGE UP
-  CEFTRIAXONE: SIGNIFICANT CHANGE UP
-  CEFUROXIME: SIGNIFICANT CHANGE UP
-  CIPROFLOXACIN: SIGNIFICANT CHANGE UP
-  ERTAPENEM: SIGNIFICANT CHANGE UP
-  GENTAMICIN: SIGNIFICANT CHANGE UP
-  LEVOFLOXACIN: SIGNIFICANT CHANGE UP
-  MEROPENEM: SIGNIFICANT CHANGE UP
-  NITROFURANTOIN: SIGNIFICANT CHANGE UP
-  PIPERACILLIN/TAZOBACTAM: SIGNIFICANT CHANGE UP
-  TOBRAMYCIN: SIGNIFICANT CHANGE UP
-  TRIMETHOPRIM/SULFAMETHOXAZOLE: SIGNIFICANT CHANGE UP
A1C WITH ESTIMATED AVERAGE GLUCOSE RESULT: 5.2 % — SIGNIFICANT CHANGE UP (ref 4–5.6)
ALBUMIN SERPL ELPH-MCNC: 2.6 G/DL — LOW (ref 3.3–5)
ALBUMIN SERPL ELPH-MCNC: 3.1 G/DL — LOW (ref 3.3–5)
ALP SERPL-CCNC: 75 U/L — SIGNIFICANT CHANGE UP (ref 40–120)
ALP SERPL-CCNC: 83 U/L — SIGNIFICANT CHANGE UP (ref 40–120)
ALT FLD-CCNC: 10 U/L — LOW (ref 12–78)
ALT FLD-CCNC: 11 U/L — LOW (ref 12–78)
ANION GAP SERPL CALC-SCNC: 5 MMOL/L — SIGNIFICANT CHANGE UP (ref 5–17)
ANION GAP SERPL CALC-SCNC: 6 MMOL/L — SIGNIFICANT CHANGE UP (ref 5–17)
ANION GAP SERPL CALC-SCNC: 7 MMOL/L — SIGNIFICANT CHANGE UP (ref 5–17)
ANION GAP SERPL CALC-SCNC: 8 MMOL/L — SIGNIFICANT CHANGE UP (ref 5–17)
ANION GAP SERPL CALC-SCNC: 9 MMOL/L — SIGNIFICANT CHANGE UP (ref 5–17)
APPEARANCE UR: ABNORMAL
APTT BLD: 20.8 SEC — LOW (ref 24.5–35.6)
AST SERPL-CCNC: 31 U/L — SIGNIFICANT CHANGE UP (ref 15–37)
AST SERPL-CCNC: 33 U/L — SIGNIFICANT CHANGE UP (ref 15–37)
BACTERIA # UR AUTO: ABNORMAL /HPF
BASOPHILS # BLD AUTO: 0 K/UL — SIGNIFICANT CHANGE UP (ref 0–0.2)
BASOPHILS NFR BLD AUTO: 0 % — SIGNIFICANT CHANGE UP (ref 0–2)
BILIRUB SERPL-MCNC: 0.9 MG/DL — SIGNIFICANT CHANGE UP (ref 0.2–1.2)
BILIRUB SERPL-MCNC: 1.4 MG/DL — HIGH (ref 0.2–1.2)
BILIRUB UR-MCNC: NEGATIVE — SIGNIFICANT CHANGE UP
BUN SERPL-MCNC: 10 MG/DL — SIGNIFICANT CHANGE UP (ref 7–23)
BUN SERPL-MCNC: 10 MG/DL — SIGNIFICANT CHANGE UP (ref 7–23)
BUN SERPL-MCNC: 13 MG/DL — SIGNIFICANT CHANGE UP (ref 7–23)
BUN SERPL-MCNC: 23 MG/DL — SIGNIFICANT CHANGE UP (ref 7–23)
BUN SERPL-MCNC: 31 MG/DL — HIGH (ref 7–23)
CALCIUM SERPL-MCNC: 8 MG/DL — LOW (ref 8.5–10.1)
CALCIUM SERPL-MCNC: 8.2 MG/DL — LOW (ref 8.5–10.1)
CALCIUM SERPL-MCNC: 8.3 MG/DL — LOW (ref 8.5–10.1)
CALCIUM SERPL-MCNC: 8.5 MG/DL — SIGNIFICANT CHANGE UP (ref 8.5–10.1)
CALCIUM SERPL-MCNC: 9 MG/DL — SIGNIFICANT CHANGE UP (ref 8.5–10.1)
CHLORIDE SERPL-SCNC: 105 MMOL/L — SIGNIFICANT CHANGE UP (ref 96–108)
CHLORIDE SERPL-SCNC: 113 MMOL/L — HIGH (ref 96–108)
CHLORIDE SERPL-SCNC: 114 MMOL/L — HIGH (ref 96–108)
CHOLEST SERPL-MCNC: 173 MG/DL — SIGNIFICANT CHANGE UP
CO2 SERPL-SCNC: 20 MMOL/L — LOW (ref 22–31)
CO2 SERPL-SCNC: 22 MMOL/L — SIGNIFICANT CHANGE UP (ref 22–31)
CO2 SERPL-SCNC: 24 MMOL/L — SIGNIFICANT CHANGE UP (ref 22–31)
CO2 SERPL-SCNC: 25 MMOL/L — SIGNIFICANT CHANGE UP (ref 22–31)
CO2 SERPL-SCNC: 26 MMOL/L — SIGNIFICANT CHANGE UP (ref 22–31)
COLOR SPEC: YELLOW — SIGNIFICANT CHANGE UP
CREAT SERPL-MCNC: 0.79 MG/DL — SIGNIFICANT CHANGE UP (ref 0.5–1.3)
CREAT SERPL-MCNC: 0.83 MG/DL — SIGNIFICANT CHANGE UP (ref 0.5–1.3)
CREAT SERPL-MCNC: 0.87 MG/DL — SIGNIFICANT CHANGE UP (ref 0.5–1.3)
CREAT SERPL-MCNC: 0.97 MG/DL — SIGNIFICANT CHANGE UP (ref 0.5–1.3)
CREAT SERPL-MCNC: 1.1 MG/DL — SIGNIFICANT CHANGE UP (ref 0.5–1.3)
CULTURE RESULTS: ABNORMAL
DIFF PNL FLD: ABNORMAL
EGFR: 46 ML/MIN/1.73M2 — LOW
EGFR: 53 ML/MIN/1.73M2 — LOW
EGFR: 61 ML/MIN/1.73M2 — SIGNIFICANT CHANGE UP
EGFR: 64 ML/MIN/1.73M2 — SIGNIFICANT CHANGE UP
EGFR: 68 ML/MIN/1.73M2 — SIGNIFICANT CHANGE UP
EOSINOPHIL # BLD AUTO: 0.77 K/UL — HIGH (ref 0–0.5)
EOSINOPHIL NFR BLD AUTO: 8 % — HIGH (ref 0–6)
EPI CELLS # UR: PRESENT
ESTIMATED AVERAGE GLUCOSE: 103 MG/DL — SIGNIFICANT CHANGE UP (ref 68–114)
FERRITIN SERPL-MCNC: 285 NG/ML — SIGNIFICANT CHANGE UP (ref 13–330)
GLUCOSE SERPL-MCNC: 103 MG/DL — HIGH (ref 70–99)
GLUCOSE SERPL-MCNC: 104 MG/DL — HIGH (ref 70–99)
GLUCOSE SERPL-MCNC: 113 MG/DL — HIGH (ref 70–99)
GLUCOSE SERPL-MCNC: 225 MG/DL — HIGH (ref 70–99)
GLUCOSE SERPL-MCNC: 99 MG/DL — SIGNIFICANT CHANGE UP (ref 70–99)
GLUCOSE UR QL: NEGATIVE MG/DL — SIGNIFICANT CHANGE UP
HCT VFR BLD CALC: 32.6 % — LOW (ref 34.5–45)
HCT VFR BLD CALC: 35.6 % — SIGNIFICANT CHANGE UP (ref 34.5–45)
HDLC SERPL-MCNC: 40 MG/DL — LOW
HGB BLD-MCNC: 10 G/DL — LOW (ref 11.5–15.5)
HGB BLD-MCNC: 10.8 G/DL — LOW (ref 11.5–15.5)
INR BLD: 1.04 RATIO — SIGNIFICANT CHANGE UP (ref 0.85–1.18)
IRON SATN MFR SERPL: 18 % — SIGNIFICANT CHANGE UP (ref 14–50)
IRON SATN MFR SERPL: 40 UG/DL — SIGNIFICANT CHANGE UP (ref 30–160)
KETONES UR-MCNC: NEGATIVE MG/DL — SIGNIFICANT CHANGE UP
LEUKOCYTE ESTERASE UR-ACNC: ABNORMAL
LIPID PNL WITH DIRECT LDL SERPL: 113 MG/DL — HIGH
LYMPHOCYTES # BLD AUTO: 1.06 K/UL — SIGNIFICANT CHANGE UP (ref 1–3.3)
LYMPHOCYTES # BLD AUTO: 11 % — LOW (ref 13–44)
MAGNESIUM SERPL-MCNC: 2.2 MG/DL — SIGNIFICANT CHANGE UP (ref 1.6–2.6)
MCHC RBC-ENTMCNC: 30.3 GM/DL — LOW (ref 32–36)
MCHC RBC-ENTMCNC: 30.5 PG — SIGNIFICANT CHANGE UP (ref 27–34)
MCHC RBC-ENTMCNC: 30.7 GM/DL — LOW (ref 32–36)
MCHC RBC-ENTMCNC: 30.8 PG — SIGNIFICANT CHANGE UP (ref 27–34)
MCV RBC AUTO: 100.3 FL — HIGH (ref 80–100)
MCV RBC AUTO: 100.6 FL — HIGH (ref 80–100)
METHOD TYPE: SIGNIFICANT CHANGE UP
MONOCYTES # BLD AUTO: 0.29 K/UL — SIGNIFICANT CHANGE UP (ref 0–0.9)
MONOCYTES NFR BLD AUTO: 3 % — SIGNIFICANT CHANGE UP (ref 2–14)
NEUTROPHILS # BLD AUTO: 7.24 K/UL — SIGNIFICANT CHANGE UP (ref 1.8–7.4)
NEUTROPHILS NFR BLD AUTO: 75 % — SIGNIFICANT CHANGE UP (ref 43–77)
NITRITE UR-MCNC: POSITIVE
NON HDL CHOLESTEROL: 133 MG/DL — HIGH
NRBC # BLD: 0 /100 WBCS — SIGNIFICANT CHANGE UP (ref 0–0)
NRBC # BLD: SIGNIFICANT CHANGE UP /100 WBCS (ref 0–0)
OB PNL STL: NEGATIVE — SIGNIFICANT CHANGE UP
ORGANISM # SPEC MICROSCOPIC CNT: ABNORMAL
ORGANISM # SPEC MICROSCOPIC CNT: SIGNIFICANT CHANGE UP
PH UR: 7 — SIGNIFICANT CHANGE UP (ref 5–8)
PHOSPHATE SERPL-MCNC: 2.1 MG/DL — LOW (ref 2.5–4.5)
PHOSPHATE SERPL-MCNC: 2.3 MG/DL — LOW (ref 2.5–4.5)
PLATELET # BLD AUTO: 141 K/UL — LOW (ref 150–400)
PLATELET # BLD AUTO: 197 K/UL — SIGNIFICANT CHANGE UP (ref 150–400)
POTASSIUM SERPL-MCNC: 3.5 MMOL/L — SIGNIFICANT CHANGE UP (ref 3.5–5.3)
POTASSIUM SERPL-MCNC: 4.1 MMOL/L — SIGNIFICANT CHANGE UP (ref 3.5–5.3)
POTASSIUM SERPL-MCNC: 4.6 MMOL/L — SIGNIFICANT CHANGE UP (ref 3.5–5.3)
POTASSIUM SERPL-SCNC: 3.5 MMOL/L — SIGNIFICANT CHANGE UP (ref 3.5–5.3)
POTASSIUM SERPL-SCNC: 4.1 MMOL/L — SIGNIFICANT CHANGE UP (ref 3.5–5.3)
POTASSIUM SERPL-SCNC: 4.6 MMOL/L — SIGNIFICANT CHANGE UP (ref 3.5–5.3)
PROT SERPL-MCNC: 6.6 G/DL — SIGNIFICANT CHANGE UP (ref 6–8.3)
PROT SERPL-MCNC: 7.7 G/DL — SIGNIFICANT CHANGE UP (ref 6–8.3)
PROT UR-MCNC: 100 MG/DL
PROTHROM AB SERPL-ACNC: 12.1 SEC — SIGNIFICANT CHANGE UP (ref 9.5–13)
RBC # BLD: 3.25 M/UL — LOW (ref 3.8–5.2)
RBC # BLD: 3.54 M/UL — LOW (ref 3.8–5.2)
RBC # FLD: 18.6 % — HIGH (ref 10.3–14.5)
RBC # FLD: 18.6 % — HIGH (ref 10.3–14.5)
RBC CASTS # UR COMP ASSIST: 7 /HPF — HIGH (ref 0–4)
SODIUM SERPL-SCNC: 134 MMOL/L — LOW (ref 135–145)
SODIUM SERPL-SCNC: 143 MMOL/L — SIGNIFICANT CHANGE UP (ref 135–145)
SODIUM SERPL-SCNC: 143 MMOL/L — SIGNIFICANT CHANGE UP (ref 135–145)
SODIUM SERPL-SCNC: 144 MMOL/L — SIGNIFICANT CHANGE UP (ref 135–145)
SODIUM SERPL-SCNC: 146 MMOL/L — HIGH (ref 135–145)
SP GR SPEC: 1.02 — SIGNIFICANT CHANGE UP (ref 1–1.03)
SPECIMEN SOURCE: SIGNIFICANT CHANGE UP
T3 SERPL-MCNC: 53 NG/DL — LOW (ref 80–200)
T4 AB SER-ACNC: 4.6 UG/DL — SIGNIFICANT CHANGE UP (ref 4.6–12)
TIBC SERPL-MCNC: 216 UG/DL — LOW (ref 220–430)
TRIGL SERPL-MCNC: 113 MG/DL — SIGNIFICANT CHANGE UP
TSH SERPL-MCNC: 17 UIU/ML — HIGH (ref 0.36–3.74)
TSH SERPL-MCNC: 19.6 UIU/ML — HIGH (ref 0.36–3.74)
UIBC SERPL-MCNC: 177 UG/DL — SIGNIFICANT CHANGE UP (ref 110–370)
UROBILINOGEN FLD QL: 1 MG/DL — SIGNIFICANT CHANGE UP (ref 0.2–1)
WBC # BLD: 8.86 K/UL — SIGNIFICANT CHANGE UP (ref 3.8–10.5)
WBC # BLD: 9.65 K/UL — SIGNIFICANT CHANGE UP (ref 3.8–10.5)
WBC # FLD AUTO: 8.86 K/UL — SIGNIFICANT CHANGE UP (ref 3.8–10.5)
WBC # FLD AUTO: 9.65 K/UL — SIGNIFICANT CHANGE UP (ref 3.8–10.5)
WBC UR QL: ABNORMAL /HPF (ref 0–5)

## 2024-01-01 PROCEDURE — 70498 CT ANGIOGRAPHY NECK: CPT | Mod: 26,MC

## 2024-01-01 PROCEDURE — 87077 CULTURE AEROBIC IDENTIFY: CPT

## 2024-01-01 PROCEDURE — 97530 THERAPEUTIC ACTIVITIES: CPT

## 2024-01-01 PROCEDURE — 84436 ASSAY OF TOTAL THYROXINE: CPT

## 2024-01-01 PROCEDURE — 70498 CT ANGIOGRAPHY NECK: CPT | Mod: MC

## 2024-01-01 PROCEDURE — 70496 CT ANGIOGRAPHY HEAD: CPT | Mod: MC

## 2024-01-01 PROCEDURE — 81001 URINALYSIS AUTO W/SCOPE: CPT

## 2024-01-01 PROCEDURE — 73030 X-RAY EXAM OF SHOULDER: CPT | Mod: 26,RT

## 2024-01-01 PROCEDURE — 97110 THERAPEUTIC EXERCISES: CPT

## 2024-01-01 PROCEDURE — 83540 ASSAY OF IRON: CPT

## 2024-01-01 PROCEDURE — 99285 EMERGENCY DEPT VISIT HI MDM: CPT

## 2024-01-01 PROCEDURE — 99232 SBSQ HOSP IP/OBS MODERATE 35: CPT

## 2024-01-01 PROCEDURE — 70450 CT HEAD/BRAIN W/O DYE: CPT | Mod: 26,59,MC

## 2024-01-01 PROCEDURE — 0042T: CPT

## 2024-01-01 PROCEDURE — 92610 EVALUATE SWALLOWING FUNCTION: CPT

## 2024-01-01 PROCEDURE — 84100 ASSAY OF PHOSPHORUS: CPT

## 2024-01-01 PROCEDURE — 85610 PROTHROMBIN TIME: CPT

## 2024-01-01 PROCEDURE — 83550 IRON BINDING TEST: CPT

## 2024-01-01 PROCEDURE — 70450 CT HEAD/BRAIN W/O DYE: CPT | Mod: 26

## 2024-01-01 PROCEDURE — 80048 BASIC METABOLIC PNL TOTAL CA: CPT

## 2024-01-01 PROCEDURE — 84443 ASSAY THYROID STIM HORMONE: CPT

## 2024-01-01 PROCEDURE — 83036 HEMOGLOBIN GLYCOSYLATED A1C: CPT

## 2024-01-01 PROCEDURE — 83735 ASSAY OF MAGNESIUM: CPT

## 2024-01-01 PROCEDURE — 73030 X-RAY EXAM OF SHOULDER: CPT

## 2024-01-01 PROCEDURE — 85025 COMPLETE CBC W/AUTO DIFF WBC: CPT

## 2024-01-01 PROCEDURE — 80061 LIPID PANEL: CPT

## 2024-01-01 PROCEDURE — 82272 OCCULT BLD FECES 1-3 TESTS: CPT

## 2024-01-01 PROCEDURE — 87186 SC STD MICRODIL/AGAR DIL: CPT

## 2024-01-01 PROCEDURE — 71045 X-RAY EXAM CHEST 1 VIEW: CPT | Mod: 26

## 2024-01-01 PROCEDURE — 97535 SELF CARE MNGMENT TRAINING: CPT

## 2024-01-01 PROCEDURE — 93005 ELECTROCARDIOGRAM TRACING: CPT

## 2024-01-01 PROCEDURE — 96374 THER/PROPH/DIAG INJ IV PUSH: CPT

## 2024-01-01 PROCEDURE — 99222 1ST HOSP IP/OBS MODERATE 55: CPT

## 2024-01-01 PROCEDURE — 87086 URINE CULTURE/COLONY COUNT: CPT

## 2024-01-01 PROCEDURE — 70496 CT ANGIOGRAPHY HEAD: CPT | Mod: 26,MC

## 2024-01-01 PROCEDURE — 97166 OT EVAL MOD COMPLEX 45 MIN: CPT

## 2024-01-01 PROCEDURE — 97162 PT EVAL MOD COMPLEX 30 MIN: CPT

## 2024-01-01 PROCEDURE — 71045 X-RAY EXAM CHEST 1 VIEW: CPT

## 2024-01-01 PROCEDURE — 85730 THROMBOPLASTIN TIME PARTIAL: CPT

## 2024-01-01 PROCEDURE — 85027 COMPLETE CBC AUTOMATED: CPT

## 2024-01-01 PROCEDURE — 82728 ASSAY OF FERRITIN: CPT

## 2024-01-01 PROCEDURE — 80053 COMPREHEN METABOLIC PANEL: CPT

## 2024-01-01 PROCEDURE — 36415 COLL VENOUS BLD VENIPUNCTURE: CPT

## 2024-01-01 PROCEDURE — 84480 ASSAY TRIIODOTHYRONINE (T3): CPT

## 2024-01-01 PROCEDURE — 70450 CT HEAD/BRAIN W/O DYE: CPT | Mod: MC

## 2024-01-01 RX ORDER — ACETAMINOPHEN 500 MG
650 TABLET ORAL EVERY 6 HOURS
Refills: 0 | Status: DISCONTINUED | OUTPATIENT
Start: 2024-01-01 | End: 2024-01-01

## 2024-01-01 RX ORDER — CEFTRIAXONE 500 MG/1
1000 INJECTION, POWDER, FOR SOLUTION INTRAMUSCULAR; INTRAVENOUS EVERY 24 HOURS
Refills: 0 | Status: DISCONTINUED | OUTPATIENT
Start: 2024-01-01 | End: 2024-01-01

## 2024-01-01 RX ORDER — LEVOTHYROXINE SODIUM 125 MCG
125 TABLET ORAL DAILY
Refills: 0 | Status: DISCONTINUED | OUTPATIENT
Start: 2024-01-01 | End: 2024-01-01

## 2024-01-01 RX ORDER — HYDRALAZINE HCL 50 MG
25 TABLET ORAL THREE TIMES A DAY
Refills: 0 | Status: DISCONTINUED | OUTPATIENT
Start: 2024-01-01 | End: 2024-01-01

## 2024-01-01 RX ORDER — ASPIRIN/CALCIUM CARB/MAGNESIUM 324 MG
300 TABLET ORAL DAILY
Refills: 0 | Status: DISCONTINUED | OUTPATIENT
Start: 2024-01-01 | End: 2024-01-01

## 2024-01-01 RX ORDER — FOLIC ACID 0.8 MG
1 TABLET ORAL DAILY
Refills: 0 | Status: DISCONTINUED | OUTPATIENT
Start: 2024-01-01 | End: 2024-01-01

## 2024-01-01 RX ORDER — CEFTRIAXONE 500 MG/1
1000 INJECTION, POWDER, FOR SOLUTION INTRAMUSCULAR; INTRAVENOUS EVERY 24 HOURS
Refills: 0 | Status: COMPLETED | OUTPATIENT
Start: 2024-01-01 | End: 2024-01-01

## 2024-01-01 RX ORDER — ALLOPURINOL 300 MG
1 TABLET ORAL
Refills: 0 | DISCHARGE

## 2024-01-01 RX ORDER — ENOXAPARIN SODIUM 100 MG/ML
45 INJECTION SUBCUTANEOUS EVERY 12 HOURS
Refills: 0 | Status: DISCONTINUED | OUTPATIENT
Start: 2024-01-01 | End: 2024-01-01

## 2024-01-01 RX ORDER — PANTOPRAZOLE SODIUM 20 MG/1
40 TABLET, DELAYED RELEASE ORAL DAILY
Refills: 0 | Status: DISCONTINUED | OUTPATIENT
Start: 2024-01-01 | End: 2024-01-01

## 2024-01-01 RX ORDER — SODIUM,POTASSIUM PHOSPHATES 278-250MG
1 POWDER IN PACKET (EA) ORAL THREE TIMES A DAY
Refills: 0 | Status: DISCONTINUED | OUTPATIENT
Start: 2024-01-01 | End: 2024-01-01

## 2024-01-01 RX ORDER — DILTIAZEM HCL 120 MG
240 CAPSULE, EXT RELEASE 24 HR ORAL DAILY
Refills: 0 | Status: DISCONTINUED | OUTPATIENT
Start: 2024-01-01 | End: 2024-01-01

## 2024-01-01 RX ORDER — ATORVASTATIN CALCIUM 80 MG/1
20 TABLET, FILM COATED ORAL AT BEDTIME
Refills: 0 | Status: DISCONTINUED | OUTPATIENT
Start: 2024-01-01 | End: 2024-01-01

## 2024-01-01 RX ORDER — LEVOTHYROXINE SODIUM 125 MCG
88 TABLET ORAL AT BEDTIME
Refills: 0 | Status: DISCONTINUED | OUTPATIENT
Start: 2024-01-01 | End: 2024-01-01

## 2024-01-01 RX ORDER — SODIUM CHLORIDE 9 MG/ML
1000 INJECTION, SOLUTION INTRAVENOUS
Refills: 0 | Status: DISCONTINUED | OUTPATIENT
Start: 2024-01-01 | End: 2024-01-01

## 2024-01-01 RX ORDER — LEVOTHYROXINE SODIUM 125 MCG
1 TABLET ORAL
Refills: 0 | DISCHARGE

## 2024-01-01 RX ORDER — POTASSIUM CHLORIDE 20 MEQ
10 PACKET (EA) ORAL
Refills: 0 | Status: COMPLETED | OUTPATIENT
Start: 2024-01-01 | End: 2024-01-01

## 2024-01-01 RX ORDER — PANTOPRAZOLE SODIUM 20 MG/1
40 TABLET, DELAYED RELEASE ORAL
Refills: 0 | Status: DISCONTINUED | OUTPATIENT
Start: 2024-01-01 | End: 2024-01-01

## 2024-01-01 RX ORDER — METOPROLOL TARTRATE 50 MG
25 TABLET ORAL
Refills: 0 | Status: DISCONTINUED | OUTPATIENT
Start: 2024-01-01 | End: 2024-01-01

## 2024-01-01 RX ORDER — FAMOTIDINE 10 MG/ML
1 INJECTION INTRAVENOUS
Refills: 0 | DISCHARGE

## 2024-01-01 RX ORDER — ATORVASTATIN CALCIUM 80 MG/1
1 TABLET, FILM COATED ORAL
Refills: 0 | DISCHARGE

## 2024-01-01 RX ORDER — RIVAROXABAN 15 MG-20MG
15 KIT ORAL
Refills: 0 | Status: DISCONTINUED | OUTPATIENT
Start: 2024-01-01 | End: 2024-01-01

## 2024-01-01 RX ORDER — POTASSIUM PHOSPHATE, MONOBASIC POTASSIUM PHOSPHATE, DIBASIC 236; 224 MG/ML; MG/ML
30 INJECTION, SOLUTION INTRAVENOUS ONCE
Refills: 0 | Status: COMPLETED | OUTPATIENT
Start: 2024-01-01 | End: 2024-01-01

## 2024-01-01 RX ORDER — CEFTRIAXONE 500 MG/1
1000 INJECTION, POWDER, FOR SOLUTION INTRAMUSCULAR; INTRAVENOUS ONCE
Refills: 0 | Status: COMPLETED | OUTPATIENT
Start: 2024-01-01 | End: 2024-01-01

## 2024-01-01 RX ORDER — METOPROLOL TARTRATE 50 MG
5 TABLET ORAL EVERY 6 HOURS
Refills: 0 | Status: DISCONTINUED | OUTPATIENT
Start: 2024-01-01 | End: 2024-01-01

## 2024-01-01 RX ORDER — ENOXAPARIN SODIUM 100 MG/ML
40 INJECTION SUBCUTANEOUS EVERY 24 HOURS
Refills: 0 | Status: DISCONTINUED | OUTPATIENT
Start: 2024-01-01 | End: 2024-01-01

## 2024-01-01 RX ADMIN — PANTOPRAZOLE SODIUM 40 MILLIGRAM(S): 20 TABLET, DELAYED RELEASE ORAL at 11:23

## 2024-01-01 RX ADMIN — Medication 88 MICROGRAM(S): at 20:45

## 2024-01-01 RX ADMIN — SODIUM CHLORIDE 50 MILLILITER(S): 9 INJECTION, SOLUTION INTRAVENOUS at 11:19

## 2024-01-01 RX ADMIN — Medication 100 MILLIEQUIVALENT(S): at 20:44

## 2024-01-01 RX ADMIN — ENOXAPARIN SODIUM 40 MILLIGRAM(S): 100 INJECTION SUBCUTANEOUS at 05:06

## 2024-01-01 RX ADMIN — CEFTRIAXONE 100 MILLIGRAM(S): 500 INJECTION, POWDER, FOR SOLUTION INTRAMUSCULAR; INTRAVENOUS at 16:44

## 2024-01-01 RX ADMIN — CEFTRIAXONE 100 MILLIGRAM(S): 500 INJECTION, POWDER, FOR SOLUTION INTRAMUSCULAR; INTRAVENOUS at 18:19

## 2024-01-01 RX ADMIN — Medication 5 MILLIGRAM(S): at 11:47

## 2024-01-01 RX ADMIN — Medication 5 MILLIGRAM(S): at 06:14

## 2024-01-01 RX ADMIN — Medication 5 MILLIGRAM(S): at 17:36

## 2024-01-01 RX ADMIN — ENOXAPARIN SODIUM 40 MILLIGRAM(S): 100 INJECTION SUBCUTANEOUS at 06:14

## 2024-01-01 RX ADMIN — Medication 88 MICROGRAM(S): at 22:38

## 2024-01-01 RX ADMIN — Medication 5 MILLIGRAM(S): at 17:26

## 2024-01-01 RX ADMIN — SODIUM CHLORIDE 50 MILLILITER(S): 9 INJECTION, SOLUTION INTRAVENOUS at 17:24

## 2024-01-01 RX ADMIN — Medication 5 MILLIGRAM(S): at 17:43

## 2024-01-01 RX ADMIN — Medication 5 MILLIGRAM(S): at 17:37

## 2024-01-01 RX ADMIN — ENOXAPARIN SODIUM 40 MILLIGRAM(S): 100 INJECTION SUBCUTANEOUS at 05:30

## 2024-01-01 RX ADMIN — Medication 5 MILLIGRAM(S): at 11:23

## 2024-01-01 RX ADMIN — PANTOPRAZOLE SODIUM 40 MILLIGRAM(S): 20 TABLET, DELAYED RELEASE ORAL at 11:20

## 2024-01-01 RX ADMIN — CEFTRIAXONE 100 MILLIGRAM(S): 500 INJECTION, POWDER, FOR SOLUTION INTRAMUSCULAR; INTRAVENOUS at 14:53

## 2024-01-01 RX ADMIN — Medication 5 MILLIGRAM(S): at 00:18

## 2024-01-01 RX ADMIN — Medication 5 MILLIGRAM(S): at 05:06

## 2024-01-01 RX ADMIN — CEFTRIAXONE 100 MILLIGRAM(S): 500 INJECTION, POWDER, FOR SOLUTION INTRAMUSCULAR; INTRAVENOUS at 17:24

## 2024-01-01 RX ADMIN — Medication 5 MILLIGRAM(S): at 05:26

## 2024-01-01 RX ADMIN — ENOXAPARIN SODIUM 45 MILLIGRAM(S): 100 INJECTION SUBCUTANEOUS at 05:29

## 2024-01-01 RX ADMIN — Medication 5 MILLIGRAM(S): at 00:30

## 2024-01-01 RX ADMIN — Medication 5 MILLIGRAM(S): at 11:19

## 2024-01-01 RX ADMIN — Medication 5 MILLIGRAM(S): at 00:13

## 2024-01-01 RX ADMIN — ENOXAPARIN SODIUM 40 MILLIGRAM(S): 100 INJECTION SUBCUTANEOUS at 06:38

## 2024-01-01 RX ADMIN — Medication 100 MILLIEQUIVALENT(S): at 22:34

## 2024-01-01 RX ADMIN — SODIUM CHLORIDE 50 MILLILITER(S): 9 INJECTION, SOLUTION INTRAVENOUS at 16:44

## 2024-01-01 RX ADMIN — Medication 88 MICROGRAM(S): at 21:19

## 2024-01-01 RX ADMIN — PANTOPRAZOLE SODIUM 40 MILLIGRAM(S): 20 TABLET, DELAYED RELEASE ORAL at 13:55

## 2024-01-01 RX ADMIN — Medication 100 MILLIEQUIVALENT(S): at 23:45

## 2024-01-01 RX ADMIN — SODIUM CHLORIDE 50 MILLILITER(S): 9 INJECTION, SOLUTION INTRAVENOUS at 12:31

## 2024-01-01 RX ADMIN — POTASSIUM PHOSPHATE, MONOBASIC POTASSIUM PHOSPHATE, DIBASIC 83.33 MILLIMOLE(S): 236; 224 INJECTION, SOLUTION INTRAVENOUS at 06:10

## 2024-01-01 RX ADMIN — Medication 5 MILLIGRAM(S): at 05:30

## 2024-01-01 RX ADMIN — CEFTRIAXONE 100 MILLIGRAM(S): 500 INJECTION, POWDER, FOR SOLUTION INTRAMUSCULAR; INTRAVENOUS at 17:35

## 2024-01-01 RX ADMIN — Medication 300 MILLIGRAM(S): at 11:47

## 2024-01-01 RX ADMIN — Medication 300 MILLIGRAM(S): at 12:50

## 2024-01-01 RX ADMIN — Medication 5 MILLIGRAM(S): at 12:52

## 2024-01-01 RX ADMIN — Medication 88 MICROGRAM(S): at 21:08

## 2024-01-01 RX ADMIN — Medication 88 MICROGRAM(S): at 21:58

## 2024-01-16 NOTE — ED PROVIDER NOTE - RATE
Caller: Elizabeth Momin    Relationship: Self    Best call back number: [phone]     What medication are you requesting: COUGH MEDICATION     What are your current symptoms: COUGH     How long have you been experiencing symptoms: 4 DAYS    Have you had these symptoms before:    [] Yes  [x] No    Have you been treated for these symptoms before:   [] Yes  [x] No    If a prescription is needed, what is your preferred pharmacy and phone number: [id number] - [address] AT [facility] [address] & [facility] [address] - [phone] [facility] [phone]      Additional notes: TOOK COVID TEST MONDAY AND IT WAS NEGATIVE  IS HAVING SURGERY THURSDAY AND TRYING TO GET SOMETHING FOR COUGH BEFORE HE GOES INTO SURGERY          60

## 2024-01-25 NOTE — PATIENT PROFILE ADULT. - FUNCTIONAL LEVEL PRIOR: AMBULATION
ESOPHAGOGASTRODUODENOSCOPY Procedure Report    Patient Name:  Manfred Perry  YOB: 1949    Date of Surgery:  1/25/2024     Pre-Op Diagnosis:  Esophageal dysphagia [R13.19]         Procedure/CPT® Codes:      Procedure(s):  ESOPHAGOGASTRODUODENOSCOPY, non-guided balloon dilation of distal esophagus (12-14mm)    Staff:  Surgeon(s):  Rachel Easley MD      Anesthesia: Monitored Anesthesia Care    Description of Procedure:  A description of the procedure as well as risks, benefits and alternative methods were explained to the patient who voiced understanding and signed the corresponding consent form. A physical exam was performed and vital signs were monitored throughout the procedure.    An upper GI endoscope was placed into the mouth and proceeded through the esophagus, stomach and second portion of the duodenum without difficulty. The scope was then retroflexed and the fundus was visualized. The procedure was not difficult and there were no immediate complications.    Findings  LA grade C erosive esophagitis with GE junction stricture was noticed progressively dilated from 12 to 14.5 mm balloon with a superficial mucosal rent.  Moderate-sized hiatal hernia was seen.  Gastric mucosa did show changes consistent with a chronic gastritis body in the fundus area multiple biopsy was performed antral mucosa antral mucosa grossly looks normal.      Impression:  1.  LA grade C erosive esophagitis.  2.  GE junction stricture progressively dilated 12 to 14.5 mm balloon.  3.  Moderate-sized hiatal hernia.  4.  Changes suggestive of chronic gastric.    Recommendations:  Follow with biopsy result.  Continue with the PPI twice a day.  Add Reglan 10 mg 3 times a day.  Add sucralfate 1 g 3 times a day for bile induced gastritis and significant reflux as well due to bile.  Repeat EGD with further dilation 10 to 12 weeks  Follow up with biopsy report      Rachel Easley MD     Date: 1/25/2024    Time: 13:50 EST      
(2) assistive person

## 2024-02-16 NOTE — PATIENT PROFILE ADULT - FUNCTIONAL ASSESSMENT - BASIC MOBILITY 5.
Pt is completing a home sleep test. Pt was instructed on how to put on the Noxturnal T3 device and associated equipment before going to bed and given the opportunity to practice putting it on before leaving the sleep center. Pt was reminded to bring the home sleep test kit back to the center tomorrow, at agreed upon time for download and reporting.   Neck circumference: 46 CM / 18 inches.  Angélica Erwin CMA on 2/14/2024 at 9:58 AM      
2/16/2024  
1 = Total assistance

## 2024-03-11 NOTE — PROGRESS NOTE ADULT - ASSESSMENT
1 89female was sent to ER by her cardiologist for a low hg level 7.1. Patient states for the past 2 weeks she has not been feeling well, generalized fatigue, shortness of breath with exertion, sleeping more.	In ER patient was found to be anemic, received prbc transfusion this admission.  Patient is being admitted for further work up and treatment , GI called for fobt positive stool,  anemia and for gi w/u, scheduled for gi w/u on 6/8 afternoon.  hematology was consulted for anemia, iron profile was suggestive of iron deficiency and patient received iv venofer----6/6/18---6/8/18  patient underwent egd/colonoscopy by gi on 6/8--per gi , hepatic flexure mass/gastric lesion, requested ct scan by gi/cr surgery, patient was seen by colorectal surgery dr paez and is for possible surgery

## 2024-04-12 NOTE — H&P ADULT - ASSESSMENT
Nohemy Hoyt is a 89 YO Female with past medical history of of Coronary Artery Disease s/p CABG, A fib on Xarelto, Hypertension, Hyperlipidemia, cardiac dysrrhythmia s/p AICD, rectal mass s/p resection, staghorn calculus presenting with failure to thrive.   Patient unable to eat or drink for last 3 days.

## 2024-04-12 NOTE — ED PROVIDER NOTE - CARE PLAN
Principal Discharge DX:	Acute CVA (cerebrovascular accident)  Secondary Diagnosis:	Hypernatremia  Secondary Diagnosis:	Acute UTI   1

## 2024-04-12 NOTE — PATIENT PROFILE ADULT - FUNCTIONAL ASSESSMENT - BASIC MOBILITY 6.
1-calculated by average/Not able to assess (calculate score using [facility] averaging method)

## 2024-04-12 NOTE — ED PROVIDER NOTE - CLINICAL SUMMARY MEDICAL DECISION MAKING FREE TEXT BOX
89 y/o F PMH of CAD s/p CABG, Afib on Xarelto, HTN, HLD, s/p AICD, rectal mass s/p resection, staghorn calculus presenting with Ftt.   r/o infectious precipitant   r/o CVA  Check screening labs, CXR, UA/UCx  CTH  Admit for Ftt  c/w D5W infusion 89 y/o F PMH of CAD s/p CABG, Afib on Xarelto, HTN, HLD, s/p AICD, rectal mass s/p resection, staghorn calculus presenting with Ftt.  Notably not moving L side- unknown chronicity or LKW   r/o infectious precipitant, such as UTI  r/o CVA  Check screening labs, CXR, UA/UCx  CTH  c/w D5W infusion    Ultimately found to have acute CVA and UTI. Covered with CTX. d/w Neurology admitted for further evaluation

## 2024-04-12 NOTE — ED ADULT NURSE NOTE - NSFALLHARMRISKINTERV_ED_ALL_ED
Assistance OOB with selected safe patient handling equipment if applicable/Assistance with ambulation/Communicate risk of Fall with Harm to all staff, patient, and family/Monitor gait and stability/Monitor for mental status changes and reorient to person, place, and time, as needed/Provide visual cue: red socks, yellow wristband, yellow gown, etc/Reinforce activity limits and safety measures with patient and family/Toileting schedule using arm’s reach rule for commode and bathroom/Use of alarms - bed, stretcher, chair and/or video monitoring/Bed in lowest position, wheels locked, appropriate side rails in place/Call bell, personal items and telephone in reach/Instruct patient to call for assistance before getting out of bed/chair/stretcher/Non-slip footwear applied when patient is off stretcher/[place] to call system/Physically safe environment - no spills, clutter or unnecessary equipment/Purposeful Proactive Rounding/Room/bathroom lighting operational, light cord in reach

## 2024-04-12 NOTE — ED PROVIDER NOTE - OBJECTIVE STATEMENT
89 y/o F PMH of CAD s/p CABG, Afib on Xarelto, HTN, HLD, s/p AICD, rectal mass s/p resection, staghorn calculus presenting with Ftt.     Patient is resident of [facility]. Provides limited history AAOx2. As per accompanying documentation, patient with poor PO intake and sodium of 150. Started on D5W. Of note, patient not moving her L side, unknown chronicity.    PCP: Dr. Eran Escboar. 89 y/o F PMH of CAD s/p CABG, Afib on Xarelto, HTN, HLD, s/p AICD, rectal mass s/p resection, staghorn calculus presenting with Ftt.     Patient is resident of [facility]. Provides limited history AAOx2. As per accompanying documentation, patient with poor PO intake and sodium of 150. Started on D5W. Of note, during examination patient not moving her L side, unknown chronicity.    PCP: Dr. Eran Escobar.

## 2024-04-12 NOTE — PATIENT PROFILE ADULT - FALL HARM RISK - HARM RISK INTERVENTIONS
Assistance with ambulation/Assistance OOB with selected safe patient handling equipment/Communicate Risk of Fall with Harm to all staff/Discuss with provider need for PT consult/Monitor gait and stability/Provide patient with walking aids - walker, cane, crutches/Reinforce activity limits and safety measures with patient and family/Tailored Fall Risk Interventions/Visual Cue: Yellow wristband and red socks/Bed in lowest position, wheels locked, appropriate side rails in place/Call bell, personal items and telephone in reach/Instruct patient to call for assistance before getting out of bed or chair/Non-slip footwear when patient is out of bed/Physically safe environment - no spills, clutter or unnecessary equipment/Purposeful Proactive Rounding/Room/bathroom lighting operational, light cord in reach/Unable to comprehend

## 2024-04-12 NOTE — ED PROVIDER NOTE - PHYSICAL EXAMINATION
GENERAL: lethargic   HEAD:  Atraumatic, Normocephalic  EYES: EOMI, PERRLA, conjunctiva and sclera clear  ENT: MMM; oropharynx clear  NECK: Supple, No JVD  CHEST/LUNG: Clear to auscultation bilaterally; No wheeze  HEART: Regular rate and rhythm; No murmurs, rubs, or gallops  ABDOMEN: Soft, Nontender, Nondistended; Bowel sounds present  EXTREMITIES:  2+ Peripheral Pulses, No clubbing, cyanosis, or edema  PSYCH: AAOx3  NEUROLOGY: not moving L side   SKIN: No rashes or lesions GENERAL: lethargic   HEAD:  Atraumatic, Normocephalic  EYES: EOMI, PERRLA, R gaze deviation able to overcome   ENT: MMM; oropharynx clear  NECK: Supple, No JVD  CHEST/LUNG: Clear to auscultation bilaterally; No wheeze  HEART: Regular rate and rhythm; No murmurs, rubs, or gallops  ABDOMEN: Soft, Nontender, Nondistended; Bowel sounds present  EXTREMITIES:  2+ Peripheral Pulses, No clubbing, cyanosis, or edema  PSYCH: AAOx3  NEUROLOGY: not moving L side   SKIN: No rashes or lesions GENERAL: lethargic   HEAD:  Atraumatic, Normocephalic  EYES: EOMI, PERRLA, R gaze deviation able to overcome   ENT: MMM; oropharynx clear  NECK: Supple, No JVD  CHEST/LUNG: Clear to auscultation bilaterally; No wheeze  HEART: Regular rate and rhythm; No murmurs, rubs, or gallops  ABDOMEN: Soft, Nontender, Nondistended; Bowel sounds present  EXTREMITIES:  2+ Peripheral Pulses, No clubbing, cyanosis, or edema  PSYCH: AAOx2 to self and place.  NEUROLOGY: not moving L side   SKIN: No rashes or lesions

## 2024-04-12 NOTE — H&P ADULT - NSHPLABSRESULTS_GEN_ALL_CORE
10.8   9.65  )-----------( 197      ( 2024 15:15 )             35.6     2024 15:15    146    |  113    |  31     ----------------------------<  113    4.1     |  25     |  1.10     Ca    9.0        2024 15:15  Phos  2.3       2024 15:15  Mg     2.2       2024 15:15    TPro  7.7    /  Alb  3.1    /  TBili  1.4    /  DBili  x      /  AST  33     /  ALT  10     /  AlkPhos  83     2024 15:15    LIVER FUNCTIONS - ( 2024 15:15 )  Alb: 3.1 g/dL / Pro: 7.7 g/dL / ALK PHOS: 83 U/L / ALT: 10 U/L / AST: 33 U/L / GGT: x           PT/INR - ( 2024 15:15 )   PT: 12.1 sec;   INR: 1.04 ratio       PTT - ( 2024 15:15 )  PTT:20.8 sec  CAPILLARY BLOOD GLUCOSE    Urinalysis Basic - ( 2024 15:15 )    Color: Yellow / Appearance: Turbid / S.016 / pH: x  Gluc: 113 mg/dL / Ketone: Negative mg/dL  / Bili: Negative / Urobili: 1.0 mg/dL   Blood: x / Protein: 100 mg/dL / Nitrite: Positive   Leuk Esterase: Large / RBC: 7 /HPF / WBC Too Numerous to count /HPF   Sq Epi: x / Non Sq Epi: x / Bacteria: Many /HPF    < from: CT Head No Cont (24 @ 16:02) >    IMPRESSION:    1.  No evidence of acute intracranial hemorrhage or midline shift.  2.  Large territory of decreased discrimination of the gray-white matter   in the right parietal lobe and right insula, highly concerning for acute   infarction. Recommend MRI of the brain for further evaluation.  3.  Chronic ischemic changes as discussed above.    Multiple attempts made to contact the ordering provider.    < end of copied text >    < from: CT Angio Neck w/ IV Cont (24 @ 18:48) >    IMPRESSION:    CT PERFUSION demonstrated: No asymmetric or territorial perfusion   abnormality.    CTA COW:  Patent intracranial circulation without flow limiting stenosis   or large vessel occlusion.    CTA NECK: Large atherosclerotic plaque at the distal right common carotid   artery and carotid bifurcation contributes to severe stenosis at the   right internal carotid artery origin and right external carotid artery   origin.  Bilateral vertebral arteries are patent without flow limiting stenosis.      < end of copied text >

## 2024-04-12 NOTE — ED PROVIDER NOTE - PROGRESS NOTE DETAILS
d/w Dr. Mcclendon- agreeable with current plan. d/w case with Dr. Jefferson pending results of CTA Discussed with Dr. Jefferson regarding CTA results.  Accepts  admission to remote telemetry.

## 2024-04-12 NOTE — ED ADULT NURSE NOTE - OBJECTIVE STATEMENT
Pt presents to the ED sent in by facility for dec PO intake for last few days. Pt is a&ox2, lethargic. responsive to painful stimuli and to verbal. Pt has no complaints at this time. Left hand noted to be swollen. Pt has increased weakness on L side. IV established in left arm with a 20G, labs drawn and sent, call bell in reach, warm blanket provided, bed in lowest position, side rails up x2, MD evaluation in progress. Pt placed in gown, diaper changed, skin intact, straight cathed for urine.

## 2024-04-12 NOTE — H&P ADULT - NEUROLOGICAL
details… sensation intact/responds to pain/responds to verbal commands/deep reflexes intact/cranial nerves intact

## 2024-04-12 NOTE — H&P ADULT - PROBLEM SELECTOR PLAN 8
folic acid 1 milliGRAM(s) Oral daily  multivitamin 1 Tablet(s) Oral daily  potassium phosphate / sodium phosphate Powder (PHOS-NaK) 1 Packet(s) Oral three times a day  acetaminophen     Tablet .. 650 milliGRAM(s) Oral every 6 hours PRN Temp greater or equal to 38C (100.4F)

## 2024-04-12 NOTE — ED PROVIDER NOTE - OTHER FINDINGS
Recommend Bilateral upper lid blepharoplasty. Medicare(discussed risks and benefits of sx. .. ). artifact

## 2024-04-12 NOTE — H&P ADULT - PROBLEM SELECTOR PLAN 10
rivaroxaban 15 kathleen GRAM(s) Oral with dinner  metoprolol tartrate 25 kathleen GRAM(s) Oral two times a day  diltiazem    kathleen GRAM(s) Oral daily

## 2024-04-12 NOTE — H&P ADULT - HISTORY OF PRESENT ILLNESS
· Chief Complaint: The patient is a 89y Female complaining of see chief complaint quote.  · HPI Objective Statement: 89 y/o F PMH of CAD s/p CABG, Afib on Xarelto, HTN, HLD, s/p AICD, rectal mass s/p resection, staghorn calculus presenting with Ftt.     	Patient is resident of [facility]. Provides limited history AAOx2. As per accompanying documentation, patient with poor PO intake and sodium of 150. Started on D5W. Of note, during examination patient not moving her L side, unknown chronicity.    	   Nohemy Hoyt is a 89 YO Female with past medical history of of Coronary Artery Disease s/p CABG, A fib on Xarelto, Hypertension, Hyperlipidemia, cardiac dysrrhythmia s/p AICD, rectal mass s/p resection, staghorn calculus presenting with failure to thrive.   Patient unable to eat or drink for last 3 days.  Her serum sodium was 150. and she was started on D5W.

## 2024-04-12 NOTE — H&P ADULT - PROBLEM SELECTOR PLAN 4
metoprolol tartrate 25 kathleen GRAM(s) Oral two times a day  diltiazem    akthleen GRAM(s) Oral daily

## 2024-04-12 NOTE — ED ADULT TRIAGE NOTE - CHIEF COMPLAINT QUOTE
pt BIBA from SNF failure to thrive hasn't eaten or drank in 3 days- started on D5 IV fluids at facility

## 2024-04-13 NOTE — CHART NOTE - NSCHARTNOTEFT_GEN_A_CORE
Called by RN for patient failing bedside dysphagia screen. Will make NPO pending speech & swallow eval. Called by RN for patient failing bedside dysphagia screen. Will make NPO pending speech & swallow eval.    ADDENDUM 4:30AM  - Called by RN as patient unable to tolerate swallowing water with pills. Will switch possible meds from PO to IV

## 2024-04-13 NOTE — CONSULT NOTE ADULT - SUBJECTIVE AND OBJECTIVE BOX
[facility]  INFECTIOUS DISEASES   [address] [place] [address]  Tel: [phone]     Fax: [phone]  ========================================================  MD Krishna Rabago Kaushal, MD Cho, Michelle, MD Sunjit, Jaspal, MD  ========================================================    MRN-[id number]  NOHEMY ABREU     CC: Patient is a 89y old  Female who presents with a chief complaint of Failure to thrive and left side weakness noted by ED physician. (12 Apr 2024 19:37)    HPI:  Nohemy Abreu is a 89 YO Female with past medical history of of Coronary Artery Disease s/p CABG, A fib on Xarelto, Hypertension, Hyperlipidemia, cardiac dysrrhythmia s/p AICD, rectal mass s/p resection, staghorn calculus presenting with failure to thrive.   Patient unable to eat or drink for last 3 days.    PAST MEDICAL & SURGICAL HISTORY:  CAD (coronary artery disease)  S/P CABG 1999  Hypertension  Dyslipidemia  Obesity  Hypothyroidism  Gastroesophageal reflux disease without esophagitis  Hyperlipidemia  Atrial fibrillation, unspecified  Gastritis  Gastrointestinal hemorrhage, unspecified gastritis, unspecified gastrointestinal hemorrhage type  Constipation  Anemia  Xerophthalmia  H/O aortic valve replacement  S/P CABG (coronary artery bypass graft)  in 1999  Rectal mass  removal in 6/2015  H/O abdominal hysterectomy  History of colon polyps  removal 2014    Social Hx: No current smoking, EtOH or drugs     FAMILY HISTORY:  No pertinent family history in first degree relatives    Allergies  amoxicillin (Unknown)    MEDICATIONS  (STANDING):  cefTRIAXone   IVPB 1000 milliGRAM(s) IV Intermittent every 24 hours  dextrose 5%. 1000 milliLiter(s) (50 mL/Hr) IV Continuous <Continuous>  levothyroxine Injectable 88 MICROGram(s) IV Push at bedtime  metoprolol tartrate Injectable 5 milliGRAM(s) IV Push every 6 hours  pantoprazole  Injectable 40 milliGRAM(s) IV Push daily  potassium phosphate / sodium phosphate Powder (PHOS-NaK) 1 Packet(s) Oral three times a day  rivaroxaban 15 milliGRAM(s) Oral with dinner    MEDICATIONS  (PRN):  acetaminophen     Tablet .. 650 milliGRAM(s) Oral every 6 hours PRN Temp greater or equal to 38C (100.4F)     REVIEW OF SYSTEMS:  Not answering    Physical Exam:  Vital Signs Last 24 Hrs  T(C): 36.9 (13 Apr 2024 12:50), Max: 36.9 (12 Apr 2024 23:29)  T(F): 98.4 (13 Apr 2024 12:50), Max: 98.5 (12 Apr 2024 23:29)  HR: 60 (13 Apr 2024 12:50) (60 - 72)  BP: 139/68 (13 Apr 2024 12:50) (139/68 - 177/76)  BP(mean): 110 (12 Apr 2024 23:29) (110 - 110)  RR: 18 (13 Apr 2024 12:50) (18 - 18)  SpO2: 92% (13 Apr 2024 12:50) (92% - 97%)  Parameters below as of 13 Apr 2024 12:50  Patient On (Oxygen Delivery Method): room air  Weight (kg): 44.5 (04-12 @ 14:02)  GEN: NAD  HEENT: normocephalic and atraumatic. EOMI. PERRL.    NECK: Supple.  No lymphadenopathy   LUNGS: Clear to auscultation.  HEART: Regular rate and rhythm   ABDOMEN: Soft, nontender, and nondistended.  Positive bowel sounds.    : No CVA tenderness  EXTREMITIES: Without edema.  PSYCHIATRIC: Awake, not communicating   SKIN: No rash     Labs:  04-13    143  |  113<H>  |  23  ----------------------------<  99  3.5   |  24  |  0.87    Ca    8.2<L>      13 Apr 2024 07:15  Phos  2.1     04-13  Mg     2.2     04-12    TPro  7.7  /  Alb  3.1<L>  /  TBili  1.4<H>  /  DBili  x   /  AST  33  /  ALT  10<L>  /  AlkPhos  83  04-12                        10.0   8.86  )-----------( 141      ( 13 Apr 2024 07:15 )             32.6     PT/INR - ( 12 Apr 2024 15:15 )   PT: 12.1 sec;   INR: 1.04 ratio    PTT - ( 12 Apr 2024 15:15 )  PTT:20.8 sec  Urinalysis Basic - ( 13 Apr 2024 07:15 )    Color: x / Appearance: x / SG: x / pH: x  Gluc: 99 mg/dL / Ketone: x  / Bili: x / Urobili: x   Blood: x / Protein: x / Nitrite: x   Leuk Esterase: x / RBC: x / WBC x   Sq Epi: x / Non Sq Epi: x / Bacteria: x    LIVER FUNCTIONS - ( 12 Apr 2024 15:15 )  Alb: 3.1 g/dL / Pro: 7.7 g/dL / ALK PHOS: 83 U/L / ALT: 10 U/L / AST: 33 U/L / GGT: x           All imaging and other data have been reviewed.  < from: Xray Chest 1 View- PORTABLE-Urgent (Xray Chest 1 View- PORTABLE-Urgent .) (04.12.24 @ 14:25) >  IMPRESSION: No acute finding or change.    < from: CT Angio Head w/ IV Cont (04.12.24 @ 18:47) >  IMPRESSION:  CT PERFUSION demonstrated: No asymmetric or territorial perfusion   abnormality.  CTA COW:  Patent intracranial circulation without flow limiting stenosis   or large vessel occlusion.  CTA NECK: Large atherosclerotic plaque at the distal right common carotid   artery and carotid bifurcation contributes to severe stenosis at the   right internal carotid artery origin and right external carotid artery   origin.  Bilateral vertebral arteries are patent without flow limiting stenosis.      Assessment and Plan:   89 YO Female with past medical history of of Coronary Artery Disease s/p CABG, A fib on Xarelto, Hypertension, Hyperlipidemia, cardiac dysrrhythmia s/p AICD, rectal mass s/p resection, staghorn calculus presenting with failure to thrive.     Patient unable to eat or drink for last 3 days.  UA showed high WBC and a positive nitrate.     # UTI  # Failure to thrive    - Will follow cultures   - Agree with ceftriaxone 1gm daily   - Based on culture results will modify Abx regimen   - Will monitor Tmx, WBC, Creat  - GOC, palliative care consult     Thank you for courtesy of this consult.     Will follow.  Discussed with the primary team.     Monserrat Barlow MD  Division of Infectious Diseases   Please call ID service at [phone] with any question.    75 minutes spent on total encounter assessing patient, examination, chart review, counseling and coordinating care by the attending physician/nurse/care manager.

## 2024-04-13 NOTE — PHYSICAL THERAPY INITIAL EVALUATION ADULT - PERTINENT HX OF CURRENT PROBLEM, REHAB EVAL
Nohemy Hoyt is a 89 YO Female with past medical history of of Coronary Artery Disease s/p CABG, A fib on Xarelto, Hypertension, Hyperlipidemia, cardiac dysrrhythmia s/p AICD, rectal mass s/p resection, staghorn calculus presenting with failure to thrive.   Patient unable to eat or drink for last 3 days.

## 2024-04-13 NOTE — PHYSICAL THERAPY INITIAL EVALUATION ADULT - LEVEL OF INDEPENDENCE: SUPINE/SIT, REHAB EVAL
[FreeTextEntry1] : Lyme disease\par Chronic headache and back pain [de-identified] :  reports increased headache and lower back pain, recurrence of previous episode unable to perform

## 2024-04-13 NOTE — DIETITIAN INITIAL EVALUATION ADULT - ORAL INTAKE PTA/DIET HISTORY
Pt not able to be interviewed, transfer documents reviewed.  Pt was made NPO on 4/9 PTA so actual diet hx not known. Pt noted to be on Ensure Clear tid however. Wt hx 8/2022 125.6#.  Transfer paper document wt 98#.

## 2024-04-13 NOTE — DIETITIAN INITIAL EVALUATION ADULT - PERTINENT MEDS FT
MEDICATIONS  (STANDING):  cefTRIAXone   IVPB 1000 milliGRAM(s) IV Intermittent every 24 hours  dextrose 5%. 1000 milliLiter(s) (50 mL/Hr) IV Continuous <Continuous>  levothyroxine Injectable 88 MICROGram(s) IV Push at bedtime  metoprolol tartrate Injectable 5 milliGRAM(s) IV Push every 6 hours  pantoprazole  Injectable 40 milliGRAM(s) IV Push daily  potassium phosphate / sodium phosphate Powder (PHOS-NaK) 1 Packet(s) Oral three times a day  rivaroxaban 15 milliGRAM(s) Oral with dinner    MEDICATIONS  (PRN):  acetaminophen     Tablet .. 650 milliGRAM(s) Oral every 6 hours PRN Temp greater or equal to 38C (100.4F)

## 2024-04-13 NOTE — DIETITIAN INITIAL EVALUATION ADULT - PERTINENT LABORATORY DATA
04-13    143  |  113<H>  |  23  ----------------------------<  99  3.5   |  24  |  0.87    Ca    8.2<L>      13 Apr 2024 07:15  Phos  2.1     04-13  Mg     2.2     04-12    TPro  7.7  /  Alb  3.1<L>  /  TBili  1.4<H>  /  DBili  x   /  AST  33  /  ALT  10<L>  /  AlkPhos  83  04-12  A1C with Estimated Average Glucose Result: 5.2 % (04-12-24 @ 15:15)   English

## 2024-04-13 NOTE — DIETITIAN INITIAL EVALUATION ADULT - ADD RECOMMEND
1. Await swallow eval for possibility of diet advancement.   2. Monitor wt, labs, bowel function, diet slade if advanced.

## 2024-04-13 NOTE — DIETITIAN INITIAL EVALUATION ADULT - PROBLEM SELECTOR PLAN 4
metoprolol tartrate 25 kathleen GRAM(s) Oral two times a day  diltiazem    kathleen GRAM(s) Oral daily

## 2024-04-13 NOTE — DIETITIAN INITIAL EVALUATION ADULT - NS FNS REASON FOR WEIGHT CHANG
Wt ~1.5 yr ago 125.6# current wt 96# indicating a ~25# wt change over past 1.5 years. (20%)  Mild temporal wasting observed.  Pt likely with malnutrition dx however wt loss exceeds time frame criteria. Will follow.

## 2024-04-13 NOTE — DIETITIAN INITIAL EVALUATION ADULT - OTHER INFO
"89 YO Female with past medical history of of Coronary Artery Disease s/p CABG, A fib on Xarelto, Hypertension, Hyperlipidemia, cardiac dysrrhythmia s/p AICD, rectal mass s/p resection, staghorn calculus presenting with failure to thrive.   Patient unable to eat or drink for last 3 days."  Failed bedside swallow screen.  SLP eval ordered.

## 2024-04-13 NOTE — DIETITIAN INITIAL EVALUATION ADULT - ETIOLOGY
related to motor causes post CVA, hx dysphagia diet related to presumed inadequate oral intake in the setting of advanced age

## 2024-04-14 NOTE — OCCUPATIONAL THERAPY INITIAL EVALUATION ADULT - DIAGNOSIS, OT EVAL
Pt with impaired cognition, increased flexor tone in b/l UE, and generalized weakness impacting ability to complete ADLs, IADLs, functional mobility/transfers

## 2024-04-14 NOTE — OCCUPATIONAL THERAPY INITIAL EVALUATION ADULT - LEVEL OF INDEPENDENCE: EATING, OT EVAL
- Prior iron studies cw iron def Anemia plus AOCD   - Monitor H/H and transfuse to keep Hgb > 7  - c/w PO iron and vit c dependent (less than 25% patients effort)

## 2024-04-14 NOTE — OCCUPATIONAL THERAPY INITIAL EVALUATION ADULT - ADDITIONAL COMMENTS
Pt with impaired cognition, hypophonic, and not following commands. Unable to obtain social hx and pt's PLOF at this time.

## 2024-04-14 NOTE — OCCUPATIONAL THERAPY INITIAL EVALUATION ADULT - PERTINENT HX OF CURRENT PROBLEM, REHAB EVAL
As per H&P: "89 YO Female with past medical history of of Coronary Artery Disease s/p CABG, A fib on Xarelto, Hypertension, Hyperlipidemia, cardiac dysrrhythmia s/p AICD, rectal mass s/p resection, staghorn calculus presenting with failure to thrive.   Patient unable to eat or drink for last 3 days."   Admit dx: Cerebral infarction   X-Ray R shoulder: "Negative impression for fx, Senile demineralization. Mild degenerative change acromioclavicular joint. Calcific tendinitis"  CT Head: "No evidence of acute intracranial hemorrhage or midline shift. Large territory of decreased discrimination of the gray-white matter in the right parietal lobe and right insula, highly concerning for acute infarction. Chronic ischemic changes"

## 2024-04-14 NOTE — PHARMACOTHERAPY INTERVENTION NOTE - COMMENTS
Patient is a 89 yo female presenting with FTT, failed bedside dysphagia screening and unable to tolerate PO medications. Patient currently ordered for Xarelto 15 mg w/ dinner for past medical history of afib. Discussed with Attending Dr. Jefferson and recommended alternative anticoagulation with Lovenox or heparin as patient cannot take PO medications. MD made aware, will review per clinical judgement.
Medication reconciliation completed on admission with medication list from [facility] [place]. Updated medication list in OMR/prescription writer, discussed with Dr. Jefferson.    Home Medications:  allopurinol 300 mg oral tablet: 1 tab(s) orally once a day (13 Apr 2024 10:50)  atorvastatin 20 mg oral tablet: 1 tab(s) orally once a day (at bedtime) (13 Apr 2024 10:48)  diltiazem 240 mg/24 hours oral capsule, extended release: 1 cap(s) orally once a day. Hold if SBP less than 110 or HR less than 60 (13 Apr 2024 10:51)  famotidine 20 mg oral tablet: 1 tab(s) orally once a day (at bedtime) (13 Apr 2024 10:52)  folic acid 1 mg oral tablet: 1 tab(s) orally once a day (13 Apr 2024 10:51)  levothyroxine 150 mcg (0.15 mg) oral tablet: 1 tab(s) orally once a day (13 Apr 2024 10:52)  Metoprolol Tartrate 25 mg oral tablet: 1 tab(s) orally 2 times a day. Hold if SBP less than 110 or HR less than 60 (13 Apr 2024 10:51)  Multiple Vitamins oral liquid: 5 milliliter(s) orally once a day (13 Apr 2024 10:52)  rivaroxaban 15 mg oral tablet: 1 tab(s) orally every 24 hours (13 Apr 2024 10:47)
89 yo female presenting with failure to thrive. Patient failed bedside dysphagia screening and unable to tolerate swallowing water with pills. On home Xarelto 15 mg daily with dinner; currently ordered for Lovenox 45 mg q12h. Discussed with Dr. Jefferson - patient's CrCl is ~24 and charted bed weight is 40.6 kg. Recommended adjusting order to 40 mg q24h based on patient's renal status. Order adjusted per discussion.

## 2024-04-15 NOTE — CARE COORDINATION ASSESSMENT. - NSPASTMEDSURGHISTORY_GEN_ALL_CORE_FT
PAST MEDICAL & SURGICAL HISTORY:  Hypothyroidism      Obesity      Dyslipidemia      Hypertension      CAD (coronary artery disease)  S/P CABG 1999      S/P CABG (coronary artery bypass graft)  in 1999      Gastrointestinal hemorrhage, unspecified gastritis, unspecified gastrointestinal hemorrhage type      Gastritis      Atrial fibrillation, unspecified      Hyperlipidemia      Gastroesophageal reflux disease without esophagitis      History of colon polyps  removal 2014      H/O abdominal hysterectomy      Rectal mass  removal in 6/2015      Xerophthalmia      Anemia      Constipation      H/O aortic valve replacement

## 2024-04-15 NOTE — SWALLOW BEDSIDE ASSESSMENT ADULT - SWALLOW EVAL: RECOMMENDED DIET
1- will defer PO diet to MD at this time given pt refusal.  2- consider ST alternative means of nutrition/hydration as pt is at an increased nutritional risk. 3- consider ongoing GOC discussion with pt, family and medical team re: nutritional goals of care.

## 2024-04-15 NOTE — SWALLOW BEDSIDE ASSESSMENT ADULT - ADDITIONAL RECOMMENDATIONS
This dept to f/u for clinical reassessment x1 to assess PO candidacy. Should pt continue to refuse, MD is advised to consider ongoing GOC with pt and family re: nutritional goals of care. MD further advised to reconsult this service should change in status and/or willingness to participate arise.

## 2024-04-15 NOTE — CARE COORDINATION ASSESSMENT. - NSDCPLANSERVICES_GEN_ALL_CORE
Anticipate return to [facility] when stable for d/c./Same as Prior Admission/Skilled Nursing Facility-Short Term

## 2024-04-15 NOTE — CARE COORDINATION ASSESSMENT. - OTHER PERTINENT DISCHARGE PLANNING INFORMATION:
Discussed today at rounds. Patient from [facility]. Spoke with admissions there and confirmed she is a long term resident. They faxed me Health Care Proxy (HCP)which indicates son Nicolás as primary and daughter Emili as back up. HCP placed on chart. Family states she has resided at [facility] for 3 years. states she is mainly chair bound there but staff does get her up with assistance to walk her. Educated them re. role of social work and provided d/c planning folder including social work name and #. Daughter resides in [place] and here visiting, son is local. Social work to follow and assist as needed.

## 2024-04-15 NOTE — CARE COORDINATION ASSESSMENT. - NSCAREPROVIDERS_GEN_ALL_CORE_FT
CARE PROVIDERS:  Accepting Physician: Clement Jefferson  Administration: Dakota Arteaga  Administration: Joselo Caisano  Admitting: Clement Jefferson  Attending: Clement Jefferson  Case Management: Liana Neely  Consultant: Monserrat Barlow  Consultant: Tien Mcclendon  Covering Team: Ursula Britton  Covering Team: hSira Euceda  ED Attending: Dakota Nassar  ED Nurse: Maureen Galdamez  Nurse: Paloma Jean Baptiste  Nurse: Sena Romero  Nurse: Emili Ndiaye  Nurse: Bobbi Gregory  Ordered: ADM, User  Outpatient Provider: Eran Baldwin  Outpatient Provider: Alonso Craig  Outpatient Provider: Willy Bess  Outpatient Provider: Sathya Machado  Override: Kelley Bean  Override: Paloma Singh  Override: Octavia Steven  PCA/Nursing Assistant: Cynthia Nielson  Physical Therapy: Baylee Waters  Primary Team: Clement Jefferson  Primary Team: Brigette Smyth  : Darcie Reyes  : Breanna Major  Speech Pathology: Azalia Tse  Team: [facility], Team

## 2024-04-15 NOTE — CAREGIVER ENGAGEMENT NOTE - CAREGIVER OUTREACH NOTES - FREE TEXT
Met with son Nicolás and Daughter Emili [phone], both educated re. role of social work in assisting with d/c plans.

## 2024-04-17 NOTE — PROGRESS NOTE ADULT - PROBLEM SELECTOR PLAN 1
dextrose 5%. 1000 milliliters(s) (50 mL/Hr) IV Continuous <Continuous>  palliative eval
dextrose 5%. 1000 milliliters(s) (50 mL/Hr) IV Continuous <Continuous>  palliative eval  DNR / DNI appropriate
dextrose 5%. 1000 milliliters(s) (50 mL/Hr) IV Continuous <Continuous>  palliative eval

## 2024-04-17 NOTE — SWALLOW BEDSIDE ASSESSMENT ADULT - ORAL PHASE
Unable to assess due to no swallow elicited, bolus' removed from oral cavity via toothette. suspect posterior loss

## 2024-04-17 NOTE — DISCHARGE NOTE PROVIDER - NSDCCPCAREPLAN_GEN_ALL_CORE_FT
PRINCIPAL DISCHARGE DIAGNOSIS  Diagnosis: AMS (altered mental status)  Assessment and Plan of Treatment: follow up with PMD Dr. Baldwin        SECONDARY DISCHARGE DIAGNOSES  Diagnosis: Hypernatremia  Assessment and Plan of Treatment:     Diagnosis: Acute UTI  Assessment and Plan of Treatment:

## 2024-04-17 NOTE — SWALLOW BEDSIDE ASSESSMENT ADULT - ORAL PREPARATORY PHASE
+oral holding with puree and moderately thick liquids, pt without swallow elicitation despite max modality cues, bolus' ultimately removed from oral cavity with toothette./Reduced oral grading +Oral holding with eventual swallow elicitation with thin liquids

## 2024-04-17 NOTE — SOCIAL WORK PROGRESS NOTE - NSSWPROGRESSNOTE_GEN_ALL_CORE
Pt discussed during rounds; pt failed swallow eval. Palliative consulted. SW will continue to follow.

## 2024-04-17 NOTE — SWALLOW BEDSIDE ASSESSMENT ADULT - PHARYNGEAL PHASE
Unable to assess due to no swallow elicited, bolus' removed from oral cavity via toothette Cough post oral intake

## 2024-04-17 NOTE — GOALS OF CARE CONVERSATION - ADVANCED CARE PLANNING - CONVERSATION DETAILS
Palliative  met with patient's children, Nicolás and Emili,  Reviewed patient's medical and social history as well as events leading to patient's hospitalization. Writer discussed patient's current diagnosis (left sided weakness, FTT, Traumatic ecchymosis of chest, Acute UTI; HX of CAD s/p CABG, Afib, HTN, HLD, s/p AICD, rectal mass s/p resection), dysphagia with recommendation of NPO. medical condition and management. Patient has a HCP on chart with her son Nicolás as primary health care agent and daughter Emili as alternate agent. Inquired about patient's wishes regarding extent of medical care to be provided including insertion of PEG for PINA. Both agree that pt would not want any artificial nutrition. Explained to both that comfort measures only would be appropriate at this time. Explained in detail that pt would not be returning to the hospital , would not receive IV fluids and would only receive medical treatments aimed at comfort . All questions answered and both agree to CMO at [facility].
Palliative care SW met with patient's children, Nicolás and Emili, to discuss advanced care planning. Reviewed patient's medical and social history as well as events leading to patient's hospitalization. Writer discussed patient's current diagnosis (left sided weakness, FTT, Traumatic ecchymosis of chest, Acute UTI; HX of CAD s/p CABG, Afib, HTN, HLD, s/p AICD, rectal mass s/p resection), medical condition and management. Patient has a HCP on chart with her son Nicolás as primary health care agent and daughter Emili as alternate agent. Inquired about patient's wishes regarding extent of medical care to be provided including t thoughts regarding cardiopulmonary resuscitation and mechanical ventilation/intubation. Family showed insight into patient's medical condition. They states that given patient's current medical issues and advanced age, they would not want CPR or mechanical ventilation/intubation. They agree to DNR/DNI. Reviewed MOLST form. MOLST completed and placed on chart.  Family states that patient has not been doing well overall for some time. Discussed with their goals for patient and at this time they want to see if patient is able to improve before making any further decisions. Reviewed comfort care/hospice at LTC facility. Patient has been a LTC resident at [facility] for several years. All questions answered. Psychosocial support provided.

## 2024-04-17 NOTE — DISCHARGE NOTE PROVIDER - NSDCMRMEDTOKEN_GEN_ALL_CORE_FT
allopurinol 300 mg oral tablet: 1 tab(s) orally once a day  atorvastatin 20 mg oral tablet: 1 tab(s) orally once a day (at bedtime)  diltiazem 240 mg/24 hours oral capsule, extended release: 1 cap(s) orally once a day. Hold if SBP less than 110 or HR less than 60  famotidine 20 mg oral tablet: 1 tab(s) orally once a day (at bedtime)  folic acid 1 mg oral tablet: 1 tab(s) orally once a day  levothyroxine 150 mcg (0.15 mg) oral tablet: 1 tab(s) orally once a day  Metoprolol Tartrate 25 mg oral tablet: 1 tab(s) orally 2 times a day. Hold if SBP less than 110 or HR less than 60  Multiple Vitamins oral liquid: 5 milliliter(s) orally once a day  rivaroxaban 15 mg oral tablet: 1 tab(s) orally every 24 hours

## 2024-04-17 NOTE — DISCHARGE NOTE PROVIDER - CARE PROVIDER_API CALL
Eran Baldwin  Internal Medicine  [address] [place] [id number]  Phone: [phone]  Fax: [phone]  Follow Up Time:

## 2024-04-17 NOTE — GOALS OF CARE CONVERSATION - ADVANCED CARE PLANNING - TREATMENT GUIDELINES
DNR/Comfort measures only/Do not re-hospitalize/No blood draws/No artificial nutrition/No IV fluids/DNI
DNR/DNI

## 2024-04-17 NOTE — SWALLOW BEDSIDE ASSESSMENT ADULT - SLP PERTINENT HISTORY OF CURRENT PROBLEM
Bedside swallow eval attempted 4/15/24 rx "1- will defer PO diet to MD at this time given pt refusal.  2- consider ST alternative means of nutrition/hydration as pt is at an increased nutritional risk. 3- consider ongoing GOC discussion with pt, family and medical team re: nutritional goals of care."  Per JOHNNY Manriquez pt holding puree in mouth with eventual expectoration on to blanket.

## 2024-04-17 NOTE — PROGRESS NOTE ADULT - PROBLEM SELECTOR PROBLEM 3
Traumatic ecchymosis of chest

## 2024-04-17 NOTE — PROGRESS NOTE ADULT - PROBLEM SELECTOR PLAN 2
CT head, CT Angio brain   neuro consult with Dr. freitas appreciated
CT head, CT Angio brain   neuro consult with Dr. freitas
CT head, CT Angio brain   neuro consult with Dr. freitas appreciated  CVA ruled out
CT head, CT Angio brain   neuro consult with Dr. freitas appreciated
CT head, CT Angio brain   neuro consult with Dr. 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

## 2024-04-17 NOTE — PROGRESS NOTE ADULT - PROBLEM SELECTOR PLAN 7
pantoprazole    Tablet 40 milliGRAM(s) Oral before breakfast

## 2024-04-17 NOTE — PROGRESS NOTE ADULT - PROBLEM SELECTOR PLAN 6
ceftriaxone   IVPB 1000 milliGRAM(s) IV Intermittent every 24 hours  ID eval with Dr. tere blake
ceftriaxone   IVPB 1000 milliGRAM(s) IV Intermittent every 24 hours
ceftriaxone   IVPB 1000 milliGRAM(s) IV Intermittent every 24 hours  ID eval with Dr. carranza
ceftriaxone   IVPB 1000 milliGRAM(s) IV Intermittent every 24 hours completed  ID eval with Dr. tere blake
ceftriaxone   IVPB 1000 milliGRAM(s) IV Intermittent every 24 hours  ID eval

## 2024-04-17 NOTE — PROGRESS NOTE ADULT - ASSESSMENT
Nohemy Hoyt is a 89 YO Female with past medical history of of Coronary Artery Disease s/p CABG, A fib on Xarelto, Hypertension, Hyperlipidemia, cardiac dysrrhythmia s/p AICD, rectal mass s/p resection, staghorn calculus presenting with failure to thrive.   Patient unable to eat or drink for last 3 days.            
Nohemy Hoyt is a 89 YO Female with past medical history of of Coronary Artery Disease s/p CABG, A fib on Xarelto, Hypertension, Hyperlipidemia, cardiac dysrrhythmia s/p AICD, rectal mass s/p resection, staghorn calculus presenting with failure to thrive.   Patient unable to eat or drink for last 3 days.            
Nohemy Hoyt is a 89 YO Female with past medical history of of Coronary Artery Disease s/p CABG, A fib on Xarelto, Hypertension, Hyperlipidemia, cardiac dysrrhythmia s/p AICD, rectal mass s/p resection, staghorn calculus presenting with failure to thrive.   Patient unable to eat or drink for last 3 days.            
Nohemy Hoyt is a 89 YO Female with past medical history of of Coronary Artery Disease s/p CABG, A fib on Xarelto, Hypertension, Hyperlipidemia, cardiac dysrrhythmia s/p AICD, rectal mass s/p resection, staghorn calculus presenting with failure to thrive.   Patient unable to eat or drink for last 3 days.            
Nohemy Hoyt is a 89 YO Female with past medical history of of Coronary Artery Disease s/p CABG, A fib on Xarelto, Hypertension, Hyperlipidemia, cardiac dysrrhythmia s/p AICD, rectal mass s/p resection, staghorn calculus presenting with failure to thrive.   Patient unable to eat or drink for last 3 days.

## 2024-04-17 NOTE — SWALLOW BEDSIDE ASSESSMENT ADULT - ASR SWALLOW RECOMMEND DIAG
MBS not rx'd at this time due to pt with no swallow elicited and removal of bolus' from oral cavity with puree and moderately thick liquids.  Will follow to reassess at bedside, as appropriate.
not indicated 2/2 pt refusal

## 2024-04-17 NOTE — SWALLOW BEDSIDE ASSESSMENT ADULT - SWALLOW EVAL: DIAGNOSIS
Moderate-severe oral dysphagia marked by reduced oral grading, +oral holding with puree and moderately thick liquids, pt without swallow elicitation despite max modality cues, bolus' ultimately removed from oral cavity with toothette.  +Oral holding with eventual swallow elicitation with thin liquids, suspect posterior loss.  Suspect pharyngeal dysphagia with thin liquids due to +cough immediately post swallow.  Unable to assess pharyngeal stage with puree or moderately thick liquids due to no swallow elicited.  Suspect reduced cognition further impacting swallow profile.  Rx NPO with short-term non oral means of nutrition/hydration as per pt/family wishes and consider GOC conversation with pt/family regarding nutrition/hydration.  MBS not rx'd at this time due to pt with no swallow elicited and removal of bolus' from oral cavity with puree and moderately thick liquids.
unable to assess oral-pharyngeal function at this time 2/2 pt refusal. pt presented with puree and thin liquid textures via cup and teaspoon presentations at which time pt consistently pushes trials away, shakes her head 'no' and remains with pursed lips disallowing for bolus collection. clinician encouragement unsuccessful in allowing for pt acceptance of PO trials.

## 2024-04-17 NOTE — PROGRESS NOTE ADULT - PROBLEM SELECTOR PLAN 3
XR of right shoulder
XR of right shoulder
XR of right shoulder  no fracture

## 2024-04-17 NOTE — SWALLOW BEDSIDE ASSESSMENT ADULT - COMMENTS
MD orders received. Chart reviewed. Per charting, pt is a "89 YO Female with past medical history of of Coronary Artery Disease s/p CABG, A fib on Xarelto, Hypertension, Hyperlipidemia, cardiac dysrrhythmia s/p AICD, rectal mass s/p resection, staghorn calculus presenting with failure to thrive.   Patient unable to eat or drink for last 3 days."    Recent CXR revealed "No acute finding or change."    Recent head CT revealed "1. No evidence of acute intracranial hemorrhage or midline shift.  2. Large territory of decreased discrimination of the gray-white matter in the right parietal lobe and right insula, highly concerning for acute infarction. Recommend MRI of the brain for further evaluation.  3. Chronic ischemic changes as discussed above"     At the time of today's assessment, the pt is received asleep, though easily aroused via verbal and tactile stimulation (i.e bed repositioning) provided by SLP. Pt is unable to answer questions pertaining to orientation at this time.
Chart reviewed order received for MBS, however, discussed with Dr. Jefferson pt refusal of PO trials during swallow eval 4/15/24, requested to reassess swallow to determine candidacy for MBS, if warranted.  Pt received upright in bed A&A, on RA, eyes closed, reduced cognition, reduced command following, pain scale 0/10 pre & post eval.  Swallow eval completed see below for details.  Pt left as received NAD JOHNNY Manriquez & Dr. Perlman notified.  Will follow.     Per charting, "Nohemy Hoyt is a 89 YO Female with past medical history of of Coronary Artery Disease s/p CABG, A fib on Xarelto, Hypertension, Hyperlipidemia, cardiac dysrrhythmia s/p AICD, rectal mass s/p resection, staghorn calculus presenting with failure to thrive.   Patient unable to eat or drink for last 3 days."    CT Head 4/15/24: "No evidence of an acute intracranial hemorrhage, midline shift, or   hydrocephalus.  Aging subacute anterolateral right parietal infarct"    Chest xray 4/12/24: "IMPRESSION: No acute finding or change."

## 2024-04-17 NOTE — PROGRESS NOTE ADULT - PROBLEM SELECTOR PLAN 9
atorvastatin 20 kathleen GRAM(s) Oral at bedtime

## 2024-04-17 NOTE — SWALLOW BEDSIDE ASSESSMENT ADULT - SWALLOW EVAL: CRITERIA FOR SKILLED INTERVENTION MET
patient/family refuse skilled intervention at this time
demonstrates skilled criteria for swallowing intervention

## 2024-04-17 NOTE — SWALLOW BEDSIDE ASSESSMENT ADULT - SLP GENERAL OBSERVATIONS
awake
Pt received upright in bed A&A, on RA, eyes closed, reduced cognition, reduced command following, pain scale 0/10 pre & post eval.

## 2024-04-17 NOTE — DISCHARGE NOTE PROVIDER - HOSPITAL COURSE
admitted for AMS  CVA ruled out  adult FTT with poor oral intake  found to have UTI  ABX per ID  DC after ID and palliative clearance

## 2024-04-17 NOTE — PROGRESS NOTE ADULT - PROBLEM SELECTOR PLAN 5
levothyroxine 125 MICROGram(s) Oral daily  endo eval with Dr. perlman
levothyroxine 125 MICROGram(s) Oral daily  endo eval
levothyroxine 125 MICROGram(s) Oral daily  endo eval
levothyroxine 125 MICROGram(s) Oral daily
levothyroxine 125 MICROGram(s) Oral daily  endo eval with Dr. perlman

## 2024-04-18 NOTE — PROGRESS NOTE ADULT - PROVIDER SPECIALTY LIST ADULT
Family Medicine
Infectious Disease
Infectious Disease
Neurology
Infectious Disease
Neurology
Family Medicine
Family Medicine
Hospitalist
Family Medicine

## 2024-04-18 NOTE — CHART NOTE - NSCHARTNOTEFT_GEN_A_CORE
Do you have Advance Directives (HCP / LV / Organ donation / Documentation of oral advance Directive):   ( x   )  yes    (      )    NO                                                                            Do you have LV - Living will :                                                                                                                                             (  x  )  yes    (      )   No    Do you have HCP - Health Care Proxy:                                                                                                                            (   x  )  yes   (       ) N0    Do you have DNR- Do Not Resuscitate :                                                                                                                           (   x   )  yes  (        )  No    Do you have DNI- Do Not intubate  :                                                                                                                               (   x   )  yes   (       ) No    Do you have MOLST - Medical orders for Life sustaining treatment  :                                                                    (   x   ) yes    (       ) No    Decision Maker :  (     ) Patient     ( x     )  HCA   (     ) Public Health Law Surrogate     (      ) Surrogate  (       ) Guardian    Goals of Care :  (      )   Complete Care     (       ) No Limitations                              (       )   Comfort Care       (       )  Hospice                               (    x  )   Limited medical Intervention / s    Medical Interventions :   (        )   CPR       (     x   )  DNR                                               (        )  Intubation with MV - Mechanical Ventilation  (      ) BIPAP/CPAP    (      x   )   DNI                                               (         )  Artificial Nutrition -  IVF, TPN / PPN, Tube Feeds             (    x     )   No Feeding Tube                                                (    x    ) Use Antibiotics                         (          ) No Antibiotics                                                (     x    ) Blood and Blood Products     (         )   No Blood or Blood products                                                (          )  Dialysis                                    (   x      )  No Dialysis                                                (          )  Medical Management only  (     x    )  No Invasive Interventions or Surgery  Time spent :                        (       ) upto 30 minutes                       (       x    )   more than 30 minutes  ACP and GOC reviewed and discussed

## 2024-04-18 NOTE — PROGRESS NOTE ADULT - SUBJECTIVE AND OBJECTIVE BOX
Neurology Follow up note    NOHEMY [id number]    HPI:  Nohemy Abreu is a 89 YO Female with past medical history of of Coronary Artery Disease s/p CABG, A fib on Xarelto, Hypertension, Hyperlipidemia, cardiac dysrrhythmia s/p AICD, rectal mass s/p resection, staghorn calculus presenting with failure to thrive.   Patient unable to eat or drink for last 3 days.  Her serum sodium was 150. and she was started on D5W.           Interval History -no new events    Patient is seen, chart was reviewed and case was discussed with the treatment team.  Pt is not in any distress.   Lying on bed comfortably.       Vital Signs Last 24 Hrs  T(C): 37 (18 Apr 2024 11:39), Max: 37 (18 Apr 2024 11:39)  T(F): 98.6 (18 Apr 2024 11:39), Max: 98.6 (18 Apr 2024 11:39)  HR: 79 (18 Apr 2024 11:39) (58 - 79)  BP: 139/62 (18 Apr 2024 11:39) (131/83 - 167/53)  BP(mean): --  RR: 16 (18 Apr 2024 11:39) (16 - 18)  SpO2: 96% (18 Apr 2024 11:39) (91% - 98%)    Parameters below as of 18 Apr 2024 11:39  Patient On (Oxygen Delivery Method): room air                      REVIEW OF SYSTEMS:  limited due poor communication  not in any distress        On Neurological Examination:    Mental Status - Pt is awake, oriented X1 Follows some simple commands       Speech -  Normal. Pt has dysarthria.    Cranial Nerves - Pupils 3 mm equal and reactive to light, extraocular eye movements intact. Pt has no visual field deficit.  Pt has left facial asymmetry. Facial sensation is intact.Tongue - is in midline.    Muscle tone - is reduced on left    Motor Exam - left UE-1/5; LLE 2/5  , LEFT  drift. No shaking or tremors.    Sensory Exam -. Pt withdraws all extremities equally on stimulation. No asymmetry seen.           Deep tendon Reflexes - 2 plus all over.          Neck Supple -  Yes.     MEDICATIONS  (STANDING):  aspirin Suppository 300 milliGRAM(s) Rectal daily  cefTRIAXone   IVPB 1000 milliGRAM(s) IV Intermittent every 24 hours  dextrose 5%. 1000 milliLiter(s) (50 mL/Hr) IV Continuous <Continuous>  enoxaparin Injectable 40 milliGRAM(s) SubCutaneous every 24 hours  levothyroxine Injectable 88 MICROGram(s) IV Push at bedtime  metoprolol tartrate Injectable 5 milliGRAM(s) IV Push every 6 hours    MEDICATIONS  (PRN):  acetaminophen  Suppository .. 650 milliGRAM(s) Rectal every 6 hours PRN Temp greater or equal to 38C (100.4F), Mild Pain (1 - 3)          Allergies    amoxicillin (Unknown)    Intolerances      04-18    134<L>  |  105  |  10  ----------------------------<  225<H>  4.1   |  22  |  0.97    Ca    8.5      18 Apr 2024 09:47          Hemoglobin A1C:     Vitamin B12     RADIOLOGY    < from: CT Head No Cont (04.12.24 @ 16:02) >    ACC: [id number] EXAM:  CT BRAIN   ORDERED BY:  CONCETTA SINGH     PROCEDURE DATE:  04/12/2024          INTERPRETATION:  EXAM: CT HEAD WITHOUT INTRAVENOUS CONTRAST    HISTORY: Left-sided hemiparesis, patient contracted    TECHNIQUE: Multiple axial images were obtained from the skull base to the   vertex. Sagittal and coronal reformatted images were obtained from the   axial data set. The images were reviewed in brain and bone windows.    COMPARISON: CT of the head June 23, 2021    FINDINGS:    No acute intracranial hemorrhage. Large territory of decreased   discrimination of the gray-white matter, encompassing the right parietal   lobe and right insula. Areas of decreased attenuation throughout the deep   and periventricular white matter, compatible with chronic small vessel   disease. 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   Parenchymal volume loss resulting in a mild ex vacuo dilatation   appearance of the bilateral ventricles. The extra-axial spaces and basal   cisterns are within normal limits. No midline shift or mass effect   present.    The cranial cervical junction is within normal limits. The sella is not   expanded. No depressed calvarial fracture. Mucous retention cyst versus   polyp in a right ethmoid air cell. The visualized mastoid air cells are   well aerated. The visualized orbits are status post cataract surgery.    IMPRESSION:    1.  No evidence of acute intracranial hemorrhage or midline shift.  2.  Large territory of decreased discrimination of the gray-white matter   in the right parietal lobe and right insula, highly concerning for acute   infarction. Recommend MRI of the brain for further evaluation.  3.  Chronic ischemic changes as discussed above.    Multiple attempts made to contact the ordering provider    CONCETTA SIERRA MD; Attending Radiologist  This document has been electronically signed. Apr 12 2024  4:12PM      ASSESSMENT AND PLAN:      presented with failure to thrive  noted to have left sided weakness-  cw subacute right mca infarct  high grade stenosis of right ICA  UTI     ON ASA suppository   REPEAT CT HEAD-results noted   Physical therapy evaluation.  OOB to chair/ambulation with assistance only.  Pain is accessed and addressed..  Plan of care was discussed with family((daughter) Questions answered.  Would continue to follow.        
Neurology Follow up note    NOHEMY [id number]    HPI:  Nohemy Abreu is a 89 YO Female with past medical history of of Coronary Artery Disease s/p CABG, A fib on Xarelto, Hypertension, Hyperlipidemia, cardiac dysrrhythmia s/p AICD, rectal mass s/p resection, staghorn calculus presenting with failure to thrive.   Patient unable to eat or drink for last 3 days.  Her serum sodium was 150. and she was started on D5W.           Interval History -no new events    Patient is seen, chart was reviewed and case was discussed with the treatment team.  Pt is not in any distress.   Lying on bed comfortably.       Vital Signs Last 24 Hrs  T(C): 36.6 (17 Apr 2024 11:24), Max: 36.8 (17 Apr 2024 04:59)  T(F): 97.9 (17 Apr 2024 11:24), Max: 98.2 (17 Apr 2024 04:59)  HR: 62 (17 Apr 2024 12:53) (60 - 80)  BP: 160/72 (17 Apr 2024 12:53) (144/80 - 169/71)  BP(mean): --  RR: 18 (17 Apr 2024 11:24) (18 - 18)  SpO2: 97% (17 Apr 2024 11:24) (93% - 97%)    Parameters below as of 17 Apr 2024 11:24  Patient On (Oxygen Delivery Method): room air                        REVIEW OF SYSTEMS:  limited due poor communication  not in any distress        On Neurological Examination:    Mental Status - Pt is awake, oriented X1 Follows some simple commands       Speech -  Normal. Pt has dysarthria.    Cranial Nerves - Pupils 3 mm equal and reactive to light, extraocular eye movements intact. Pt has no visual field deficit.  Pt has left facial asymmetry. Facial sensation is intact.Tongue - is in midline.    Muscle tone - is reduced on left    Motor Exam - left UE-1/5; LLE 2/5  , LEFT  drift. No shaking or tremors.    Sensory Exam -. Pt withdraws all extremities equally on stimulation. No asymmetry seen.           Deep tendon Reflexes - 2 plus all over.          Neck Supple -  Yes.     MEDICATIONS  (STANDING):  aspirin Suppository 300 milliGRAM(s) Rectal daily  cefTRIAXone   IVPB 1000 milliGRAM(s) IV Intermittent every 24 hours  dextrose 5%. 1000 milliLiter(s) (50 mL/Hr) IV Continuous <Continuous>  enoxaparin Injectable 40 milliGRAM(s) SubCutaneous every 24 hours  levothyroxine Injectable 88 MICROGram(s) IV Push at bedtime  metoprolol tartrate Injectable 5 milliGRAM(s) IV Push every 6 hours    MEDICATIONS  (PRN):  acetaminophen  Suppository .. 650 milliGRAM(s) Rectal every 6 hours PRN Temp greater or equal to 38C (100.4F), Mild Pain (1 - 3)          Allergies    amoxicillin (Unknown)    Intolerances            Hemoglobin A1C:     Vitamin B12     RADIOLOGY    < from: CT Head No Cont (04.12.24 @ 16:02) >    ACC: [id number] EXAM:  CT BRAIN   ORDERED BY:  CONCETTA SINGH     PROCEDURE DATE:  04/12/2024          INTERPRETATION:  EXAM: CT HEAD WITHOUT INTRAVENOUS CONTRAST    HISTORY: Left-sided hemiparesis, patient contracted    TECHNIQUE: Multiple axial images were obtained from the skull base to the   vertex. Sagittal and coronal reformatted images were obtained from the   axial data set. The images were reviewed in brain and bone windows.    COMPARISON: CT of the head June 23, 2021    FINDINGS:    No acute intracranial hemorrhage. Large territory of decreased   discrimination of the gray-white matter, encompassing the right parietal   lobe and right insula. Areas of decreased attenuation throughout the deep   and periventricular white matter, compatible with chronic small vessel   disease. 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   Parenchymal volume loss resulting in a mild ex vacuo dilatation   appearance of the bilateral ventricles. The extra-axial spaces and basal   cisterns are within normal limits. No midline shift or mass effect   present.    The cranial cervical junction is within normal limits. The sella is not   expanded. No depressed calvarial fracture. Mucous retention cyst versus   polyp in a right ethmoid air cell. The visualized mastoid air cells are   well aerated. The visualized orbits are status post cataract surgery.    IMPRESSION:    1.  No evidence of acute intracranial hemorrhage or midline shift.  2.  Large territory of decreased discrimination of the gray-white matter   in the right parietal lobe and right insula, highly concerning for acute   infarction. Recommend MRI of the brain for further evaluation.  3.  Chronic ischemic changes as discussed above.    Multiple attempts made to contact the ordering provider    CONCETTA SIERRA MD; Attending Radiologist  This document has been electronically signed. Apr 12 2024  4:12PM      ASSESSMENT AND PLAN:      presented with failure to thrive  noted to have left sided weakness-  cw subacute right mca infarct  high grade stenosis of right ICA  UTI    STARTED ON ASA suppository   REPEAT CT HEAD-results noted   Physical therapy evaluation.  OOB to chair/ambulation with assistance only.  Pain is accessed and addressed..  Plan of care was discussed with family((daughter) Questions answered.  Would continue to follow.    
Neurology Follow up note    NOHEMY [id number]    HPI:  Noehmy Abreu is a 89 YO Female with past medical history of of Coronary Artery Disease s/p CABG, A fib on Xarelto, Hypertension, Hyperlipidemia, cardiac dysrrhythmia s/p AICD, rectal mass s/p resection, staghorn calculus presenting with failure to thrive.   Patient unable to eat or drink for last 3 days.  Her serum sodium was 150. and she was started on D5W.     	   (12 Apr 2024 19:37)      Interval History -no new events    Patient is seen, chart was reviewed and case was discussed with the treatment team.  Pt is not in any distress.   Lying on bed comfortably.       Vital Signs Last 24 Hrs  T(C): 36.7 (16 Apr 2024 11:44), Max: 37 (16 Apr 2024 04:43)  T(F): 98 (16 Apr 2024 11:44), Max: 98.6 (16 Apr 2024 04:43)  HR: 86 (16 Apr 2024 17:44) (60 - 86)  BP: 178/82 (16 Apr 2024 17:44) (150/72 - 178/82)  BP(mean): --  RR: 18 (16 Apr 2024 11:44) (17 - 18)  SpO2: 97% (16 Apr 2024 11:44) (95% - 100%)    Parameters below as of 16 Apr 2024 11:44  Patient On (Oxygen Delivery Method): room air                    REVIEW OF SYSTEMS:  limited due poor communication  not in any distress        On Neurological Examination:    Mental Status - Pt is awake, oriented X1 Follows some simple commands       Speech -  Normal. Pt has dysarthria.    Cranial Nerves - Pupils 3 mm equal and reactive to light, extraocular eye movements intact. Pt has no visual field deficit.  Pt has left facial asymmetry. Facial sensation is intact.Tongue - is in midline.    Muscle tone - is reduced on left    Motor Exam - left UE-1/5; LLE 2/5  , LEFT  drift. No shaking or tremors.    Sensory Exam -. Pt withdraws all extremities equally on stimulation. No asymmetry seen.           Deep tendon Reflexes - 2 plus all over.          Neck Supple -  Yes.     MEDICATIONS  (STANDING):  aspirin Suppository 300 milliGRAM(s) Rectal daily  cefTRIAXone   IVPB 1000 milliGRAM(s) IV Intermittent every 24 hours  dextrose 5%. 1000 milliLiter(s) (50 mL/Hr) IV Continuous <Continuous>  enoxaparin Injectable 40 milliGRAM(s) SubCutaneous every 24 hours  levothyroxine Injectable 88 MICROGram(s) IV Push at bedtime  metoprolol tartrate Injectable 5 milliGRAM(s) IV Push every 6 hours    MEDICATIONS  (PRN):  acetaminophen  Suppository .. 650 milliGRAM(s) Rectal every 6 hours PRN Temp greater or equal to 38C (100.4F), Mild Pain (1 - 3)          Allergies    amoxicillin (Unknown)    Intolerances            Hemoglobin A1C:     Vitamin B12     RADIOLOGY    < from: CT Head No Cont (04.12.24 @ 16:02) >    ACC: [id number] EXAM:  CT BRAIN   ORDERED BY:  CONCETTA SINGH     PROCEDURE DATE:  04/12/2024          INTERPRETATION:  EXAM: CT HEAD WITHOUT INTRAVENOUS CONTRAST    HISTORY: Left-sided hemiparesis, patient contracted    TECHNIQUE: Multiple axial images were obtained from the skull base to the   vertex. Sagittal and coronal reformatted images were obtained from the   axial data set. The images were reviewed in brain and bone windows.    COMPARISON: CT of the head June 23, 2021    FINDINGS:    No acute intracranial hemorrhage. Large territory of decreased   discrimination of the gray-white matter, encompassing the right parietal   lobe and right insula. Areas of decreased attenuation throughout the deep   and periventricular white matter, compatible with chronic small vessel   disease. 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   Parenchymal volume loss resulting in a mild ex vacuo dilatation   appearance of the bilateral ventricles. The extra-axial spaces and basal   cisterns are within normal limits. No midline shift or mass effect   present.    The cranial cervical junction is within normal limits. The sella is not   expanded. No depressed calvarial fracture. Mucous retention cyst versus   polyp in a right ethmoid air cell. The visualized mastoid air cells are   well aerated. The visualized orbits are status post cataract surgery.    IMPRESSION:    1.  No evidence of acute intracranial hemorrhage or midline shift.  2.  Large territory of decreased discrimination of the gray-white matter   in the right parietal lobe and right insula, highly concerning for acute   infarction. Recommend MRI of the brain for further evaluation.  3.  Chronic ischemic changes as discussed above.    Multiple attempts made to contact the ordering provider    CONCETTA SIERRA MD; Attending Radiologist  This document has been electronically signed. Apr 12 2024  4:12PM      ASSESSMENT AND PLAN:      presented with failure to thrive  noted to have left sided weakness-  cw subacute right mca infarct  high grade stenosis of right ICA  UTI    STARTED ON ASA suppository   REPEAT CT HEAD-results noted   Physical therapy evaluation.  OOB to chair/ambulation with assistance only.  Pain is accessed and addressed..  Plan of care was discussed with family((daughter) Questions answered.  Would continue to follow.    
Neurology Follow up note    NOHEMY [id number]    HPI:  Nohemy Abreu is a 89 YO Female with past medical history of of Coronary Artery Disease s/p CABG, A fib on Xarelto, Hypertension, Hyperlipidemia, cardiac dysrrhythmia s/p AICD, rectal mass s/p resection, staghorn calculus presenting with failure to thrive.   Patient unable to eat or drink for last 3 days.  Her serum sodium was 150. and she was started on D5W.     	   (12 Apr 2024 19:37)      Interval History -no new events    Patient is seen, chart was reviewed and case was discussed with the treatment team.  Pt is not in any distress.   Lying on bed comfortably.       Vital Signs Last 24 Hrs  T(C): 36.3 (14 Apr 2024 12:47), Max: 36.9 (14 Apr 2024 04:48)  T(F): 97.3 (14 Apr 2024 12:47), Max: 98.4 (14 Apr 2024 04:48)  HR: 60 (14 Apr 2024 12:47) (60 - 75)  BP: 133/72 (14 Apr 2024 12:47) (133/72 - 170/95)  BP(mean): --  RR: 17 (14 Apr 2024 12:47) (17 - 18)  SpO2: 97% (14 Apr 2024 12:47) (95% - 98%)    Parameters below as of 14 Apr 2024 12:47  Patient On (Oxygen Delivery Method): room air            REVIEW OF SYSTEMS:  limited due poor communication  not in any distress        On Neurological Examination:    Mental Status - Pt is awake, oriented X1 Follows some simple commands       Speech -  Normal. Pt has dysarthria.    Cranial Nerves - Pupils 3 mm equal and reactive to light, extraocular eye movements intact. Pt has no visual field deficit.  Pt has left facial asymmetry. Facial sensation is intact.Tongue - is in midline.    Muscle tone - is reduced on left    Motor Exam - left UE-1/5; LLE 2/5  , LEFT  drift. No shaking or tremors.    Sensory Exam -. Pt withdraws all extremities equally on stimulation. No asymmetry seen.           Deep tendon Reflexes - 2 plus all over.          Neck Supple -  Yes.     MEDICATIONS    acetaminophen  Suppository .. 650 milliGRAM(s) Rectal every 6 hours PRN  cefTRIAXone   IVPB 1000 milliGRAM(s) IV Intermittent every 24 hours  dextrose 5%. 1000 milliLiter(s) IV Continuous <Continuous>  levothyroxine Injectable 88 MICROGram(s) IV Push at bedtime  metoprolol tartrate Injectable 5 milliGRAM(s) IV Push every 6 hours  pantoprazole  Injectable 40 milliGRAM(s) IV Push daily      Allergies    amoxicillin (Unknown)    Intolerances        LABS:  CBC Full  -  ( 13 Apr 2024 07:15 )  WBC Count : 8.86 K/uL  RBC Count : 3.25 M/uL  Hemoglobin : 10.0 g/dL  Hematocrit : 32.6 %  Platelet Count - Automated : 141 K/uL  Mean Cell Volume : 100.3 fl  Mean Cell Hemoglobin : 30.8 pg  Mean Cell Hemoglobin Concentration : 30.7 gm/dL  Auto Neutrophil # : x  Auto Lymphocyte # : x      Urinalysis Basic - ( 13 Apr 2024 07:15 )    Color: x / Appearance: x / SG: x / pH: x  Gluc: 99 mg/dL / Ketone: x  / Bili: x / Urobili: x   Blood: x / Protein: x / Nitrite: x   Leuk Esterase: x / RBC: x / WBC x   Sq Epi: x / Non Sq Epi: x / Bacteria: x      04-13    143  |  113<H>  |  23  ----------------------------<  99  3.5   |  24  |  0.87    Ca    8.2<L>      13 Apr 2024 07:15  Phos  2.1     04-13  Mg     2.2     04-12    TPro  7.7  /  Alb  3.1<L>  /  TBili  1.4<H>  /  DBili  x   /  AST  33  /  ALT  10<L>  /  AlkPhos  83  04-12    Hemoglobin A1C:     Vitamin B12     RADIOLOGY    < from: CT Head No Cont (04.12.24 @ 16:02) >    ACC: [id number] EXAM:  CT BRAIN   ORDERED BY:  CONCETTA SINGH     PROCEDURE DATE:  04/12/2024          INTERPRETATION:  EXAM: CT HEAD WITHOUT INTRAVENOUS CONTRAST    HISTORY: Left-sided hemiparesis, patient contracted    TECHNIQUE: Multiple axial images were obtained from the skull base to the   vertex. Sagittal and coronal reformatted images were obtained from the   axial data set. The images were reviewed in brain and bone windows.    COMPARISON: CT of the head June 23, 2021    FINDINGS:    No acute intracranial hemorrhage. Large territory of decreased   discrimination of the gray-white matter, encompassing the right parietal   lobe and right insula. Areas of decreased attenuation throughout the deep   and periventricular white matter, compatible with chronic small vessel   disease. 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   Parenchymal volume loss resulting in a mild ex vacuo dilatation   appearance of the bilateral ventricles. The extra-axial spaces and basal   cisterns are within normal limits. No midline shift or mass effect   present.    The cranial cervical junction is within normal limits. The sella is not   expanded. No depressed calvarial fracture. Mucous retention cyst versus   polyp in a right ethmoid air cell. The visualized mastoid air cells are   well aerated. The visualized orbits are status post cataract surgery.    IMPRESSION:    1.  No evidence of acute intracranial hemorrhage or midline shift.  2.  Large territory of decreased discrimination of the gray-white matter   in the right parietal lobe and right insula, highly concerning for acute   infarction. Recommend MRI of the brain for further evaluation.  3.  Chronic ischemic changes as discussed above.    Multiple attempts made to contact the ordering provider.            CONCETTA SIERRA MD; Attending Radiologist  This document has been electronically signed. Apr 12 2024  4:12PM    < end of copied text >  ASSESSMENT AND PLAN:      presented with failure to thrive  noted to have left sided weakness-  cw subacute right mca infarct  high grade stenosis of right ICA  UTI    START ASA/STATIN  REPEAT CT HEAD  Physical therapy evaluation.  OOB to chair/ambulation with assistance only.  Pain is accessed and addressed..  Plan of care was discussed with family((daughter) Questions answered.  Would continue to follow.              
[facility]  INFECTIOUS DISEASES   [address] [place] [address]  Tel: [phone]     Fax: [phone]  ========================================================  MD Krishna Rabago Kaushal, MD Cho, Michelle, MD Sunjit, Jaspal, MD  ========================================================    [id number]  [facility]     Follow up: No new changes, lying in bed, no fever.     PAST MEDICAL & SURGICAL HISTORY:  CAD (coronary artery disease)  S/P CABG 1999  Hypertension  Dyslipidemia  Obesity  Hypothyroidism  Gastroesophageal reflux disease without esophagitis  Hyperlipidemia  Atrial fibrillation, unspecified  Gastritis  Gastrointestinal hemorrhage, unspecified gastritis, unspecified gastrointestinal hemorrhage type  Constipation  Anemia  Xerophthalmia  H/O aortic valve replacement  S/P CABG (coronary artery bypass graft)  in 1999  Rectal mass  removal in 6/2015  H/O abdominal hysterectomy  History of colon polyps  removal 2014    Social Hx: No current smoking, EtOH or drugs     FAMILY HISTORY:  No pertinent family history in first degree relatives    Allergies  amoxicillin (Unknown)    MEDICATIONS  (STANDING):  cefTRIAXone   IVPB 1000 milliGRAM(s) IV Intermittent every 24 hours  dextrose 5%. 1000 milliLiter(s) (50 mL/Hr) IV Continuous <Continuous>  levothyroxine Injectable 88 MICROGram(s) IV Push at bedtime  metoprolol tartrate Injectable 5 milliGRAM(s) IV Push every 6 hours  pantoprazole  Injectable 40 milliGRAM(s) IV Push daily  potassium phosphate / sodium phosphate Powder (PHOS-NaK) 1 Packet(s) Oral three times a day  rivaroxaban 15 milliGRAM(s) Oral with dinner     REVIEW OF SYSTEMS:  Not answering    Physical Exam:  Vital Signs Last 24 Hrs  T(C): 36.7 (16 Apr 2024 11:44), Max: 37 (16 Apr 2024 04:43)  T(F): 98 (16 Apr 2024 11:44), Max: 98.6 (16 Apr 2024 04:43)  HR: 62 (16 Apr 2024 11:44) (60 - 62)  BP: 160/79 (16 Apr 2024 11:44) (123/73 - 172/73)  BP(mean): --  RR: 18 (16 Apr 2024 11:44) (17 - 18)  SpO2: 97% (16 Apr 2024 11:44) (95% - 100%)  Parameters below as of 16 Apr 2024 11:44  Patient On (Oxygen Delivery Method): room air  GEN: NAD  HEENT: normocephalic and atraumatic. EOMI. PERRL.    NECK: Supple.  No lymphadenopathy   LUNGS: Clear to auscultation.  HEART: Regular rate and rhythm   ABDOMEN: Soft, nontender, and nondistended.  Positive bowel sounds.    : No CVA tenderness  EXTREMITIES: Without edema.  PSYCHIATRIC: Awake, not communicating   SKIN: No rash       Labs:    04-15    144  |  113<H>  |  10  ----------------------------<  104<H>  4.1   |  26  |  0.79    Ca    8.0<L>      15 Apr 2024 07:59    TPro  6.6  /  Alb  2.6<L>  /  TBili  0.9  /  DBili  x   /  AST  31  /  ALT  11<L>  /  AlkPhos  75  04-14    Culture - Urine (collected 04-12-24 @ 15:15)  Source: Catheterized Catheterized  Final Report (04-15-24 @ 13:13):    >100,000 CFU/ml Proteus mirabilis  Organism: Proteus mirabilis (04-15-24 @ 13:13)  Organism: Proteus mirabilis (04-15-24 @ 13:13)    Sensitivities:      Method Type: JOHN      -  Amoxicillin/Clavulanic Acid: R >16/8      -  Ampicillin: R >16 These ampicillin results predict results for amoxicillin      -  Ampicillin/Sulbactam: S 8/4      -  Aztreonam: S <=4      -  Cefazolin: R >16 For uncomplicated UTI with K. pneumoniae, E. coli, or P. mirablis: JOHN <=16 is sensitive and JOHN >=32 is resistant. This also predicts results for oral agents cefaclor, cefdinir, cefpodoxime, cefprozil, cefuroxime axetil, cephalexin and locarbef for uncomplicated UTI. Note that some isolates may be susceptible to these agents while testing resistant to cefazolin.      -  Cefepime: S <=2      -  Cefoxitin: S <=8      -  Ceftriaxone: S <=1      -  Cefuroxime: S 8      -  Ciprofloxacin: R >2      -  Ertapenem: S <=0.5      -  Gentamicin: S <=2      -  Levofloxacin: R 4      -  Meropenem: S <=1      -  Nitrofurantoin: R >64 Should not be used to treat pyelonephritis      -  Piperacillin/Tazobactam: S <=8      -  Tobramycin: S <=2      -  Trimethoprim/Sulfamethoxazole: S <=0.5/9.5    Culture - Urine (collected 08-29-22 @ 17:17)  Source: Clean Catch Clean Catch (Midstream)  Final Report (08-31-22 @ 01:39):    <10,000 CFU/mL Normal Urogenital [place]    Culture - Blood (collected 08-29-22 @ 12:23)  Source: .Blood Blood-Peripheral  Final Report (09-03-22 @ 17:00):    No Growth Final    Culture - Blood (collected 08-29-22 @ 12:15)  Source: .Blood Blood-Peripheral  Final Report (09-03-22 @ 17:00):    No Growth Final    WBC Count: 8.86 K/uL (04-13-24 @ 07:15)  WBC Count: 9.65 K/uL (04-12-24 @ 15:15)    Creatinine: 0.79 mg/dL (04-15-24 @ 07:59)  Creatinine: 0.83 mg/dL (04-14-24 @ 16:15)  Creatinine: 0.87 mg/dL (04-13-24 @ 07:15)  Creatinine: 1.10 mg/dL (04-12-24 @ 15:15)    Ferritin: 285 ng/mL (04-12-24 @ 15:15)    All imaging and other data have been reviewed.  < from: Xray Chest 1 View- PORTABLE-Urgent (Xray Chest 1 View- PORTABLE-Urgent .) (04.12.24 @ 14:25) >  IMPRESSION: No acute finding or change.    < from: CT Angio Head w/ IV Cont (04.12.24 @ 18:47) >  IMPRESSION:  CT PERFUSION demonstrated: No asymmetric or territorial perfusion   abnormality.  CTA COW:  Patent intracranial circulation without flow limiting stenosis   or large vessel occlusion.  CTA NECK: Large atherosclerotic plaque at the distal right common carotid   artery and carotid bifurcation contributes to severe stenosis at the   right internal carotid artery origin and right external carotid artery   origin.  Bilateral vertebral arteries are patent without flow limiting stenosis.    Assessment and Plan:   89 YO Female with past medical history of of Coronary Artery Disease s/p CABG, A fib on Xarelto, Hypertension, Hyperlipidemia, cardiac dysrrhythmia s/p AICD, rectal mass s/p resection, staghorn calculus presenting with failure to thrive.     Patient unable to eat or drink for last 3 days.  UA showed high WBC and a positive nitrate.     # UTI  # Failure to thrive    - Urine culture with a sensitive proteus R to FQ  - Continue ceftriaxone 1gm daily   - Today is the last day. After the dose today can stop.   - No need for po antibitoics   - Tmx, WBC, Creat all normal today     Will follow PRN. Please call with any question.     Monserrat Barlow MD  Division of Infectious Diseases   Please call ID service at [phone] with any question.    50 minutes spent on total encounter assessing patient, examination, chart review, counseling and coordinating care by the attending physician/nurse/care manager.  
[facility]  INFECTIOUS DISEASES   [address] [place] [address]  Tel: [phone]     Fax: [phone]  ========================================================  MD Krishna Rabago Kaushal, MD Cho, Michelle, MD Sunjit, Jaspal, MD  ========================================================    [id number]  [facility]     Follow up: No new changes, lying in bed, no fever.     PAST MEDICAL & SURGICAL HISTORY:  CAD (coronary artery disease)  S/P CABG 1999  Hypertension  Dyslipidemia  Obesity  Hypothyroidism  Gastroesophageal reflux disease without esophagitis  Hyperlipidemia  Atrial fibrillation, unspecified  Gastritis  Gastrointestinal hemorrhage, unspecified gastritis, unspecified gastrointestinal hemorrhage type  Constipation  Anemia  Xerophthalmia  H/O aortic valve replacement  S/P CABG (coronary artery bypass graft)  in 1999  Rectal mass  removal in 6/2015  H/O abdominal hysterectomy  History of colon polyps  removal 2014    Social Hx: No current smoking, EtOH or drugs     FAMILY HISTORY:  No pertinent family history in first degree relatives    Allergies  amoxicillin (Unknown)    MEDICATIONS  (STANDING):  cefTRIAXone   IVPB 1000 milliGRAM(s) IV Intermittent every 24 hours  dextrose 5%. 1000 milliLiter(s) (50 mL/Hr) IV Continuous <Continuous>  levothyroxine Injectable 88 MICROGram(s) IV Push at bedtime  metoprolol tartrate Injectable 5 milliGRAM(s) IV Push every 6 hours  pantoprazole  Injectable 40 milliGRAM(s) IV Push daily  potassium phosphate / sodium phosphate Powder (PHOS-NaK) 1 Packet(s) Oral three times a day  rivaroxaban 15 milliGRAM(s) Oral with dinner    MEDICATIONS  (PRN):  acetaminophen     Tablet .. 650 milliGRAM(s) Oral every 6 hours PRN Temp greater or equal to 38C (100.4F)     REVIEW OF SYSTEMS:  Not answering    Physical Exam:  Vital Signs Last 24 Hrs  T(C): 36.3 (14 Apr 2024 12:47), Max: 36.9 (14 Apr 2024 04:48)  T(F): 97.3 (14 Apr 2024 12:47), Max: 98.4 (14 Apr 2024 04:48)  HR: 60 (14 Apr 2024 12:47) (60 - 75)  BP: 133/72 (14 Apr 2024 12:47) (133/72 - 170/95)  BP(mean): --  RR: 17 (14 Apr 2024 12:47) (17 - 18)  SpO2: 97% (14 Apr 2024 12:47) (95% - 98%)  Parameters below as of 14 Apr 2024 12:47  Patient On (Oxygen Delivery Method): room air  GEN: NAD  HEENT: normocephalic and atraumatic. EOMI. PERRL.    NECK: Supple.  No lymphadenopathy   LUNGS: Clear to auscultation.  HEART: Regular rate and rhythm   ABDOMEN: Soft, nontender, and nondistended.  Positive bowel sounds.    : No CVA tenderness  EXTREMITIES: Without edema.  PSYCHIATRIC: Awake, not communicating   SKIN: No rash     Labs:                        10.0   8.86  )-----------( 141      ( 13 Apr 2024 07:15 )             32.6     04-13    143  |  113<H>  |  23  ----------------------------<  99  3.5   |  24  |  0.87    Ca    8.2<L>      13 Apr 2024 07:15  Phos  2.1     04-13  Mg     2.2     04-12    TPro  7.7  /  Alb  3.1<L>  /  TBili  1.4<H>  /  DBili  x   /  AST  33  /  ALT  10<L>  /  AlkPhos  83  04-12    Culture - Urine (collected 04-12-24 @ 15:15)  Source: Catheterized Catheterized  Preliminary Report (04-13-24 @ 23:32):    >100,000 CFU/ml Gram Negative Rods    WBC Count: 8.86 K/uL (04-13-24 @ 07:15)  WBC Count: 9.65 K/uL (04-12-24 @ 15:15)    Creatinine: 0.87 mg/dL (04-13-24 @ 07:15)  Creatinine: 1.10 mg/dL (04-12-24 @ 15:15)    Ferritin: 285 ng/mL (04-12-24 @ 15:15)    All imaging and other data have been reviewed.  < from: Xray Chest 1 View- PORTABLE-Urgent (Xray Chest 1 View- PORTABLE-Urgent .) (04.12.24 @ 14:25) >  IMPRESSION: No acute finding or change.    < from: CT Angio Head w/ IV Cont (04.12.24 @ 18:47) >  IMPRESSION:  CT PERFUSION demonstrated: No asymmetric or territorial perfusion   abnormality.  CTA COW:  Patent intracranial circulation without flow limiting stenosis   or large vessel occlusion.  CTA NECK: Large atherosclerotic plaque at the distal right common carotid   artery and carotid bifurcation contributes to severe stenosis at the   right internal carotid artery origin and right external carotid artery   origin.  Bilateral vertebral arteries are patent without flow limiting stenosis.    Assessment and Plan:   89 YO Female with past medical history of of Coronary Artery Disease s/p CABG, A fib on Xarelto, Hypertension, Hyperlipidemia, cardiac dysrrhythmia s/p AICD, rectal mass s/p resection, staghorn calculus presenting with failure to thrive.     Patient unable to eat or drink for last 3 days.  UA showed high WBC and a positive nitrate.     # UTI  # Failure to thrive    - Urine culture with GNR, will follow result  - Continue ceftriaxone 1gm daily   - Based on culture results will modify Abx regimen   - Will monitor Tmx, WBC, Creat all improving   - GOC, palliative care consult     Will follow.    Monserrat Barlow MD  Division of Infectious Diseases   Please call ID service at [phone] with any question.    50 minutes spent on total encounter assessing patient, examination, chart review, counseling and coordinating care by the attending physician/nurse/care manager.  
[facility]  INFECTIOUS DISEASES   [address] [place] [address]  Tel: [phone]     Fax: [phone]  ========================================================  MD Krishna Rabago Kaushal, MD Cho, Michelle, MD Sunjit, Jaspal, MD  ========================================================    [id number]  [facility]     Follow up: No new changes, lying in bed, no fever.     PAST MEDICAL & SURGICAL HISTORY:  CAD (coronary artery disease)  S/P CABG 1999  Hypertension  Dyslipidemia  Obesity  Hypothyroidism  Gastroesophageal reflux disease without esophagitis  Hyperlipidemia  Atrial fibrillation, unspecified  Gastritis  Gastrointestinal hemorrhage, unspecified gastritis, unspecified gastrointestinal hemorrhage type  Constipation  Anemia  Xerophthalmia  H/O aortic valve replacement  S/P CABG (coronary artery bypass graft)  in 1999  Rectal mass  removal in 6/2015  H/O abdominal hysterectomy  History of colon polyps  removal 2014    Social Hx: No current smoking, EtOH or drugs     FAMILY HISTORY:  No pertinent family history in first degree relatives    Allergies  amoxicillin (Unknown)    MEDICATIONS  (STANDING):  cefTRIAXone   IVPB 1000 milliGRAM(s) IV Intermittent every 24 hours  dextrose 5%. 1000 milliLiter(s) (50 mL/Hr) IV Continuous <Continuous>  levothyroxine Injectable 88 MICROGram(s) IV Push at bedtime  metoprolol tartrate Injectable 5 milliGRAM(s) IV Push every 6 hours  pantoprazole  Injectable 40 milliGRAM(s) IV Push daily  potassium phosphate / sodium phosphate Powder (PHOS-NaK) 1 Packet(s) Oral three times a day  rivaroxaban 15 milliGRAM(s) Oral with dinner     REVIEW OF SYSTEMS:  Not answering    Physical Exam:  Vital Signs Last 24 Hrs  T(C): 36.8 (15 Apr 2024 11:15), Max: 37.2 (15 Apr 2024 05:09)  T(F): 98.2 (15 Apr 2024 11:15), Max: 98.9 (15 Apr 2024 05:09)  HR: 60 (15 Apr 2024 11:15) (58 - 65)  BP: 144/70 (15 Apr 2024 11:15) (132/70 - 181/73)  BP(mean): --  RR: 16 (15 Apr 2024 11:15) (16 - 17)  SpO2: 99% (15 Apr 2024 11:15) (93% - 99%)  Parameters below as of 15 Apr 2024 11:15  Patient On (Oxygen Delivery Method): room air  GEN: NAD  HEENT: normocephalic and atraumatic. EOMI. PERRL.    NECK: Supple.  No lymphadenopathy   LUNGS: Clear to auscultation.  HEART: Regular rate and rhythm   ABDOMEN: Soft, nontender, and nondistended.  Positive bowel sounds.    : No CVA tenderness  EXTREMITIES: Without edema.  PSYCHIATRIC: Awake, not communicating   SKIN: No rash     Labs:    04-15    144  |  113<H>  |  10  ----------------------------<  104<H>  4.1   |  26  |  0.79    Ca    8.0<L>      15 Apr 2024 07:59    TPro  6.6  /  Alb  2.6<L>  /  TBili  0.9  /  DBili  x   /  AST  31  /  ALT  11<L>  /  AlkPhos  75  04-14    Culture - Urine (collected 04-12-24 @ 15:15)  Source: Catheterized Catheterized  Final Report (04-15-24 @ 13:13):    >100,000 CFU/ml Proteus mirabilis  Organism: Proteus mirabilis (04-15-24 @ 13:13)  Organism: Proteus mirabilis (04-15-24 @ 13:13)    Sensitivities:      Method Type: JOHN      -  Amoxicillin/Clavulanic Acid: R >16/8      -  Ampicillin: R >16 These ampicillin results predict results for amoxicillin      -  Ampicillin/Sulbactam: S 8/4      -  Aztreonam: S <=4      -  Cefazolin: R >16 For uncomplicated UTI with K. pneumoniae, E. coli, or P. mirablis: JOHN <=16 is sensitive and JOHN >=32 is resistant. This also predicts results for oral agents cefaclor, cefdinir, cefpodoxime, cefprozil, cefuroxime axetil, cephalexin and locarbef for uncomplicated UTI. Note that some isolates may be susceptible to these agents while testing resistant to cefazolin.      -  Cefepime: S <=2      -  Cefoxitin: S <=8      -  Ceftriaxone: S <=1      -  Cefuroxime: S 8      -  Ciprofloxacin: R >2      -  Ertapenem: S <=0.5      -  Gentamicin: S <=2      -  Levofloxacin: R 4      -  Meropenem: S <=1      -  Nitrofurantoin: R >64 Should not be used to treat pyelonephritis      -  Piperacillin/Tazobactam: S <=8      -  Tobramycin: S <=2      -  Trimethoprim/Sulfamethoxazole: S <=0.5/9.5    Culture - Urine (collected 08-29-22 @ 17:17)  Source: Clean Catch Clean Catch (Midstream)  Final Report (08-31-22 @ 01:39):    <10,000 CFU/mL Normal Urogenital [place]    Culture - Blood (collected 08-29-22 @ 12:23)  Source: .Blood Blood-Peripheral  Final Report (09-03-22 @ 17:00):    No Growth Final    Culture - Blood (collected 08-29-22 @ 12:15)  Source: .Blood Blood-Peripheral  Final Report (09-03-22 @ 17:00):    No Growth Final    WBC Count: 8.86 K/uL (04-13-24 @ 07:15)  WBC Count: 9.65 K/uL (04-12-24 @ 15:15)    Creatinine: 0.79 mg/dL (04-15-24 @ 07:59)  Creatinine: 0.83 mg/dL (04-14-24 @ 16:15)  Creatinine: 0.87 mg/dL (04-13-24 @ 07:15)  Creatinine: 1.10 mg/dL (04-12-24 @ 15:15)    Ferritin: 285 ng/mL (04-12-24 @ 15:15)    All imaging and other data have been reviewed.  < from: Xray Chest 1 View- PORTABLE-Urgent (Xray Chest 1 View- PORTABLE-Urgent .) (04.12.24 @ 14:25) >  IMPRESSION: No acute finding or change.    < from: CT Angio Head w/ IV Cont (04.12.24 @ 18:47) >  IMPRESSION:  CT PERFUSION demonstrated: No asymmetric or territorial perfusion   abnormality.  CTA COW:  Patent intracranial circulation without flow limiting stenosis   or large vessel occlusion.  CTA NECK: Large atherosclerotic plaque at the distal right common carotid   artery and carotid bifurcation contributes to severe stenosis at the   right internal carotid artery origin and right external carotid artery   origin.  Bilateral vertebral arteries are patent without flow limiting stenosis.    Assessment and Plan:   89 YO Female with past medical history of of Coronary Artery Disease s/p CABG, A fib on Xarelto, Hypertension, Hyperlipidemia, cardiac dysrrhythmia s/p AICD, rectal mass s/p resection, staghorn calculus presenting with failure to thrive.     Patient unable to eat or drink for last 3 days.  UA showed high WBC and a positive nitrate.     # UTI  # Failure to thrive    - Urine culture with a sensitive proteus R to FQ  - Continue ceftriaxone 1gm daily   - Tomorrow is the last day.   - Tmx, WBC, Creat all normal today     Will follow PRN.    Monserrat Barlow MD  Division of Infectious Diseases   Please call ID service at [phone] with any question.    50 minutes spent on total encounter assessing patient, examination, chart review, counseling and coordinating care by the attending physician/nurse/care manager.  
Neurology Follow up note    NOHEMY [id number]    HPI:  Nohemy Abreu is a 89 YO Female with past medical history of of Coronary Artery Disease s/p CABG, A fib on Xarelto, Hypertension, Hyperlipidemia, cardiac dysrrhythmia s/p AICD, rectal mass s/p resection, staghorn calculus presenting with failure to thrive.   Patient unable to eat or drink for last 3 days.  Her serum sodium was 150. and she was started on D5W.     	   (12 Apr 2024 19:37)      Interval History -no new events    Patient is seen, chart was reviewed and case was discussed with the treatment team.  Pt is not in any distress.   Lying on bed comfortably.       Vital Signs Last 24 Hrs  T(C): 36.8 (15 Apr 2024 11:15), Max: 37.2 (15 Apr 2024 05:09)  T(F): 98.2 (15 Apr 2024 11:15), Max: 98.9 (15 Apr 2024 05:09)  HR: 60 (15 Apr 2024 11:15) (58 - 65)  BP: 144/70 (15 Apr 2024 11:15) (132/70 - 181/73)  BP(mean): --  RR: 16 (15 Apr 2024 11:15) (16 - 17)  SpO2: 99% (15 Apr 2024 11:15) (93% - 99%)    Parameters below as of 15 Apr 2024 11:15  Patient On (Oxygen Delivery Method): room air                REVIEW OF SYSTEMS:  limited due poor communication  not in any distress        On Neurological Examination:    Mental Status - Pt is awake, oriented X1 Follows some simple commands       Speech -  Normal. Pt has dysarthria.    Cranial Nerves - Pupils 3 mm equal and reactive to light, extraocular eye movements intact. Pt has no visual field deficit.  Pt has left facial asymmetry. Facial sensation is intact.Tongue - is in midline.    Muscle tone - is reduced on left    Motor Exam - left UE-1/5; LLE 2/5  , LEFT  drift. No shaking or tremors.    Sensory Exam -. Pt withdraws all extremities equally on stimulation. No asymmetry seen.           Deep tendon Reflexes - 2 plus all over.          Neck Supple -  Yes.     MEDICATIONS  (STANDING):  cefTRIAXone   IVPB 1000 milliGRAM(s) IV Intermittent every 24 hours  dextrose 5%. 1000 milliLiter(s) (50 mL/Hr) IV Continuous <Continuous>  enoxaparin Injectable 40 milliGRAM(s) SubCutaneous every 24 hours  levothyroxine Injectable 88 MICROGram(s) IV Push at bedtime  metoprolol tartrate Injectable 5 milliGRAM(s) IV Push every 6 hours  pantoprazole  Injectable 40 milliGRAM(s) IV Push daily    MEDICATIONS  (PRN):  acetaminophen  Suppository .. 650 milliGRAM(s) Rectal every 6 hours PRN Temp greater or equal to 38C (100.4F), Mild Pain (1 - 3)        Allergies    amoxicillin (Unknown)    Intolerances        04-15    144  |  113<H>  |  10  ----------------------------<  104<H>  4.1   |  26  |  0.79    Ca    8.0<L>      15 Apr 2024 07:59    TPro  6.6  /  Alb  2.6<L>  /  TBili  0.9  /  DBili  x   /  AST  31  /  ALT  11<L>  /  AlkPhos  75  04-14      Hemoglobin A1C:     Vitamin B12     RADIOLOGY    < from: CT Head No Cont (04.12.24 @ 16:02) >    ACC: [id number] EXAM:  CT BRAIN   ORDERED BY:  CONCETTA SINGH     PROCEDURE DATE:  04/12/2024          INTERPRETATION:  EXAM: CT HEAD WITHOUT INTRAVENOUS CONTRAST    HISTORY: Left-sided hemiparesis, patient contracted    TECHNIQUE: Multiple axial images were obtained from the skull base to the   vertex. Sagittal and coronal reformatted images were obtained from the   axial data set. The images were reviewed in brain and bone windows.    COMPARISON: CT of the head June 23, 2021    FINDINGS:    No acute intracranial hemorrhage. Large territory of decreased   discrimination of the gray-white matter, encompassing the right parietal   lobe and right insula. Areas of decreased attenuation throughout the deep   and periventricular white matter, compatible with chronic small vessel   disease. 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   Parenchymal volume loss resulting in a mild ex vacuo dilatation   appearance of the bilateral ventricles. The extra-axial spaces and basal   cisterns are within normal limits. No midline shift or mass effect   present.    The cranial cervical junction is within normal limits. The sella is not   expanded. No depressed calvarial fracture. Mucous retention cyst versus   polyp in a right ethmoid air cell. The visualized mastoid air cells are   well aerated. The visualized orbits are status post cataract surgery.    IMPRESSION:    1.  No evidence of acute intracranial hemorrhage or midline shift.  2.  Large territory of decreased discrimination of the gray-white matter   in the right parietal lobe and right insula, highly concerning for acute   infarction. Recommend MRI of the brain for further evaluation.  3.  Chronic ischemic changes as discussed above.    Multiple attempts made to contact the ordering provider    CONCETTA SIERRA MD; Attending Radiologist  This document has been electronically signed. Apr 12 2024  4:12PM      ASSESSMENT AND PLAN:      presented with failure to thrive  noted to have left sided weakness-  cw subacute right mca infarct  high grade stenosis of right ICA  UTI    START ASA suppository   REPEAT CT HEAD-results noted   Physical therapy evaluation.  OOB to chair/ambulation with assistance only.  Pain is accessed and addressed..  Plan of care was discussed with family((daughter) Questions answered.  Would continue to follow.    
Date of Service 04-15-24 @ 14:51    Patient is a 89y old  Female who presents with a chief complaint of Failure to thrive and left side weakness noted by ED physician. (15 Apr 2024 13:25)      INTERVAL /OVERNIGHT EVENTS: arousable    MEDICATIONS  (STANDING):  aspirin Suppository 300 milliGRAM(s) Rectal daily  cefTRIAXone   IVPB 1000 milliGRAM(s) IV Intermittent every 24 hours  dextrose 5%. 1000 milliLiter(s) (50 mL/Hr) IV Continuous <Continuous>  enoxaparin Injectable 40 milliGRAM(s) SubCutaneous every 24 hours  levothyroxine Injectable 88 MICROGram(s) IV Push at bedtime  metoprolol tartrate Injectable 5 milliGRAM(s) IV Push every 6 hours  pantoprazole  Injectable 40 milliGRAM(s) IV Push daily    MEDICATIONS  (PRN):  acetaminophen  Suppository .. 650 milliGRAM(s) Rectal every 6 hours PRN Temp greater or equal to 38C (100.4F), Mild Pain (1 - 3)      Allergies    amoxicillin (Unknown)    Intolerances        REVIEW OF SYSTEMS:  unable to obtain    Vital Signs Last 24 Hrs  T(C): 36.8 (15 Apr 2024 11:15), Max: 37.2 (15 Apr 2024 05:09)  T(F): 98.2 (15 Apr 2024 11:15), Max: 98.9 (15 Apr 2024 05:09)  HR: 60 (15 Apr 2024 11:15) (58 - 65)  BP: 144/70 (15 Apr 2024 11:15) (132/70 - 181/73)  BP(mean): --  RR: 16 (15 Apr 2024 11:15) (16 - 17)  SpO2: 99% (15 Apr 2024 11:15) (93% - 99%)    Parameters below as of 15 Apr 2024 11:15  Patient On (Oxygen Delivery Method): room air        PHYSICAL EXAM:  GENERAL: NAD, well-groomed, well-developed  HEAD:  Atraumatic, Normocephalic  EYES: EOMI, PERRLA, conjunctiva and sclera clear  ENMT: No tonsillar erythema, exudates, or enlargement; Moist mucous membranes, Good dentition, No lesions  NECK: Supple, No JVD, Normal thyroid  NERVOUS SYSTEM:  Arousable; Motor Strength 5/5 B/L upper and lower extremities; DTRs 2+ intact and symmetric  CHEST/LUNG: Clear to auscultation bilaterally; No rales, rhonchi, wheezing, or rubs  HEART: Regular rate and rhythm; No murmurs, rubs, or gallops  ABDOMEN: Soft, Nontender, Nondistended; Bowel sounds present  EXTREMITIES:  2+ Peripheral Pulses, No clubbing, cyanosis, or edema  LYMPH: No lymphadenopathy noted  SKIN: No rashes or lesions    LABS:    15 Apr 2024 07:59    144    |  113    |  10     ----------------------------<  104    4.1     |  26     |  0.79     Ca    8.0        15 Apr 2024 07:59    TPro  6.6    /  Alb  2.6    /  TBili  0.9    /  DBili  x      /  AST  31     /  ALT  11     /  AlkPhos  75     14 Apr 2024 16:15      Urinalysis Basic - ( 15 Apr 2024 07:59 )    Color: x / Appearance: x / SG: x / pH: x  Gluc: 104 mg/dL / Ketone: x  / Bili: x / Urobili: x   Blood: x / Protein: x / Nitrite: x   Leuk Esterase: x / RBC: x / WBC x   Sq Epi: x / Non Sq Epi: x / Bacteria: x      CAPILLARY BLOOD GLUCOSE          RADIOLOGY & ADDITIONAL TESTS:    Notes Reviewed:  [x ] YES  [ ] NO    Care Discussed with Consultants/Other Providers [x ] YES  [ ] NO
Patient is a 89y old  Female who presents with a chief complaint of Failure to thrive and left side weakness noted by ED physician. (17 Apr 2024 08:05)  dw s&s- failed swallow eval, rec'd NPO      INTERVAL HPI/OVERNIGHT EVENTS:  T(C): 36.6 (04-17-24 @ 11:24), Max: 36.8 (04-17-24 @ 04:59)  HR: 62 (04-17-24 @ 12:53) (60 - 86)  BP: 160/72 (04-17-24 @ 12:53) (144/80 - 178/82)  RR: 18 (04-17-24 @ 11:24) (18 - 18)  SpO2: 97% (04-17-24 @ 11:24) (93% - 97%)  Wt(kg): --  I&O's Summary    16 Apr 2024 07:01  -  17 Apr 2024 07:00  --------------------------------------------------------  IN: 840 mL / OUT: 850 mL / NET: -10 mL        LABS:              CAPILLARY BLOOD GLUCOSE                MEDICATIONS  (STANDING):  aspirin Suppository 300 milliGRAM(s) Rectal daily  dextrose 5%. 1000 milliLiter(s) (50 mL/Hr) IV Continuous <Continuous>  enoxaparin Injectable 40 milliGRAM(s) SubCutaneous every 24 hours  levothyroxine Injectable 88 MICROGram(s) IV Push at bedtime  metoprolol tartrate Injectable 5 milliGRAM(s) IV Push every 6 hours    MEDICATIONS  (PRN):  acetaminophen  Suppository .. 650 milliGRAM(s) Rectal every 6 hours PRN Temp greater or equal to 38C (100.4F), Mild Pain (1 - 3)      REVIEW OF SYSTEMS:  not obtainable  RADIOLOGY & ADDITIONAL TESTS:    Imaging Personally Reviewed:  [x ] YES  [ ] NO    Consultant(s) Notes Reviewed:  [ x] YES  [ ] NO    PHYSICAL EXAM:  GENERAL: NAD, well-groomed, well-developed  HEAD:  Atraumatic, Normocephalic  EYES: EOMI, PERRLA, conjunctiva and sclera clear  ENMT: No tonsillar erythema, exudates, or enlargement; Moist mucous membranes, Good dentition, No lesions  NECK: Supple, No JVD, Normal thyroid  NERVOUS SYSTEM:  Alert & Oriented X1, generalized weakness  CHEST/LUNG: Clear to percussion bilaterally; No rales, rhonchi, wheezing, or rubs  HEART: Regular rate and rhythm; No murmurs, rubs, or gallops  ABDOMEN: Soft, Nontender, Nondistended; Bowel sounds present  EXTREMITIES:  2+ Peripheral Pulses, No clubbing, cyanosis, or edema  LYMPH: No lymphadenopathy noted  SKIN: No rashes or lesions    Care Discussed with Consultants/Other Providers [x ] YES  [ ] NO    advance care planning and advance directives discussed, including but not limited to long term care planning, and all forms reviewed [x]YES  [ ]NO
Date of Service 04-14-24 @ 18:46    Patient is a 89y old  Female who presents with a chief complaint of Failure to thrive and left side weakness noted by ED physician. (14 Apr 2024 14:41)      INTERVAL /OVERNIGHT EVENTS: more alert and awake    MEDICATIONS  (STANDING):  cefTRIAXone   IVPB 1000 milliGRAM(s) IV Intermittent every 24 hours  dextrose 5%. 1000 milliLiter(s) (50 mL/Hr) IV Continuous <Continuous>  levothyroxine Injectable 88 MICROGram(s) IV Push at bedtime  metoprolol tartrate Injectable 5 milliGRAM(s) IV Push every 6 hours  pantoprazole  Injectable 40 milliGRAM(s) IV Push daily    MEDICATIONS  (PRN):  acetaminophen  Suppository .. 650 milliGRAM(s) Rectal every 6 hours PRN Temp greater or equal to 38C (100.4F), Mild Pain (1 - 3)      Allergies    amoxicillin (Unknown)    Intolerances        REVIEW OF SYSTEMS:  denies    Vital Signs Last 24 Hrs  T(C): 36.4 (14 Apr 2024 18:15), Max: 36.9 (14 Apr 2024 04:48)  T(F): 97.5 (14 Apr 2024 18:15), Max: 98.4 (14 Apr 2024 04:48)  HR: 58 (14 Apr 2024 18:15) (58 - 60)  BP: 132/70 (14 Apr 2024 18:15) (132/70 - 170/95)  BP(mean): --  RR: 17 (14 Apr 2024 18:15) (17 - 17)  SpO2: 99% (14 Apr 2024 18:15) (97% - 99%)    Parameters below as of 14 Apr 2024 18:15  Patient On (Oxygen Delivery Method): room air        PHYSICAL EXAM:  GENERAL: NAD, well-groomed, well-developed  HEAD:  Atraumatic, Normocephalic  EYES: EOMI, PERRLA, conjunctiva and sclera clear  ENMT: No tonsillar erythema, exudates, or enlargement; Moist mucous membranes, Good dentition, No lesions  NECK: Supple, No JVD, Normal thyroid  NERVOUS SYSTEM:  Arousable; Motor Strength 5/5 B/L upper and lower extremities; DTRs 2+ intact and symmetric  CHEST/LUNG: Clear to auscultation bilaterally; No rales, rhonchi, wheezing, or rubs  HEART: Regular rate and rhythm; No murmurs, rubs, or gallops  ABDOMEN: Soft, Nontender, Nondistended; Bowel sounds present  EXTREMITIES:  2+ Peripheral Pulses, No clubbing, cyanosis, or edema  LYMPH: No lymphadenopathy noted  SKIN: No rashes or lesions    LABS:    14 Apr 2024 16:15    143    |  114    |  13     ----------------------------<  103    4.6     |  20     |  0.83     Ca    8.3        14 Apr 2024 16:15    TPro  6.6    /  Alb  2.6    /  TBili  0.9    /  DBili  x      /  AST  31     /  ALT  11     /  AlkPhos  75     14 Apr 2024 16:15      Urinalysis Basic - ( 14 Apr 2024 16:15 )    Color: x / Appearance: x / SG: x / pH: x  Gluc: 103 mg/dL / Ketone: x  / Bili: x / Urobili: x   Blood: x / Protein: x / Nitrite: x   Leuk Esterase: x / RBC: x / WBC x   Sq Epi: x / Non Sq Epi: x / Bacteria: x      CAPILLARY BLOOD GLUCOSE          RADIOLOGY & ADDITIONAL TESTS:    Notes Reviewed:  [x ] YES  [ ] NO    Care Discussed with Consultants/Other Providers [x ] YES  [ ] NO
Date of Service 04-16-24 @ 15:52    Patient is a 89y old  Female who presents with a chief complaint of Failure to thrive and left side weakness noted by ED physician. (16 Apr 2024 15:06)      INTERVAL /OVERNIGHT EVENTS: more alert and awake    MEDICATIONS  (STANDING):  aspirin Suppository 300 milliGRAM(s) Rectal daily  cefTRIAXone   IVPB 1000 milliGRAM(s) IV Intermittent every 24 hours  dextrose 5%. 1000 milliLiter(s) (50 mL/Hr) IV Continuous <Continuous>  enoxaparin Injectable 40 milliGRAM(s) SubCutaneous every 24 hours  levothyroxine Injectable 88 MICROGram(s) IV Push at bedtime  metoprolol tartrate Injectable 5 milliGRAM(s) IV Push every 6 hours    MEDICATIONS  (PRN):  acetaminophen  Suppository .. 650 milliGRAM(s) Rectal every 6 hours PRN Temp greater or equal to 38C (100.4F), Mild Pain (1 - 3)      Allergies    amoxicillin (Unknown)    Intolerances        REVIEW OF SYSTEMS:  CONSTITUTIONAL: No fever, weight loss, or fatigue  EYES: No eye pain, visual disturbances, or discharge  ENMT:  No difficulty hearing, tinnitus, vertigo; No sinus or throat pain  NECK: No pain or stiffness  RESPIRATORY: No cough, wheezing, chills or hemoptysis; No shortness of breath  CARDIOVASCULAR: No chest pain, palpitations, dizziness, or leg swelling  GASTROINTESTINAL: No abdominal or epigastric pain. No nausea, vomiting, or hematemesis; No diarrhea or constipation. No melena or hematochezia.  GENITOURINARY: No dysuria, frequency, hematuria, or incontinence  NEUROLOGICAL: No headaches, memory loss, loss of strength, numbness, or tremors  SKIN: No itching, burning, rashes, or lesions   LYMPH NODES: No enlarged glands  ENDOCRINE: No heat or cold intolerance; No hair loss; No polydipsia or polyuria  MUSCULOSKELETAL: No joint pain or swelling; No muscle, back, or extremity pain  PSYCHIATRIC: No depression, anxiety, mood swings, or difficulty sleeping  HEME/LYMPH: No easy bruising, or bleeding gums  ALLERGY AND IMMUNOLOGIC: No hives or eczema    Vital Signs Last 24 Hrs  T(C): 36.7 (16 Apr 2024 11:44), Max: 37 (16 Apr 2024 04:43)  T(F): 98 (16 Apr 2024 11:44), Max: 98.6 (16 Apr 2024 04:43)  HR: 62 (16 Apr 2024 11:44) (60 - 62)  BP: 160/79 (16 Apr 2024 11:44) (123/73 - 172/73)  BP(mean): --  RR: 18 (16 Apr 2024 11:44) (17 - 18)  SpO2: 97% (16 Apr 2024 11:44) (95% - 100%)    Parameters below as of 16 Apr 2024 11:44  Patient On (Oxygen Delivery Method): room air        PHYSICAL EXAM:  GENERAL: NAD, well-groomed, well-developed  HEAD:  Atraumatic, Normocephalic  EYES: EOMI, PERRLA, conjunctiva and sclera clear  ENMT: No tonsillar erythema, exudates, or enlargement; Moist mucous membranes, Good dentition, No lesions  NECK: Supple, No JVD, Normal thyroid  NERVOUS SYSTEM:  Arousable; Motor Strength 5/5 B/L upper and lower extremities; DTRs 2+ intact and symmetric  CHEST/LUNG: Clear to auscultation bilaterally; No rales, rhonchi, wheezing, or rubs  HEART: Regular rate and rhythm; No murmurs, rubs, or gallops  ABDOMEN: Soft, Nontender, Nondistended; Bowel sounds present  EXTREMITIES:  2+ Peripheral Pulses, No clubbing, cyanosis, or edema  LYMPH: No lymphadenopathy noted  SKIN: No rashes or lesions    LABS:      Ca    8.0        15 Apr 2024 07:59        Urinalysis Basic - ( 15 Apr 2024 07:59 )    Color: x / Appearance: x / SG: x / pH: x  Gluc: 104 mg/dL / Ketone: x  / Bili: x / Urobili: x   Blood: x / Protein: x / Nitrite: x   Leuk Esterase: x / RBC: x / WBC x   Sq Epi: x / Non Sq Epi: x / Bacteria: x      CAPILLARY BLOOD GLUCOSE          RADIOLOGY & ADDITIONAL TESTS:    Notes Reviewed:  [x ] YES  [ ] NO    Care Discussed with Consultants/Other Providers [x ] YES  [ ] NO
Date of Service 04-13-24 @ 19:09    Patient is a 89y old  Female who presents with a chief complaint of ams     (13 Apr 2024 14:13)      INTERVAL /OVERNIGHT EVENTS: not offering any complaints    MEDICATIONS  (STANDING):  cefTRIAXone   IVPB 1000 milliGRAM(s) IV Intermittent every 24 hours  dextrose 5%. 1000 milliLiter(s) (50 mL/Hr) IV Continuous <Continuous>  levothyroxine Injectable 88 MICROGram(s) IV Push at bedtime  metoprolol tartrate Injectable 5 milliGRAM(s) IV Push every 6 hours  pantoprazole  Injectable 40 milliGRAM(s) IV Push daily  potassium phosphate / sodium phosphate Powder (PHOS-NaK) 1 Packet(s) Oral three times a day  rivaroxaban 15 milliGRAM(s) Oral with dinner    MEDICATIONS  (PRN):  acetaminophen     Tablet .. 650 milliGRAM(s) Oral every 6 hours PRN Temp greater or equal to 38C (100.4F)      Allergies    amoxicillin (Unknown)    Intolerances        REVIEW OF SYSTEMS:  denies    Vital Signs Last 24 Hrs  T(C): 36.4 (13 Apr 2024 17:39), Max: 36.9 (12 Apr 2024 23:29)  T(F): 97.6 (13 Apr 2024 17:39), Max: 98.5 (12 Apr 2024 23:29)  HR: 68 (13 Apr 2024 17:55) (60 - 75)  BP: 154/78 (13 Apr 2024 17:55) (139/68 - 177/76)  BP(mean): 110 (12 Apr 2024 23:29) (110 - 110)  RR: 18 (13 Apr 2024 17:39) (18 - 18)  SpO2: 95% (13 Apr 2024 17:39) (92% - 96%)    Parameters below as of 13 Apr 2024 17:39  Patient On (Oxygen Delivery Method): room air        PHYSICAL EXAM:  GENERAL: NAD, well-groomed, well-developed  HEAD:  Atraumatic, Normocephalic  EYES: EOMI, PERRLA, conjunctiva and sclera clear  ENMT: No tonsillar erythema, exudates, or enlargement; Moist mucous membranes, Good dentition, No lesions  NECK: Supple, No JVD, Normal thyroid  NERVOUS SYSTEM:  ; Motor Strength 5/5 B/L upper and lower extremities; DTRs 2+ intact and symmetric  CHEST/LUNG: Clear to auscultation bilaterally; No rales, rhonchi, wheezing, or rubs  HEART: Regular rate and rhythm; No murmurs, rubs, or gallops  ABDOMEN: Soft, Nontender, Nondistended; Bowel sounds present  EXTREMITIES:  2+ Peripheral Pulses, No clubbing, cyanosis, or edema  LYMPH: No lymphadenopathy noted  SKIN: No rashes or lesions    LABS:                        10.0   8.86  )-----------( 141      ( 13 Apr 2024 07:15 )             32.6     13 Apr 2024 07:15    143    |  113    |  23     ----------------------------<  99     3.5     |  24     |  0.87     Ca    8.2        13 Apr 2024 07:15  Phos  2.1       13 Apr 2024 07:15      PT/INR - ( 12 Apr 2024 15:15 )   PT: 12.1 sec;   INR: 1.04 ratio         PTT - ( 12 Apr 2024 15:15 )  PTT:20.8 sec  Urinalysis Basic - ( 13 Apr 2024 07:15 )    Color: x / Appearance: x / SG: x / pH: x  Gluc: 99 mg/dL / Ketone: x  / Bili: x / Urobili: x   Blood: x / Protein: x / Nitrite: x   Leuk Esterase: x / RBC: x / WBC x   Sq Epi: x / Non Sq Epi: x / Bacteria: x      CAPILLARY BLOOD GLUCOSE          RADIOLOGY & ADDITIONAL TESTS:    Notes Reviewed:  [x ] YES  [ ] NO    Care Discussed with Consultants/Other Providers x] YES  [ ] NO

## 2024-04-18 NOTE — DISCHARGE NOTE NURSING/CASE MANAGEMENT/SOCIAL WORK - NSDCPEFALRISK_GEN_ALL_CORE
For information on Fall & Injury Prevention, visit: [email or web address] OR  [email or web address] OR  https://www.cdc.gov/steadi/patient.html

## 2024-04-18 NOTE — SOCIAL WORK PROGRESS NOTE - NSSWPROGRESSNOTE_GEN_ALL_CORE
Per medical team, pt cleared for dc to [place] today on comfort care measures. Pt to be transferred to [place] today at 4:30pm via [facility] ambulance. Pt made pt son Nicolás and dtr Emili aware of dc/transport; both in agreement. SW will remain available as needed.

## 2024-04-18 NOTE — PROGRESS NOTE ADULT - REASON FOR ADMISSION
Failure to thrive and left side weakness noted by ED physician.
Failure to thrive and left side weakness

## 2024-04-18 NOTE — DISCHARGE NOTE NURSING/CASE MANAGEMENT/SOCIAL WORK - PATIENT PORTAL LINK FT
You can access the FollowMyHealth Patient Portal offered by [facility] by registering at the following website: [email or web address]. By joining [facility]’s FollowMyHealth portal, you will also be able to view your health information using other applications (apps) compatible with our system.

## 2024-04-30 NOTE — H&P ADULT - PROBLEM/PLAN-7
Physical Therapy Visit    Visit Type: Daily Treatment Note  Visit: 2  Referring Provider: Angie Pearson MD  Medical Diagnosis (from order): M25.561 - Acute pain of right knee     SUBJECTIVE                                                                                                               Patient has been off of Naproxen for 4-5 days, does feel a bit of pain but nowhere as bad as it was. Ices occasionally.   Functional Change: Compliance with Home Exercise Program       OBJECTIVE                                                                                                                                     Treatment     Therapeutic Exercise  Performed:  Nustep warm up level 3, seat 11, arms 12 x 5 minutes with subjective information gathering  -6 inch step:      -forward step up x 10 repetitions no hands      -lateral step up x 10 repetitions       -step down x 10 repetitions  Wall squat 30 seconds 3 repetitions   Leg press 95 lbs bilateral lower extremitiesx 15 repetitions       Unilateral right 50 lbs x 15   Standing calf stretch 30 seconds bilaterally  Standing quad stretch 30 seconds x 2 using chair, bilaterally ; issued as prn exercise  - Side Stepping with Resistance at Ankles GREEN band  -15 repetitions  -diagonal walk with green band at ankles forward and reverse 15 feet x 2; issued for Home Exercise Program   - Standing Hip Extension with Resistance at Ankles and Unilateral Counter Support  - verbal review  -lateral walk in partial squat 20 feet x 2 bilaterally  - Seated Hamstring Stretch  -  2 reps - 20 hold  Seated piriformis internal rotation stretch 30 second x 2 bilaterally     Neuromuscular Re-Education  Squat mechanics eduction/review  single leg stance right lower extremity static 30 seconds x 2  Right single leg stance with left leg forward/backward sway  Tandem gait 25 feet x 2    Writer verbally educated and received verbal consent for hand placement, positioning of patient, and  techniques to be performed today from patient for clothing adjustments for techniques, hand placement and palpation for techniques and therapist position for techniques as described above and how they are pertinent to the patient's plan of care.  Home Exercise Program  Access Code: [email or web address]  URL: [email or web address]  Date: 05/01/2024  Prepared by: Merline Santiago    Exercises  - Straight Leg Raise with External Rotation  - 1 x daily - 1-2 sets - 15 reps  - Side Stepping with Resistance at Ankles  - 1 x daily - 1-2 sets - 15 reps  - Standing Hip Extension with Resistance at Ankles and Unilateral Counter Support  - 1 x daily - 1-2 sets - 15 reps  - Seated Hamstring Stretch  - 1 x daily - 3 reps - 20 hold  - Forward Monster Walks  - 1 x daily  - Quadricep Stretch with Chair and Counter Support  - 1 x daily - 2-3 reps - 20 hold      ASSESSMENT                                                                                                            Focus on quad strengthening and static, dynamic balance today. No reports of increased knee pain with exercises performed in clinic. Advanced Home Exercise Program as indicated.     PLAN                                                                                                                           Suggestions for next session as indicated: Progress per plan of care       Therapy procedure time and total treatment time can be found documented on the Time Entry flowsheet     DISPLAY PLAN FREE TEXT

## 2024-05-10 ENCOUNTER — APPOINTMENT (OUTPATIENT)
Dept: CARDIOLOGY | Facility: CLINIC | Age: 89
End: 2024-05-10

## 2024-05-16 NOTE — DISCHARGE NOTE ADULT - NSFTFHOMEOTHERFT_GEN_ALL_CORE
Maintain iv fluids   Recent Labs   Lab 05/16/24  [id number]   *     Nephrology consulted   Follow up urine osm, serum Osmo, urine sodium   S/P TAVR

## 2024-08-26 NOTE — PHYSICAL THERAPY INITIAL EVALUATION ADULT - AMBULATION SKILLS, REHAB EVAL
"----- Message from Bettina YEE sent at 8/26/2024 11:01 AM EDT -----  Regarding: FW: Medication Valtrex 1GM  Contact: [phone]    ----- Message -----  From: Hilton Roberto \"Werner\"  Sent: 8/26/2024   8:37 AM EDT  To: [facility]  Subject: Medication Valtrex 1GM                           I had a RX for Valtrex for fever blisters from Dr. Stas Clark in March 2021, I have just now used all of that Rx. I would like to get another one to have on hand when I start to get a fever blister. If that is possible could I get the RX sent to my pharmacy. Thanks Hilton Roberto  "
Valtrex sent   
independent/needs device